# Patient Record
Sex: MALE | Race: OTHER | HISPANIC OR LATINO | Employment: FULL TIME | URBAN - METROPOLITAN AREA
[De-identification: names, ages, dates, MRNs, and addresses within clinical notes are randomized per-mention and may not be internally consistent; named-entity substitution may affect disease eponyms.]

---

## 2017-01-20 ENCOUNTER — TRANSCRIBE ORDERS (OUTPATIENT)
Dept: LAB | Facility: HOSPITAL | Age: 42
End: 2017-01-20

## 2017-01-20 ENCOUNTER — APPOINTMENT (OUTPATIENT)
Dept: LAB | Facility: HOSPITAL | Age: 42
End: 2017-01-20
Attending: INTERNAL MEDICINE
Payer: COMMERCIAL

## 2017-01-20 DIAGNOSIS — N18.30 CHRONIC KIDNEY DISEASE, STAGE III (MODERATE) (HCC): ICD-10-CM

## 2017-01-20 LAB
ANION GAP SERPL CALCULATED.3IONS-SCNC: 7 MMOL/L (ref 4–13)
BUN SERPL-MCNC: 26 MG/DL (ref 5–25)
CALCIUM SERPL-MCNC: 9 MG/DL (ref 8.3–10.1)
CHLORIDE SERPL-SCNC: 105 MMOL/L (ref 100–108)
CO2 SERPL-SCNC: 28 MMOL/L (ref 21–32)
CREAT SERPL-MCNC: 1.18 MG/DL (ref 0.6–1.3)
GFR SERPL CREATININE-BSD FRML MDRD: >60 ML/MIN/1.73SQ M
GLUCOSE SERPL-MCNC: 130 MG/DL (ref 65–140)
POTASSIUM SERPL-SCNC: 3.6 MMOL/L (ref 3.5–5.3)
SODIUM SERPL-SCNC: 140 MMOL/L (ref 136–145)

## 2017-01-20 PROCEDURE — 36415 COLL VENOUS BLD VENIPUNCTURE: CPT

## 2017-01-20 PROCEDURE — 80048 BASIC METABOLIC PNL TOTAL CA: CPT

## 2017-01-25 ENCOUNTER — ALLSCRIPTS OFFICE VISIT (OUTPATIENT)
Dept: OTHER | Facility: OTHER | Age: 42
End: 2017-01-25

## 2017-02-08 ENCOUNTER — ALLSCRIPTS OFFICE VISIT (OUTPATIENT)
Dept: OTHER | Facility: OTHER | Age: 42
End: 2017-02-08

## 2017-02-13 ENCOUNTER — GENERIC CONVERSION - ENCOUNTER (OUTPATIENT)
Dept: OTHER | Facility: OTHER | Age: 42
End: 2017-02-13

## 2017-02-22 ENCOUNTER — ALLSCRIPTS OFFICE VISIT (OUTPATIENT)
Dept: OTHER | Facility: OTHER | Age: 42
End: 2017-02-22

## 2017-03-07 ENCOUNTER — TRANSCRIBE ORDERS (OUTPATIENT)
Dept: LAB | Facility: HOSPITAL | Age: 42
End: 2017-03-07

## 2017-03-07 ENCOUNTER — APPOINTMENT (OUTPATIENT)
Dept: LAB | Facility: HOSPITAL | Age: 42
End: 2017-03-07
Attending: INTERNAL MEDICINE
Payer: COMMERCIAL

## 2017-03-07 DIAGNOSIS — R73.01 IMPAIRED FASTING GLUCOSE: ICD-10-CM

## 2017-03-07 DIAGNOSIS — E78.5 HYPERLIPIDEMIA, UNSPECIFIED HYPERLIPIDEMIA TYPE: ICD-10-CM

## 2017-03-07 DIAGNOSIS — E66.9 OBESITY, UNSPECIFIED: ICD-10-CM

## 2017-03-07 DIAGNOSIS — E78.5 HYPERLIPIDEMIA, UNSPECIFIED HYPERLIPIDEMIA TYPE: Primary | ICD-10-CM

## 2017-03-07 LAB
ANION GAP SERPL CALCULATED.3IONS-SCNC: 8 MMOL/L (ref 4–13)
BUN SERPL-MCNC: 18 MG/DL (ref 5–25)
CALCIUM SERPL-MCNC: 9.2 MG/DL (ref 8.3–10.1)
CHLORIDE SERPL-SCNC: 106 MMOL/L (ref 100–108)
CO2 SERPL-SCNC: 29 MMOL/L (ref 21–32)
CREAT SERPL-MCNC: 1.06 MG/DL (ref 0.6–1.3)
GFR SERPL CREATININE-BSD FRML MDRD: >60 ML/MIN/1.73SQ M
GLUCOSE SERPL-MCNC: 93 MG/DL (ref 65–140)
MAGNESIUM SERPL-MCNC: 2.3 MG/DL (ref 1.6–2.6)
POTASSIUM SERPL-SCNC: 3.6 MMOL/L (ref 3.5–5.3)
SODIUM SERPL-SCNC: 143 MMOL/L (ref 136–145)

## 2017-03-07 PROCEDURE — 83735 ASSAY OF MAGNESIUM: CPT

## 2017-03-07 PROCEDURE — 36415 COLL VENOUS BLD VENIPUNCTURE: CPT

## 2017-03-07 PROCEDURE — 80048 BASIC METABOLIC PNL TOTAL CA: CPT

## 2017-03-08 ENCOUNTER — GENERIC CONVERSION - ENCOUNTER (OUTPATIENT)
Dept: OTHER | Facility: OTHER | Age: 42
End: 2017-03-08

## 2017-03-08 ENCOUNTER — ALLSCRIPTS OFFICE VISIT (OUTPATIENT)
Dept: OTHER | Facility: OTHER | Age: 42
End: 2017-03-08

## 2017-03-22 ENCOUNTER — ALLSCRIPTS OFFICE VISIT (OUTPATIENT)
Dept: OTHER | Facility: OTHER | Age: 42
End: 2017-03-22

## 2017-03-26 ENCOUNTER — APPOINTMENT (OUTPATIENT)
Dept: LAB | Age: 42
End: 2017-03-26
Payer: COMMERCIAL

## 2017-03-26 ENCOUNTER — TRANSCRIBE ORDERS (OUTPATIENT)
Dept: ADMINISTRATIVE | Age: 42
End: 2017-03-26

## 2017-03-26 DIAGNOSIS — Z00.00 ROUTINE ADULT HEALTH MAINTENANCE: ICD-10-CM

## 2017-03-26 DIAGNOSIS — Z00.00 ROUTINE ADULT HEALTH MAINTENANCE: Primary | ICD-10-CM

## 2017-03-26 LAB
ALBUMIN SERPL BCP-MCNC: 4.1 G/DL (ref 3.5–5)
ALP SERPL-CCNC: 82 U/L (ref 46–116)
ALT SERPL W P-5'-P-CCNC: 69 U/L (ref 12–78)
ANION GAP SERPL CALCULATED.3IONS-SCNC: 8 MMOL/L (ref 4–13)
AST SERPL W P-5'-P-CCNC: 22 U/L (ref 5–45)
BASOPHILS # BLD AUTO: 0.13 THOUSANDS/ΜL (ref 0–0.1)
BASOPHILS NFR BLD AUTO: 2 % (ref 0–1)
BILIRUB SERPL-MCNC: 0.36 MG/DL (ref 0.2–1)
BUN SERPL-MCNC: 15 MG/DL (ref 5–25)
CALCIUM SERPL-MCNC: 9.1 MG/DL (ref 8.3–10.1)
CHLORIDE SERPL-SCNC: 106 MMOL/L (ref 100–108)
CO2 SERPL-SCNC: 30 MMOL/L (ref 21–32)
CORTIS AM PEAK SERPL-MCNC: 18 UG/ML (ref 4.2–22.4)
CREAT SERPL-MCNC: 0.91 MG/DL (ref 0.6–1.3)
CREAT UR-MCNC: 71.7 MG/DL
EOSINOPHIL # BLD AUTO: 0.51 THOUSAND/ΜL (ref 0–0.61)
EOSINOPHIL NFR BLD AUTO: 6 % (ref 0–6)
ERYTHROCYTE [DISTWIDTH] IN BLOOD BY AUTOMATED COUNT: 14 % (ref 11.6–15.1)
EST. AVERAGE GLUCOSE BLD GHB EST-MCNC: 120 MG/DL
GFR SERPL CREATININE-BSD FRML MDRD: >60 ML/MIN/1.73SQ M
GLUCOSE P FAST SERPL-MCNC: 81 MG/DL (ref 65–99)
HBA1C MFR BLD: 5.8 % (ref 4.2–6.3)
HCT VFR BLD AUTO: 46.9 % (ref 36.5–49.3)
HGB BLD-MCNC: 15.4 G/DL (ref 12–17)
LYMPHOCYTES # BLD AUTO: 1.55 THOUSANDS/ΜL (ref 0.6–4.47)
LYMPHOCYTES NFR BLD AUTO: 19 % (ref 14–44)
MAGNESIUM SERPL-MCNC: 2.4 MG/DL (ref 1.6–2.6)
MCH RBC QN AUTO: 28.3 PG (ref 26.8–34.3)
MCHC RBC AUTO-ENTMCNC: 32.8 G/DL (ref 31.4–37.4)
MCV RBC AUTO: 86 FL (ref 82–98)
MICROALBUMIN UR-MCNC: 9.2 MG/L (ref 0–20)
MICROALBUMIN/CREAT 24H UR: 13 MG/G CREATININE (ref 0–30)
MONOCYTES # BLD AUTO: 0.94 THOUSAND/ΜL (ref 0.17–1.22)
MONOCYTES NFR BLD AUTO: 12 % (ref 4–12)
NEUTROPHILS # BLD AUTO: 4.88 THOUSANDS/ΜL (ref 1.85–7.62)
NEUTS SEG NFR BLD AUTO: 61 % (ref 43–75)
NRBC BLD AUTO-RTO: 0 /100 WBCS
PHOSPHATE SERPL-MCNC: 3.4 MG/DL (ref 2.7–4.5)
PLATELET # BLD AUTO: 272 THOUSANDS/UL (ref 149–390)
PMV BLD AUTO: 9.9 FL (ref 8.9–12.7)
POTASSIUM SERPL-SCNC: 3.5 MMOL/L (ref 3.5–5.3)
PROLACTIN SERPL-MCNC: 36.7 NG/ML (ref 2.5–17.4)
PROT SERPL-MCNC: 7.5 G/DL (ref 6.4–8.2)
RBC # BLD AUTO: 5.45 MILLION/UL (ref 3.88–5.62)
SODIUM SERPL-SCNC: 144 MMOL/L (ref 136–145)
T3FREE SERPL-MCNC: 2.18 PG/ML (ref 2.3–4.2)
T4 FREE SERPL-MCNC: 0.82 NG/DL (ref 0.76–1.46)
TSH SERPL DL<=0.05 MIU/L-ACNC: 0.83 UIU/ML (ref 0.36–3.74)
WBC # BLD AUTO: 8.03 THOUSAND/UL (ref 4.31–10.16)

## 2017-03-26 PROCEDURE — 84100 ASSAY OF PHOSPHORUS: CPT

## 2017-03-26 PROCEDURE — 83036 HEMOGLOBIN GLYCOSYLATED A1C: CPT

## 2017-03-26 PROCEDURE — 82024 ASSAY OF ACTH: CPT

## 2017-03-26 PROCEDURE — 84439 ASSAY OF FREE THYROXINE: CPT

## 2017-03-26 PROCEDURE — 82043 UR ALBUMIN QUANTITATIVE: CPT | Performed by: SPECIALIST

## 2017-03-26 PROCEDURE — 84146 ASSAY OF PROLACTIN: CPT

## 2017-03-26 PROCEDURE — 84481 FREE ASSAY (FT-3): CPT

## 2017-03-26 PROCEDURE — 82533 TOTAL CORTISOL: CPT

## 2017-03-26 PROCEDURE — 36415 COLL VENOUS BLD VENIPUNCTURE: CPT

## 2017-03-26 PROCEDURE — 84443 ASSAY THYROID STIM HORMONE: CPT

## 2017-03-26 PROCEDURE — 83735 ASSAY OF MAGNESIUM: CPT

## 2017-03-26 PROCEDURE — 85025 COMPLETE CBC W/AUTO DIFF WBC: CPT

## 2017-03-26 PROCEDURE — 82570 ASSAY OF URINE CREATININE: CPT | Performed by: SPECIALIST

## 2017-03-26 PROCEDURE — 80053 COMPREHEN METABOLIC PANEL: CPT

## 2017-03-28 LAB — ACTH PLAS-MCNC: 2 PG/ML (ref 7.2–63.3)

## 2017-04-05 ENCOUNTER — GENERIC CONVERSION - ENCOUNTER (OUTPATIENT)
Dept: OTHER | Facility: OTHER | Age: 42
End: 2017-04-05

## 2017-05-04 ENCOUNTER — ALLSCRIPTS OFFICE VISIT (OUTPATIENT)
Dept: OTHER | Facility: OTHER | Age: 42
End: 2017-05-04

## 2017-05-17 ENCOUNTER — TRANSCRIBE ORDERS (OUTPATIENT)
Dept: ADMINISTRATIVE | Age: 42
End: 2017-05-17

## 2017-05-17 ENCOUNTER — APPOINTMENT (OUTPATIENT)
Dept: LAB | Age: 42
End: 2017-05-17
Payer: COMMERCIAL

## 2017-05-17 DIAGNOSIS — E27.8 ABNORMALITY OF CORTISOL-BINDING GLOBULIN (HCC): ICD-10-CM

## 2017-05-17 DIAGNOSIS — E27.8 ABNORMALITY OF CORTISOL-BINDING GLOBULIN (HCC): Primary | ICD-10-CM

## 2017-05-17 DIAGNOSIS — N18.30 CHRONIC KIDNEY DISEASE, STAGE III (MODERATE) (HCC): ICD-10-CM

## 2017-05-17 LAB
ANION GAP SERPL CALCULATED.3IONS-SCNC: 6 MMOL/L (ref 4–13)
BUN SERPL-MCNC: 17 MG/DL (ref 5–25)
CALCIUM SERPL-MCNC: 9 MG/DL (ref 8.3–10.1)
CHLORIDE SERPL-SCNC: 105 MMOL/L (ref 100–108)
CO2 SERPL-SCNC: 31 MMOL/L (ref 21–32)
CREAT SERPL-MCNC: 0.89 MG/DL (ref 0.6–1.3)
GFR SERPL CREATININE-BSD FRML MDRD: >60 ML/MIN/1.73SQ M
GLUCOSE P FAST SERPL-MCNC: 93 MG/DL (ref 65–99)
POTASSIUM SERPL-SCNC: 3.6 MMOL/L (ref 3.5–5.3)
SODIUM SERPL-SCNC: 142 MMOL/L (ref 136–145)
TESTOST SERPL-MCNC: 478.2 NG/DL (ref 241–827)

## 2017-05-17 PROCEDURE — 84133 ASSAY OF URINE POTASSIUM: CPT

## 2017-05-17 PROCEDURE — 82507 ASSAY OF CITRATE: CPT

## 2017-05-17 PROCEDURE — 84403 ASSAY OF TOTAL TESTOSTERONE: CPT

## 2017-05-17 PROCEDURE — 80048 BASIC METABOLIC PNL TOTAL CA: CPT

## 2017-05-17 PROCEDURE — 81003 URINALYSIS AUTO W/O SCOPE: CPT

## 2017-05-17 PROCEDURE — 82436 ASSAY OF URINE CHLORIDE: CPT

## 2017-05-17 PROCEDURE — 82340 ASSAY OF CALCIUM IN URINE: CPT

## 2017-05-17 PROCEDURE — 83935 ASSAY OF URINE OSMOLALITY: CPT

## 2017-05-17 PROCEDURE — 82140 ASSAY OF AMMONIA: CPT

## 2017-05-17 PROCEDURE — 82530 CORTISOL FREE: CPT

## 2017-05-17 PROCEDURE — 84300 ASSAY OF URINE SODIUM: CPT

## 2017-05-17 PROCEDURE — 84392 ASSAY OF URINE SULFATE: CPT

## 2017-05-17 PROCEDURE — 84560 ASSAY OF URINE/URIC ACID: CPT

## 2017-05-17 PROCEDURE — 84105 ASSAY OF URINE PHOSPHORUS: CPT

## 2017-05-17 PROCEDURE — 83735 ASSAY OF MAGNESIUM: CPT

## 2017-05-17 PROCEDURE — 83945 ASSAY OF OXALATE: CPT

## 2017-05-17 PROCEDURE — 36415 COLL VENOUS BLD VENIPUNCTURE: CPT

## 2017-05-17 PROCEDURE — 82570 ASSAY OF URINE CREATININE: CPT

## 2017-05-17 PROCEDURE — 82131 AMINO ACIDS SINGLE QUANT: CPT

## 2017-05-19 LAB
CORTIS F 24H UR-MRATE: 121 UG/24 HR (ref 0–50)
CORTIS F UR-MCNC: 26 UG/L

## 2017-05-25 LAB
AMMONIA 24H UR-MRATE: 54 MEQ/24 HR
AMMONIA UR-SCNC: ABNORMAL UG/DL
CA H2 PHOS DIHYD CRY URNS QL MICRO: 2.65 RATIO (ref 0–3)
CALCIUM 24H UR-MCNC: 10.4 MG/DL
CALCIUM 24H UR-MRATE: 483.6 MG/24 HR (ref 100–300)
CHLORIDE 24H UR-SCNC: <20 MMOL/L
CHLORIDE 24H UR-SRATE: <93 MMOL/24 HR (ref 110–250)
CITRATE 24H UR-MCNC: 39 MG/L
CITRATE 24H UR-MRATE: 181 MG/24 HR (ref 320–1240)
COM CRY STONE QL IR: 2.24 RATIO (ref 0–6)
CREAT 24H UR-MCNC: 38 MG/DL
CREAT 24H UR-MRATE: 1767 MG/24 HR (ref 1000–2000)
CYSTINE 24H UR-MCNC: 0.36 MG/L
CYSTINE 24H UR-MRATE: 1.67 MG/24 HR (ref 10–100)
MAGNESIUM 24H UR-MRATE: 107 MG/24 HR (ref 12–293)
MAGNESIUM UR-MCNC: 2.3 MG/DL
NA URATE CRY STONE QL IR: 0.43 RATIO (ref 0–4)
OSMOLALITY UR: 248 MOSMOL/KG (ref 300–900)
OXALATE 24H UR-MRATE: 23 MG/24 HR (ref 7–44)
OXALATE UR-MCNC: 5 MG/L
PH 24H UR: 6.7 [PH]
PHOSPHATE 24H UR-MCNC: 29.5 MG/DL
PHOSPHATE 24H UR-MRATE: 1371.8 MG/24 HR (ref 400–1300)
PLEASE NOTE (STONE RISK): ABNORMAL
POTASSIUM 24H UR-SCNC: 97.6 MMOL/24 HR (ref 25–125)
POTASSIUM UR-SCNC: 21 MMOL/L
PRESERVED URINE: 4650 ML/24 HR (ref 800–1800)
SODIUM 24H UR-SCNC: <20 MMOL/L
SODIUM 24H UR-SRATE: <93 MMOL/24 HR (ref 58–337)
SPECIMEN VOL 24H UR: 4650 ML/24 HR (ref 800–1800)
SULFATE 24H UR-MCNC: 65 MEQ/24 HR (ref 0–30)
SULFATE UR-MCNC: 14 MEQ/L
TRI-PHOS CRY STONE MICRO: 0.05 RATIO (ref 0–1)
URATE 24H UR-MCNC: 17.9 MG/DL
URATE 24H UR-MRATE: 832 MG/24 HR (ref 250–750)
URATE DIHYD CRY STONE QL IR: 0.17 RATIO (ref 0–1.2)

## 2017-06-02 ENCOUNTER — TRANSCRIBE ORDERS (OUTPATIENT)
Dept: ADMINISTRATIVE | Facility: HOSPITAL | Age: 42
End: 2017-06-02

## 2017-06-02 DIAGNOSIS — E27.8 ABNORMALITY OF CORTISOL-BINDING GLOBULIN (HCC): Primary | ICD-10-CM

## 2017-06-02 DIAGNOSIS — I15.2 ADRENAL HYPERTENSION (HCC): Primary | ICD-10-CM

## 2017-06-02 DIAGNOSIS — E27.9 ADRENAL HYPERTENSION (HCC): Primary | ICD-10-CM

## 2017-06-08 ENCOUNTER — GENERIC CONVERSION - ENCOUNTER (OUTPATIENT)
Dept: OTHER | Facility: OTHER | Age: 42
End: 2017-06-08

## 2017-06-19 ENCOUNTER — HOSPITAL ENCOUNTER (OUTPATIENT)
Dept: RADIOLOGY | Age: 42
Discharge: HOME | End: 2017-06-19
Payer: COMMERCIAL

## 2017-06-19 DIAGNOSIS — I10 ESSENTIAL (PRIMARY) HYPERTENSION: ICD-10-CM

## 2017-06-19 DIAGNOSIS — E04.1 THYROID NODULE: ICD-10-CM

## 2017-06-19 DIAGNOSIS — E27.8 ABNORMALITY OF CORTISOL-BINDING GLOBULIN (HCC): ICD-10-CM

## 2017-06-19 DIAGNOSIS — N20.0 CALCULUS OF KIDNEY: ICD-10-CM

## 2017-06-19 DIAGNOSIS — N18.30 CHRONIC KIDNEY DISEASE, STAGE III (MODERATE) (HCC): ICD-10-CM

## 2017-06-19 DIAGNOSIS — E27.9 ADRENAL HYPERTENSION (HCC): ICD-10-CM

## 2017-06-19 DIAGNOSIS — I15.2 ADRENAL HYPERTENSION (HCC): ICD-10-CM

## 2017-06-19 PROCEDURE — 77080 DXA BONE DENSITY AXIAL: CPT

## 2017-06-19 PROCEDURE — 76536 US EXAM OF HEAD AND NECK: CPT

## 2017-06-29 ENCOUNTER — TRANSCRIBE ORDERS (OUTPATIENT)
Dept: LAB | Facility: HOSPITAL | Age: 42
End: 2017-06-29

## 2017-06-29 ENCOUNTER — APPOINTMENT (OUTPATIENT)
Dept: LAB | Facility: HOSPITAL | Age: 42
End: 2017-06-29
Payer: COMMERCIAL

## 2017-06-29 DIAGNOSIS — Z00.8 HEALTH EXAMINATION IN POPULATION SURVEY: ICD-10-CM

## 2017-06-29 DIAGNOSIS — Z00.8 HEALTH EXAMINATION IN POPULATION SURVEY: Primary | ICD-10-CM

## 2017-06-29 LAB
CHOLEST SERPL-MCNC: 184 MG/DL (ref 50–200)
EST. AVERAGE GLUCOSE BLD GHB EST-MCNC: 140 MG/DL
HBA1C MFR BLD: 6.5 % (ref 4.2–6.3)
HDLC SERPL-MCNC: 63 MG/DL (ref 40–60)
LDLC SERPL CALC-MCNC: 95 MG/DL (ref 0–100)
TRIGL SERPL-MCNC: 128 MG/DL

## 2017-06-29 PROCEDURE — 80061 LIPID PANEL: CPT

## 2017-06-29 PROCEDURE — 36415 COLL VENOUS BLD VENIPUNCTURE: CPT

## 2017-06-29 PROCEDURE — 83036 HEMOGLOBIN GLYCOSYLATED A1C: CPT

## 2017-08-02 ENCOUNTER — GENERIC CONVERSION - ENCOUNTER (OUTPATIENT)
Dept: OTHER | Facility: OTHER | Age: 42
End: 2017-08-02

## 2017-09-11 ENCOUNTER — ALLSCRIPTS OFFICE VISIT (OUTPATIENT)
Dept: OTHER | Facility: OTHER | Age: 42
End: 2017-09-11

## 2017-11-28 ENCOUNTER — TRANSCRIBE ORDERS (OUTPATIENT)
Dept: LAB | Facility: HOSPITAL | Age: 42
End: 2017-11-28

## 2017-11-28 ENCOUNTER — APPOINTMENT (OUTPATIENT)
Dept: LAB | Facility: HOSPITAL | Age: 42
End: 2017-11-28
Payer: COMMERCIAL

## 2017-11-28 DIAGNOSIS — E27.8 ABNORMALITY OF CORTISOL-BINDING GLOBULIN (HCC): ICD-10-CM

## 2017-11-28 DIAGNOSIS — D35.00: ICD-10-CM

## 2017-11-28 DIAGNOSIS — E27.8 ABNORMALITY OF CORTISOL-BINDING GLOBULIN (HCC): Primary | ICD-10-CM

## 2017-11-28 LAB
ANION GAP SERPL CALCULATED.3IONS-SCNC: 5 MMOL/L (ref 4–13)
BUN SERPL-MCNC: 22 MG/DL (ref 5–25)
CALCIUM SERPL-MCNC: 9.2 MG/DL (ref 8.3–10.1)
CHLORIDE SERPL-SCNC: 106 MMOL/L (ref 100–108)
CO2 SERPL-SCNC: 30 MMOL/L (ref 21–32)
CREAT SERPL-MCNC: 1.11 MG/DL (ref 0.6–1.3)
GFR SERPL CREATININE-BSD FRML MDRD: 81 ML/MIN/1.73SQ M
GLUCOSE P FAST SERPL-MCNC: 129 MG/DL (ref 65–99)
POTASSIUM SERPL-SCNC: 3.3 MMOL/L (ref 3.5–5.3)
SODIUM SERPL-SCNC: 141 MMOL/L (ref 136–145)

## 2017-11-28 PROCEDURE — 36415 COLL VENOUS BLD VENIPUNCTURE: CPT

## 2017-11-28 PROCEDURE — 82530 CORTISOL FREE: CPT

## 2017-11-28 PROCEDURE — 80048 BASIC METABOLIC PNL TOTAL CA: CPT

## 2017-11-30 LAB
CORTIS F 24H UR-MRATE: 68 UG/24 HR (ref 0–50)
CORTIS F UR-MCNC: 25 UG/L

## 2017-12-14 ENCOUNTER — APPOINTMENT (OUTPATIENT)
Dept: LAB | Facility: HOSPITAL | Age: 42
End: 2017-12-14
Payer: COMMERCIAL

## 2017-12-14 DIAGNOSIS — D35.00: ICD-10-CM

## 2017-12-14 LAB
BASOPHILS # BLD AUTO: 0.05 THOUSANDS/ΜL (ref 0–0.1)
BASOPHILS NFR BLD AUTO: 1 % (ref 0–1)
EOSINOPHIL # BLD AUTO: 0.13 THOUSAND/ΜL (ref 0–0.61)
EOSINOPHIL NFR BLD AUTO: 1 % (ref 0–6)
ERYTHROCYTE [DISTWIDTH] IN BLOOD BY AUTOMATED COUNT: 13.1 % (ref 11.6–15.1)
HCT VFR BLD AUTO: 45.5 % (ref 36.5–49.3)
HGB BLD-MCNC: 15.3 G/DL (ref 12–17)
LYMPHOCYTES # BLD AUTO: 1.82 THOUSANDS/ΜL (ref 0.6–4.47)
LYMPHOCYTES NFR BLD AUTO: 17 % (ref 14–44)
MCH RBC QN AUTO: 29.4 PG (ref 26.8–34.3)
MCHC RBC AUTO-ENTMCNC: 33.6 G/DL (ref 31.4–37.4)
MCV RBC AUTO: 87 FL (ref 82–98)
MONOCYTES # BLD AUTO: 1.33 THOUSAND/ΜL (ref 0.17–1.22)
MONOCYTES NFR BLD AUTO: 13 % (ref 4–12)
NEUTROPHILS # BLD AUTO: 7.05 THOUSANDS/ΜL (ref 1.85–7.62)
NEUTS SEG NFR BLD AUTO: 68 % (ref 43–75)
NRBC BLD AUTO-RTO: 0 /100 WBCS
PLATELET # BLD AUTO: 311 THOUSANDS/UL (ref 149–390)
PMV BLD AUTO: 8.9 FL (ref 8.9–12.7)
RBC # BLD AUTO: 5.21 MILLION/UL (ref 3.88–5.62)
WBC # BLD AUTO: 10.54 THOUSAND/UL (ref 4.31–10.16)

## 2017-12-14 PROCEDURE — 84402 ASSAY OF FREE TESTOSTERONE: CPT

## 2017-12-14 PROCEDURE — 85025 COMPLETE CBC W/AUTO DIFF WBC: CPT

## 2017-12-14 PROCEDURE — 84403 ASSAY OF TOTAL TESTOSTERONE: CPT

## 2017-12-14 PROCEDURE — 36415 COLL VENOUS BLD VENIPUNCTURE: CPT

## 2017-12-16 LAB
TESTOST FREE SERPL-MCNC: 14 PG/ML (ref 6.8–21.5)
TESTOST SERPL-MCNC: 227 NG/DL (ref 264–916)

## 2018-01-11 NOTE — PROGRESS NOTES
Chief Complaint  Chief Complaint Free Text Note Form: Patient here for Health system follow up; BP slightly elevated, states he just took his BP meds  History of Present Illness  HPI: Transition from very low calorie diet to low calorie diet  Utilizing 2 mm replacement and one protein bar and a normal dinner  Has gained about 1/2 pounds since his last visit  He to reach this due to over indulging over the holidays and increased snacking not necessarily due to hunger but because of severe  He has not been able to consistently incorporate exercise  He remains off of diuretics, his BP was mildly elevated but just took his medications prior to      Past Medical History    1  History of Obesity (BMI 30 0-34 9) (278 00) (E66 9)    Assessment    1  Weight gain (783 1) (R63 5)   2  Benign essential hypertension (401 1) (I10)   3  GERD (gastroesophageal reflux disease) (530 81) (K21 9)   4  Impaired fasting glucose (790 21) (R73 01)    Discussion/Summary  Discussion Summary:   35 yo male w/ hypertension, CKD II/IIIa, hyperlipidemia, GERD, MED, impaired fasting glucose, nephrolithiasis, low T bilateral adrenal nodules and obesity here to pursue medical weight management to improve weight and health      Initial: 217 7  Current: 193 8  Change: -23 8 lbs (-11%)    Obesity class 1-->overweight:  -completed VLCD-->LCD  -initial focus of 5-10% weight loss over 3-6 mos for improved health(met)  -screening labs-BMP completed 1/2017 and within acceptable limits, repeat labs 3/2017 by PCP and within acceptable limits    IFG: improving  -a1c 6 3-->5 8%  -defer metformin for now    b/l adrenal nodules/abnormal ft4:  -follows w/ endocrinology    HTN:  -on coreg, and felodipine  -torsemide held, pt stopped eplerenone and BP stable at home in the 120s /80  -mildly elevated today, but pt just took BP meds    GERD: improved  -decreased PPI to once daily, now decrease to every other day and stop after 2 weeks,  if symptoms return go back to once daily    HPL:  -on statin    low T:  -on androgel    History of kidney stones/CKD II/IIIa:  -encourage hydration, no protein restriction per guidelines    MED:  -pt does not use CPAP  -Advised on risks of untreated sleep apnea and how it may relate to difficulty with losing weight  -Since patient will start on aggressive weight loss regimen will defer sleep medicine evaluation for now    RTC in 3 months w/ me  Goals and Barriers: The patient has the current Goals:   Goal:  80oz Of water  Continue to monitor BP  Add exercise to regimen, combine protein w/ carb for snack  Decrease PPI to every other day for 2 weeks then stop  Patient's Capacity to Self-Care: Patient is able to Self-Care  Understands and agrees with treatment plan: The treatment plan was reviewed with the patient/guardian  The patient/guardian understands and agrees with the treatment plan      Active Problems    1  Abnormal EKG (794 31) (R94 31)   2  Adrenal mass (255 9) (E27 9)   3  Atypical chest pain (786 59) (R07 89)   4  Benign essential hypertension (401 1) (I10)   5  CKD (chronic kidney disease), stage III (585 3) (N18 3)   6  GERD (gastroesophageal reflux disease) (530 81) (K21 9)   7  Heart burn (787 1) (R12)   8  Hyperlipidemia (272 4) (E78 5)   9  Impaired fasting glucose (790 21) (R73 01)   10  Kidney stones (592 0) (N20 0)   11  Low testosterone (257 2) (E29 1)   12  Nephrolithiasis (592 0) (N20 0)   13  Obstructive sleep apnea (327 23) (G47 33)   14  Thyroid disease (246 9) (E07 9)   15  Weight gain (783 1) (R63 5)    Family History  Mother    1  Family history of asthma (V17 5) (Z82 5)   2  Family history of diabetes mellitus (V18 0) (Z83 3)  Father    3  Family history of endocarditis (V17 49) (Z82 49)   4  Family history of essential hypertension (V17 49) (Z82 49)   5   Family history of gout (V18 19) (Z82 69)    Social History    · Being A Social Drinker   · Daily caffeine consumption, 4-5 servings a day   · Former smoker (V15 82) (W49 087)   · Denied: History of Never Used Drugs    Current Meds   1  AndroGel Pump 20 25 MG/ACT (1 62%) Transdermal Gel; APPLY ONE PUMP PRESS   ONE TIME DAILY AS DIRECTED; Therapy: (Recorded:10Kjp6593) to Recorded   2  Atorvastatin Calcium 20 MG Oral Tablet; Take 1 tablet daily; Therapy: 61LJL0086 to Recorded   3  Carvedilol 12 5 MG Oral Tablet; TAKE ONE TABLET BY MOUTH EVERY DAY IN THE   MORNING AND TAKE 2 TABLETS IN THE EVENING; Therapy: 10CVE4861 to  Requested for: 25QJK1326 Recorded   4  Felodipine ER 10 MG Oral Tablet Extended Release 24 Hour; TAKE 1 TABLET ONCE   DAILY; Therapy: 87LUJ8851 to (Evaluate:27Sue2636)  Requested for: 53HGH2449; Last   Rx:06Bsx4703 Ordered   5  Omeprazole 20 MG Oral Capsule Delayed Release; TAKE 1 CAPSULE TWICE DAILY; Therapy: (459 9517) to Recorded   6  Vitamin D 2000 UNIT Oral Tablet; Take 1 tablet daily Recorded    Allergies    1  No Known Drug Allergies    Vitals  Vital Signs    Recorded: 98WAR2123 09:01AM   Temperature 96 8 F   Heart Rate 64   Respiration 16   Systolic 187   Diastolic 86   Height 5 ft 8 5 in   Weight 193 lb 12 8 oz   BMI Calculated 29 04   BSA Calculated 2 02     Physical Exam    Constitutional   General appearance: Abnormal   well developed and obese  Eyes No conjunctival pallor  Ears, Nose, Mouth, and Throat Moist oral mucosa  Pulmonary   Respiratory effort: No increased work of breathing or signs of respiratory distress  Auscultation of lungs: Clear to auscultation, equal breath sounds bilaterally, no wheezes, no rales, no rhonci  Cardiovascular   Auscultation of heart: Normal rate and rhythm, normal S1 and S2, without murmurs  Abdomen   Abdomen: Non-tender, no masses      Musculoskeletal   Gait and station: Normal     Psychiatric   Orientation to person, place and time: Normal     Mood and affect: Normal          Signatures   Electronically signed by : JONNA Lorenzo ; May  4 2017 10:07AM EST (Author)

## 2018-01-12 VITALS
HEIGHT: 69 IN | DIASTOLIC BLOOD PRESSURE: 80 MMHG | TEMPERATURE: 97.6 F | WEIGHT: 217.7 LBS | HEART RATE: 72 BPM | SYSTOLIC BLOOD PRESSURE: 140 MMHG | BODY MASS INDEX: 32.24 KG/M2

## 2018-01-12 NOTE — PROGRESS NOTES
History of Present Illness  HPI: Ann Kapoor presented for his 4 week follow-up of the VLCD diet  He lost 7 9# the past 2 weeks and has had an overall 15 9# loss (7 3% TBW) in the past 4 weeks  His BP was 132/70 today and he states he has had similar daily readings at home  He stated that he discontinued Eplerone  Relayed this to Dr Yumiko Mckinney and patient instrcuted to notify his nephrologist of this change  He is taking Carvedilol and Felodipine  Patient states they are doing well  Tolerating meal replacements without difficulty  Consuming 60 oz of water - stressed he should be consuming 80oz -100 oz daily of water in addition to water used to mix replacements  Patient not experiencing constipation  He stated his energy level is good and has no break through hunger  Patient wishes to continue another 2 weeks on the VLCD diet  Bariatric [facility]:   Weight Assessment: IBW: 157#, Goal Weight: 185# ( 30#)  Excess calories come from in between meal snacking, large portions at mealtimes and high calorie high fat food choices  Dietary Recall:   Breakfast: Shake  Lunch: Shake  Snack: bar  Dinner: Soup  Beverages: water  Estimated fluid intake: 60-80oz daily   He dines out seldom  Exercise Frequency:  He does not exercise  His obesity/being overweight is related to excessive energy intake and as evidenced by BMI: 30 2  Nutrition Prescription: Estimated calories for weight loss: Ecal BMR: 3806 weight loss 1465 calories ( 2# per week with light activity with job), Estimated daily protein needs: 86-107gm ( 1 2-1 5gm/kg IBW) and Estimated daily fluid needs: 83oz ( 35ml/kg IBW)  Nutrition Intervention: Counseling provided with emphasis on meal planning, hydration, fiber intake and food journaling  Comprehension: good  Expected Compliance: good     Goals: follow meal plan prescribed, do not skip meals or snacks and VLCD Diet - 800 caloires/ 50 grams of protein using 3 meal replacements and 1 bar    Monitor/Evaluation: weekly weight, food journal, fluid intake and exercise level  Active Problems    1  Abnormal EKG (794 31) (R94 31)   2  Adrenal mass (255 9) (E27 9)   3  Atypical chest pain (786 59) (R07 89)   4  Benign essential hypertension (401 1) (I10)   5  CKD (chronic kidney disease), stage III (585 3) (N18 3)   6  GERD (gastroesophageal reflux disease) (530 81) (K21 9)   7  Heart burn (787 1) (R12)   8  Hyperlipidemia (272 4) (E78 5)   9  Impaired fasting glucose (790 21) (R73 01)   10  Kidney stones (592 0) (N20 0)   11  Low testosterone (257 2) (E29 1)   12  Nephrolithiasis (592 0) (N20 0)   13  Obesity (BMI 30 0-34 9) (278 00) (E66 9)   14  Obstructive sleep apnea (327 23) (G47 33)   15  Thyroid disease (246 9) (E07 9)   16  Weight gain (783 1) (R63 5)    Family History  Mother    1  Family history of asthma (V17 5) (Z82 5)   2  Family history of diabetes mellitus (V18 0) (Z83 3)  Father    3  Family history of endocarditis (V17 49) (Z82 49)   4  Family history of essential hypertension (V17 49) (Z82 49)   5  Family history of gout (V18 19) (Z82 69)    Social History    · Being A Social Drinker   · Daily caffeine consumption, 4-5 servings a day   · Former smoker (V15 82) (H12 092)   · Denied: History of Never Used Drugs    Current Meds   1  AndroGel Pump 20 25 MG/ACT (1 62%) Transdermal Gel; APPLY ONE PUMP PRESS   ONE TIME DAILY AS DIRECTED; Therapy: (Recorded:78Cme1490) to Recorded   2  Atorvastatin Calcium 20 MG Oral Tablet; Take 1 tablet daily; Therapy: 59ONQ3484 to Recorded   3  Carvedilol 12 5 MG Oral Tablet; TAKE ONE TABLET BY MOUTH EVERY DAY IN THE   MORNING AND TAKE 2 TABLETS IN THE EVENING; Therapy: 28CCN0349 to  Requested for: 95GBO1251 Recorded   4  Eplerenone 50 MG Oral Tablet; TAKE 1 TABLET DAILY; Therapy: 26TEJ2846 to (Evaluate:42Cjm3947)  Requested for: 72HWN8825; Last   Rx:90Vbd1290 Ordered   5   Felodipine ER 10 MG Oral Tablet Extended Release 24 Hour; TAKE 1 TABLET ONCE   DAILY; Therapy: 54VGV2603 to (Evaluate:21Laa3914)  Requested for: 92BJK3227; Last   Rx:28Hoc3891 Ordered   6  Omeprazole 20 MG Oral Capsule Delayed Release; TAKE 1 CAPSULE TWICE DAILY; Therapy: (458 1615) to Recorded   7  Torsemide 10 MG Oral Tablet; take 1 tablet by mouth one time daily; Therapy: 64ISW1863 to (Evaluate:29Gqp0839)  Requested for: 19PCH0188; Last   Rx:91Bpj1279 Ordered   8  Vitamin D 2000 UNIT Oral Tablet; Take 1 tablet daily Recorded    Allergies    1  No Known Drug Allergies    Vitals  Signs   Recorded: 07STG9464 10:62RR   Systolic: 701  Diastolic: 70  Height: 5 ft 8 5 in  Weight: 201 lb 12 8 oz  BMI Calculated: 30 24  BSA Calculated: 2 06    Future Appointments    Date/Time Provider Specialty Site   04/05/2017 08:00 AM Jasson Gardner [facility]   03/22/2017 09:15 AM Mortimer Keeling, M D   Bariatric Medicine [facility]     Signatures   Electronically signed by : Joy Azevedo, ; Mar  9 2017  9:54AM EST                       (Author)    Electronically signed by : JONNA Soto ; Mar  9 2017 12:18PM EST                       (Co-author)

## 2018-01-12 NOTE — PROGRESS NOTES
Chief Complaint  Chief Complaint Free Text Note Form: Patient here for MW follow up  History of Present Illness  HPI: The patient presents for MW follow up  He has had a 5 lb weight gain since his last visit  He has continued to do two shakes per day, with 2 hard boiled eggs for am snack  He usually does not have any snacks before dinner, but has the most difficulty in the evenings  He reports his wife makes a lot of different foods for dinner  He also has trouble with evening snacking, particularly cheetos  Initially was measuring out portions, but has not been doing so lately  Not exercising  No longer food logging, but has been trying to be consistent with his food choices in the evenings  Past Medical History    1  History of Obesity (BMI 30 0-34 9) (278 00) (E66 9)  Active Problems And Past Medical History Reviewed: The active problems and past medical history were reviewed and updated today  Assessment    1  Weight gain (783 1) (R63 5)   2  Benign essential hypertension (401 1) (I10)   3  CKD (chronic kidney disease), stage III (585 3) (N18 3)   4  DMII (diabetes mellitus, type 2) (250 00) (E11 9)   5  GERD (gastroesophageal reflux disease) (530 81) (K21 9)   6  Hyperlipidemia (272 4) (E78 5)    Discussion/Summary  Discussion Summary:   37 yo male w/ hypertension, CKD II/IIIa, hyperlipidemia, GERD, MED, impaired fasting glucose, nephrolithiasis, low T bilateral adrenal nodules and obesity here to pursue medical weight management to improve weight and health  Initial: 217 7  Current: 198 8 lbs  Change: -18 9 lbs (-8 5 %)    Obesity class 1-->overweight:  -completed VLCD-->LCD  -initial focus of 5-10% weight loss over 3-6 mos for improved health(met)  -screening labs-BMP completed 1/2017 and within acceptable limits, repeat labs 3/2017 by PCP and within acceptable limits  -Discussed the role of weight loss medications   Patient is currently not interested, as he feels he is aware of the areas he needs to improve  He finds food logging tedious  He does not have much variation in breakfast, am snack, and lunch, therefore recommended he focus on food logging his evening meals, snacks, and beverages, for now  He should try not exceeding 850 calories to continue with 1821-3457/day calorie diet  However, did discuss the importance of food logging consistently throughout the day  Also recommended he add mid afternoon snack to help prevent overeating at dinner, such as string cheese with apple or greek yogurt  IFG --> DM 2  -a1c 6 3--> 5 8% --> 6 5 %  -Reviewed A1c results from 6/29/17 with the patient  -He has an appointment coming up with endocrinology and plans to further discuss at that time   -Discussed potential treatments, including metformin/Saxenda, depending on his kidney function    -He will focus on lifestyle changes first    -Recommended he have notes/labs from endocrinologist sent to our office  b/l adrenal nodules/abnormal ft4:  -follows w/ endocrinology    HTN: stable  -on coreg, and felodipine      GERD: improved  -decreased PPI to once daily  -drinks 4-8 cups coffee/day  Strongly encouraged he reduce coffee intake to help improve sx  HPL:  -on statin  -within acceptable limits from 6/29/17    low T:  -on androgel    History of kidney stones/CKD II/IIIa:  -encourage hydration, no protein restriction per guidelines    MED:  -pt does not use CPAP  -will further discuss at next appointment  RTC in 3-4 months  Goals and Barriers: The patient has the current Goals:   Goal:  80oz Of water  Add exercise to regimen  No sugary beverages  Patient's Capacity to Self-Care: Patient is able to Self-Care  Understands and agrees with treatment plan: The treatment plan was reviewed with the patient/guardian  The patient/guardian understands and agrees with the treatment plan      Active Problems    1  Abnormal EKG (794 31) (R94 31)   2  Adrenal mass (255 9) (E27 9)   3   Atypical chest pain (786 59) (R07 89)   4  Benign essential hypertension (401 1) (I10)   5  CKD (chronic kidney disease), stage III (585 3) (N18 3)   6  GERD (gastroesophageal reflux disease) (530 81) (K21 9)   7  Heart burn (787 1) (R12)   8  Hyperlipidemia (272 4) (E78 5)   9  Impaired fasting glucose (790 21) (R73 01)   10  Kidney stones (592 0) (N20 0)   11  Low testosterone (257 2) (E29 1)   12  Nephrolithiasis (592 0) (N20 0)   13  Obstructive sleep apnea (327 23) (G47 33)   14  Thyroid disease (246 9) (E07 9)   15  Weight gain (783 1) (R63 5)    Surgical History  Surgical History Reviewed: The surgical history was reviewed and updated today  Family History  Mother    1  Family history of asthma (V17 5) (Z82 5)   2  Family history of diabetes mellitus (V18 0) (Z83 3)  Father    3  Family history of endocarditis (V17 49) (Z82 49)   4  Family history of essential hypertension (V17 49) (Z82 49)   5  Family history of gout (V18 19) (Z82 69)  Family History Reviewed: The family history was reviewed and updated today  Social History    · Being A Social Drinker   · Daily caffeine consumption, 4-5 servings a day   · Former smoker (V15 82) (G14 742)   · Denied: History of Never Used Drugs  Social History Reviewed: The social history was reviewed and updated today  Current Meds   1  AndroGel Pump 20 25 MG/ACT (1 62%) Transdermal Gel; APPLY ONE PUMP PRESS   ONE TIME DAILY AS DIRECTED; Therapy: (Recorded:95Wnr3431) to Recorded   2  Atorvastatin Calcium 20 MG Oral Tablet; Take 1 tablet daily; Therapy: 63AZQ9300 to Recorded   3  Carvedilol 12 5 MG Oral Tablet; TAKE ONE TABLET BY MOUTH EVERY DAY IN THE   MORNING AND TAKE 2 TABLETS IN THE EVENING; Therapy: 98DDK5794 to  Requested for: 91KRW2566 Recorded   4  Felodipine ER 10 MG Oral Tablet Extended Release 24 Hour; TAKE 1 TABLET ONCE   DAILY; Therapy: 89INT7389 to (Evaluate:86Qyq0722)  Requested for: 28TXB5599; Last   Rx:63Xok1623 Ordered   5   Omeprazole 20 MG Oral Capsule Delayed Release; TAKE 1 CAPSULE TWICE DAILY; Therapy: (497 7898) to Recorded   6  Vitamin D 2000 UNIT Oral Tablet; Take 1 tablet daily Recorded  Medication List Reviewed: The medication list was reviewed and updated today  Allergies    1  No Known Drug Allergies    Vitals  Vital Signs    Recorded: 11Sep2017 12:55PM   Temperature 97 9 F   Heart Rate 68   Respiration 16   Systolic 392   Diastolic 80   Height 5 ft 8 5 in   Weight 198 lb 12 8 oz   BMI Calculated 29 79   BSA Calculated 2 05     Physical Exam    Constitutional   General appearance: Abnormal   well developed and overweight  Eyes no conjunctival pallor  Ears, Nose, Mouth, and Throat oral mucosa moist    Pulmonary   Respiratory effort: No increased work of breathing or signs of respiratory distress  Auscultation of lungs: Clear to auscultation, equal breath sounds bilaterally, no wheezes, no rales, no rhonci  Cardiovascular   Auscultation of heart: Normal rate and rhythm, normal S1 and S2, without murmurs  Examination of extremities for edema and/or varicosities: Normal   no edema  Abdomen   Abdomen: Non-tender, no masses  Bowel sounds were normal  The abdomen was soft and nontender     Musculoskeletal   Gait and station: Normal     Psychiatric   Orientation to person, place and time: Normal          Future Appointments    Date/Time Provider Specialty Site   01/09/2018 10:00 AM Viktoria Hill [facility] Bariatric Medicine [facility]     Signatures   Electronically signed by : Juanjose Boyd [facility]; Sep 11 2017  2:20PM EST                       (Author)    Electronically signed by : JONNA Chase ; Sep 11 2017  2:24PM EST                       (Co-author)

## 2018-01-13 VITALS
TEMPERATURE: 96.8 F | BODY MASS INDEX: 28.71 KG/M2 | DIASTOLIC BLOOD PRESSURE: 86 MMHG | SYSTOLIC BLOOD PRESSURE: 140 MMHG | WEIGHT: 193.8 LBS | HEIGHT: 69 IN | RESPIRATION RATE: 16 BRPM | HEART RATE: 64 BPM

## 2018-01-13 VITALS
SYSTOLIC BLOOD PRESSURE: 132 MMHG | DIASTOLIC BLOOD PRESSURE: 70 MMHG | WEIGHT: 201.8 LBS | BODY MASS INDEX: 29.89 KG/M2 | HEIGHT: 69 IN

## 2018-01-13 VITALS
WEIGHT: 219.25 LBS | DIASTOLIC BLOOD PRESSURE: 76 MMHG | HEIGHT: 69 IN | SYSTOLIC BLOOD PRESSURE: 134 MMHG | BODY MASS INDEX: 32.47 KG/M2

## 2018-01-13 NOTE — PROGRESS NOTES
Message  Reviewed VLCD diet principles  Developed meal plan and reviewed product use  Ordered 2 week supply  Gave patient written VLCD packet and left patient contact number  Will call patent in 2-3 days to see how tolerating the VLCD diet  Active Problems    1  Abnormal EKG (794 31) (R94 31)   2  Adrenal mass (255 9) (E27 9)   3  Atypical chest pain (786 59) (R07 89)   4  Benign essential hypertension (401 1) (I10)   5  CKD (chronic kidney disease), stage III (585 3) (N18 3)   6  GERD (gastroesophageal reflux disease) (530 81) (K21 9)   7  Heart burn (787 1) (R12)   8  Hyperlipidemia (272 4) (E78 5)   9  Kidney stones (592 0) (N20 0)   10  Low testosterone (257 2) (E29 1)   11  Nephrolithiasis (592 0) (N20 0)   12  Obstructive sleep apnea (327 23) (G47 33)   13  Thyroid disease (246 9) (E07 9)    Current Meds   1  AndroGel Pump 20 25 MG/ACT (1 62%) Transdermal Gel; APPLY ONE PUMP PRESS   ONE TIME DAILY AS DIRECTED; Therapy: (Recorded:81Gxe3132) to Recorded   2  Atorvastatin Calcium 20 MG Oral Tablet; Take 1 tablet daily; Therapy: 41LXK8042 to Recorded   3  Carvedilol 12 5 MG Oral Tablet; TAKE ONE TABLET BY MOUTH EVERY DAY IN THE   MORNING AND TAKE 2 TABLETS IN THE EVENING; Therapy: 72TZQ7316 to  Requested for: 04FKC6313 Recorded   4  Eplerenone 50 MG Oral Tablet; TAKE ONE TABLET BY MOUTH ONCE DAILY; Therapy: 79NSF6808 to (Evaluate:92Gay6994)  Requested for: 49Igs7545; Last   Rx:34Ofe7601 Ordered   5  Felodipine ER 10 MG Oral Tablet Extended Release 24 Hour; take 1 tablet by mouth one   time daily; Therapy: 65BOM3391 to (Evaluate:55Ctq8748)  Requested for: 53Fuy7224; Last   Rx:82Vlf4143 Ordered   6  Omeprazole 20 MG Oral Capsule Delayed Release; TAKE 1 CAPSULE TWICE DAILY; Therapy: (459 9584) to Recorded   7  Torsemide 10 MG Oral Tablet (Demadex); take 1 tablet by mouth one time daily; Therapy: 10OJT9646 to (Cyndi Janett)  Requested for: 07VEZ1472;  Last   Rx:30Nov2015 Ordered 8  Vitamin D 2000 UNIT Oral Tablet; Take 1 tablet daily Recorded    Allergies    1   No Known Drug Allergies    Signatures   Electronically signed by : Delbert Corcoran, ; Feb 8 2017  2:09PM EST                       (Author)

## 2018-01-13 NOTE — MISCELLANEOUS
Provider Comments  Provider Comments:   Dear Candie Castorena,    We called you about your scheduled appointment for today but were unable to reach you  It is very important that you follow up with us so that we can assess your physical and nutritional safety  Please call our office at [phone] to reschedule your appointment       Sincerely,     [facility]          Signatures   Electronically signed by : Effie Colemna, ; Aug  2 2017  8:35AM EST                       (Author)

## 2018-01-13 NOTE — RESULT NOTES
Verified Results  (1) MAGNESIUM 34GUR0028 06:53AM Eveline Trevino     Test Name Result Flag Reference   MAGNESIUM 2 3 mg/dL  1 6-2 6     (1) BASIC METABOLIC PROFILE 04AFH0911 06:53AM Eveline Trevino     Test Name Result Flag Reference   GLUCOSE,RANDM 93 mg/dL     If the patient is fasting, the ADA then defines impaired fasting glucose as > 100 mg/dL and diabetes as > or equal to 123 mg/dL  SODIUM 143 mmol/L  136-145   POTASSIUM 3 6 mmol/L  3 5-5 3   CHLORIDE 106 mmol/L  100-108   CARBON DIOXIDE 29 mmol/L  21-32   ANION GAP (CALC) 8 mmol/L  4-13   BLOOD UREA NITROGEN 18 mg/dL  5-25   CREATININE 1 06 mg/dL  0 60-1 30   Standardized to IDMS reference method   CALCIUM 9 2 mg/dL  8 3-10 1   eGFR Non-African American      >60 0 ml/min/1 73sq m   [facility] [employer] Disease Education Program recommendations are as follows:  GFR calculation is accurate only with a steady state creatinine  Chronic Kidney disease less than 60 ml/min/1 73 sq  meters  Kidney failure less than 15 ml/min/1 73 sq  meters

## 2018-01-14 VITALS
TEMPERATURE: 97.9 F | SYSTOLIC BLOOD PRESSURE: 134 MMHG | HEIGHT: 69 IN | DIASTOLIC BLOOD PRESSURE: 80 MMHG | WEIGHT: 198.8 LBS | BODY MASS INDEX: 29.44 KG/M2 | HEART RATE: 68 BPM | RESPIRATION RATE: 16 BRPM

## 2018-01-16 NOTE — MISCELLANEOUS
Message  Spoke to patient regarding his last 24-hour urine stone risk profile results  His urine volume, urine sodium levels are acceptable  Has significantly higher urine calcium level and low urine citrate level  At this time we'll start patient on HCTZ 25 mg by mouth daily and potassium citrate 1 tablet by mouth daily  Patient is agreeable  He also is being followed by endocrinology in  Regarding his adrenal gland  He was recently told to have an adrenal hyperplasia with elevated cortisol level  He is scheduled for bone scan and depending on the results of bone scan, he might need an adrenal gland surgery  Will need repeat 24-hour urine stone risk profile in 2-3 months        Signatures   Electronically signed by : JONNA Stevens ; Jun 8 2017  3:30PM EST                       (Author)

## 2018-01-17 NOTE — MISCELLANEOUS
Message   Recorded as Task   Date: 06/08/2017 12:34 PM, Created By: Chris Vega   Task Name: Call Back   Assigned To: Yoly Crook   Regarding Patient: Jojo Brush, Status: In Progress   GiovanniEmma Salazar - 08 Jun 2017 12:34 PM     TASK CREATED  I reviewed his 24 hour urine stone profile  He has high urine calcium  Can you let him know to start HCTZ and potassium citrate? I already prescribed to pharmacy  Also he needs repeat BMP in 2-3 weeks  Also protein restriction (meat particularly)  Thanks  Yoly Crook - 08 Jun 2017 1:33 PM     TASK IN PROGRESS   Per Dr Kevin Schreiber I reviewed his 24 hour urine stone profile  He has high urine calcium  Can you let him know to start HCTZ and potassium citrate? I already prescribed to pharmacy  Also he needs to repeat a BMP 2-3 week and also a protein restriction (meat particularly)  I spoke with the patient's wife and she is aware of the above instructions and the lab slip has been mailed out for the patient  Gopi Henriquez      Active Problems    1  Abnormal EKG (794 31) (R94 31)   2  Adrenal mass (255 9) (E27 9)   3  Atypical chest pain (786 59) (R07 89)   4  Benign essential hypertension (401 1) (I10)   5  CKD (chronic kidney disease), stage III (585 3) (N18 3)   6  GERD (gastroesophageal reflux disease) (530 81) (K21 9)   7  Heart burn (787 1) (R12)   8  Hyperlipidemia (272 4) (E78 5)   9  Impaired fasting glucose (790 21) (R73 01)   10  Kidney stones (592 0) (N20 0)   11  Low testosterone (257 2) (E29 1)   12  Nephrolithiasis (592 0) (N20 0)   13  Obstructive sleep apnea (327 23) (G47 33)   14  Thyroid disease (246 9) (E07 9)   15  Weight gain (783 1) (R63 5)    Current Meds   1  AndroGel Pump 20 25 MG/ACT (1 62%) Transdermal Gel; APPLY ONE PUMP PRESS   ONE TIME DAILY AS DIRECTED; Therapy: (Recorded:31Wkv1372) to Recorded   2  Atorvastatin Calcium 20 MG Oral Tablet; Take 1 tablet daily; Therapy: 24BAU3513 to Recorded   3   Carvedilol 12 5 MG Oral Tablet; TAKE ONE TABLET BY MOUTH EVERY DAY IN THE   MORNING AND TAKE 2 TABLETS IN THE EVENING; Therapy: 95CPY1449 to  Requested for: 29IXT0127 Recorded   4  Felodipine ER 10 MG Oral Tablet Extended Release 24 Hour; TAKE 1 TABLET ONCE   DAILY; Therapy: 53QCT6639 to (Evaluate:97Xks5479)  Requested for: 06BWX3381; Last   Rx:58Bnn4093 Ordered   5  HydroCHLOROthiazide 25 MG Oral Tablet; take 1 tablet by mouth every day; Therapy: 36UCM7746 to (77 873 135)  Requested for: 31YUS0271; Last   Rx:2017 Ordered   6  Omeprazole 20 MG Oral Capsule Delayed Release; TAKE 1 CAPSULE TWICE DAILY; Therapy: (459 9550) to Recorded   7  Potassium Citrate ER 15 MEQ (1620 MG) Oral Tablet Extended Release; Take one tablet   po daily; Therapy: 72DLI8888 to (Juan Diego Newman)  Requested for: 96VJN7187; Last   Rx:2017 Ordered   8  Vitamin D 2000 UNIT Oral Tablet; Take 1 tablet daily Recorded    Allergies    1  No Known Drug Allergies    Plan  Benign essential hypertension, CKD (chronic kidney disease), stage III, Kidney stones,  Nephrolithiasis    · (1) BASIC METABOLIC PROFILE; Status:Active - Retrospective By Protocol  Authorization;  Requested [id number];     Signatures   Electronically signed by : Lorrin Hodgkins, M D ; 2017  2:59PM EST                       (Author)

## 2018-01-17 NOTE — PROGRESS NOTES
History of Present Illness  HPI: Flora Ortega presented for his 2 week follow-up on initiation of the VLCD diet  He has lost 8# in the past 2 weeks  He states he is doing well  Tolerating meal replacements without difficulty  Consuming at least 80oz of water in addition to water used to mix replacements  Patient not experiencing constipation  Energy level good  No hungry  Bariatric MNT [facility]:   Weight Assessment: IBW: 157#, Goal Weight: to lose 30#  Excess calories come from in between meal snacking, large portions at mealtimes and high calorie high fat food choices  Dietary Recall:   Breakfast: Shake  Lunch: Shake  Snack: bar  Dinner: Soup  Snack: fiber ice tea   Beverages: water  Estimated fluid intake: >80oz  He dines out seldom  Exercise Frequency:  He does not exercise  His obesity/being overweight is related to excessive energy intake and as evidenced by BMI: 31 4  Nutrition Prescription: Estimated calories for weight loss: Ecal BMR: 1829 Weight loss: 4763-0776 calories ( 2# per week - sedentary ot lightly active), Estimated daily protein needs: 86-107gm ( 1 2-1 5 gm/kg IBW) and Estimated daily fluid needs: 83oz ( 35ml/kg IBW)  Nutrition Intervention: Counseling provided with emphasis on meal planning, hydration, fiber intake, protein intake, exercise and food journaling  Education materials provided: [place] manual    Comprehension: good  Expected Compliance: good  Goals: follow meal plan prescribed, reduce portion sizes at mealtimes, Limit carbohydrate intake less than 50 grams and 800 calories with less than 50 grams  Monitor/Evaluation: weekly weight, food journal, fluid intake and exercise level  Active Problems    1  Abnormal EKG (794 31) (R94 31)   2  Adrenal mass (255 9) (E27 9)   3  Atypical chest pain (786 59) (R07 89)   4  Benign essential hypertension (401 1) (I10)   5  CKD (chronic kidney disease), stage III (585 3) (N18 3)   6   GERD (gastroesophageal reflux disease) (530 81) (K21 9)   7  Heart burn (787 1) (R12)   8  Hyperlipidemia (272 4) (E78 5)   9  Impaired fasting glucose (790 21) (R73 01)   10  Kidney stones (592 0) (N20 0)   11  Low testosterone (257 2) (E29 1)   12  Nephrolithiasis (592 0) (N20 0)   13  Obesity (BMI 30 0-34 9) (278 00) (E66 9)   14  Obstructive sleep apnea (327 23) (G47 33)   15  Thyroid disease (246 9) (E07 9)   16  Weight gain (783 1) (R63 5)    Family History  Mother    1  Family history of asthma (V17 5) (Z82 5)   2  Family history of diabetes mellitus (V18 0) (Z83 3)  Father    3  Family history of endocarditis (V17 49) (Z82 49)   4  Family history of essential hypertension (V17 49) (Z82 49)   5  Family history of gout (V18 19) (Z82 69)    Social History    · Being A Social Drinker   · Daily caffeine consumption, 4-5 servings a day   · Former smoker (V15 82) (G04 455)   · Denied: History of Never Used Drugs    Current Meds   1  AndroGel Pump 20 25 MG/ACT (1 62%) Transdermal Gel; APPLY ONE PUMP PRESS   ONE TIME DAILY AS DIRECTED; Therapy: (Recorded:47Fef9556) to Recorded   2  Atorvastatin Calcium 20 MG Oral Tablet; Take 1 tablet daily; Therapy: 62AUC2411 to Recorded   3  Carvedilol 12 5 MG Oral Tablet; TAKE ONE TABLET BY MOUTH EVERY DAY IN THE   MORNING AND TAKE 2 TABLETS IN THE EVENING; Therapy: 07JBM4441 to  Requested for: 77SLK2414 Recorded   4  Eplerenone 50 MG Oral Tablet; TAKE ONE TABLET BY MOUTH ONCE DAILY; Therapy: 88VAQ1505 to (Evaluate:01Feb2017)  Requested for: 81Vei1156; Last   Rx:68Zov7553 Ordered   5  Felodipine ER 10 MG Oral Tablet Extended Release 24 Hour; take 1 tablet by mouth one   time daily; Therapy: 08KGS2763 to (Evaluate:01Feb2017)  Requested for: 69Wqn2864; Last   Rx:00Mtm4595 Ordered   6  Omeprazole 20 MG Oral Capsule Delayed Release; TAKE 1 CAPSULE TWICE DAILY; Therapy: (459 9584) to Recorded   7   Torsemide 10 MG Oral Tablet; take 1 tablet by mouth one time daily; Therapy: 13WAS6178 to (Delpha Rom)  Requested for: 63UMH7634; Last   Rx:30Nov2015 Ordered   8  Vitamin D 2000 UNIT Oral Tablet; Take 1 tablet daily Recorded    Allergies    1  No Known Drug Allergies    Vitals  Signs   Recorded: 92XNK7857 75:36GU   Systolic: 763  Diastolic: 78  Height: 5 ft 8 5 in  Weight: 209 lb 11 2 oz  BMI Calculated: 31 42  BSA Calculated: 2 1    Future Appointments    Date/Time Provider Specialty Site   03/08/2017 08:00 AM Yevgeniy [facility]   03/22/2017 09:15 AM JONNA Garcia   Bariatric Medicine [facility]     Signatures   Electronically signed by : Fay Romero, ; Feb 22 2017  2:36PM EST                       (Author)    Electronically signed by : JONNA Schultz ; Feb 23 2017  9:23AM EST                       (Co-author)

## 2018-01-18 NOTE — PROGRESS NOTES
Message  Called patient to follow-up on initiation of the VLCD diet  Patient states they are doing well  Tolerating meal replacements without difficulty  Consuming at least 80oz of water in addition to water used to mix replacements  Patient not experiencing constipation  Active Problems    1  Abnormal EKG (794 31) (R94 31)   2  Adrenal mass (255 9) (E27 9)   3  Atypical chest pain (786 59) (R07 89)   4  Benign essential hypertension (401 1) (I10)   5  CKD (chronic kidney disease), stage III (585 3) (N18 3)   6  GERD (gastroesophageal reflux disease) (530 81) (K21 9)   7  Heart burn (787 1) (R12)   8  Hyperlipidemia (272 4) (E78 5)   9  Impaired fasting glucose (790 21) (R73 01)   10  Kidney stones (592 0) (N20 0)   11  Low testosterone (257 2) (E29 1)   12  Nephrolithiasis (592 0) (N20 0)   13  Obesity (BMI 30 0-34 9) (278 00) (E66 9)   14  Obstructive sleep apnea (327 23) (G47 33)   15  Thyroid disease (246 9) (E07 9)   16  Weight gain (783 1) (R63 5)    Current Meds   1  AndroGel Pump 20 25 MG/ACT (1 62%) Transdermal Gel; APPLY ONE PUMP PRESS   ONE TIME DAILY AS DIRECTED; Therapy: (Recorded:99Quz9025) to Recorded   2  Atorvastatin Calcium 20 MG Oral Tablet; Take 1 tablet daily; Therapy: 33JHF2750 to Recorded   3  Carvedilol 12 5 MG Oral Tablet; TAKE ONE TABLET BY MOUTH EVERY DAY IN THE   MORNING AND TAKE 2 TABLETS IN THE EVENING; Therapy: 19NAC6219 to  Requested for: 70ZAT7885 Recorded   4  Eplerenone 50 MG Oral Tablet; TAKE ONE TABLET BY MOUTH ONCE DAILY; Therapy: 74DHB8971 to (Evaluate:63Bsz3209)  Requested for: 67Edi6508; Last   Rx:94Ipp5981 Ordered   5  Felodipine ER 10 MG Oral Tablet Extended Release 24 Hour; take 1 tablet by mouth one   time daily; Therapy: 23CYH9825 to (Evaluate:96Ohe2247)  Requested for: 43Mri2548; Last   Rx:26Hti4601 Ordered   6  Omeprazole 20 MG Oral Capsule Delayed Release; TAKE 1 CAPSULE TWICE DAILY; Therapy: (Flo Ladd) to Recorded   7   Torsemide 10 MG Oral Tablet (Demadex); take 1 tablet by mouth one time daily; Therapy: 15QDF0643 to (Rhys Sanders)  Requested for: 38HIP5814; Last   Rx:17Uzk4833 Ordered   8  Vitamin D 2000 UNIT Oral Tablet; Take 1 tablet daily Recorded    Allergies    1   No Known Drug Allergies    Signatures   Electronically signed by : Justin Umaña, ; Feb 13 2017  2:29PM EST                       (Author)

## 2018-01-18 NOTE — PROGRESS NOTES
History of Present Illness  HPI: Flora Ortega presented for meal planning session He has had a 2 7# gain in the past month and has had an overall weight loss of 23 8# (11% TBW) in the past 3 months  Food journaling 7999-0939 calories Had a 5 day of higher calorie range and did not track everything  Home one day a week - snack more  We discussed moving a shake to the trouble area of snacking  Patient would like to keep using 2 meal replacements for now  Bariatric MNT [facility]:   Weight Assessment: IBW: 157#, Goal Weight: 180#  Excess calories come from in between meal snacking, large portions at mealtimes and high calorie high fat food choices  Dietary Recall:   Breakfast: Shake  Snack: Protien Bar  2 fruit  Lunch: Shake  Snack: 2 eggs    Dinner: 600-800 calories  larger portions - or second  Snack: skip   Beverages: water/ coffee  Estimated fluid intake: 80oz  Cooking: wife  Shopping: wife  Exercise Frequency:  He does not exercise  Nutrition Prescription: Estimated calories for weight loss: Ecal BMR: 1756 Weight loss 2# with light activity, Estimated daily protein needs: 86-107gm ( 1 2-1 5 gm/kg IBW) and Estimated daily fluid needs: 83oz ( 35ml/kg IBW)  Nutrition Intervention: Counseling provided with emphasis on meal planning, portion sizes, healthy snack choices, hydration, fiber intake, protein intake, exercise and food journaling  Education materials provided: calorie controlled menus and low carbohydrate meal planning  Comprehension: good  Expected Compliance: good  Goals: follow meal plan prescribed, reduce portion sizes at mealtimes, plan meals and snacks daily, Choose lower-calorie, lower-fat meal options at home and when dining out, food journal, do not skip meals or snacks and 2839-7630 calories on non exercise days and 0375-3587 calories   Monitor/Evaluation: weekly weight, food journal, fluid intake and exercise level  Active Problems    1   Abnormal EKG (794 31) (R94 31)   2  Adrenal mass (255 9) (E27 9)   3  Atypical chest pain (786 59) (R07 89)   4  Benign essential hypertension (401 1) (I10)   5  CKD (chronic kidney disease), stage III (585 3) (N18 3)   6  GERD (gastroesophageal reflux disease) (530 81) (K21 9)   7  Heart burn (787 1) (R12)   8  Hyperlipidemia (272 4) (E78 5)   9  Impaired fasting glucose (790 21) (R73 01)   10  Kidney stones (592 0) (N20 0)   11  Low testosterone (257 2) (E29 1)   12  Nephrolithiasis (592 0) (N20 0)   13  Obstructive sleep apnea (327 23) (G47 33)   14  Thyroid disease (246 9) (E07 9)   15  Weight gain (783 1) (R63 5)    Past Medical History    1  History of Obesity (BMI 30 0-34 9) (278 00) (E66 9)    Family History  Mother    1  Family history of asthma (V17 5) (Z82 5)   2  Family history of diabetes mellitus (V18 0) (Z83 3)  Father    3  Family history of endocarditis (V17 49) (Z82 49)   4  Family history of essential hypertension (V17 49) (Z82 49)   5  Family history of gout (V18 19) (Z82 69)    Social History    · Being A Social Drinker   · Daily caffeine consumption, 4-5 servings a day   · Former smoker (V15 82) (Q29 674)   · Denied: History of Never Used Drugs    Current Meds   1  AndroGel Pump 20 25 MG/ACT (1 62%) Transdermal Gel; APPLY ONE PUMP PRESS   ONE TIME DAILY AS DIRECTED; Therapy: (Recorded:10Zbh7708) to Recorded   2  Atorvastatin Calcium 20 MG Oral Tablet; Take 1 tablet daily; Therapy: 63VAW5792 to Recorded   3  Carvedilol 12 5 MG Oral Tablet; TAKE ONE TABLET BY MOUTH EVERY DAY IN THE   MORNING AND TAKE 2 TABLETS IN THE EVENING; Therapy: 33XFH6577 to  Requested for: 85DCU8042 Recorded   4  Felodipine ER 10 MG Oral Tablet Extended Release 24 Hour; TAKE 1 TABLET ONCE   DAILY; Therapy: 57GQQ3072 to (Evaluate:49Tlo0272)  Requested for: 27YZL2071; Last   Rx:12Sqt9687 Ordered   5  Omeprazole 20 MG Oral Capsule Delayed Release; TAKE 1 CAPSULE TWICE DAILY; Therapy: (Christi Tobias) to Recorded   6   Vitamin D 2000 UNIT Oral Tablet; Take 1 tablet daily Recorded    Allergies    1  No Known Drug Allergies    Future Appointments    Date/Time Provider Specialty Site   08/02/2017 08:15 AM Amy Mcclain  [facility]   08/02/2017 08:00 AM JONNA Woody   Bariatric Medicine [facility]     Signatures   Electronically signed by : Geneva Scott, ; May  4 2017 11:00AM EST                       (Author)    Electronically signed by : JONNA Benavides ; May  4 2017 12:59PM EST                       (Co-author)

## 2018-01-18 NOTE — PROGRESS NOTES
Chief Complaint  Chief Complaint Free Text Note Form: Patient is here today for 6 week VLCD/ MWM follow-up  Past Medical History    1  History of Obesity (BMI 30 0-34 9) (278 00) (E66 9)    Assessment    1  Weight gain (783 1) (R63 5)   2  Benign essential hypertension (401 1) (I10)   3  GERD (gastroesophageal reflux disease) (530 81) (K21 9)   4  Impaired fasting glucose (790 21) (R73 01)   5  Adrenal mass (255 9) (E27 9)   6  Thyroid disease (246 9) (E07 9)   7  History of Obesity (BMI 30 0-34 9) (278 00) (E66 9)    Discussion/Summary  Discussion Summary:   37 yo male w/ hypertension, CKD II/IIIa, hyperlipidemia, GERD, MED, impaired fasting glucose, nephrolithiasis, low T bilateral adrenal nodules and obesity here to pursue medical weight management to improve weight and health  Initial: 217 7  Current: 194 4  Change: -23 4 lbs (-11%)    Obesity class 1-->overweight:  -enrolled in VLCD  -initial focus of 5-10% weight loss over 3-6 mos for improved health(met)  -screening labs-BMP completed 1/2017 and within acceptable limits    IFG:  -a1c 6 3%  -may consider metformin  -will repeat at next visit    b/l adrenal nodules/abnormal ft4:  -follows w/ endocrinology    HTN:  -on coreg, and felodipine  -torsemide held, pt stopped eplerenone and BP stable at home in the 120s /80    GERD:  -on PPI twice daily, decrease to once daily    HPL:  -on statin    low T:  -on androgel    History of kidney stones/CKD II/IIIa:  -encourage hydration, no protein restriction per guidelines    MED:  -pt does not use CPAP  -Advised on risks of untreated sleep apnea and how it may relate to difficulty with losing weight  -Since patient will start on aggressive weight loss regimen will defer sleep medicine evaluation for now    RTC in 6 weeks  Pt wishes to continue VLCD for 2 more weeks then work on a transition plan    Goal:  80oz of water  Continue to monitor BP  Decrease PPI to once daily     Patient's Capacity to Self-Care: Patient is able to Self-Care  Understands and agrees with treatment plan: The treatment plan was reviewed with the patient/guardian  The patient/guardian understands and agrees with the treatment plan      Active Problems    1  Abnormal EKG (794 31) (R94 31)   2  Adrenal mass (255 9) (E27 9)   3  Atypical chest pain (786 59) (R07 89)   4  Benign essential hypertension (401 1) (I10)   5  CKD (chronic kidney disease), stage III (585 3) (N18 3)   6  GERD (gastroesophageal reflux disease) (530 81) (K21 9)   7  Heart burn (787 1) (R12)   8  Hyperlipidemia (272 4) (E78 5)   9  Impaired fasting glucose (790 21) (R73 01)   10  Kidney stones (592 0) (N20 0)   11  Low testosterone (257 2) (E29 1)   12  Nephrolithiasis (592 0) (N20 0)   13  Obstructive sleep apnea (327 23) (G47 33)   14  Thyroid disease (246 9) (E07 9)   15  Weight gain (783 1) (R63 5)    Family History  Mother    1  Family history of asthma (V17 5) (Z82 5)   2  Family history of diabetes mellitus (V18 0) (Z83 3)  Father    3  Family history of endocarditis (V17 49) (Z82 49)   4  Family history of essential hypertension (V17 49) (Z82 49)   5  Family history of gout (V18 19) (Z82 69)    Social History    · Being A Social Drinker   · Daily caffeine consumption, 4-5 servings a day   · Former smoker (V15 82) (B69 474)   · Denied: History of Never Used Drugs    Current Meds   1  AndroGel Pump 20 25 MG/ACT (1 62%) Transdermal Gel; APPLY ONE PUMP PRESS   ONE TIME DAILY AS DIRECTED; Therapy: (Recorded:34Wal3753) to Recorded   2  Atorvastatin Calcium 20 MG Oral Tablet; Take 1 tablet daily; Therapy: 90IGA8961 to Recorded   3  Carvedilol 12 5 MG Oral Tablet; TAKE ONE TABLET BY MOUTH EVERY DAY IN THE   MORNING AND TAKE 2 TABLETS IN THE EVENING; Therapy: 63TDF9965 to  Requested for: 91GUL2550 Recorded   4  Felodipine ER 10 MG Oral Tablet Extended Release 24 Hour; TAKE 1 TABLET ONCE   DAILY;    Therapy: 98LYK0276 to (Evaluate:03Zfi8437)  Requested for: 40QNI5717; Last   Rx:00Qqp2837 Ordered   5  Omeprazole 20 MG Oral Capsule Delayed Release; TAKE 1 CAPSULE TWICE DAILY; Therapy: (520 8073) to Recorded   6  Vitamin D 2000 UNIT Oral Tablet; Take 1 tablet daily Recorded    Allergies    1  No Known Drug Allergies    Vitals  Vital Signs    Recorded: 52QSJ0680 08:57AM   Temperature 97 1 F, Tympanic   Heart Rate 68, L Radial   Pulse Quality Norm   Systolic 688, LUE, Sitting   Diastolic 84, LUE, Sitting   Height 5 ft 8 5 in   Weight 194 lb 6 4 oz   BMI Calculated 29 13   BSA Calculated 2 02     Physical Exam    Constitutional   General appearance: Abnormal   well developed and obese  Eyes No conjunctival pallor  Ears, Nose, Mouth, and Throat Moist oral mucosa  Pulmonary   Respiratory effort: No increased work of breathing or signs of respiratory distress  Auscultation of lungs: Clear to auscultation, equal breath sounds bilaterally, no wheezes, no rales, no rhonci  Cardiovascular   Auscultation of heart: Normal rate and rhythm, normal S1 and S2, without murmurs  Abdomen   Abdomen: Non-tender, no masses  Musculoskeletal   Gait and station: Normal     Psychiatric   Orientation to person, place and time: Normal     Mood and affect: Normal          Future Appointments    Date/Time Provider Specialty Site   04/05/2017 08:00 AM Amy Mcclain  [facility]   05/04/2017 09:00 AM JONNA Rodgers   Bariatric Medicine [facility]     Signatures   Electronically signed by : JONNA Nieto ; Mar 22 2017  9:42AM EST                       (Author)

## 2018-01-22 VITALS
SYSTOLIC BLOOD PRESSURE: 140 MMHG | DIASTOLIC BLOOD PRESSURE: 84 MMHG | WEIGHT: 194.4 LBS | BODY MASS INDEX: 28.79 KG/M2 | HEIGHT: 69 IN | HEART RATE: 68 BPM | TEMPERATURE: 97.1 F

## 2018-01-22 VITALS
WEIGHT: 191.1 LBS | HEIGHT: 69 IN | BODY MASS INDEX: 28.3 KG/M2 | SYSTOLIC BLOOD PRESSURE: 130 MMHG | DIASTOLIC BLOOD PRESSURE: 80 MMHG

## 2018-01-22 VITALS
DIASTOLIC BLOOD PRESSURE: 78 MMHG | BODY MASS INDEX: 31.06 KG/M2 | HEIGHT: 69 IN | WEIGHT: 209.7 LBS | SYSTOLIC BLOOD PRESSURE: 132 MMHG

## 2018-03-13 ENCOUNTER — TELEPHONE (OUTPATIENT)
Dept: NEPHROLOGY | Facility: CLINIC | Age: 43
End: 2018-03-13

## 2018-03-13 DIAGNOSIS — E78.5 HYPERLIPIDEMIA, UNSPECIFIED HYPERLIPIDEMIA TYPE: Primary | ICD-10-CM

## 2018-03-13 DIAGNOSIS — I10 ESSENTIAL HYPERTENSION: ICD-10-CM

## 2018-03-13 RX ORDER — ATORVASTATIN CALCIUM 20 MG/1
1 TABLET, FILM COATED ORAL DAILY
COMMUNITY
Start: 2015-03-11 | End: 2018-03-13 | Stop reason: SDUPTHER

## 2018-03-13 RX ORDER — ATORVASTATIN CALCIUM 20 MG/1
20 TABLET, FILM COATED ORAL DAILY
Qty: 90 TABLET | Refills: 3 | Status: SHIPPED | OUTPATIENT
Start: 2018-03-13 | End: 2018-06-29 | Stop reason: SDUPTHER

## 2018-03-13 RX ORDER — EPLERENONE 50 MG/1
50 TABLET, FILM COATED ORAL DAILY
Qty: 90 TABLET | Refills: 3 | Status: SHIPPED | OUTPATIENT
Start: 2018-03-13 | End: 2018-06-29 | Stop reason: SDUPTHER

## 2018-03-13 RX ORDER — CARVEDILOL 12.5 MG/1
1 TABLET ORAL 3 TIMES DAILY
COMMUNITY
Start: 2013-07-09 | End: 2018-03-13 | Stop reason: SDUPTHER

## 2018-03-13 RX ORDER — TORSEMIDE 10 MG/1
1 TABLET ORAL DAILY
COMMUNITY
End: 2018-03-13 | Stop reason: SDUPTHER

## 2018-03-13 RX ORDER — FELODIPINE 10 MG/1
10 TABLET, EXTENDED RELEASE ORAL DAILY
Qty: 90 TABLET | Refills: 3 | Status: SHIPPED | OUTPATIENT
Start: 2018-03-13 | End: 2018-06-29 | Stop reason: SDUPTHER

## 2018-03-13 RX ORDER — EPLERENONE 50 MG/1
1 TABLET, FILM COATED ORAL DAILY
COMMUNITY
End: 2018-03-13 | Stop reason: SDUPTHER

## 2018-03-13 RX ORDER — CARVEDILOL 12.5 MG/1
12.5 TABLET ORAL 2 TIMES DAILY WITH MEALS
Qty: 270 TABLET | Refills: 3 | Status: SHIPPED | OUTPATIENT
Start: 2018-03-13 | End: 2018-06-29 | Stop reason: SDUPTHER

## 2018-03-13 RX ORDER — TORSEMIDE 10 MG/1
10 TABLET ORAL DAILY
Qty: 90 TABLET | Refills: 3 | Status: SHIPPED | OUTPATIENT
Start: 2018-03-13 | End: 2018-06-29 | Stop reason: SDUPTHER

## 2018-03-13 RX ORDER — FELODIPINE 10 MG/1
1 TABLET, EXTENDED RELEASE ORAL DAILY
COMMUNITY
Start: 2015-02-11 | End: 2018-03-13 | Stop reason: SDUPTHER

## 2018-03-13 NOTE — TELEPHONE ENCOUNTER
The wife called and stated patient needs refill on meds sent to [facility] in [place]  Epleronone  Torsemide  Felodipine  Carvedilol  Atorvastatin  Please contact pt when meds are sent to the pharmacy   They will like to pick them up today

## 2018-03-15 ENCOUNTER — APPOINTMENT (OUTPATIENT)
Dept: LAB | Facility: HOSPITAL | Age: 43
End: 2018-03-15
Payer: COMMERCIAL

## 2018-03-15 ENCOUNTER — TRANSCRIBE ORDERS (OUTPATIENT)
Dept: LAB | Facility: HOSPITAL | Age: 43
End: 2018-03-15

## 2018-03-15 DIAGNOSIS — R68.82 DECREASED LIBIDO: ICD-10-CM

## 2018-03-15 DIAGNOSIS — R68.82 DECREASED LIBIDO: Primary | ICD-10-CM

## 2018-03-15 PROCEDURE — 36415 COLL VENOUS BLD VENIPUNCTURE: CPT

## 2018-03-15 PROCEDURE — 84402 ASSAY OF FREE TESTOSTERONE: CPT

## 2018-03-15 PROCEDURE — 84403 ASSAY OF TOTAL TESTOSTERONE: CPT

## 2018-03-16 LAB
TESTOST FREE SERPL-MCNC: 29.7 PG/ML (ref 6.8–21.5)
TESTOST SERPL-MCNC: 888 NG/DL (ref 264–916)

## 2018-03-26 PROBLEM — R79.89 LOW TESTOSTERONE: Status: ACTIVE | Noted: 2017-02-08

## 2018-04-30 NOTE — PATIENT INSTRUCTIONS
What is low testosterone (male hypogonadism)? Low testosterone (male hypogonadism) is a condition in which the testes (testicles, the male reproductive glands) do not produce enough testosterone (a male sex hormone)  In men, testosterone helps maintain and develop:    Sexual features  Muscle mass  Adequate levels of red blood cells  Bone density  Sense of well-being, and  Sexual and reproductive function  How common is low testosterone? Low testosterone affects almost 40% of men aged 39 and older  It is difficult to define normal testosterone levels, because levels vary throughout the day and are affected by body mass index (BMI), nutrition, alcohol consumption, certain medications, age, and illness  What causes low testosterone? As a man ages, the amount of testosterone in his body gradually drops  This natural decline starts after age 27 and continues (about 1% per year) throughout his life      There are many other potential causes of low testosterone, including the following:    Injury (trauma, interrupted blood supply to the testes) or infection of the testes (orchitis)  Chemotherapy for cancer  Metabolic disorders such as hemochromatosis (too much iron in the body)  Dysfunction or tumors of the pituitary gland  Medications, including opioids, hormones used to treat prostate cancer, and steroids (i e , prednisone)  Acute (short-term) or chronic (long-term) illness  Alcohol abuse  Cirrhosis of the liver  Chronic renal (kidney) failure  HIV/AIDS  Inflammatory conditions such as sarcoidosis (a condition that causes inflammation of the lungs and other organs)  Kallman syndrome (abnormal development of the hypothalamus, a gland in the brain that controls many hormones)  High levels of the milk-producing hormone prolactin  Obesity or extreme weight loss  Uncontrolled type 2 diabetes mellitus  Congenital defect (present at birth)  Obstructive sleep apnea  Aging  Estrogen excess (usually from an external or environmental source)  Anabolic steroid abuse  Severe primary hypothyroidism  Pubertal delay  Trauma (head injury)  Radiation exposure or prior surgery of the brain  What are the symptoms of low testosterone? Symptoms of low testosterone depend on the age of person, and include the following:    Low sex drive  Erectile dysfunction  Decreased sense of well-being  Depressed mood  Difficulties with concentration and memory  Fatigue  Moodiness and irritability  Loss of muscular strength  Other changes that occur with low testosterone include:    A decrease in hemoglobin and mild anemia  A decrease in body hair  Thinning of the bones (osteoporosis)  Increased body fat  Breast development (gynecomastia)    How is low testosterone diagnosed? Low testosterone is diagnosed by measuring the amount of testosterone in the blood with a blood test  It may take several measurements to determine if a patient has low testosterone, since levels tend to change throughout the day  The highest levels of testosterone are generally in the morning, near 8:00 a m  This is why doctors prefer to measure testosterone levels in the early morning  How is low testosterone treated? Low testosterone is treated with testosterone replacement therapy, which can be given in several different ways:    Intramuscular injections (into a muscle), usually every 10 to 14 days; Testosterone patches  These are used every day and are applied to different parts of the body, including the buttocks, arms, back, and abdomen  Testosterone gels that are applied every day to the clean dry skin of the upper back and arms  (The gels require care in making sure that the hormone is not accidentally transferred to another person or partner )  Pellets that are implanted under the skin every two months  (Oral testosterone is not approved for use in the [place] )    What are the benefits of testosterone replacement therapy?     Potential benefits of testosterone replacement therapy may include: In boys, avoiding problems related to delayed puberty  Loss of fat  Increased bone density and protection against osteoporosis  Improved mood and sense of well-being  Improved sexual function  Improved mental sharpness  Greater muscle strength and physical performance  What are the side effects of testosterone replacement therapy? The side effects of testosterone replacement therapy include:    Acne or oily skin  Swelling in the ankles caused by mild fluid retention  Stimulation of the prostate, which can cause urination symptoms such as difficulty urinating  Breast enlargement or tenderness  Worsening of sleep apnea (a sleep disorder that results in frequent nighttime awakenings and daytime sleepiness)  Smaller testicles  Skin irritation  Laboratory abnormalities that can occur with testosterone replacement include: Increase in prostate-specific antigen (PSA)  Increase in red blood cell count  Decrease in sperm count, producing infertility (inability to have children), which is especially important in younger men who desire fertility  If you are taking hormone replacement therapy, regular follow-up appointments with your physician are important:    PSA levels should be checked at 3, 6, and 12 months within the first year, and then every year after that  A digital rectal examination of the prostate should be done at 3-6 months and 1 year after beginning therapy, and then every year after that  This is recommended even for men who are not on testosterone replacement therapy, as an age-related prostate cancer screening  This typically begins at age [de-identified]  Hematocrit levels will be checked before testosterone therapy starts, and then on a regular basis to make sure red blood cell levels remain normal     Who shouldn't take testosterone replacement therapy? Testosterone replacement therapy may cause the prostate to grow   If a man has early prostate cancer, there is concern that testosterone may stimulate the cancer's growth  Therefore, men who have prostate cancer should not take testosterone replacement therapy  It is important for all men considering testosterone replacement therapy to undergo prostate screening before starting this therapy  Other men who should not take testosterone replacement therapy include those who have: An enlarged prostate resulting in urinary symptoms (difficulty starting a urinary stream); A lump on their prostate that has not been evaluated; A PSA measurement above 4;  Breast cancer; An elevated hematocrit level (higher-than-normal number of red blood cells); Severe congestive heart failure; Obstructive sleep apnea that has not been treated  Kidney Stones   WHAT YOU NEED TO KNOW:   What is a kidney stone? Kidney stones form in the urinary system when the water and waste in your urine are out of balance  When this happens, certain types of waste crystals separate from the urine  The crystals build up and form kidney stones  Kidney stones can be made of uric acid, calcium, phosphate, or oxalate crystals  You may have 1 or more kidney stones  What increases my risk for kidney stones? · You do not drink enough liquids (especially water) each day  · You have urinary tract infections often  · You follow a certain type of diet  For example, people who eat a diet high in meat or salt may be at higher risk for kidney stones  People who eat foods high in oxalate may also be at higher risk  Foods that are high in oxalate include nuts, chocolate, coffee, and green leafy vegetables  · You take certain medicines such as diuretics, steroids, and antacids  · A family member has had kidney stones  · You were born with a kidney or bowel disorder, or you have other medical problems such as gout  What are the signs and symptoms of kidney stones?    · Pain in the middle of your back that moves across to your side or that may spread to your groin    · Nausea and vomiting    · Urge to urinate often, burning feeling when you urinate, or pink or red urine    · Tenderness in your lower back, side, or stomach  How are kidney stones diagnosed? Your healthcare provider will ask about your health, diet, and lifestyle  He may refer you to a urologist  [place] may need tests to find out what type of kidney stones you have  Tests can show the size of your kidney stones and where they are in your urinary system  You may have one or more of the following:  · Urine tests  may show if you have blood in your urine  They may also show high amounts of the substances that form kidney stones, such as uric acid  · Blood tests  show how well your kidneys are working  They may also be used to check the levels of calcium or uric acid in your blood  · A noncontrast helical CT scan  is a type of x-ray that uses a computer to take pictures of your kidneys  Healthcare providers use the pictures to check for kidney stones or other problems  · X-rays  of your kidneys, bladder, and ureters may be done  You may be given a dye before the pictures are taken to help healthcare providers see the pictures better  You may need to have more than one x-ray  Tell the healthcare provider if you have ever had an allergic reaction to contrast dye  · An abdominal ultrasound  uses sound waves to show pictures of your abdomen on a monitor  How are kidney stones treated? · NSAIDs , such as ibuprofen, help decrease swelling, pain, and fever  This medicine is available with or without a doctor's order  NSAIDs can cause stomach bleeding or kidney problems in certain people  If you take blood thinner medicine, always ask your healthcare provider if NSAIDs are safe for you  Always read the medicine label and follow directions  · Prescription medicine  may be given  Ask how to take this medicine safely  · Medicines  to balance your electrolytes may be needed       · A procedure or surgery  to remove the kidney stones may be needed if they do not pass on their own  Your treatment will depend on the size and location of your kidney stones  How can I manage my symptoms? · Drink plenty of liquids  Your healthcare provider may tell you to drink at least 8 to 12 (eight-ounce) cups of liquids each day  This helps flush out the kidney stones when you urinate  Water is the best liquid to drink  · Strain your urine every time you go to the bathroom  Urinate through a strainer or a piece of thin cloth to catch the stones  Take the stones to your healthcare provider so they can be sent to the lab for tests  This will help your healthcare providers plan the best treatment for you  · Eat a variety of healthy foods  Healthy foods include fruits, vegetables, whole-grain breads, low-fat dairy products, beans, and fish  You may need to limit how much sodium (salt) or protein you eat  Ask for information about the best foods for you  · Exercise regularly  Your stones may pass more easily if you stay active  Ask about the best activities for you  When should I seek immediate care? · You have vomiting that is not relieved by medicine  When should I contact my healthcare provider? · You have a fever  · You have trouble passing urine  · You see blood in your urine  · You have severe pain  · You have any questions or concerns about your condition or care  CARE AGREEMENT:   You have the right to help plan your care  Learn about your health condition and how it may be treated  Discuss treatment options with your caregivers to decide what care you want to receive  You always have the right to refuse treatment  The above information is an  only  It is not intended as medical advice for individual conditions or treatments  Talk to your doctor, nurse or pharmacist before following any medical regimen to see if it is safe and effective for you    © 2017 [facility] [facility] Information is for End User's use only and may not be sold, redistributed or otherwise used for commercial purposes  All illustrations and images included in CareNotes® are the copyrighted property of A D A M , Inc  or Fabiano Gupta

## 2018-04-30 NOTE — PROGRESS NOTES
Problem List Items Addressed This Visit     Nephrolithiasis     Patient with a history of quite a few stone episodes in the past also with history of extracorporeal shockwave lithotripsy  He last saw a urologist 1/2 years ago wishes to reestablish with urological care  His last ultrasound from approximately 1 5 years ago shows a 1 centimeter nonobstructing left renal stone put he does not have a recent CT scan     Plan:  I discussed with the patient the need for follow-up imaging to assess his current stone burden  We will proceed to CT scan of the abdomen pelvis without contrast for this purpose         Relevant Orders    CT abdomen pelvis wo contrast    Low testosterone - Primary     Patient has a reported history of low testosterone, is receiving biweekly testosterone injections by an endocrinologist     Previously tried AndroGel which did not achieve therapeutic levels  His testosterone is most recently 5 which is well within the normal range and in fact that the upper limit of normal     He identifies no untoward effects from testosterone replacement therapy and will continue to receive this from his endocrinologist                Discussion:  After his CT scan is performed we will review this for his current stone burden  If he has nonobstructing stones we will offer him ureteroscopy with laser lithotripsy if desired by him  Otherwise, we will continue to recommend continued fluid intake, and consider treating him with hydrochlorothiazide to decrease his urinary calcium and potassium citrate to increase his urinary citrate      Assessment and plan:     Please see problem oriented charting for the assessment plan of today's urological complaints    Mary Jo Martin MD      Chief Complaint     Nephrolithiasis  Hypogonadism      History of Present Illness     Francheska Grissom is a 39 y o  gentleman with a past medical history as listed below who has a urological history of hypogonadism as well as nephrolithiasis  With regard to nephrolithiasis it is localized to both the right and left kidney  He is not currently having any flank discomfort or flank pain  These symptoms  These symptoms have been present for quite some time and the timing is chronic  Previous treatments include extracorporeal shockwave lithotripsy and previous work-up includes renal bladder ultrasound  The patient mentions nothing as aggravating and alleviating factors, respectively  The following associated signs and symptoms are mentioned:   Occasional flan pain in the past with passage of stones kand symptoms are mentioned: Aric Bearden His most recent free and total testosterone from March 15, 2018 are in the corrected range of 888 nanograms/deciliter of total testosterone, and 29 7 picograms per milliliter of free testosterone  His last 24 hour urine study from May 2017 showed an appropriate urine volume of well over 2 liters (over 4 liters were collected), hyperuricosuria was seen, hypercalciuria and hypocitraturia were also noted)  Review of systems otherwise negative    He works as a paramedic with the [employer]      Detailed Urologic History     - please refer to HPI    Review of Systems     Review of Systems   Constitutional: Negative  HENT: Negative  Eyes: Negative  Respiratory: Negative  Cardiovascular: Negative  Gastrointestinal: Negative  Endocrine: Negative  Genitourinary: Negative  Musculoskeletal: Negative  Skin: Negative  Allergic/Immunologic: Negative  Neurological: Negative  Hematological: Negative  Psychiatric/Behavioral: Negative  Allergies     No Known Allergies    Physical Exam     Physical Exam   Constitutional: He is oriented to person, place, and time  He appears well-developed and well-nourished  No distress  HENT:   Head: Normocephalic and atraumatic     Right Ear: External ear normal    Left Ear: External ear normal    Nose: Nose normal    Mouth/Throat: Oropharynx is clear and moist  No oropharyngeal exudate  Eyes: Conjunctivae and EOM are normal  Pupils are equal, round, and reactive to light  Right eye exhibits no discharge  Left eye exhibits no discharge  No scleral icterus  Neck: Normal range of motion  Neck supple  No tracheal deviation present  No thyromegaly present  Cardiovascular: Normal rate, regular rhythm and intact distal pulses  Pulmonary/Chest: Effort normal  No stridor  No respiratory distress  He has no wheezes  He exhibits no tenderness  Abdominal: Soft  He exhibits no distension and no mass  There is no tenderness  There is no rebound and no guarding  No hernia  There is no CVA tenderness   Musculoskeletal: Normal range of motion  He exhibits no edema, tenderness or deformity  Lymphadenopathy:     He has no cervical adenopathy  Neurological: He is alert and oriented to person, place, and time  No cranial nerve deficit or sensory deficit  He exhibits normal muscle tone  Coordination normal    Skin: Skin is warm and dry  No rash noted  He is not diaphoretic  No erythema  No pallor  Psychiatric: He has a normal mood and affect  His behavior is normal  Judgment and thought content normal    Nursing note and vitals reviewed            Vital Signs  Vitals:    05/01/18 1336   BP: 140/82   BP Location: Left arm   Patient Position: Sitting   Cuff Size: Adult   Pulse: 84   Weight: 96 2 kg (212 lb 1 3 oz)   Height: 5' 9" (1 753 m)         Current Medications       Current Outpatient Prescriptions:     aspirin 81 MG tablet, Take by mouth, Disp: , Rfl:     atorvastatin (LIPITOR) 20 mg tablet, Take 1 tablet (20 mg total) by mouth daily, Disp: 90 tablet, Rfl: 3    carvedilol (COREG) 12 5 mg tablet, Take 1 tablet (12 5 mg total) by mouth 2 (two) times a day with meals, Disp: 270 tablet, Rfl: 3    Cholecalciferol (VITAMIN D) 2000 units CAPS, Take 1 tablet by mouth daily, Disp: , Rfl:     eplerenone (INSPRA) 50 MG tablet, Take 1 tablet (50 mg total) by mouth daily, Disp: 90 tablet, Rfl: 3    felodipine (PLENDIL) 10 MG 24 hr tablet, Take 1 tablet (10 mg total) by mouth daily, Disp: 90 tablet, Rfl: 3    omeprazole (PriLOSEC) 20 mg delayed release capsule, Take 1 capsule (20 mg total) by mouth 2 (two) times a day, Disp: 180 capsule, Rfl: 3    SYRINGE-NEEDLE, DISP, 3 ML 21G X 1-1/2" 3 ML MISC, For testerone injection, Disp: , Rfl:     testosterone (ANDROGEL PUMP) 1 62 % TD gel pump, Place on the skin Daily, Disp: , Rfl:     testosterone cypionate (DEPO-TESTOSTERONE) 200 mg/mL SOLN, Inject 0 8 mL deep IM once every two weeks  , Disp: , Rfl:     torsemide (DEMADEX) 10 mg tablet, Take 1 tablet (10 mg total) by mouth daily, Disp: 90 tablet, Rfl: 3      Active Problems     Patient Active Problem List   Diagnosis    Nephrolithiasis    Low testosterone    Gastroesophageal reflux disease without esophagitis         Past Medical History     Past Medical History:   Diagnosis Date    [facility] [address] [facility]     Chronic kidney disease     Diabetes [facility] [address] [facility]     Disease of thyroid gland     GERD (gastroesophageal reflux disease)     Hypertension     Kidney stones     Low testosterone     Thyroid disease          Surgical History     History reviewed  No pertinent surgical history  Family History     Family History   Problem Relation Age of Onset    Asthma Mother     Diabetes Mother     Other Father      Endocarditis    Hypertension Father     Gout Father          Social History     Social History     History   Smoking Status    Never Smoker   Smokeless Tobacco    Never Used         Pertinent Lab Values     Lab Results   Component Value Date    CREATININE 1 11 11/28/2017       No results found for: PSA          Pertinent Imaging       The patient's images were reviewed by me personally and also in real time with them in the examination room using our PACS imaging system    The imaging findings are significant for a renal bladder ultrasound from December 2016 which showed a 10 millimeter echogenic focus in the mid pole of left kidney which could possibly represent a stone  Fede Henao

## 2018-05-01 ENCOUNTER — OFFICE VISIT (OUTPATIENT)
Dept: GASTROENTEROLOGY | Facility: AMBULARY SURGERY CENTER | Age: 43
End: 2018-05-01
Payer: COMMERCIAL

## 2018-05-01 ENCOUNTER — OFFICE VISIT (OUTPATIENT)
Dept: UROLOGY | Facility: CLINIC | Age: 43
End: 2018-05-01
Payer: COMMERCIAL

## 2018-05-01 VITALS
WEIGHT: 212 LBS | BODY MASS INDEX: 31.4 KG/M2 | DIASTOLIC BLOOD PRESSURE: 80 MMHG | SYSTOLIC BLOOD PRESSURE: 142 MMHG | HEART RATE: 85 BPM | TEMPERATURE: 98.4 F | HEIGHT: 69 IN

## 2018-05-01 VITALS
SYSTOLIC BLOOD PRESSURE: 140 MMHG | HEART RATE: 84 BPM | BODY MASS INDEX: 31.41 KG/M2 | HEIGHT: 69 IN | DIASTOLIC BLOOD PRESSURE: 82 MMHG | WEIGHT: 212.08 LBS

## 2018-05-01 DIAGNOSIS — K21.9 GASTROESOPHAGEAL REFLUX DISEASE WITHOUT ESOPHAGITIS: Primary | ICD-10-CM

## 2018-05-01 DIAGNOSIS — N20.0 NEPHROLITHIASIS: ICD-10-CM

## 2018-05-01 DIAGNOSIS — R79.89 LOW TESTOSTERONE: Primary | ICD-10-CM

## 2018-05-01 PROCEDURE — 99244 OFF/OP CNSLTJ NEW/EST MOD 40: CPT | Performed by: INTERNAL MEDICINE

## 2018-05-01 PROCEDURE — 99204 OFFICE O/P NEW MOD 45 MIN: CPT | Performed by: UROLOGY

## 2018-05-01 RX ORDER — OMEPRAZOLE 20 MG/1
20 CAPSULE, DELAYED RELEASE ORAL 2 TIMES DAILY
Qty: 180 CAPSULE | Refills: 3 | Status: SHIPPED | OUTPATIENT
Start: 2018-05-01 | End: 2019-08-30 | Stop reason: SDUPTHER

## 2018-05-01 NOTE — ASSESSMENT & PLAN NOTE
Patient has a reported history of low testosterone, is receiving biweekly testosterone injections by an endocrinologist     Previously tried AndroGel which did not achieve therapeutic levels    His testosterone is most recently 5 which is well within the normal range and in fact that the upper limit of normal     He identifies no untoward effects from testosterone replacement therapy and will continue to receive this from his endocrinologist

## 2018-05-01 NOTE — ASSESSMENT & PLAN NOTE
Patient with a history of quite a few stone episodes in the past also with history of extracorporeal shockwave lithotripsy  He last saw a urologist 1/2 years ago wishes to reestablish with urological care  His last ultrasound from approximately 1 5 years ago shows a 1 centimeter nonobstructing left renal stone put he does not have a recent CT scan     Plan:  I discussed with the patient the need for follow-up imaging to assess his current stone burden    We will proceed to CT scan of the abdomen pelvis without contrast for this purpose

## 2018-05-01 NOTE — LETTER
May 1, 2018     MD Sherry Siu [id number] [facility]    Patient: Zo Yates   YOB: 1975   Date of Visit: 5/1/2018       Dear Dr Nuno Prado: Thank you for referring Zo Yates to me for evaluation  Below are my notes for this consultation  If you have questions, please do not hesitate to call me  I look forward to following your patient along with you  Sincerely,        Charla Bedolla MD        CC: No Recipients  Charla Bedolla MD  5/1/2018  4:28 PM  Sign at close encounter       Problem List Items Addressed This Visit     Nephrolithiasis     Patient with a history of quite a few stone episodes in the past also with history of extracorporeal shockwave lithotripsy  He last saw a urologist 1/2 years ago wishes to reestablish with urological care  His last ultrasound from approximately 1 5 years ago shows a 1 centimeter nonobstructing left renal stone put he does not have a recent CT scan     Plan:  I discussed with the patient the need for follow-up imaging to assess his current stone burden  We will proceed to CT scan of the abdomen pelvis without contrast for this purpose         Relevant Orders    CT abdomen pelvis wo contrast    Low testosterone - Primary     Patient has a reported history of low testosterone, is receiving biweekly testosterone injections by an endocrinologist     Previously tried AndroGel which did not achieve therapeutic levels  His testosterone is most recently 5 which is well within the normal range and in fact that the upper limit of normal     He identifies no untoward effects from testosterone replacement therapy and will continue to receive this from his endocrinologist                Discussion:  After his CT scan is performed we will review this for his current stone burden  If he has nonobstructing stones we will offer him ureteroscopy with laser lithotripsy if desired by him    Otherwise, we will continue to recommend continued fluid intake, and consider treating him with hydrochlorothiazide to decrease his urinary calcium and potassium citrate to increase his urinary citrate  Assessment and plan:     Please see problem oriented charting for the assessment plan of today's urological complaints    Jeannie Soto MD      Chief Complaint     Nephrolithiasis  Hypogonadism      History of Present Illness     Bruce Magaña is a 39 y o  gentleman with a past medical history as listed below who has a urological history of hypogonadism as well as nephrolithiasis  With regard to nephrolithiasis it is localized to both the right and left kidney  He is not currently having any flank discomfort or flank pain  These symptoms  These symptoms have been present for quite some time and the timing is chronic  Previous treatments include extracorporeal shockwave lithotripsy and previous work-up includes renal bladder ultrasound  The patient mentions nothing as aggravating and alleviating factors, respectively  The following associated signs and symptoms are mentioned:   Occasional flan pain in the past with passage of stones kand symptoms are mentioned: Ligia Crowe His most recent free and total testosterone from March 15, 2018 are in the corrected range of 888 nanograms/deciliter of total testosterone, and 29 7 picograms per milliliter of free testosterone  His last 24 hour urine study from May 2017 showed an appropriate urine volume of well over 2 liters (over 4 liters were collected), hyperuricosuria was seen, hypercalciuria and hypocitraturia were also noted)  Review of systems otherwise negative    He works as a paramedic with the [employer]      Detailed Urologic History     - please refer to HPI    Review of Systems     Review of Systems   Constitutional: Negative  HENT: Negative  Eyes: Negative  Respiratory: Negative  Cardiovascular: Negative  Gastrointestinal: Negative  Endocrine: Negative  Genitourinary: Negative  Musculoskeletal: Negative  Skin: Negative  Allergic/Immunologic: Negative  Neurological: Negative  Hematological: Negative  Psychiatric/Behavioral: Negative  Allergies     No Known Allergies    Physical Exam     Physical Exam   Constitutional: He is oriented to person, place, and time  He appears well-developed and well-nourished  No distress  HENT:   Head: Normocephalic and atraumatic  Right Ear: External ear normal    Left Ear: External ear normal    Nose: Nose normal    Mouth/Throat: Oropharynx is clear and moist  No oropharyngeal exudate  Eyes: Conjunctivae and EOM are normal  Pupils are equal, round, and reactive to light  Right eye exhibits no discharge  Left eye exhibits no discharge  No scleral icterus  Neck: Normal range of motion  Neck supple  No tracheal deviation present  No thyromegaly present  Cardiovascular: Normal rate, regular rhythm and intact distal pulses  Pulmonary/Chest: Effort normal  No stridor  No respiratory distress  He has no wheezes  He exhibits no tenderness  Abdominal: Soft  He exhibits no distension and no mass  There is no tenderness  There is no rebound and no guarding  No hernia  There is no CVA tenderness   Musculoskeletal: Normal range of motion  He exhibits no edema, tenderness or deformity  Lymphadenopathy:     He has no cervical adenopathy  Neurological: He is alert and oriented to person, place, and time  No cranial nerve deficit or sensory deficit  He exhibits normal muscle tone  Coordination normal    Skin: Skin is warm and dry  No rash noted  He is not diaphoretic  No erythema  No pallor  Psychiatric: He has a normal mood and affect  His behavior is normal  Judgment and thought content normal    Nursing note and vitals reviewed            Vital Signs  Vitals:    05/01/18 1336   BP: 140/82   BP Location: Left arm   Patient Position: Sitting   Cuff Size: Adult   Pulse: 84   Weight: 96 2 kg (212 lb 1 3 oz)   Height: 5' 9" (1 753 m)         Current Medications       Current Outpatient Prescriptions:     aspirin 81 MG tablet, Take by mouth, Disp: , Rfl:     atorvastatin (LIPITOR) 20 mg tablet, Take 1 tablet (20 mg total) by mouth daily, Disp: 90 tablet, Rfl: 3    carvedilol (COREG) 12 5 mg tablet, Take 1 tablet (12 5 mg total) by mouth 2 (two) times a day with meals, Disp: 270 tablet, Rfl: 3    Cholecalciferol (VITAMIN D) 2000 units CAPS, Take 1 tablet by mouth daily, Disp: , Rfl:     eplerenone (INSPRA) 50 MG tablet, Take 1 tablet (50 mg total) by mouth daily, Disp: 90 tablet, Rfl: 3    felodipine (PLENDIL) 10 MG 24 hr tablet, Take 1 tablet (10 mg total) by mouth daily, Disp: 90 tablet, Rfl: 3    omeprazole (PriLOSEC) 20 mg delayed release capsule, Take 1 capsule (20 mg total) by mouth 2 (two) times a day, Disp: 180 capsule, Rfl: 3    SYRINGE-NEEDLE, DISP, 3 ML 21G X 1-1/2" 3 ML MISC, For testerone injection, Disp: , Rfl:     testosterone (ANDROGEL PUMP) 1 62 % TD gel pump, Place on the skin Daily, Disp: , Rfl:     testosterone cypionate (DEPO-TESTOSTERONE) 200 mg/mL SOLN, Inject 0 8 mL deep IM once every two weeks  , Disp: , Rfl:     torsemide (DEMADEX) 10 mg tablet, Take 1 tablet (10 mg total) by mouth daily, Disp: 90 tablet, Rfl: 3      Active Problems     Patient Active Problem List   Diagnosis    Nephrolithiasis    Low testosterone    Gastroesophageal reflux disease without esophagitis         Past Medical History     Past Medical History:   Diagnosis Date    [facility]     Chronic kidney disease     Diabetes [facility]     Disease of thyroid gland     GERD (gastroesophageal reflux disease)     Hypertension     Kidney stones     Low testosterone     Thyroid disease          Surgical History     History reviewed  No pertinent surgical history        Family History     Family History   Problem Relation Age of Onset    Asthma Mother     Diabetes Mother    Lynette Nolasco Other Father Endocarditis    Hypertension Father     Gout Father          Social History     Social History     History   Smoking Status    Never Smoker   Smokeless Tobacco    Never Used         Pertinent Lab Values     Lab Results   Component Value Date    CREATININE 1 11 11/28/2017       No results found for: PSA          Pertinent Imaging       The patient's images were reviewed by me personally and also in real time with them in the examination room using our PACS imaging system  The imaging findings are significant for a renal bladder ultrasound from December 2016 which showed a 10 millimeter echogenic focus in the mid pole of left kidney which could possibly represent a stone  Georgette Ormond

## 2018-05-01 NOTE — PROGRESS NOTES
Consultation - [phone]  Mabeline Body 1975 male         Chief Complaint:  GERD    HPI:  50-year-old male with history of hypertension, hyperlipidemia has problems with chronic acid reflux  He had upper endoscopy few years ago and takes omeprazole regularly  He reports having gastric polyps removed  Patient has regular bowel movements and denies any blood or mucus in the stool  Appetite is good and denies any recent weight loss  Denies any abdominal pain, nausea, or vomiting  Has no heartburn or acid reflux  Denies any difficulty swallowing  REVIEW OF SYSTEMS: Review of Systems   Constitutional: Negative for activity change, appetite change, chills, diaphoresis, fatigue, fever and unexpected weight change  HENT: Negative for ear discharge, ear pain, facial swelling, hearing loss, nosebleeds, sore throat, tinnitus and voice change  Eyes: Negative for pain, discharge, redness, itching and visual disturbance  Respiratory: Negative for apnea, cough, chest tightness, shortness of breath and wheezing  Cardiovascular: Negative for chest pain and palpitations  Gastrointestinal:        As noted in HPI   Endocrine: Negative for cold intolerance, heat intolerance and polyuria  Genitourinary: Negative for difficulty urinating, dysuria, flank pain, hematuria and urgency  Musculoskeletal: Negative for arthralgias, back pain, gait problem, joint swelling and myalgias  Skin: Negative for rash and wound  Neurological: Negative for dizziness, tremors, seizures, speech difficulty, light-headedness, numbness and headaches  Hematological: Negative for adenopathy  Does not bruise/bleed easily  Psychiatric/Behavioral: Negative for agitation, behavioral problems and confusion  The patient is not nervous/anxious           Past Medical History:   Diagnosis Date    [facility] [address] [facility]     Chronic kidney disease     [facility]     Disease of thyroid gland     GERD (gastroesophageal reflux disease)     Hypertension     Kidney stones     Low testosterone     Thyroid disease       History reviewed  No pertinent surgical history  Social History     Social History    Marital status: /Civil Union     Spouse name: N/A    Number of children: N/A    Years of education: N/A     Occupational History    Not on file  Social History Main Topics    Smoking status: Never Smoker    Smokeless tobacco: Never Used    Alcohol use No    Drug use: No    Sexual activity: Not on file     Other Topics Concern    Not on file     Social History Narrative    No narrative on file     Family History   Problem Relation Age of Onset    Asthma Mother     Diabetes Mother     Other Father      Endocarditis    Hypertension Father     Gout Father      Patient has no known allergies  Current Outpatient Prescriptions   Medication Sig Dispense Refill    aspirin 81 MG tablet Take by mouth      atorvastatin (LIPITOR) 20 mg tablet Take 1 tablet (20 mg total) by mouth daily 90 tablet 3    carvedilol (COREG) 12 5 mg tablet Take 1 tablet (12 5 mg total) by mouth 2 (two) times a day with meals 270 tablet 3    Cholecalciferol (VITAMIN D) 2000 units CAPS Take 1 tablet by mouth daily      eplerenone (INSPRA) 50 MG tablet Take 1 tablet (50 mg total) by mouth daily 90 tablet 3    felodipine (PLENDIL) 10 MG 24 hr tablet Take 1 tablet (10 mg total) by mouth daily 90 tablet 3    SYRINGE-NEEDLE, DISP, 3 ML 21G X 1-1/2" 3 ML MISC For testerone injection      testosterone (ANDROGEL PUMP) 1 62 % TD gel pump Place on the skin Daily      testosterone cypionate (DEPO-TESTOSTERONE) 200 mg/mL SOLN Inject 0 8 mL deep IM once every two weeks        torsemide (DEMADEX) 10 mg tablet Take 1 tablet (10 mg total) by mouth daily 90 tablet 3    omeprazole (PriLOSEC) 20 mg delayed release capsule Take 1 capsule (20 mg total) by mouth 2 (two) times a day 180 capsule 3     No current facility-administered medications for this visit  Blood pressure 142/80, pulse 85, temperature 98 4 °F (36 9 °C), temperature source Tympanic, height 5' 9" (1 753 m), weight 96 2 kg (212 lb)  PHYSICAL EXAM: Physical Exam   Constitutional: He is oriented to person, place, and time  He appears well-developed  HENT:   Head: Normocephalic and atraumatic  Mouth/Throat: Oropharynx is clear and moist    Eyes: Conjunctivae are normal  Pupils are equal, round, and reactive to light  Right eye exhibits no discharge  Left eye exhibits no discharge  No scleral icterus  Neck: Neck supple  No JVD present  No tracheal deviation present  No thyromegaly present  Cardiovascular: Normal rate, regular rhythm, normal heart sounds and intact distal pulses  Exam reveals no gallop and no friction rub  No murmur heard  Pulmonary/Chest: Effort normal and breath sounds normal  No respiratory distress  He has no wheezes  He has no rales  He exhibits no tenderness  Abdominal: Soft  Bowel sounds are normal  He exhibits no distension and no mass  There is no tenderness  There is no rebound and no guarding  No hernia  Musculoskeletal: He exhibits no edema  Lymphadenopathy:     He has no cervical adenopathy  Neurological: He is alert and oriented to person, place, and time  Skin: Skin is warm and dry  No rash noted  No erythema  Psychiatric: He has a normal mood and affect   His behavior is normal  Thought content normal         Lab Results   Component Value Date    WBC 10 54 (H) 12/14/2017    HGB 15 3 12/14/2017    HCT 45 5 12/14/2017    MCV 87 12/14/2017     12/14/2017     Lab Results   Component Value Date    GLUCOSE 93 03/07/2017    CALCIUM 9 2 11/28/2017     11/28/2017    K 3 3 (L) 11/28/2017    CO2 30 11/28/2017     11/28/2017    BUN 22 11/28/2017    CREATININE 1 11 11/28/2017     Lab Results   Component Value Date    ALT 69 03/26/2017    AST 22 03/26/2017    ALKPHOS 82 03/26/2017    BILITOT 0 36 03/26/2017 No results found for: INR, PROTIME    Us Thyroid    Result Date: 6/21/2017  Impression:  Stable left-sided thyroid nodules  Surveillance ultrasound in one year's time suggested  ##[id number]    Workstation performed: [id number]     Dxa Bone Density Spine Hip And Pelvis    Result Date: 6/19/2017  Impression: 1  Based on the [facility] classification, the T-score of -1 2 is consistent with low bone mineral density  There has been 3 5% interval decrease in the BMD of the hip, and 3 6% increase in the spine  2   The 10 year risk of hip fracture is 0 1 %, with the 10 year risk of major osteoporotic fracture being 1 2 %, as calculated by the WHO fracture risk assessment tool (FRAX)  The current NOF guidelines recommend treating patients with FRAX 10 year risk  score of >3% for hip fracture and >20% for major osteoporotic fracture  3   Any secondary causes of low bone mineral density should be excluded prior to treatment, if clinically indicated  4   A daily intake of at least 1200 mg calcium and 800 - 1000 IU of Vitamin D, as well as weight bearing and muscle strengthening exercise, fall prevention and avoidance of tobacco and excessive alcohol intake as basic preventive measures are suggested  5   Repeat DXA scan in 18-24 months as clinically indicated  WHO CLASSIFICATION: Normal (a T-score of -1 0 or higher) Low bone mineral density (a T-score of less than -1 0 but higher than -2 5) Osteoporosis (a T-score of -2 5 or less) Severe osteoporosis (a T-score of -2 5 or less with a fragility fracture)   Workstation performed: [id number]       ASSESSMENT & PLAN:    Gastroesophageal reflux disease without esophagitis  Gastroesophageal reflux disease - Patient has the symptoms of chronic acid reflux for a long time  Possible hiatal hernia or LES weakness    Should rule out Florian's esophagus because of chronic symptoms         -continue omeprazole    -schedule for upper endoscopy    -Patient was explained about the lifestyle and dietary modifications  Advised to avoid fatty foods, chocolates, caffeine, alcohol and any other triggering foods  Avoid eating for at least 3 hours before going to bed         -Patient was explained about  the risks and benefits of the procedure  Risks including but not limited to bleeding, infection, perforation were explained in detail  Also explained about less than 100% sensitivity with the exam and other alternatives

## 2018-05-01 NOTE — LETTER
May 2, 2018     Shruti Andrade MD  [place] [id number] [facility]    Patient: Kelton Hankins   YOB: 1975   Date of Visit: 5/1/2018       Dear Dr Maria Alejandra Lindsey: Thank you for referring Kelton Hankins to me for evaluation  Below are my notes for this consultation  If you have questions, please do not hesitate to call me  I look forward to following your patient along with you           Sincerely,        Elisa Florez MD        CC: No Recipients

## 2018-05-02 NOTE — ASSESSMENT & PLAN NOTE
Gastroesophageal reflux disease - Patient has the symptoms of chronic acid reflux for a long time  Possible hiatal hernia or LES weakness  Should rule out Florian's esophagus because of chronic symptoms         -continue omeprazole    -schedule for upper endoscopy    -Patient was explained about the lifestyle and dietary modifications  Advised to avoid fatty foods, chocolates, caffeine, alcohol and any other triggering foods  Avoid eating for at least 3 hours before going to bed         -Patient was explained about  the risks and benefits of the procedure  Risks including but not limited to bleeding, infection, perforation were explained in detail  Also explained about less than 100% sensitivity with the exam and other alternatives

## 2018-05-30 ENCOUNTER — TRANSCRIBE ORDERS (OUTPATIENT)
Dept: ADMINISTRATIVE | Age: 43
End: 2018-05-30

## 2018-05-30 ENCOUNTER — APPOINTMENT (OUTPATIENT)
Dept: LAB | Age: 43
End: 2018-05-30
Payer: COMMERCIAL

## 2018-05-30 DIAGNOSIS — D35.00 BENIGN NEOPLASM OF ADRENAL GLAND, UNSPECIFIED LATERALITY: ICD-10-CM

## 2018-05-30 DIAGNOSIS — D35.00 BENIGN NEOPLASM OF ADRENAL GLAND, UNSPECIFIED LATERALITY: Primary | ICD-10-CM

## 2018-05-30 PROCEDURE — 82530 CORTISOL FREE: CPT

## 2018-05-31 ENCOUNTER — APPOINTMENT (OUTPATIENT)
Dept: LAB | Facility: HOSPITAL | Age: 43
End: 2018-05-31
Payer: COMMERCIAL

## 2018-05-31 ENCOUNTER — TRANSCRIBE ORDERS (OUTPATIENT)
Dept: LAB | Facility: HOSPITAL | Age: 43
End: 2018-05-31

## 2018-05-31 DIAGNOSIS — D35.00 BENIGN NEOPLASM OF ADRENAL GLAND, UNSPECIFIED LATERALITY: ICD-10-CM

## 2018-05-31 DIAGNOSIS — Z00.8 HEALTH EXAMINATION IN POPULATION SURVEY: ICD-10-CM

## 2018-05-31 DIAGNOSIS — D35.00 BENIGN NEOPLASM OF ADRENAL GLAND, UNSPECIFIED LATERALITY: Primary | ICD-10-CM

## 2018-05-31 LAB
ANION GAP SERPL CALCULATED.3IONS-SCNC: 9 MMOL/L (ref 4–13)
BASOPHILS # BLD AUTO: 0.09 THOUSANDS/ΜL (ref 0–0.1)
BASOPHILS NFR BLD AUTO: 1 % (ref 0–1)
BUN SERPL-MCNC: 21 MG/DL (ref 5–25)
CALCIUM SERPL-MCNC: 9.1 MG/DL (ref 8.3–10.1)
CHLORIDE SERPL-SCNC: 102 MMOL/L (ref 100–108)
CO2 SERPL-SCNC: 27 MMOL/L (ref 21–32)
CREAT SERPL-MCNC: 1.21 MG/DL (ref 0.6–1.3)
EOSINOPHIL # BLD AUTO: 0.22 THOUSAND/ΜL (ref 0–0.61)
EOSINOPHIL NFR BLD AUTO: 2 % (ref 0–6)
ERYTHROCYTE [DISTWIDTH] IN BLOOD BY AUTOMATED COUNT: 13.4 % (ref 11.6–15.1)
GFR SERPL CREATININE-BSD FRML MDRD: 72 ML/MIN/1.73SQ M
GLUCOSE P FAST SERPL-MCNC: 102 MG/DL (ref 65–99)
HCT VFR BLD AUTO: 49.3 % (ref 36.5–49.3)
HGB BLD-MCNC: 16 G/DL (ref 12–17)
IMM GRANULOCYTES # BLD AUTO: 0.08 THOUSAND/UL (ref 0–0.2)
IMM GRANULOCYTES NFR BLD AUTO: 1 % (ref 0–2)
LYMPHOCYTES # BLD AUTO: 1.85 THOUSANDS/ΜL (ref 0.6–4.47)
LYMPHOCYTES NFR BLD AUTO: 18 % (ref 14–44)
MCH RBC QN AUTO: 27.7 PG (ref 26.8–34.3)
MCHC RBC AUTO-ENTMCNC: 32.5 G/DL (ref 31.4–37.4)
MCV RBC AUTO: 85 FL (ref 82–98)
MONOCYTES # BLD AUTO: 1.26 THOUSAND/ΜL (ref 0.17–1.22)
MONOCYTES NFR BLD AUTO: 12 % (ref 4–12)
NEUTROPHILS # BLD AUTO: 7.07 THOUSANDS/ΜL (ref 1.85–7.62)
NEUTS SEG NFR BLD AUTO: 66 % (ref 43–75)
NRBC BLD AUTO-RTO: 0 /100 WBCS
PLATELET # BLD AUTO: 340 THOUSANDS/UL (ref 149–390)
PMV BLD AUTO: 9.4 FL (ref 8.9–12.7)
POTASSIUM SERPL-SCNC: 3.1 MMOL/L (ref 3.5–5.3)
RBC # BLD AUTO: 5.77 MILLION/UL (ref 3.88–5.62)
SODIUM SERPL-SCNC: 138 MMOL/L (ref 136–145)
TESTOST SERPL-MCNC: 275 NG/DL (ref 113–1065)
WBC # BLD AUTO: 10.57 THOUSAND/UL (ref 4.31–10.16)

## 2018-05-31 PROCEDURE — 84403 ASSAY OF TOTAL TESTOSTERONE: CPT

## 2018-05-31 PROCEDURE — 85025 COMPLETE CBC W/AUTO DIFF WBC: CPT

## 2018-05-31 PROCEDURE — 36415 COLL VENOUS BLD VENIPUNCTURE: CPT

## 2018-05-31 PROCEDURE — 80048 BASIC METABOLIC PNL TOTAL CA: CPT

## 2018-06-02 LAB
CORTIS F 24H UR-MRATE: 29 UG/24 HR (ref 0–50)
CORTIS F UR-MCNC: 15 UG/L

## 2018-06-07 ENCOUNTER — HOSPITAL ENCOUNTER (OUTPATIENT)
Dept: RADIOLOGY | Age: 43
Discharge: HOME | End: 2018-06-07
Payer: COMMERCIAL

## 2018-06-07 DIAGNOSIS — N20.0 NEPHROLITHIASIS: ICD-10-CM

## 2018-06-07 PROCEDURE — 74176 CT ABD & PELVIS W/O CONTRAST: CPT

## 2018-06-11 ENCOUNTER — ANESTHESIA EVENT (OUTPATIENT)
Dept: GASTROENTEROLOGY | Facility: HOSPITAL | Age: 43
End: 2018-06-11
Payer: COMMERCIAL

## 2018-06-14 ENCOUNTER — HOSPITAL ENCOUNTER (OUTPATIENT)
Dept: RADIOLOGY | Age: 43
Discharge: HOME | End: 2018-06-14
Payer: COMMERCIAL

## 2018-06-14 ENCOUNTER — OFFICE VISIT (OUTPATIENT)
Dept: UROLOGY | Facility: CLINIC | Age: 43
End: 2018-06-14
Payer: COMMERCIAL

## 2018-06-14 VITALS
HEART RATE: 74 BPM | DIASTOLIC BLOOD PRESSURE: 78 MMHG | WEIGHT: 207.6 LBS | BODY MASS INDEX: 30.75 KG/M2 | SYSTOLIC BLOOD PRESSURE: 138 MMHG | HEIGHT: 69 IN

## 2018-06-14 DIAGNOSIS — D35.00 BENIGN NEOPLASM OF ADRENAL GLAND, UNSPECIFIED LATERALITY: ICD-10-CM

## 2018-06-14 DIAGNOSIS — N20.0 NEPHROLITHIASIS: Primary | ICD-10-CM

## 2018-06-14 DIAGNOSIS — R79.89 LOW TESTOSTERONE: ICD-10-CM

## 2018-06-14 PROCEDURE — 74170 CT ABD WO CNTRST FLWD CNTRST: CPT

## 2018-06-14 PROCEDURE — 99214 OFFICE O/P EST MOD 30 MIN: CPT | Performed by: UROLOGY

## 2018-06-14 RX ADMIN — IOHEXOL 100 ML: 350 INJECTION, SOLUTION INTRAVENOUS at 13:07

## 2018-06-14 NOTE — PROGRESS NOTES
Problem List Items Addressed This Visit     Nephrolithiasis - Primary     I last saw the patient a number of weeks ago and send him for CT scan to evaluate his stone burden  He has no particular stone burden on the right side, however he does have a nonobstructing stone in the lower pole left kidney as well as a sizable obstructing stone in the distal ureter  He has previously preferred extracorporeal shockwave lithotripsy, but I told him that this is not the ideal treatment modality for distal ureteral stones  We did discuss ureteroscopy and cystoscopy with laser lithotripsy and retrograde pyelography as well as the benefit of stone removal, and the risk of ureteral stricture, ureteral damage, need for additional procedures, flank pain, infection, bleeding, and damage to surrounding structures  He verbalized consent to this procedure and also signed informed consent form and we will proceed to the operating room at the ambulatory surgery center in the coming weeks to treat his stone burden  Relevant Orders    Case request operating room: CYSTOSCOPY URETEROSCOPY WITH LITHOTRIPSY HOLMIUM LASER, RETROGRADE PYELOGRAM AND INSERTION STENT URETERAL (Completed)    Low testosterone     The patient's low testosterone is currently managed by Endocrinology, his measure testosterone levels have been between the low 200s to 800s depending on when his testosterone is checked  He feels well subjectively  Plan:  Continue testosterone replacement therapy as per Endocrinology                   Assessment and plan:       Please see problem oriented charting for the assessment plan of today's urological complaints  Renetta Ruiz MD      Chief Complaint     Chief Complaint   Patient presents with    low testosterone    Nephrolithiasis         History of Present Illness     Ramiro Floyd is a 39 y o  gentleman with a history of recurrent nephrolithiasis as well as low testosterone    He is doing well from a hypogonadism perspective and receiving his testosterone replacement shots from the endocrinology service and there appropriately managing him for this complaint  On review of systems today he denies dysuria, incontinence, hesitancy of urination, gross hematuria, urgency, nocturia times once, he feels empty after voiding and stream quality is good  He denies flank pain, malaise, fevers, and chills  His AUA symptom score today is 2 and quality of Life score is 1  He did go for his CT scan as directed and I thanked him for doing this  This CT scan shows a large, obstructing distal left ureteral stone and we had a long discussion today regarding the need for treatment given that he has silent obstruction with no pain and given his young age and the risk for complicated urinary tract infection going forward  He is amenable to treatment of his kidney stone      Detailed Urologic History     - please refer to HPI    Review of Systems     Review of Systems   Constitutional: Negative  HENT: Negative  Eyes: Negative  Respiratory: Negative  Cardiovascular: Negative  Gastrointestinal: Negative  Endocrine: Negative  Genitourinary:        As per HPI   Musculoskeletal: Negative  Skin: Negative  Allergic/Immunologic: Negative  Neurological: Negative  Hematological: Negative  Psychiatric/Behavioral: Negative  AUA SYMPTOM SCORE      Most Recent Value   AUA SYMPTOM SCORE   How often have you had a sensation of not emptying your bladder completely after you finished urinating? 0   How often have you had to urinate again less than two hours after you finished urinating? 1   How often have you found you stopped and started again several times when you urinate?  0   How often have you found it difficult to postpone urination? 0   How often have you had a weak urinary stream?  0   How often have you had to push or strain to begin urination?   0   How many times did you most typically get up to urinate from the time you went to bed at night until the time you got up in the morning? 1   Quality of Life: If you were to spend the rest of your life with your urinary condition just the way it is now, how would you feel about that?  1   AUA SYMPTOM SCORE  2            Allergies     No Known Allergies    Physical Exam     Physical Exam   Constitutional: He is oriented to person, place, and time  He appears well-developed and well-nourished  No distress  HENT:   Head: Normocephalic and atraumatic  Eyes: EOM are normal  Pupils are equal, round, and reactive to light  Right eye exhibits no discharge  Left eye exhibits no discharge  Neck: Normal range of motion  Neck supple  Cardiovascular: Regular rhythm and intact distal pulses  Pulmonary/Chest: Effort normal and breath sounds normal  No stridor  No respiratory distress  He has no wheezes  He exhibits no tenderness  Abdominal: Soft  Bowel sounds are normal  He exhibits no distension and no mass  There is no tenderness  There is no rebound and no guarding  No hernia  Musculoskeletal: Normal range of motion  Neurological: He is alert and oriented to person, place, and time  No cranial nerve deficit  Coordination normal    Skin: Skin is warm and dry  No rash noted  He is not diaphoretic  No erythema  No pallor  Psychiatric: He has a normal mood and affect  His behavior is normal  Judgment and thought content normal    Nursing note and vitals reviewed            Vital Signs  Vitals:    06/14/18 1126   BP: 138/78   BP Location: Left arm   Patient Position: Sitting   Cuff Size: Standard   Pulse: 74   Weight: 94 2 kg (207 lb 9 6 oz)   Height: 5' 9" (1 753 m)         Current Medications       Current Outpatient Prescriptions:     atorvastatin (LIPITOR) 20 mg tablet, Take 1 tablet (20 mg total) by mouth daily, Disp: 90 tablet, Rfl: 3    carvedilol (COREG) 12 5 mg tablet, Take 1 tablet (12 5 mg total) by mouth 2 (two) times a day with meals, Disp: 270 tablet, Rfl: 3    Cholecalciferol (VITAMIN D) 2000 units CAPS, Take 1 tablet by mouth daily, Disp: , Rfl:     eplerenone (INSPRA) 50 MG tablet, Take 1 tablet (50 mg total) by mouth daily, Disp: 90 tablet, Rfl: 3    felodipine (PLENDIL) 10 MG 24 hr tablet, Take 1 tablet (10 mg total) by mouth daily, Disp: 90 tablet, Rfl: 3    omeprazole (PriLOSEC) 20 mg delayed release capsule, Take 1 capsule (20 mg total) by mouth 2 (two) times a day, Disp: 180 capsule, Rfl: 3    SYRINGE-NEEDLE, DISP, 3 ML 21G X 1-1/2" 3 ML MISC, For testerone injection, Disp: , Rfl:     testosterone cypionate (DEPO-TESTOSTERONE) 200 mg/mL SOLN, Inject 0 8 mL deep IM once every two weeks  , Disp: , Rfl:     torsemide (DEMADEX) 10 mg tablet, Take 1 tablet (10 mg total) by mouth daily, Disp: 90 tablet, Rfl: 3    aspirin 81 MG tablet, Take by mouth, Disp: , Rfl:     testosterone (ANDROGEL PUMP) 1 62 % TD gel pump, Place on the skin Daily, Disp: , Rfl:       Active Problems     Patient Active Problem List   Diagnosis    Nephrolithiasis    Low testosterone    Gastroesophageal reflux disease without esophagitis         Past Medical History     Past Medical History:   Diagnosis Date    [facility] [address] [facility]     Chronic kidney disease     Diabetes [facility] [address] [facility]     Disease of thyroid gland     GERD (gastroesophageal reflux disease)     Hypertension     Kidney stones     Low testosterone     Thyroid disease          Surgical History     History reviewed  No pertinent surgical history        Family History     Family History   Problem Relation Age of Onset    Asthma Mother     Diabetes Mother     Other Father         Endocarditis    Hypertension Father     Gout Father          Social History     Social History     History   Smoking Status    Never Smoker   Smokeless Tobacco    Never Used         Pertinent Lab Values     Lab Results   Component Value Date    CREATININE 1 21 05/31/2018               Pertinent Imaging      The patient's images were reviewed by me personally and also in real time with them in the examination room using our PACS imaging system  The imaging findings are significant for distal left ureteral stone burden, a small, nonobstructing lower pole left renal stone

## 2018-06-14 NOTE — LETTER
June 14, 2018     Oni Interiano MD  [facility] [id number] [facility]    Patient: Saritha Garcia   YOB: 1975   Date of Visit: 6/14/2018       Dear Dr Sahil Euceda: Thank you for referring Saritha Garcia to me for evaluation  Below are my notes for this consultation  If you have questions, please do not hesitate to call me  I look forward to following your patient along with you  Sincerely,        Sapna Plasencia MD        CC: No Recipients  Sapna Plasencia MD  6/14/2018  1:07 PM  Sign at close encounter       Problem List Items Addressed This Visit     Nephrolithiasis - Primary     I last saw the patient a number of weeks ago and send him for CT scan to evaluate his stone burden  He has no particular stone burden on the right side, however he does have a nonobstructing stone in the lower pole left kidney as well as a sizable obstructing stone in the distal ureter  He has previously preferred extracorporeal shockwave lithotripsy, but I told him that this is not the ideal treatment modality for distal ureteral stones  We did discuss ureteroscopy and cystoscopy with laser lithotripsy and retrograde pyelography as well as the benefit of stone removal, and the risk of ureteral stricture, ureteral damage, need for additional procedures, flank pain, infection, bleeding, and damage to surrounding structures  He verbalized consent to this procedure and also signed informed consent form and we will proceed to the operating room at the ambulatory surgery center in the coming weeks to treat his stone burden  Relevant Orders    Case request operating room: CYSTOSCOPY URETEROSCOPY WITH LITHOTRIPSY HOLMIUM LASER, RETROGRADE PYELOGRAM AND INSERTION STENT URETERAL (Completed)    Low testosterone     The patient's low testosterone is currently managed by Endocrinology, his measure testosterone levels have been between the low 200s to 800s depending on when his testosterone is checked      He feels well subjectively  Plan:  Continue testosterone replacement therapy as per Endocrinology                   Assessment and plan:       Please see problem oriented charting for the assessment plan of today's urological complaints  Magdy Tineo MD      Chief Complaint     Chief Complaint   Patient presents with    low testosterone    Nephrolithiasis         History of Present Illness     Lian Henderson is a 39 y o  gentleman with a history of recurrent nephrolithiasis as well as low testosterone  He is doing well from a hypogonadism perspective and receiving his testosterone replacement shots from the endocrinology service and there appropriately managing him for this complaint  On review of systems today he denies dysuria, incontinence, hesitancy of urination, gross hematuria, urgency, nocturia times once, he feels empty after voiding and stream quality is good  He denies flank pain, malaise, fevers, and chills  His AUA symptom score today is 2 and quality of Life score is 1  He did go for his CT scan as directed and I thanked him for doing this  This CT scan shows a large, obstructing distal left ureteral stone and we had a long discussion today regarding the need for treatment given that he has silent obstruction with no pain and given his young age and the risk for complicated urinary tract infection going forward  He is amenable to treatment of his kidney stone      Detailed Urologic History     - please refer to HPI    Review of Systems     Review of Systems   Constitutional: Negative  HENT: Negative  Eyes: Negative  Respiratory: Negative  Cardiovascular: Negative  Gastrointestinal: Negative  Endocrine: Negative  Genitourinary:        As per HPI   Musculoskeletal: Negative  Skin: Negative  Allergic/Immunologic: Negative  Neurological: Negative  Hematological: Negative  Psychiatric/Behavioral: Negative          AUA SYMPTOM SCORE      Most Recent Value AUA SYMPTOM SCORE   How often have you had a sensation of not emptying your bladder completely after you finished urinating? 0   How often have you had to urinate again less than two hours after you finished urinating? 1   How often have you found you stopped and started again several times when you urinate?  0   How often have you found it difficult to postpone urination? 0   How often have you had a weak urinary stream?  0   How often have you had to push or strain to begin urination? 0   How many times did you most typically get up to urinate from the time you went to bed at night until the time you got up in the morning? 1   Quality of Life: If you were to spend the rest of your life with your urinary condition just the way it is now, how would you feel about that?  1   AUA SYMPTOM SCORE  2            Allergies     No Known Allergies    Physical Exam     Physical Exam   Constitutional: He is oriented to person, place, and time  He appears well-developed and well-nourished  No distress  HENT:   Head: Normocephalic and atraumatic  Eyes: EOM are normal  Pupils are equal, round, and reactive to light  Right eye exhibits no discharge  Left eye exhibits no discharge  Neck: Normal range of motion  Neck supple  Cardiovascular: Regular rhythm and intact distal pulses  Pulmonary/Chest: Effort normal and breath sounds normal  No stridor  No respiratory distress  He has no wheezes  He exhibits no tenderness  Abdominal: Soft  Bowel sounds are normal  He exhibits no distension and no mass  There is no tenderness  There is no rebound and no guarding  No hernia  Musculoskeletal: Normal range of motion  Neurological: He is alert and oriented to person, place, and time  No cranial nerve deficit  Coordination normal    Skin: Skin is warm and dry  No rash noted  He is not diaphoretic  No erythema  No pallor  Psychiatric: He has a normal mood and affect   His behavior is normal  Judgment and thought content normal    Nursing note and vitals reviewed  Vital Signs  Vitals:    06/14/18 1126   BP: 138/78   BP Location: Left arm   Patient Position: Sitting   Cuff Size: Standard   Pulse: 74   Weight: 94 2 kg (207 lb 9 6 oz)   Height: 5' 9" (1 753 m)         Current Medications       Current Outpatient Prescriptions:     atorvastatin (LIPITOR) 20 mg tablet, Take 1 tablet (20 mg total) by mouth daily, Disp: 90 tablet, Rfl: 3    carvedilol (COREG) 12 5 mg tablet, Take 1 tablet (12 5 mg total) by mouth 2 (two) times a day with meals, Disp: 270 tablet, Rfl: 3    Cholecalciferol (VITAMIN D) 2000 units CAPS, Take 1 tablet by mouth daily, Disp: , Rfl:     eplerenone (INSPRA) 50 MG tablet, Take 1 tablet (50 mg total) by mouth daily, Disp: 90 tablet, Rfl: 3    felodipine (PLENDIL) 10 MG 24 hr tablet, Take 1 tablet (10 mg total) by mouth daily, Disp: 90 tablet, Rfl: 3    omeprazole (PriLOSEC) 20 mg delayed release capsule, Take 1 capsule (20 mg total) by mouth 2 (two) times a day, Disp: 180 capsule, Rfl: 3    SYRINGE-NEEDLE, DISP, 3 ML 21G X 1-1/2" 3 ML MISC, For testerone injection, Disp: , Rfl:     testosterone cypionate (DEPO-TESTOSTERONE) 200 mg/mL SOLN, Inject 0 8 mL deep IM once every two weeks  , Disp: , Rfl:     torsemide (DEMADEX) 10 mg tablet, Take 1 tablet (10 mg total) by mouth daily, Disp: 90 tablet, Rfl: 3    aspirin 81 MG tablet, Take by mouth, Disp: , Rfl:     testosterone (ANDROGEL PUMP) 1 62 % TD gel pump, Place on the skin Daily, Disp: , Rfl:       Active Problems     Patient Active Problem List   Diagnosis    Nephrolithiasis    Low testosterone    Gastroesophageal reflux disease without esophagitis         Past Medical History     Past Medical History:   Diagnosis Date    [facility] [address] [facility]     Chronic kidney disease     Diabetes ([facility] [address] [facility]     Disease of thyroid gland     GERD (gastroesophageal reflux disease)     Hypertension     Kidney stones     Low testosterone     Thyroid disease Surgical History     History reviewed  No pertinent surgical history  Family History     Family History   Problem Relation Age of Onset    Asthma Mother     Diabetes Mother     Other Father         Endocarditis    Hypertension Father     Gout Father          Social History     Social History     History   Smoking Status    Never Smoker   Smokeless Tobacco    Never Used         Pertinent Lab Values     Lab Results   Component Value Date    CREATININE 1 21 05/31/2018               Pertinent Imaging      The patient's images were reviewed by me personally and also in real time with them in the examination room using our PACS imaging system  The imaging findings are significant for distal left ureteral stone burden, a small, nonobstructing lower pole left renal stone

## 2018-06-14 NOTE — ASSESSMENT & PLAN NOTE
The patient's low testosterone is currently managed by Endocrinology, his measure testosterone levels have been between the low 200s to 800s depending on when his testosterone is checked  He feels well subjectively      Plan:  Continue testosterone replacement therapy as per Endocrinology

## 2018-06-14 NOTE — ASSESSMENT & PLAN NOTE
I last saw the patient a number of weeks ago and send him for CT scan to evaluate his stone burden  He has no particular stone burden on the right side, however he does have a nonobstructing stone in the lower pole left kidney as well as a sizable obstructing stone in the distal ureter  He has previously preferred extracorporeal shockwave lithotripsy, but I told him that this is not the ideal treatment modality for distal ureteral stones  We did discuss ureteroscopy and cystoscopy with laser lithotripsy and retrograde pyelography as well as the benefit of stone removal, and the risk of ureteral stricture, ureteral damage, need for additional procedures, flank pain, infection, bleeding, and damage to surrounding structures  He verbalized consent to this procedure and also signed informed consent form and we will proceed to the operating room at the ambulatory surgery center in the coming weeks to treat his stone burden

## 2018-06-14 NOTE — PATIENT INSTRUCTIONS
Ureteroscopy   WHAT YOU NEED TO KNOW:   A ureteroscopy is a procedure to examine in the inside of your urinary tract, which includes your urethra, bladder, ureters, and kidneys  A ureteroscope is a small, thin tube with a light and camera on the end  Ureteroscopy can help your healthcare provider diagnose and treat problems in your urinary tract, such as kidney stones  HOW TO PREPARE:   The week before your procedure:   · Write down the correct date, time, and location of your procedure  · Ask your caregiver if you need to stop using aspirin or any other prescribed or over-the-counter medicine before your procedure or surgery  · Bring your medicine bottles or a list of your medicines when you see your caregiver  Tell your caregiver if you are allergic to any medicine  Tell your caregiver if you use any herbs, food supplements, or over-the-counter medicine  · Arrange a ride home  Ask a family member or friend to drive you home after your surgery or procedure  Do not drive yourself home  · You may need blood or urine tests before your procedure  You may also need an EKG  Ask your healthcare provider for more information about these and other tests that you may need  Write down the date, time, and location of each test   The night before your procedure:  Ask caregivers about directions for eating and drinking  The day of your procedure:   · Ask your caregiver before taking any medicine on the day of your procedure  These medicines include insulin, diabetic pills, high blood pressure pills, or heart pills  Bring a list of all the medicines you take, or your pill bottles, with you to the hospital     · You or a close family member will be asked to sign a legal document called a consent form  It gives caregivers permission to do the procedure or surgery  It also explains the problems that may happen, and your choices  Make sure all your questions are answered before you sign this form      · An anesthesiologist will talk to you before your surgery  You may need medicine to keep you asleep or numb an area of your body during surgery  Tell caregivers if you or anyone in your family has had a problem with anesthesia in the past     · Caregivers may insert an intravenous tube (IV) into your vein  A vein in the arm is usually chosen  Through the IV tube, you may be given liquids and medicine  WHAT WILL HAPPEN:   What will happen: Your healthcare provider will place the ureteroscope into your urethra  He will pass it through your bladder and into your ureters and kidneys  Your healthcare provider may place tools through the scope that will help him remove tissue or stones  The tools may also help him place stents or sheaths to help keep your ureters open  After your procedure: You will be taken to a room to rest until you are fully awake  Healthcare providers will monitor you closely for any problems  Do not get out of bed until your healthcare provider says it is okay  When your healthcare provider sees that you are okay, you will be able to go home or be taken to your hospital room  CONTACT YOUR HEALTHCARE PROVIDER IF:   · You cannot make it to your procedure  · You have a fever  · You get a cold or the flu  · You have questions or concerns about your procedure  SEEK CARE IMMEDIATELY IF:   · Your symptoms get worse  RISKS:   You may bleed more than expected or get an infection  One of your ureters may be injured  You may have a blockage in one of your ureters  You may need another procedure or surgery  CARE AGREEMENT:   You have the right to help plan your care  Learn about your health condition and how it may be treated  Discuss treatment options with your caregivers to decide what care you want to receive  You always have the right to refuse treatment     © 2017 [address] [employer] Information is for End User's use only and may not be sold, redistributed or otherwise used for commercial purposes  All illustrations and images included in CareNotes® are the copyrighted property of [employer]  or [employer]  The above information is an  only  It is not intended as medical advice for individual conditions or treatments  Talk to your doctor, nurse or pharmacist before following any medical regimen to see if it is safe and effective for you

## 2018-06-15 ENCOUNTER — TELEPHONE (OUTPATIENT)
Dept: GASTROENTEROLOGY | Facility: AMBULARY SURGERY CENTER | Age: 43
End: 2018-06-15

## 2018-06-20 NOTE — ANESTHESIA PREPROCEDURE EVALUATION
Review of Systems/Medical History  Patient summary reviewed  Chart reviewed  No history of anesthetic complications     Cardiovascular  Hypertension ,    Pulmonary  Negative pulmonary ROS        GI/Hepatic    GERD ,        Kidney stones, Chronic kidney disease ,        Endo/Other  History of thyroid disease ,   Comment: Adrenal Mass    GYN  Negative gynecology ROS          Hematology   Musculoskeletal       Neurology   Psychology                Anesthesia Plan  ASA Score- 3     Anesthesia Type- IV sedation with anesthesia with ASA Monitors  Additional Monitors:   Airway Plan:         Plan Factors-    Induction- intravenous  Postoperative Plan-     Informed Consent- Anesthetic plan and risks discussed with patient

## 2018-06-21 ENCOUNTER — ANESTHESIA (OUTPATIENT)
Dept: GASTROENTEROLOGY | Facility: HOSPITAL | Age: 43
End: 2018-06-21
Payer: COMMERCIAL

## 2018-06-21 ENCOUNTER — HOSPITAL ENCOUNTER (OUTPATIENT)
Facility: HOSPITAL | Age: 43
Setting detail: OUTPATIENT SURGERY
Discharge: HOME | End: 2018-06-21
Attending: INTERNAL MEDICINE | Admitting: INTERNAL MEDICINE
Payer: COMMERCIAL

## 2018-06-21 VITALS
DIASTOLIC BLOOD PRESSURE: 87 MMHG | HEART RATE: 63 BPM | SYSTOLIC BLOOD PRESSURE: 138 MMHG | RESPIRATION RATE: 16 BRPM | HEIGHT: 69 IN | BODY MASS INDEX: 31.1 KG/M2 | WEIGHT: 210 LBS | TEMPERATURE: 97.9 F | OXYGEN SATURATION: 98 %

## 2018-06-21 DIAGNOSIS — K21.9 GASTROESOPHAGEAL REFLUX DISEASE WITHOUT ESOPHAGITIS: ICD-10-CM

## 2018-06-21 PROCEDURE — 88305 TISSUE EXAM BY PATHOLOGIST: CPT | Performed by: PATHOLOGY

## 2018-06-21 PROCEDURE — 43239 EGD BIOPSY SINGLE/MULTIPLE: CPT | Performed by: INTERNAL MEDICINE

## 2018-06-21 PROCEDURE — 88342 IMHCHEM/IMCYTCHM 1ST ANTB: CPT | Performed by: PATHOLOGY

## 2018-06-21 RX ORDER — PROPOFOL 10 MG/ML
INJECTION, EMULSION INTRAVENOUS AS NEEDED
Status: DISCONTINUED | OUTPATIENT
Start: 2018-06-21 | End: 2018-06-21 | Stop reason: SURG

## 2018-06-21 RX ORDER — SODIUM CHLORIDE 9 MG/ML
INJECTION, SOLUTION INTRAVENOUS CONTINUOUS PRN
Status: DISCONTINUED | OUTPATIENT
Start: 2018-06-21 | End: 2018-06-21 | Stop reason: SURG

## 2018-06-21 RX ORDER — SODIUM CHLORIDE 9 MG/ML
125 INJECTION, SOLUTION INTRAVENOUS CONTINUOUS
Status: CANCELLED | OUTPATIENT
Start: 2018-06-21

## 2018-06-21 RX ORDER — LIDOCAINE HYDROCHLORIDE 10 MG/ML
INJECTION, SOLUTION INFILTRATION; PERINEURAL AS NEEDED
Status: DISCONTINUED | OUTPATIENT
Start: 2018-06-21 | End: 2018-06-21 | Stop reason: SURG

## 2018-06-21 RX ADMIN — PROPOFOL 100 MG: 10 INJECTION, EMULSION INTRAVENOUS at 11:08

## 2018-06-21 RX ADMIN — PROPOFOL 100 MG: 10 INJECTION, EMULSION INTRAVENOUS at 11:06

## 2018-06-21 RX ADMIN — PROPOFOL 50 MG: 10 INJECTION, EMULSION INTRAVENOUS at 11:14

## 2018-06-21 RX ADMIN — PROPOFOL 50 MG: 10 INJECTION, EMULSION INTRAVENOUS at 11:11

## 2018-06-21 RX ADMIN — LIDOCAINE HYDROCHLORIDE 100 MG: 10 INJECTION, SOLUTION INFILTRATION; PERINEURAL at 11:06

## 2018-06-21 RX ADMIN — SODIUM CHLORIDE: 0.9 INJECTION, SOLUTION INTRAVENOUS at 10:53

## 2018-06-21 NOTE — OP NOTE
**** GI/ENDOSCOPY REPORT ****     PATIENT NAME: JEFFREY KERNS - VISIT ID:  Patient ID: [id number] YOB: 1975     INTRODUCTION: Esophagogastroduodenoscopy - A 39 male patient presents for   an outpatient Esophagogastroduodenoscopy at [place] [address]  INDICATIONS: GERD  CONSENT: The benefits, risks, and alternatives to the procedure were   discussed and informed consent was obtained from the patient  PREPARATION:  EKG, pulse, pulse oximetry and blood pressure were monitored   throughout the procedure  ASA Classification: Class 2 - Patient has mild   to moderate systemic disturbance that may or may not be related to the   disorder requiring surgery  MEDICATIONS: Anesthesia-check records Anesthesia administered by   anesthesiologist      PROCEDURE:  The endoscope was passed without difficulty through the mouth   under direct visualization and advanced to the 2nd portion of the   duodenum  The scope was withdrawn and the mucosa was carefully examined  FINDINGS:   Esophagus: 2 tongues of columnar type mucosa noted starting   from 38-39 5- concerning for SSBE, biopsies obtained  Stomach: Erosive   gastritis was found in the antrum and pylorus  The mucosa appeared   erosive  A biopsy was taken  Multiple semi-pedunculated polyps (measuring   between 5 and 10 mm in size) was found in the body of the stomach and   fundus  Two forceps biopsies was taken  On retroflexed view, the stomach   appeared to be normal   Duodenum: The duodenal bulb, 1st portion of the   duodenum, and 2nd portion of the duodenum appeared to be normal  A biopsy   was taken  COMPLICATIONS: There were no complications  IMPRESSIONS: 2 tongues of columnar type mucosa noted starting from   38-39 5- concerning for SSBE, biopsies obtained  Erosive gastritis found   in the antrum and pylorus  Biopsy taken  Polyps found in the body of the   stomach and fundus  Two biopsies taken  Normal stomach   Normal duodenal   bulb, 1st portion of the duodenum, and 2nd portion of the duodenum  Biopsy   taken  RECOMMENDATIONS: Discharge home when standard parameters are met  Avoid   all non-steroidal anti-inflammatory drugs (NSAID's) including but not   limited to Ibuprofen, Advil, Motrin, and Nuprin  Anti-reflux measures:   Raise the head of the bed 4 to 6 inches  Avoid smoking  Avoid excess   coffee, tea or other caffeinated beverages  Avoid garments that fit   tightly through the abdomen  Avoid eating before bed  Start ulcer free   diet  Follow-up on the results of the biopsy specimens in 2 weeks  Continue PPI for now  ESTIMATED BLOOD LOSS: Insignificant  PATHOLOGY SPECIMENS: Biopsy taken  Associated finding: Gastritis  Two   forceps biopsies taken  Associated finding: Polyp  Random biopsy taken  Yes     PROCEDURE CODES: 87820 - EGD flexible; with biopsy     ICD-9 Codes: 530 81 Esophageal reflux 535 40 Other specified gastritides,   without mention of hemorrhage 211 1 Benign neoplasm of stomach     ICD-10 Codes: K21 Gastro-esophageal reflux disease K29 Gastritis and   duodenitis K17 4 Neoplasm of uncertain behavior of stomach     PERFORMED BY: JONNA Noonan  on 06/21/2018  Version 1, electronically signed by JONNA Victor  on 06/21/2018   at 11:21

## 2018-06-21 NOTE — H&P
History and Physical - [facility]  Kemi Bull 39 y o  male MRN: [id number]    HPI: Kemi Bull is a 39y o  year old male who presents for evaluation of longstanding symptoms of GERD, EGD to evaluate for Florian's  Review of Systems    Historical Information   Past Medical History:   Diagnosis Date    Adrenal mass ([address] )     Chronic kidney disease     Disease of thyroid gland     GERD (gastroesophageal reflux disease)     Hypertension     Kidney stones     Low testosterone     Thyroid disease      History reviewed  No pertinent surgical history  Social History   History   Alcohol Use No     History   Drug Use No     History   Smoking Status    Never Smoker   Smokeless Tobacco    Never Used     Family History   Problem Relation Age of Onset    Asthma Mother     Diabetes Mother     Other Father         Endocarditis    Hypertension Father     Gout Father        Meds/Allergies     Prescriptions Prior to Admission   Medication    atorvastatin (LIPITOR) 20 mg tablet    carvedilol (COREG) 12 5 mg tablet    Cholecalciferol (VITAMIN D) 2000 units CAPS    eplerenone (INSPRA) 50 MG tablet    felodipine (PLENDIL) 10 MG 24 hr tablet    omeprazole (PriLOSEC) 20 mg delayed release capsule    torsemide (DEMADEX) 10 mg tablet    SYRINGE-NEEDLE, DISP, 3 ML 21G X 1-1/2" 3 ML MISC    testosterone (ANDROGEL PUMP) 1 62 % TD gel pump    testosterone cypionate (DEPO-TESTOSTERONE) 200 mg/mL SOLN       No Known Allergies    Objective     Blood pressure 131/85, pulse 60, temperature (!) 97 2 °F (36 2 °C), temperature source Temporal, resp  rate 18, height 5' 9" (1 753 m), weight 95 3 kg (210 lb), SpO2 98 %  PHYSICAL EXAM    Gen: NAD  CV: RRR  CHEST: Clear  ABD: soft, NT/ND  EXT: no edema  Neuro: AAO      ASSESSMENT/PLAN:  This is a 39y o  year old male here for evaluation of GERD symptoms  PLAN:   Procedure:  EGD to assess for Florian's

## 2018-06-21 NOTE — ANESTHESIA POSTPROCEDURE EVALUATION
Post-Op Assessment Note      CV Status:  Stable    Mental Status:  Alert and awake    Hydration Status:  Euvolemic    PONV Controlled:  Controlled    Airway Patency:  Patent    Post Op Vitals Reviewed: Yes          Staff: CRNA       Comments: vss sv nonobstructed uneventful          BP   146/90   Temp     Pulse 77   Resp 24   SpO2 99

## 2018-06-28 ENCOUNTER — APPOINTMENT (OUTPATIENT)
Dept: LAB | Facility: HOSPITAL | Age: 43
End: 2018-06-28

## 2018-06-28 DIAGNOSIS — Z00.8 HEALTH EXAMINATION IN POPULATION SURVEY: ICD-10-CM

## 2018-06-28 DIAGNOSIS — D35.00 BENIGN NEOPLASM OF ADRENAL GLAND, UNSPECIFIED LATERALITY: ICD-10-CM

## 2018-06-28 LAB
CHOLEST SERPL-MCNC: 167 MG/DL (ref 50–200)
EST. AVERAGE GLUCOSE BLD GHB EST-MCNC: 131 MG/DL
HBA1C MFR BLD: 6.2 % (ref 4.2–6.3)
HDLC SERPL-MCNC: 54 MG/DL (ref 40–60)
LDLC SERPL CALC-MCNC: 81 MG/DL (ref 0–100)
NONHDLC SERPL-MCNC: 113 MG/DL
TRIGL SERPL-MCNC: 158 MG/DL

## 2018-06-28 PROCEDURE — 36415 COLL VENOUS BLD VENIPUNCTURE: CPT

## 2018-06-28 PROCEDURE — 80061 LIPID PANEL: CPT

## 2018-06-28 PROCEDURE — 83036 HEMOGLOBIN GLYCOSYLATED A1C: CPT

## 2018-06-29 DIAGNOSIS — I10 ESSENTIAL HYPERTENSION: ICD-10-CM

## 2018-06-29 DIAGNOSIS — E78.5 HYPERLIPIDEMIA, UNSPECIFIED HYPERLIPIDEMIA TYPE: ICD-10-CM

## 2018-06-29 NOTE — TELEPHONE ENCOUNTER
From: Kelton Hankins  Sent: 6/29/2018 9:59 AM EDT  Subject: Medication Renewal Request    Kelton Hankins would like a refill of the following medications:     atorvastatin (LIPITOR) 20 mg tablet Ward Jiménez MD]     carvedilol (COREG) 12 5 mg tablet Ward Jiménez MD]     eplerenone (INSPRA) 50 MG tablet Ward Jiménez MD]     felodipine (PLENDIL) 10 MG 24 hr tablet Ward Jiménez MD]     torsemide (DEMADEX) 10 mg tablet Ward Jiménez MD]    [id number]

## 2018-07-02 RX ORDER — TORSEMIDE 10 MG/1
10 TABLET ORAL DAILY
Qty: 90 TABLET | Refills: 3 | Status: SHIPPED | OUTPATIENT
Start: 2018-07-02 | End: 2018-11-16

## 2018-07-02 RX ORDER — CARVEDILOL 12.5 MG/1
12.5 TABLET ORAL 2 TIMES DAILY WITH MEALS
Qty: 270 TABLET | Refills: 1 | Status: SHIPPED | OUTPATIENT
Start: 2018-07-02 | End: 2018-11-16 | Stop reason: SDUPTHER

## 2018-07-02 RX ORDER — FELODIPINE 10 MG/1
10 TABLET, EXTENDED RELEASE ORAL DAILY
Qty: 90 TABLET | Refills: 3 | Status: SHIPPED | OUTPATIENT
Start: 2018-07-02 | End: 2020-01-28 | Stop reason: SDUPTHER

## 2018-07-02 RX ORDER — ATORVASTATIN CALCIUM 20 MG/1
20 TABLET, FILM COATED ORAL DAILY
Qty: 90 TABLET | Refills: 3 | Status: SHIPPED | OUTPATIENT
Start: 2018-07-02 | End: 2019-12-30 | Stop reason: ALTCHOICE

## 2018-07-02 RX ORDER — EPLERENONE 50 MG/1
50 TABLET, FILM COATED ORAL DAILY
Qty: 90 TABLET | Refills: 3 | Status: SHIPPED | OUTPATIENT
Start: 2018-07-02 | End: 2018-11-16 | Stop reason: SDUPTHER

## 2018-07-13 ENCOUNTER — TELEPHONE (OUTPATIENT)
Dept: GASTROENTEROLOGY | Facility: AMBULARY SURGERY CENTER | Age: 43
End: 2018-07-13

## 2018-07-13 NOTE — TELEPHONE ENCOUNTER
----- Message from Rafy Fitzgerald MD sent at 7/13/2018  2:19 AM EDT -----  bx were negative for celiac disease, H pylori, polyps were benign, but due to endoscopic evidence suggestive of allison's, will repeat EGD in 2-3 years  Continue PPI for now

## 2018-07-31 NOTE — PRE-PROCEDURE INSTRUCTIONS
Pre-Surgery Instructions:   Medication Instructions    atorvastatin (LIPITOR) 20 mg tablet Instructed patient per Anesthesia Guidelines   carvedilol (COREG) 12 5 mg tablet Instructed patient per Anesthesia Guidelines   Cholecalciferol (VITAMIN D) 2000 units CAPS Instructed patient per Anesthesia Guidelines   eplerenone (INSPRA) 50 MG tablet Instructed patient per Anesthesia Guidelines   felodipine (PLENDIL) 10 MG 24 hr tablet Instructed patient per Anesthesia Guidelines   omeprazole (PriLOSEC) 20 mg delayed release capsule Instructed patient per Anesthesia Guidelines   testosterone cypionate (DEPO-TESTOSTERONE) 200 mg/mL SOLN Instructed patient per Anesthesia Guidelines   torsemide (DEMADEX) 10 mg tablet Instructed patient per Anesthesia Guidelines  Pre-op and bathing instructions given

## 2018-08-06 NOTE — PROGRESS NOTES
8/7/2018  [facility]  1975  [id number]      Assessment  -Nephrolithiasis  -Hypogonadism    Discussion  Elo Howell is a 39 y o  male being managed by Dr Jassi Wright  Patient scheduled for cystoscopy, ureteroscopy with lithotripsy holmium laser, and insertion of ureteral stent on 08/21/2018  We reviewed the risks and benefits of this procedure including but not limited to cardiopulmonary complications, bleeding, infection, damage to nearby structures such as bladder/ureter/kidney, need for nephrostomy tube, need for additional surgeries  Patient verbalized understanding  Consent will be obtained on the day of procedure by performing surgeon  All questions were answered  History of Present Illness  39 y o  male presents today to discuss his upcoming surgery  Patient was noted to have obstructing 4 5 x 6 x 8 mm calculus in the distal left ureter at the UVJ, as well as multiple nonobstructing bilateral calculi on recent CT scan from 6/7/18  It was decided to proceed with elective ureteroscopy with Dr Jassi Wright  Patient reports having 2 episodes of left-sided flank discomfort but denies passing stone  He reports no episodes of gross hematuria or dysuria  Patient has additional history of ESWL in the past   Hypogonadism being managed by endocrinology, currently using testosterone injections, recent testosterone level from 5/31/18 was 275  Review of Systems  Review of Systems   Constitutional: Negative  HENT: Negative  Respiratory: Negative  Cardiovascular: Negative  Gastrointestinal: Negative  Genitourinary: Negative for decreased urine volume, difficulty urinating, dysuria, flank pain, frequency, hematuria and urgency  Musculoskeletal: Negative  Skin: Negative  Neurological: Negative  Psychiatric/Behavioral: Negative            Past Medical History  Past Medical History:   Diagnosis Date    Adrenal mass ([address] )     Chronic kidney disease     Disease of thyroid gland     GERD (gastroesophageal reflux disease)     Hypertension     Kidney stones     Low testosterone     Sleep apnea     not currently using his CPAP    Thyroid disease        Surgical History  Past Surgical History:   Procedure Laterality Date    KIDNEY STONE SURGERY      AL ESOPHAGOGASTRODUODENOSCOPY TRANSORAL DIAGNOSTIC N/A 6/21/2018    Procedure: ESOPHAGOGASTRODUODENOSCOPY (EGD); Surgeon: Karime Mercado MD;  Location: [facility]; Service: Gastroenterology       Family History  Family History   Problem Relation Age of Onset    Asthma Mother     Diabetes Mother     Other Father         Endocarditis    Hypertension Father     Gout Father        Social History  Social History     Social History    Marital status: /Civil Union     Spouse name: N/A    Number of children: N/A    Years of education: N/A     Occupational History    Not on file       Social History Main Topics    Smoking status: Former Smoker     Packs/day: 1 00     Years: 17 00     Quit date: 2005    Smokeless tobacco: Never Used    Alcohol use Yes      Comment: 1 drink every two weeks or so    Drug use: No    Sexual activity: Yes     Other Topics Concern    Not on file     Social History Narrative    No narrative on file       Current Medications  Current Outpatient Prescriptions   Medication Sig Dispense Refill    atorvastatin (LIPITOR) 20 mg tablet Take 1 tablet (20 mg total) by mouth daily 90 tablet 3    carvedilol (COREG) 12 5 mg tablet Take 1 tablet (12 5 mg total) by mouth 2 (two) times a day with meals (Patient taking differently: Take 12 5 mg by mouth 3 (three) times a day  ) 270 tablet 1    Cholecalciferol (VITAMIN D) 2000 units CAPS Take 1 tablet by mouth daily      eplerenone (INSPRA) 50 MG tablet Take 1 tablet (50 mg total) by mouth daily 90 tablet 3    felodipine (PLENDIL) 10 MG 24 hr tablet Take 1 tablet (10 mg total) by mouth daily 90 tablet 3    omeprazole (PriLOSEC) 20 mg delayed release capsule Take 1 capsule (20 mg total) by mouth 2 (two) times a day 180 capsule 3    SYRINGE-NEEDLE, DISP, 3 ML 21G X 1-1/2" 3 ML MISC For testerone injection      testosterone cypionate (DEPO-TESTOSTERONE) 200 mg/mL SOLN Inject 0 8 mL deep IM once every two weeks   torsemide (DEMADEX) 10 mg tablet Take 1 tablet (10 mg total) by mouth daily 90 tablet 3     No current facility-administered medications for this visit  Allergies  No Known Allergies    Vitals  There were no vitals filed for this visit  Physical Exam  Physical Exam   Constitutional: He is oriented to person, place, and time  He appears well-developed and well-nourished  HENT:   Head: Normocephalic  Eyes: Pupils are equal, round, and reactive to light  Neck: Normal range of motion  Cardiovascular: Normal rate, regular rhythm, normal heart sounds and intact distal pulses  Pulmonary/Chest: Effort normal and breath sounds normal    Abdominal: Soft  Normal appearance  There is no CVA tenderness  Musculoskeletal: Normal range of motion  Neurological: He is alert and oriented to person, place, and time  He has normal reflexes  Skin: Skin is warm and dry  Psychiatric: He has a normal mood and affect   His behavior is normal  Judgment and thought content normal

## 2018-08-07 ENCOUNTER — OFFICE VISIT (OUTPATIENT)
Dept: UROLOGY | Facility: AMBULATORY SURGERY CENTER | Age: 43
End: 2018-08-07
Payer: COMMERCIAL

## 2018-08-07 ENCOUNTER — APPOINTMENT (OUTPATIENT)
Dept: LAB | Age: 43
End: 2018-08-07
Payer: COMMERCIAL

## 2018-08-07 ENCOUNTER — TRANSCRIBE ORDERS (OUTPATIENT)
Dept: ADMINISTRATIVE | Age: 43
End: 2018-08-07

## 2018-08-07 VITALS
BODY MASS INDEX: 29.09 KG/M2 | WEIGHT: 196.4 LBS | DIASTOLIC BLOOD PRESSURE: 70 MMHG | SYSTOLIC BLOOD PRESSURE: 152 MMHG | HEART RATE: 77 BPM | HEIGHT: 69 IN

## 2018-08-07 DIAGNOSIS — E29.1 HYPOGONADISM IN MALE: ICD-10-CM

## 2018-08-07 DIAGNOSIS — N20.0 NEPHROLITHIASIS: ICD-10-CM

## 2018-08-07 DIAGNOSIS — N20.0 NEPHROLITHIASIS: Primary | ICD-10-CM

## 2018-08-07 PROCEDURE — 87086 URINE CULTURE/COLONY COUNT: CPT

## 2018-08-07 PROCEDURE — 88305 TISSUE EXAM BY PATHOLOGIST: CPT | Performed by: PATHOLOGY

## 2018-08-07 PROCEDURE — 99213 OFFICE O/P EST LOW 20 MIN: CPT | Performed by: NURSE PRACTITIONER

## 2018-08-07 NOTE — TELEPHONE ENCOUNTER
Pt showed up at the office in error, thought he had an appointment with us but it was with another doctor  Pt  was advised of his results while here   Recall set

## 2018-08-07 NOTE — PATIENT INSTRUCTIONS
Ureteroscopy   WHAT YOU NEED TO KNOW:   A ureteroscopy is a procedure to examine in the inside of your urinary tract, which includes your urethra, bladder, ureters, and kidneys  A ureteroscope is a small, thin tube with a light and camera on the end  Ureteroscopy can help your healthcare provider diagnose and treat problems in your urinary tract, such as kidney stones  DISCHARGE INSTRUCTIONS:   Medicine:   · Antibiotics  may be given to treat or prevent an infection  · Take your medicine as directed  Contact your healthcare provider if you think your medicine is not helping or if you have side effects  Tell him or her if you are allergic to any medicine  Keep a list of the medicines, vitamins, and herbs you take  Include the amounts, and when and why you take them  Bring the list or the pill bottles to follow-up visits  Carry your medicine list with you in case of an emergency  Follow up with your healthcare provider as directed:  Write down your questions so you remember to ask them during your visits  Drink liquids as directed  Liquids can help prevent kidney stones and urinary tract infections  Drink water and limit the amount of caffeine you drink  Caffeine may be found in coffee, tea, soda, sports drinks, and foods  Ask your healthcare provider how much liquid to drink each day  Contact your healthcare provider if:   · You have a fever  · You cannot urinate  · You have blood in your urine  · You are vomiting  · You have pain in your abdomen or side  · You have questions or concerns about your condition or care  © 2017 [facility] Information is for End User's use only and may not be sold, redistributed or otherwise used for commercial purposes  All illustrations and images included in CareNotes® are the copyrighted property of A D A M [employer]  or [employer]iano Gupta  The above information is an  only   It is not intended as medical advice for individual conditions or treatments  Talk to your doctor, nurse or pharmacist before following any medical regimen to see if it is safe and effective for you

## 2018-08-08 ENCOUNTER — LAB REQUISITION (OUTPATIENT)
Dept: LAB | Facility: HOSPITAL | Age: 43
End: 2018-08-08
Payer: COMMERCIAL

## 2018-08-08 DIAGNOSIS — L82.0 INFLAMED SEBORRHEIC KERATOSIS: ICD-10-CM

## 2018-08-08 DIAGNOSIS — D04.39 CARCINOMA IN SITU OF SKIN OF OTHER PARTS OF FACE: ICD-10-CM

## 2018-08-08 LAB — BACTERIA UR CULT: NORMAL

## 2018-08-20 ENCOUNTER — ANESTHESIA EVENT (OUTPATIENT)
Dept: PERIOP | Facility: HOSPITAL | Age: 43
End: 2018-08-20
Payer: COMMERCIAL

## 2018-08-21 ENCOUNTER — HOSPITAL ENCOUNTER (OUTPATIENT)
Facility: HOSPITAL | Age: 43
Setting detail: OUTPATIENT SURGERY
Discharge: HOME | End: 2018-08-21
Attending: UROLOGY | Admitting: UROLOGY
Payer: COMMERCIAL

## 2018-08-21 ENCOUNTER — TELEPHONE (OUTPATIENT)
Dept: UROLOGY | Facility: CLINIC | Age: 43
End: 2018-08-21

## 2018-08-21 ENCOUNTER — APPOINTMENT (OUTPATIENT)
Dept: RADIOLOGY | Facility: HOSPITAL | Age: 43
End: 2018-08-21
Payer: COMMERCIAL

## 2018-08-21 ENCOUNTER — ANESTHESIA (OUTPATIENT)
Dept: PERIOP | Facility: HOSPITAL | Age: 43
End: 2018-08-21
Payer: COMMERCIAL

## 2018-08-21 VITALS
OXYGEN SATURATION: 99 % | SYSTOLIC BLOOD PRESSURE: 170 MMHG | DIASTOLIC BLOOD PRESSURE: 87 MMHG | HEART RATE: 74 BPM | RESPIRATION RATE: 16 BRPM | TEMPERATURE: 98 F | WEIGHT: 193 LBS | HEIGHT: 69 IN | BODY MASS INDEX: 28.58 KG/M2

## 2018-08-21 DIAGNOSIS — N20.0 NEPHROLITHIASIS: Primary | ICD-10-CM

## 2018-08-21 PROCEDURE — 74420 UROGRAPHY RTRGR +-KUB: CPT

## 2018-08-21 PROCEDURE — 52332 CYSTOSCOPY AND TREATMENT: CPT | Performed by: UROLOGY

## 2018-08-21 PROCEDURE — 52351 CYSTOURETERO & OR PYELOSCOPE: CPT | Performed by: UROLOGY

## 2018-08-21 PROCEDURE — C1769 GUIDE WIRE: HCPCS | Performed by: UROLOGY

## 2018-08-21 PROCEDURE — C2617 STENT, NON-COR, TEM W/O DEL: HCPCS | Performed by: UROLOGY

## 2018-08-21 DEVICE — STENT URETERAL 6 FR 26CM INLAY OPTIMA: Type: IMPLANTABLE DEVICE | Site: URETER | Status: FUNCTIONAL

## 2018-08-21 RX ORDER — GLYCOPYRROLATE 0.2 MG/ML
INJECTION INTRAMUSCULAR; INTRAVENOUS AS NEEDED
Status: DISCONTINUED | OUTPATIENT
Start: 2018-08-21 | End: 2018-08-21 | Stop reason: SURG

## 2018-08-21 RX ORDER — DOCUSATE SODIUM 100 MG/1
100 CAPSULE, LIQUID FILLED ORAL 3 TIMES DAILY
Qty: 90 CAPSULE | Refills: 0 | Status: SHIPPED | OUTPATIENT
Start: 2018-08-21 | End: 2018-11-06 | Stop reason: ALTCHOICE

## 2018-08-21 RX ORDER — ONDANSETRON 2 MG/ML
INJECTION INTRAMUSCULAR; INTRAVENOUS AS NEEDED
Status: DISCONTINUED | OUTPATIENT
Start: 2018-08-21 | End: 2018-08-21 | Stop reason: SURG

## 2018-08-21 RX ORDER — MIDAZOLAM HYDROCHLORIDE 1 MG/ML
INJECTION INTRAMUSCULAR; INTRAVENOUS AS NEEDED
Status: DISCONTINUED | OUTPATIENT
Start: 2018-08-21 | End: 2018-08-21 | Stop reason: SURG

## 2018-08-21 RX ORDER — FENTANYL CITRATE/PF 50 MCG/ML
25 SYRINGE (ML) INJECTION
Status: DISCONTINUED | OUTPATIENT
Start: 2018-08-21 | End: 2018-08-21 | Stop reason: HOSPADM

## 2018-08-21 RX ORDER — ONDANSETRON 4 MG/1
4 TABLET, ORALLY DISINTEGRATING ORAL EVERY 6 HOURS PRN
Qty: 20 TABLET | Refills: 0 | Status: SHIPPED | OUTPATIENT
Start: 2018-08-21 | End: 2018-11-06 | Stop reason: ALTCHOICE

## 2018-08-21 RX ORDER — MAGNESIUM HYDROXIDE 1200 MG/15ML
LIQUID ORAL AS NEEDED
Status: DISCONTINUED | OUTPATIENT
Start: 2018-08-21 | End: 2018-08-21 | Stop reason: HOSPADM

## 2018-08-21 RX ORDER — SODIUM CHLORIDE, SODIUM LACTATE, POTASSIUM CHLORIDE, CALCIUM CHLORIDE 600; 310; 30; 20 MG/100ML; MG/100ML; MG/100ML; MG/100ML
125 INJECTION, SOLUTION INTRAVENOUS CONTINUOUS
Status: DISCONTINUED | OUTPATIENT
Start: 2018-08-21 | End: 2018-08-21 | Stop reason: HOSPADM

## 2018-08-21 RX ORDER — SODIUM CHLORIDE 9 MG/ML
INJECTION, SOLUTION INTRAVENOUS AS NEEDED
Status: DISCONTINUED | OUTPATIENT
Start: 2018-08-21 | End: 2018-08-21 | Stop reason: HOSPADM

## 2018-08-21 RX ORDER — FENTANYL CITRATE 50 UG/ML
INJECTION, SOLUTION INTRAMUSCULAR; INTRAVENOUS AS NEEDED
Status: DISCONTINUED | OUTPATIENT
Start: 2018-08-21 | End: 2018-08-21 | Stop reason: SURG

## 2018-08-21 RX ORDER — SULFAMETHOXAZOLE AND TRIMETHOPRIM 800; 160 MG/1; MG/1
1 TABLET ORAL EVERY 12 HOURS SCHEDULED
Qty: 6 TABLET | Refills: 0 | Status: SHIPPED | OUTPATIENT
Start: 2018-08-21 | End: 2018-08-24

## 2018-08-21 RX ORDER — PROPOFOL 10 MG/ML
INJECTION, EMULSION INTRAVENOUS AS NEEDED
Status: DISCONTINUED | OUTPATIENT
Start: 2018-08-21 | End: 2018-08-21 | Stop reason: SURG

## 2018-08-21 RX ORDER — SODIUM CHLORIDE 9 MG/ML
100 INJECTION, SOLUTION INTRAVENOUS CONTINUOUS
Status: DISCONTINUED | OUTPATIENT
Start: 2018-08-21 | End: 2018-08-21 | Stop reason: HOSPADM

## 2018-08-21 RX ORDER — SODIUM CHLORIDE 9 MG/ML
INJECTION, SOLUTION INTRAVENOUS CONTINUOUS PRN
Status: DISCONTINUED | OUTPATIENT
Start: 2018-08-21 | End: 2018-08-21 | Stop reason: SURG

## 2018-08-21 RX ORDER — OXYCODONE HYDROCHLORIDE AND ACETAMINOPHEN 5; 325 MG/1; MG/1
1 TABLET ORAL ONCE AS NEEDED
Status: COMPLETED | OUTPATIENT
Start: 2018-08-21 | End: 2018-08-21

## 2018-08-21 RX ORDER — ONDANSETRON 2 MG/ML
4 INJECTION INTRAMUSCULAR; INTRAVENOUS ONCE AS NEEDED
Status: DISCONTINUED | OUTPATIENT
Start: 2018-08-21 | End: 2018-08-21 | Stop reason: HOSPADM

## 2018-08-21 RX ORDER — LIDOCAINE HYDROCHLORIDE 10 MG/ML
INJECTION, SOLUTION INFILTRATION; PERINEURAL AS NEEDED
Status: DISCONTINUED | OUTPATIENT
Start: 2018-08-21 | End: 2018-08-21 | Stop reason: SURG

## 2018-08-21 RX ORDER — OXYCODONE HYDROCHLORIDE AND ACETAMINOPHEN 5; 325 MG/1; MG/1
1 TABLET ORAL EVERY 6 HOURS PRN
Qty: 12 TABLET | Refills: 0 | Status: SHIPPED | OUTPATIENT
Start: 2018-08-21 | End: 2018-08-31

## 2018-08-21 RX ADMIN — GLYCOPYRROLATE 0.2 MG: 0.2 INJECTION, SOLUTION INTRAMUSCULAR; INTRAVENOUS at 12:43

## 2018-08-21 RX ADMIN — LIDOCAINE HYDROCHLORIDE 50 MG: 10 INJECTION, SOLUTION INFILTRATION; PERINEURAL at 12:43

## 2018-08-21 RX ADMIN — SODIUM CHLORIDE: 0.9 INJECTION, SOLUTION INTRAVENOUS at 12:29

## 2018-08-21 RX ADMIN — FENTANYL CITRATE 50 MCG: 50 INJECTION INTRAMUSCULAR; INTRAVENOUS at 12:50

## 2018-08-21 RX ADMIN — DEXAMETHASONE SODIUM PHOSPHATE 10 MG: 10 INJECTION INTRAMUSCULAR; INTRAVENOUS at 12:50

## 2018-08-21 RX ADMIN — PROPOFOL 200 MG: 10 INJECTION, EMULSION INTRAVENOUS at 12:43

## 2018-08-21 RX ADMIN — OXYCODONE HYDROCHLORIDE AND ACETAMINOPHEN 1 TABLET: 5; 325 TABLET ORAL at 14:27

## 2018-08-21 RX ADMIN — MIDAZOLAM HYDROCHLORIDE 2 MG: 1 INJECTION, SOLUTION INTRAMUSCULAR; INTRAVENOUS at 12:40

## 2018-08-21 RX ADMIN — CEFAZOLIN SODIUM 2000 MG: 2 SOLUTION INTRAVENOUS at 12:40

## 2018-08-21 RX ADMIN — ONDANSETRON 4 MG: 2 INJECTION INTRAMUSCULAR; INTRAVENOUS at 13:07

## 2018-08-21 NOTE — TELEPHONE ENCOUNTER
Diana Velasquez is status post left ureteroscopy, retrograde pyelography, cystoscopy, and left ureteral stent placement  This was negative for stone disease and it looks like he passed his distal, 1 centimeter ureteral stone  He has a stent on a string which will be removed later this week      The plan following that will be for a 6 weeks KUB and renal ultrasound and a follow-up visit with an advanced practitioner or with me and then following with us on a yearly basis

## 2018-08-21 NOTE — TELEPHONE ENCOUNTER
Spoke to pt's wife to schedule  Appointment was scheduled on Thursday for nurse visit stent with string removal  Wife is unsure if she can make this visit with patient and may call back to move to Friday if necessary

## 2018-08-21 NOTE — DISCHARGE INSTRUCTIONS
Ureteroscopy   WHAT YOU NEED TO KNOW:     Juliana Gorman,    Today you had a ureteroscopy and retrograde pyelography with a stent placement  I was amazed to see that you had passed your distal ureteral stones  Your ureter did look dilated indicating that stones were previously in this location  I looked up your kidney is well and there were some small calcified areas of the lining of the kidney indicative of forming stones, however there were no stones within the collecting system  This is good news  You also had no scar tissue within the ureter  It will be normal for you to have frequency of urination, pain we urinate, and discomfort in your left hand side  We will remove your stent at the end of the week we will get an x-ray and ultrasound at approximately 6 weeks to see how you are doing  If you have questions or concerns in the postoperative frame of time, please do not hesitate to contact my office  Take care and be well,  Dr Randi Keenan      A ureteroscopy is a procedure to examine in the inside of your urinary tract, which includes your urethra, bladder, ureters, and kidneys  A ureteroscope is a small, thin tube with a light and camera on the end  Ureteroscopy can help your healthcare provider diagnose and treat problems in your urinary tract, such as kidney stones  DISCHARGE INSTRUCTIONS:   Medicine:   · Antibiotics  may be given to treat or prevent an infection  · Take your medicine as directed  Contact your healthcare provider if you think your medicine is not helping or if you have side effects  Tell him or her if you are allergic to any medicine  Keep a list of the medicines, vitamins, and herbs you take  Include the amounts, and when and why you take them  Bring the list or the pill bottles to follow-up visits  Carry your medicine list with you in case of an emergency    Follow up with your healthcare provider as directed:  Write down your questions so you remember to ask them during your visits  Drink liquids as directed  Liquids can help prevent kidney stones and urinary tract infections  Drink water and limit the amount of caffeine you drink  Caffeine may be found in coffee, tea, soda, sports drinks, and foods  Ask your healthcare provider how much liquid to drink each day  Contact your healthcare provider if:   · You have a fever  · You cannot urinate  · You have blood in your urine  · You are vomiting  · You have pain in your abdomen or side  · You have questions or concerns about your condition or care  © 2017 [facility] Information is for End User's use only and may not be sold, redistributed or otherwise used for commercial purposes  All illustrations and images included in CareNotes® are the copyrighted property of A D A M [employer] Inc  or [employer]iano Gupta  The above information is an  only  It is not intended as medical advice for individual conditions or treatments  Talk to your doctor, nurse or pharmacist before following any medical regimen to see if it is safe and effective for you

## 2018-08-21 NOTE — H&P
The patient was seen and examined  There are no changes from the prior outpatient history and physical examination  The patient is appropriately prepared for surgery today as planned

## 2018-08-21 NOTE — ANESTHESIA PREPROCEDURE EVALUATION
Review of Systems/Medical History  Patient summary reviewed  Chart reviewed  No history of anesthetic complications     Cardiovascular  Hypertension ,    Pulmonary  Sleep apnea ,        GI/Hepatic    GERD ,        Kidney stones,        Endo/Other    Comment: Adrenal Mass    GYN       Hematology   Musculoskeletal       Neurology   Psychology              Lab Results   Component Value Date    WBC 10 57 (H) 05/31/2018    HGB 16 0 05/31/2018     05/31/2018     Lab Results   Component Value Date     05/31/2018    K 3 1 (L) 05/31/2018    BUN 21 05/31/2018    CREATININE 1 21 05/31/2018    GLUCOSE 93 03/07/2017     Lab Results   Component Value Date    HGBA1C 6 2 06/28/2018         Anesthesia Plan  ASA Score- 2     Anesthesia Type- general with ASA Monitors  Additional Monitors:   Airway Plan: LMA  Plan Factors-    Induction- intravenous  Postoperative Plan-     Informed Consent- Anesthetic plan and risks discussed with patient  I personally reviewed this patient with the CRNA  Discussed and agreed on the Anesthesia Plan with the CRNA  Terrance Rivera

## 2018-08-21 NOTE — ANESTHESIA POSTPROCEDURE EVALUATION
Post-Op Assessment Note      CV Status:  Stable    Hydration Status:  Euvolemic    PONV Controlled:  Controlled    Airway Patency:  Patent    Post Op Vitals Reviewed: Yes          Staff: CRNA       Comments: vss report rn          BP     Temp     Pulse     Resp      SpO2

## 2018-08-23 ENCOUNTER — CLINICAL SUPPORT (OUTPATIENT)
Dept: UROLOGY | Facility: CLINIC | Age: 43
End: 2018-08-23

## 2018-08-23 VITALS — HEIGHT: 69 IN | WEIGHT: 195 LBS | BODY MASS INDEX: 28.88 KG/M2

## 2018-08-23 DIAGNOSIS — N20.0 NEPHROLITHIASIS: Primary | ICD-10-CM

## 2018-08-23 NOTE — PROGRESS NOTES
Diana Velasquez presented for appointment today and had his stent with him  He stated that he accidentally pulled it out this morning in the shower  Patient was instructed on pain management after stent removal and hydration  Orders were placed for KUB and renal ultrasound for follow up in 6 weeks with AP

## 2018-08-27 ENCOUNTER — HOSPITAL ENCOUNTER (OUTPATIENT)
Dept: RADIOLOGY | Facility: HOSPITAL | Age: 43
Discharge: HOME | End: 2018-08-27
Attending: UROLOGY
Payer: COMMERCIAL

## 2018-08-27 DIAGNOSIS — N20.0 NEPHROLITHIASIS: ICD-10-CM

## 2018-08-27 PROCEDURE — 74018 RADEX ABDOMEN 1 VIEW: CPT

## 2018-08-27 PROCEDURE — 76770 US EXAM ABDO BACK WALL COMP: CPT

## 2018-10-15 ENCOUNTER — TELEPHONE (OUTPATIENT)
Dept: UROLOGY | Facility: CLINIC | Age: 43
End: 2018-10-15

## 2018-10-15 DIAGNOSIS — N20.0 NEPHROLITHIASIS: Primary | ICD-10-CM

## 2018-10-15 NOTE — TELEPHONE ENCOUNTER
Ct scan schedule 10/23/18 at [facility] and follow up scheduled 11/6/18  Patient is aware of appts

## 2018-10-15 NOTE — TELEPHONE ENCOUNTER
Spoke with the patient's wife, concern for recurrent stone disease, I have ordered a CT stone protocol, patient will see me in 2 weeks

## 2018-10-23 ENCOUNTER — HOSPITAL ENCOUNTER (OUTPATIENT)
Dept: RADIOLOGY | Facility: HOSPITAL | Age: 43
Discharge: HOME | End: 2018-10-23
Attending: UROLOGY
Payer: COMMERCIAL

## 2018-10-23 DIAGNOSIS — N20.0 NEPHROLITHIASIS: ICD-10-CM

## 2018-10-23 PROCEDURE — 74176 CT ABD & PELVIS W/O CONTRAST: CPT

## 2018-11-02 NOTE — PROGRESS NOTES
Problem List Items Addressed This Visit     Nephrolithiasis - Primary     At the patient's last operative visit, for a 1 centimeter, apparently impacted distal left ureteral stone no stone was seen  Follow-up imaging confirms that no stone is present at this location  I reviewed these films with the patient and with his wife in detail the examination room today  We reviewed, as well, his last 24 urine study which showed hypercalciuria as well as hypocitraturia  I offered him starting hydrochlorothiazide as well as potassium citrate but he wishes to be on less medications at this time  Plan:  I reviewed with him the intake of citrate containing beverages, and other behavioral and fluid changes to decrease future stone risk  He will see us in 6 months with a KUB and renal ultrasound  Relevant Orders    US retroperitoneal complete    XR abdomen 1 view kub    Prostate cancer screening encounter, options and risks discussed     I discussed with the patient the AUA guidelines recommendations for prostate cancer screening in men age 53-78  We also discussed the competing risks of age, medical comorbidities, and the risks of infection, bleeding, pain, damage to surrounding structures, risk of diagnosis prostate cancer, and risks of incontinence and erectile dysfunction upon diagnosis and treatment of prostate cancer      Plan:  The patient will start routine prostate cancer screening at age 53, no particular risk factors at this time                       Assessment and plan:       Please see problem oriented charting for the assessment plan of today's urological complaints    Steven Franklin MD      Chief Complaint     Chief Complaint   Patient presents with    Nephrolithiasis    Prostate cancer screening discussion      History of Present Illness     Pawan Runrachel is a 39 y o  gentleman that I have seen previously for nephrolithiasis, his repeat CT scan shows that there is no distal left ureteral stone that he passed this 1 centimeter stone on his own  He has no flank pain today and no new complaints  AUA symptom score is 6 with a bother score of 1  He does have some forming stones within the kidneys, on the right and left, we reviewed these imaging findings  I did offer him medical therapy with hydrochlorothiazide as well as potassium citrate, he wishes to not start these at this time given his taking multiple medications at this time  The following portions of the patient's history were reviewed and updated as appropriate: allergies, current medications, past family history, past medical history, past social history, past surgical history and problem list     Detailed Urologic History     - please refer to HPI    Review of Systems     Review of Systems   Constitutional: Negative  HENT: Negative  Eyes: Negative  Respiratory: Negative  Cardiovascular: Negative  Gastrointestinal: Negative  Endocrine: Negative  Genitourinary: Negative  Musculoskeletal: Negative  Skin: Negative  Allergic/Immunologic: Negative  Neurological: Negative  Hematological: Negative  Psychiatric/Behavioral: Negative  Allergies     No Known Allergies    Physical Exam     Physical Exam   Constitutional: He is oriented to person, place, and time  He appears well-developed and well-nourished  No distress  HENT:   Head: Normocephalic and atraumatic  Right Ear: External ear normal    Left Ear: External ear normal    Nose: Nose normal    Eyes: Pupils are equal, round, and reactive to light  Conjunctivae and EOM are normal  Right eye exhibits no discharge  Left eye exhibits no discharge  No scleral icterus  Neck: Normal range of motion  Neck supple  Cardiovascular: Regular rhythm and intact distal pulses  Pulmonary/Chest: Effort normal  No stridor  No respiratory distress  He has no wheezes  Abdominal: Soft  Musculoskeletal: He exhibits no deformity     Neurological: He is alert and oriented to person, place, and time  No cranial nerve deficit  Coordination normal    Skin: Skin is warm and dry  No rash noted  He is not diaphoretic  No erythema  No pallor  Psychiatric: He has a normal mood and affect  His behavior is normal  Judgment and thought content normal    Nursing note and vitals reviewed  Vital Signs  Vitals:    11/06/18 1130   BP: 140/90   BP Location: Left arm   Patient Position: Sitting   Cuff Size: Adult   Pulse: 72   Weight: 91 4 kg (201 lb 6 4 oz)   Height: 5' 9" (1 753 m)         Current Medications       Current Outpatient Prescriptions:     atorvastatin (LIPITOR) 20 mg tablet, Take 1 tablet (20 mg total) by mouth daily, Disp: 90 tablet, Rfl: 3    carvedilol (COREG) 12 5 mg tablet, Take 1 tablet (12 5 mg total) by mouth 2 (two) times a day with meals (Patient taking differently: Take 12 5 mg by mouth 3 (three) times a day  ), Disp: 270 tablet, Rfl: 1    Cholecalciferol (VITAMIN D) 2000 units CAPS, Take 1 tablet by mouth daily, Disp: , Rfl:     eplerenone (INSPRA) 50 MG tablet, Take 1 tablet (50 mg total) by mouth daily, Disp: 90 tablet, Rfl: 3    felodipine (PLENDIL) 10 MG 24 hr tablet, Take 1 tablet (10 mg total) by mouth daily, Disp: 90 tablet, Rfl: 3    omeprazole (PriLOSEC) 20 mg delayed release capsule, Take 1 capsule (20 mg total) by mouth 2 (two) times a day, Disp: 180 capsule, Rfl: 3    SYRINGE-NEEDLE, DISP, 3 ML 21G X 1-1/2" 3 ML MISC, For testerone injection, Disp: , Rfl:     testosterone cypionate (DEPO-TESTOSTERONE) 200 mg/mL SOLN, Inject 0 8 mL deep IM once every two weeks  , Disp: , Rfl:     torsemide (DEMADEX) 10 mg tablet, Take 1 tablet (10 mg total) by mouth daily, Disp: 90 tablet, Rfl: 3      Active Problems     Patient Active Problem List   Diagnosis    Nephrolithiasis    Low testosterone    Gastroesophageal reflux disease without esophagitis    Prostate cancer screening encounter, options and risks discussed         Past Medical History     Past Medical History:   Diagnosis Date    Adrenal mass ([address] )     Chronic kidney disease     Disease of thyroid gland     GERD (gastroesophageal reflux disease)     Hypertension     Kidney stones     Low testosterone     Sleep apnea     not currently using his CPAP    Thyroid disease          Surgical History     Past Surgical History:   Procedure Laterality Date    FL RETROGRADE PYELOGRAM  8/21/2018    KIDNEY STONE SURGERY      [place] CYSTO/URETERO W/LITHOTRIPSY &INDWELL STENT INSRT Left 8/21/2018    Procedure: CYSTOSCOPY URETEROSCOPY, RETROGRADE PYELOGRAM AND INSERTION STENT URETERAL;  Surgeon: Cristóbal Patel MD;  [facility];  Service: Urology    [place] ESOPHAGOGASTRODUODENOSCOPY TRANSORAL DIAGNOSTIC N/A 6/21/2018    Procedure: ESOPHAGOGASTRODUODENOSCOPY (EGD); Surgeon: Aixa Hernandez MD;  [facility]; Service: Gastroenterology         Family History     Family History   Problem Relation Age of Onset    Asthma Mother     Diabetes Mother     Other Father         Endocarditis    Hypertension Father     Gout Father          Social History     Social History     History   Smoking Status    Former Smoker    Packs/day: 1 00    Years: 17 00    Quit date: 2005   Smokeless Tobacco    Never Used         Pertinent Lab Values     Lab Results   Component Value Date    CREATININE 1 21 05/31/2018       No results found for: PSA          Pertinent Imaging       The patient's images were reviewed by me personally and also in real time with them in the examination room using our PACS imaging system  The imaging findings are significant for nonobstructing stones bilaterally, small, no hydronephrosis, the previously noted distal ureteral stone on the left is no longer present

## 2018-11-02 NOTE — PATIENT INSTRUCTIONS
Kidney Stones   WHAT YOU NEED TO KNOW:   Kidney stones form in the urinary system when the water and waste in your urine are out of balance  When this happens, certain types of waste crystals separate from the urine  The crystals build up and form kidney stones  You may have 1 or more kidney stones  DISCHARGE INSTRUCTIONS:   Seek care immediately:   · You have vomiting that is not relieved by medicine  Contact your healthcare provider if:   · You have a fever  · You have trouble passing urine  · You see blood in your urine  · You have severe pain  · You have any questions or concerns about your condition or care  Medicines:   · NSAIDs , such as ibuprofen, help decrease swelling, pain, and fever  This medicine is available with or without a doctor's order  NSAIDs can cause stomach bleeding or kidney problems in certain people  If you take blood thinner medicine, always ask your healthcare provider if NSAIDs are safe for you  Always read the medicine label and follow directions  · Prescription medicine  may be given  Ask how to take this medicine safely  · Medicines  to balance your electrolytes may be needed  · Take your medicine as directed  Contact your healthcare provider if you think your medicine is not helping or if you have side effects  Tell him or her if you are allergic to any medicine  Keep a list of the medicines, vitamins, and herbs you take  Include the amounts, and when and why you take them  Bring the list or the pill bottles to follow-up visits  Carry your medicine list with you in case of an emergency  Follow up with your healthcare provider as directed: You may need to return for more tests  Write down your questions so you remember to ask them during your visits  Self-care:   · Drink plenty of liquids  Your healthcare provider may tell you to drink at least 8 to 12 (eight-ounce) cups of liquids each day  This helps flush out the kidney stones when you urinate  Water is the best liquid to drink  · Strain your urine every time you go to the bathroom  Urinate through a strainer or a piece of thin cloth to catch the stones  Take the stones to your healthcare provider so they can be sent to the lab for tests  This will help your healthcare providers plan the best treatment for you  · Eat a variety of healthy foods  Healthy foods include fruits, vegetables, whole-grain breads, low-fat dairy products, beans, and fish  You may need to limit how much sodium (salt) or protein you eat  Ask for information about the best foods for you  · Stay active  Your stones may pass more easily by if you stay active  Ask about the best activities for you  After you pass your kidney stones:  Once you have passed your kidney stones, your healthcare provider may  order a 24-hour urine test  Results from a 24-hour urine test will help your healthcare provider plan ways to prevent more stones from forming  If you are told to do a 24-hour test, your healthcare provider will give you more instructions  © 2017 [address] [facility] Information is for End User's use only and may not be sold, redistributed or otherwise used for commercial purposes  All illustrations and images included in CareNotes® are the copyrighted property of A D A M [employer]  or Fabiano Gupta  The above information is an  only  It is not intended as medical advice for individual conditions or treatments  Talk to your doctor, nurse or pharmacist before following any medical regimen to see if it is safe and effective for you

## 2018-11-06 ENCOUNTER — OFFICE VISIT (OUTPATIENT)
Dept: UROLOGY | Facility: CLINIC | Age: 43
End: 2018-11-06
Payer: COMMERCIAL

## 2018-11-06 VITALS
BODY MASS INDEX: 29.83 KG/M2 | HEIGHT: 69 IN | DIASTOLIC BLOOD PRESSURE: 90 MMHG | WEIGHT: 201.4 LBS | HEART RATE: 72 BPM | SYSTOLIC BLOOD PRESSURE: 140 MMHG

## 2018-11-06 DIAGNOSIS — N20.0 NEPHROLITHIASIS: Primary | ICD-10-CM

## 2018-11-06 DIAGNOSIS — Z12.5 PROSTATE CANCER SCREENING ENCOUNTER, OPTIONS AND RISKS DISCUSSED: ICD-10-CM

## 2018-11-06 PROCEDURE — 99214 OFFICE O/P EST MOD 30 MIN: CPT | Performed by: UROLOGY

## 2018-11-06 NOTE — ASSESSMENT & PLAN NOTE
I discussed with the patient the AUA guidelines recommendations for prostate cancer screening in men age 53-78  We also discussed the competing risks of age, medical comorbidities, and the risks of infection, bleeding, pain, damage to surrounding structures, risk of diagnosis prostate cancer, and risks of incontinence and erectile dysfunction upon diagnosis and treatment of prostate cancer      Plan:  The patient will start routine prostate cancer screening at age 53, no particular risk factors at this time

## 2018-11-06 NOTE — ASSESSMENT & PLAN NOTE
At the patient's last operative visit, for a 1 centimeter, apparently impacted distal left ureteral stone no stone was seen  Follow-up imaging confirms that no stone is present at this location  I reviewed these films with the patient and with his wife in detail the examination room today  We reviewed, as well, his last 24 urine study which showed hypercalciuria as well as hypocitraturia  I offered him starting hydrochlorothiazide as well as potassium citrate but he wishes to be on less medications at this time  Plan:  I reviewed with him the intake of citrate containing beverages, and other behavioral and fluid changes to decrease future stone risk  He will see us in 6 months with a KUB and renal ultrasound

## 2018-11-06 NOTE — LETTER
November 6, 2018     Fabian Brown MD  [place] [id number] [facility]    Patient: Maryln Runner   YOB: 1975   Date of Visit: 11/6/2018       Dear Dr Birgit Painter: Thank you for referring Maryln Runner to me for evaluation  Below are my notes for this consultation  If you have questions, please do not hesitate to call me  I look forward to following your patient along with you  Sincerely,        Steven Franklin MD        CC: No Recipients  Steven Franklin MD  11/6/2018 12:11 PM  Sign at close encounter       Problem List Items Addressed This Visit     Nephrolithiasis - Primary     At the patient's last operative visit, for a 1 centimeter, apparently impacted distal left ureteral stone no stone was seen  Follow-up imaging confirms that no stone is present at this location  I reviewed these films with the patient and with his wife in detail the examination room today  We reviewed, as well, his last 24 urine study which showed hypercalciuria as well as hypocitraturia  I offered him starting hydrochlorothiazide as well as potassium citrate but he wishes to be on less medications at this time  Plan:  I reviewed with him the intake of citrate containing beverages, and other behavioral and fluid changes to decrease future stone risk  He will see us in 6 months with a KUB and renal ultrasound  Relevant Orders    US retroperitoneal complete    XR abdomen 1 view kub    Prostate cancer screening encounter, options and risks discussed     I discussed with the patient the AUA guidelines recommendations for prostate cancer screening in men age 53-78  We also discussed the competing risks of age, medical comorbidities, and the risks of infection, bleeding, pain, damage to surrounding structures, risk of diagnosis prostate cancer, and risks of incontinence and erectile dysfunction upon diagnosis and treatment of prostate cancer      Plan:  The patient will start routine prostate cancer screening at age 53, no particular risk factors at this time                       Assessment and plan:       Please see problem oriented charting for the assessment plan of today's urological complaints    Lona Maldonado MD      Chief Complaint     Chief Complaint   Patient presents with    Nephrolithiasis    Prostate cancer screening discussion      History of Present Illness     Magui Brar is a 39 y o  gentleman that I have seen previously for nephrolithiasis, his repeat CT scan shows that there is no distal left ureteral stone that he passed this 1 centimeter stone on his own  He has no flank pain today and no new complaints  AUA symptom score is 6 with a bother score of 1  He does have some forming stones within the kidneys, on the right and left, we reviewed these imaging findings  I did offer him medical therapy with hydrochlorothiazide as well as potassium citrate, he wishes to not start these at this time given his taking multiple medications at this time  The following portions of the patient's history were reviewed and updated as appropriate: allergies, current medications, past family history, past medical history, past social history, past surgical history and problem list     Detailed Urologic History     - please refer to HPI    Review of Systems     Review of Systems   Constitutional: Negative  HENT: Negative  Eyes: Negative  Respiratory: Negative  Cardiovascular: Negative  Gastrointestinal: Negative  Endocrine: Negative  Genitourinary: Negative  Musculoskeletal: Negative  Skin: Negative  Allergic/Immunologic: Negative  Neurological: Negative  Hematological: Negative  Psychiatric/Behavioral: Negative  Allergies     No Known Allergies    Physical Exam     Physical Exam   Constitutional: He is oriented to person, place, and time  He appears well-developed and well-nourished  No distress  HENT:   Head: Normocephalic and atraumatic  Right Ear: External ear normal    Left Ear: External ear normal    Nose: Nose normal    Eyes: Pupils are equal, round, and reactive to light  Conjunctivae and EOM are normal  Right eye exhibits no discharge  Left eye exhibits no discharge  No scleral icterus  Neck: Normal range of motion  Neck supple  Cardiovascular: Regular rhythm and intact distal pulses  Pulmonary/Chest: Effort normal  No stridor  No respiratory distress  He has no wheezes  Abdominal: Soft  Musculoskeletal: He exhibits no deformity  Neurological: He is alert and oriented to person, place, and time  No cranial nerve deficit  Coordination normal    Skin: Skin is warm and dry  No rash noted  He is not diaphoretic  No erythema  No pallor  Psychiatric: He has a normal mood and affect  His behavior is normal  Judgment and thought content normal    Nursing note and vitals reviewed            Vital Signs  Vitals:    11/06/18 1130   BP: 140/90   BP Location: Left arm   Patient Position: Sitting   Cuff Size: Adult   Pulse: 72   Weight: 91 4 kg (201 lb 6 4 oz)   Height: 5' 9" (1 753 m)         Current Medications       Current Outpatient Prescriptions:     atorvastatin (LIPITOR) 20 mg tablet, Take 1 tablet (20 mg total) by mouth daily, Disp: 90 tablet, Rfl: 3    carvedilol (COREG) 12 5 mg tablet, Take 1 tablet (12 5 mg total) by mouth 2 (two) times a day with meals (Patient taking differently: Take 12 5 mg by mouth 3 (three) times a day  ), Disp: 270 tablet, Rfl: 1    Cholecalciferol (VITAMIN D) 2000 units CAPS, Take 1 tablet by mouth daily, Disp: , Rfl:     eplerenone (INSPRA) 50 MG tablet, Take 1 tablet (50 mg total) by mouth daily, Disp: 90 tablet, Rfl: 3    felodipine (PLENDIL) 10 MG 24 hr tablet, Take 1 tablet (10 mg total) by mouth daily, Disp: 90 tablet, Rfl: 3    omeprazole (PriLOSEC) 20 mg delayed release capsule, Take 1 capsule (20 mg total) by mouth 2 (two) times a day, Disp: 180 capsule, Rfl: 3    SYRINGE-NEEDLE, DISP, 3 ML 21G X 1-1/2" 3 ML MISC, For testerone injection, Disp: , Rfl:     testosterone cypionate (DEPO-TESTOSTERONE) 200 mg/mL SOLN, Inject 0 8 mL deep IM once every two weeks  , Disp: , Rfl:     torsemide (DEMADEX) 10 mg tablet, Take 1 tablet (10 mg total) by mouth daily, Disp: 90 tablet, Rfl: 3      Active Problems     Patient Active Problem List   Diagnosis    Nephrolithiasis    Low testosterone    Gastroesophageal reflux disease without esophagitis    Prostate cancer screening encounter, options and risks discussed         Past Medical History     Past Medical History:   Diagnosis Date    Adrenal mass ([address] )     Chronic kidney disease     Disease of thyroid gland     GERD (gastroesophageal reflux disease)     Hypertension     Kidney stones     Low testosterone     Sleep apnea     not currently using his CPAP    Thyroid disease          Surgical History     Past Surgical History:   Procedure Laterality Date    [place] RETROGRADE PYELOGRAM  8/21/2018    KIDNEY STONE SURGERY      [place] CYSTO/URETERO W/LITHOTRIPSY &INDWELL STENT INSRT Left 8/21/2018    Procedure: CYSTOSCOPY URETEROSCOPY, RETROGRADE PYELOGRAM AND INSERTION STENT URETERAL;  Surgeon: Ara Albrecht MD;  [facility];  Service: Urology    [place] ESOPHAGOGASTRODUODENOSCOPY TRANSORAL DIAGNOSTIC N/A 6/21/2018    Procedure: ESOPHAGOGASTRODUODENOSCOPY (EGD); Surgeon: Shasha Quiroga MD;  [facility];   Service: Gastroenterology         Family History     Family History   Problem Relation Age of Onset    Asthma Mother     Diabetes Mother     Other Father         Endocarditis    Hypertension Father     Gout Father          Social History     Social History     History   Smoking Status    Former Smoker    Packs/day: 1 00    Years: 17 00    Quit date: 2005   Smokeless Tobacco    Never Used         Pertinent Lab Values     Lab Results   Component Value Date    CREATININE 1 21 05/31/2018       No results found for: PSA          Pertinent Imaging       The patient's images were reviewed by me personally and also in real time with them in the examination room using our PACS imaging system  The imaging findings are significant for nonobstructing stones bilaterally, small, no hydronephrosis, the previously noted distal ureteral stone on the left is no longer present

## 2018-11-16 ENCOUNTER — OFFICE VISIT (OUTPATIENT)
Dept: NEPHROLOGY | Facility: CLINIC | Age: 43
End: 2018-11-16
Payer: COMMERCIAL

## 2018-11-16 VITALS
RESPIRATION RATE: 16 BRPM | HEIGHT: 69 IN | WEIGHT: 200.38 LBS | BODY MASS INDEX: 29.68 KG/M2 | HEART RATE: 78 BPM | DIASTOLIC BLOOD PRESSURE: 118 MMHG | SYSTOLIC BLOOD PRESSURE: 156 MMHG

## 2018-11-16 DIAGNOSIS — R82.991 HYPOCITRATURIA: ICD-10-CM

## 2018-11-16 DIAGNOSIS — I10 ESSENTIAL HYPERTENSION: ICD-10-CM

## 2018-11-16 DIAGNOSIS — N18.2 CKD (CHRONIC KIDNEY DISEASE), STAGE II: Primary | ICD-10-CM

## 2018-11-16 DIAGNOSIS — E87.6 HYPOKALEMIA: ICD-10-CM

## 2018-11-16 DIAGNOSIS — N20.0 NEPHROLITHIASIS: ICD-10-CM

## 2018-11-16 PROCEDURE — 99214 OFFICE O/P EST MOD 30 MIN: CPT | Performed by: INTERNAL MEDICINE

## 2018-11-16 RX ORDER — CARVEDILOL 25 MG/1
25 TABLET ORAL 2 TIMES DAILY WITH MEALS
Qty: 60 TABLET | Refills: 5 | Status: SHIPPED | OUTPATIENT
Start: 2018-11-16 | End: 2019-04-24 | Stop reason: SDUPTHER

## 2018-11-16 RX ORDER — POTASSIUM CITRATE 15 MEQ/1
1 TABLET, EXTENDED RELEASE ORAL DAILY
Qty: 30 TABLET | Refills: 5 | Status: SHIPPED | OUTPATIENT
Start: 2018-11-16 | End: 2019-01-11 | Stop reason: SDUPTHER

## 2018-11-16 RX ORDER — EPLERENONE 50 MG/1
50 TABLET, FILM COATED ORAL 2 TIMES DAILY
Qty: 60 TABLET | Refills: 5 | Status: SHIPPED | OUTPATIENT
Start: 2018-11-16 | End: 2019-10-04 | Stop reason: SDUPTHER

## 2018-11-16 RX ORDER — CHLORTHALIDONE 25 MG/1
12.5 TABLET ORAL DAILY
Qty: 15 TABLET | Refills: 5 | Status: SHIPPED | OUTPATIENT
Start: 2018-11-16 | End: 2018-11-20 | Stop reason: SDUPTHER

## 2018-11-16 NOTE — LETTER
November 16, 2018     Oxana Schmidt MD  [facility] [address] [facility]    Patient: Krysta Herman   YOB: 1975   Date of Visit: 11/16/2018       Dear Dr Ligia Richards: Thank you for referring Krysta Herman to me for evaluation  Below are my notes for this consultation  If you have questions, please do not hesitate to call me  I look forward to following your patient along with you  Sincerely,        Ivan Sidhu MD        CC: MD Ivan Plascencia MD  11/16/2018  4:50 PM  Sign at close encounter  [address] 39 y o  male MRN: [id number]  DATE: 11/16/2018  Reason for visit:   Chief Complaint   Patient presents with    Follow-up    Chronic Kidney Disease    Nephrolithiasis    Hypertension     ASSESSMENT and PLAN:  Hypertension  -Likely thought to be essentially in origin although cannot rule out whether he has secondary hypertension due to primary hyperaldosteronism given his bilateral adrenal nodules  -Currently patient is on Coreg, eplerenone, felodipine and torsemide   -his blood pressure remains uncontrolled and above goal in the office today  His home blood pressure has been elevated in 160/110s  -will increase eplerenone from 50 mg p  O  Daily to b i d  Dosing  Increase carvedilol from 12 5 mg/25 mg to 25 mg p o  B i d  -advised to bring blood pressure machine during next visit  Continue to monitor blood pressure at home and call back if blood pressure remains greater than 140/90   -will check plasma catecholamine/metanephrine   -he is being closely followed by endocrine regarding adrenal nodule in [place]   -Patient was found to have high aldosterone level along with high aldosterone/renin ratio although patient was also taking eplerenone at that time and makes the test results unreliable  -Will discuss with endocrine at [facility] to further evaluate if BP continue to remain higher   Salt restricted diet  -Looking back in medical records, cannot rule out any component of primary hyperaldosteronism  If blood pressure remains uncontrolled, may need to consider re-evaluation with aldosterone/renin ratio in which case we will need to hold eplerenone      Recurrent nephrolithiasis  -patient recently had an episode of nephrolithiasis requiring ureteral stent placement with eventual removal   Patient says his stone composition was calcium stone in the past although I do not have documentation   -advised to drink plenty of free water with goal urine output greater than 2 L per day   -last 24 hour urine stone risk profile from 2017 were reviewed with the patient which shows adequate urine output, urine sodium at goal   Urine citrate level remains lower with significantly elevated urine calcium level    -will repeat 24 hour urine stone risk profile again next month   -continue to follow low salt diet  -will start patient on chlorthalidone 12 5 mg p  O  Daily  Discontinue torsemide   -also start potassium citrate 15 mEq p o  Daily  -will make further recommendations based on 24 hour urine stone risk profile results  -Renal ultrasound in December 2016 shows normal-sized kidneys, 10 mm nonobstructing stone in the left kidney  No hydronephrosis  -will also check serum calcium, phosphorus, PTH   -most recent CT scan in October 2018 shows stable 2 5 mm calculus in right kidney, 1 mm calculus in left kidney  No hydronephrosis  CKD stage II- III a  -Patient's baseline serum creatinine 1 1 to 1 2  Last serum creatinine 1 2 in May 2018  Check BMP next month     -He could have a component of CK D given his long-term hypertension history  Previous urinalysis did not show any hematuria or proteinuria  Last urine microalbumin/creatinine ratio 13 mg in 2017      Leg edema  -clinically does not seem to have leg edema at the time of my encounter although patient says that he has ongoing leg edema issues at home   -will start chlorthalidone as above   Discontinue standing dose of torsemide  I have advised him to take p r n  Torsemide as needed for worsening leg edema  Hypokalemia  -increasing eplerenone as above will likely help  [place] potassium diet     -checking BMP early next month and if continued to have persistent hypo kalemia, will need potassium supplement  Diagnoses and all orders for this visit:    Hypocitraturia  -     Potassium Citrate ER 15 MEQ (1620 MG) TBCR; Take 1 tablet by mouth daily    Essential hypertension  -     carvedilol (COREG) 25 mg tablet; Take 1 tablet (25 mg total) by mouth 2 (two) times a day with meals  -     eplerenone (INSPRA) 50 MG tablet; Take 1 tablet (50 mg total) by mouth 2 (two) times a day  -     chlorthalidone 25 mg tablet; Take 0 5 tablets (12 5 mg total) by mouth daily  -     Basic metabolic panel; Future  -     Urinalysis with reflex to microscopic; Future  -     Protein / creatinine ratio, urine; Future  -     Metanephrine, Fractionated Plasma Free; Future  -     Catecholamines, fractionated, plasma; Future  -     Potassium Citrate ER 15 MEQ (1620 MG) TBCR; Take 1 tablet by mouth daily  -     Uric acid; Future    Hypokalemia  -     Magnesium; Future    Nephrolithiasis  -     Stone risk profile; Future  -     PTH, intact; Future  -     Phosphorus; Future  -     Uric acid; Future          SUBJECTIVE / HPI:  Patient is 27-year-old male with significant medical issues of hypertension diagnosed early in the age, bilateral adrenal nodule, history of nephrolithiasis with last episode recently requiring ureteral stent placement with eventual removal, history of lithotripsy, sleep apnea, comes for regular follow-up of hypertension and nephrolithiasis  Baseline serum creatinine 1 1-1 2  Last serum creatinine 1 2 overall stable  Patient has lost follow-up for almost two years and was seen in the office last time in early 2017    Since last visit, patient was also seen by Dr Mick Lopez in [place] regarding his bilateral adrenal nodules and being closely followed  Lately he has been having significantly elevated blood pressure at home  His home blood pressure readings are generally 160/1 10s  Patient has not brought blood pressure machine to the office visit today  He claims to be compliant with low-salt diet  His 24 hour urine stone risk profile from 2017 was reviewed with him  He denies any hematuria, no dysuria  No flank or abdominal pain  No fever or chills  Patient recently had an episode of kidney stone requiring cystoscopy, retrograde pyelography, stent placement which was eventually removed  Being closely followed by Urology  continued to take same antihypertensive regimen including Coreg, eplerenone, felodipine and torsemide  No recent NSAID exposure  He tries to be compliant with salt restricted diet and tries to drink more water    REVIEW OF SYSTEMS:  More than 10 point review of systems were obtained and discussed in length with the patient  Complete review of systems were negative / unremarkable except mentioned above  PHYSICAL EXAM:  Vitals:    11/16/18 1440   BP: (!) 156/118   BP Location: Left arm   Patient Position: Sitting   Cuff Size: Large   Pulse: 78   Resp: 16   Weight: 90 9 kg (200 lb 6 oz)   Height: 5' 9" (1 753 m)     Body mass index is 29 59 kg/m²  Physical Exam   Constitutional: He is oriented to person, place, and time  He appears well-developed and well-nourished  HENT:   Head: Normocephalic and atraumatic  Right Ear: External ear normal    Left Ear: External ear normal    Nose: Nose normal    Eyes: Pupils are equal, round, and reactive to light  Conjunctivae and EOM are normal  No scleral icterus  Neck: Neck supple  No JVD present  Cardiovascular: Normal rate and normal heart sounds  Pulmonary/Chest: Effort normal and breath sounds normal  No respiratory distress  He has no wheezes  He has no rales  Abdominal: Soft   Bowel sounds are normal  He exhibits no distension  There is no tenderness  Musculoskeletal: He exhibits no edema or tenderness  Neurological: He is alert and oriented to person, place, and time  Skin: Skin is warm and dry  Psychiatric: He has a normal mood and affect  His behavior is normal    Vitals reviewed  PAST MEDICAL HISTORY:  Past Medical History:   Diagnosis Date    Adrenal mass ([address] )     Chronic kidney disease     Disease of thyroid gland     GERD (gastroesophageal reflux disease)     Hypertension     Kidney stones     Low testosterone     Sleep apnea     not currently using his CPAP    Thyroid disease        PAST SURGICAL HISTORY:  Past Surgical History:   Procedure Laterality Date    FL RETROGRADE PYELOGRAM  8/21/2018    KIDNEY STONE SURGERY      HI CYSTO/URETERO W/LITHOTRIPSY &INDWELL STENT INSRT Left 8/21/2018    Procedure: CYSTOSCOPY URETEROSCOPY, RETROGRADE PYELOGRAM AND INSERTION STENT URETERAL;  Surgeon: Cristóbal Patel MD;  [facility];  Service: Urology    HI ESOPHAGOGASTRODUODENOSCOPY TRANSORAL DIAGNOSTIC N/A 6/21/2018    Procedure: ESOPHAGOGASTRODUODENOSCOPY (EGD); Surgeon: Aixa Hernandez MD;  [facility];   Service: Gastroenterology       SOCIAL HISTORY:  History   Alcohol Use    Yes     Comment: 1 drink every two weeks or so     History   Drug Use No     History   Smoking Status    Former Smoker    Packs/day: 1 00    Years: 17 00    Quit date: 2005   Smokeless Tobacco    Never Used       FAMILY HISTORY:  Family History   Problem Relation Age of Onset    Asthma Mother     Diabetes Mother     Other Father         Endocarditis    Hypertension Father     Gout Father        MEDICATIONS:    Current Outpatient Prescriptions:     atorvastatin (LIPITOR) 20 mg tablet, Take 1 tablet (20 mg total) by mouth daily, Disp: 90 tablet, Rfl: 3    carvedilol (COREG) 25 mg tablet, Take 1 tablet (25 mg total) by mouth 2 (two) times a day with meals, Disp: 60 tablet, Rfl: 5    Cholecalciferol (VITAMIN D) 2000 units CAPS, Take 1 tablet by mouth daily, Disp: , Rfl:     eplerenone (INSPRA) 50 MG tablet, Take 1 tablet (50 mg total) by mouth 2 (two) times a day, Disp: 60 tablet, Rfl: 5    felodipine (PLENDIL) 10 MG 24 hr tablet, Take 1 tablet (10 mg total) by mouth daily, Disp: 90 tablet, Rfl: 3    omeprazole (PriLOSEC) 20 mg delayed release capsule, Take 1 capsule (20 mg total) by mouth 2 (two) times a day (Patient taking differently: Take 20 mg by mouth daily  ), Disp: 180 capsule, Rfl: 3    SYRINGE-NEEDLE, DISP, 3 ML 21G X 1-1/2" 3 ML MISC, For testerone injection, Disp: , Rfl:     testosterone cypionate (DEPO-TESTOSTERONE) 200 mg/mL SOLN, Inject 0 8 mL deep IM once every two weeks  , Disp: , Rfl:     chlorthalidone 25 mg tablet, Take 0 5 tablets (12 5 mg total) by mouth daily, Disp: 15 tablet, Rfl: 5    Potassium Citrate ER 15 MEQ (1620 MG) TBCR, Take 1 tablet by mouth daily, Disp: 30 tablet, Rfl: 5    Lab Results: Last creatinine 1 2

## 2018-11-16 NOTE — PROGRESS NOTES
NEPHROLOGY OUTPATIENT PROGRESS NOTE   Jessica Valerio 39 y o  male MRN: [id number]  DATE: 11/16/2018  Reason for visit:   Chief Complaint   Patient presents with    Follow-up    Chronic Kidney Disease    Nephrolithiasis    Hypertension     ASSESSMENT and PLAN:  Hypertension  -Likely thought to be essentially in origin although cannot rule out whether he has secondary hypertension due to primary hyperaldosteronism given his bilateral adrenal nodules  -Currently patient is on Coreg, eplerenone, felodipine and torsemide   -his blood pressure remains uncontrolled and above goal in the office today  His home blood pressure has been elevated in 160/110s  -will increase eplerenone from 50 mg p  O  Daily to b i d  Dosing  Increase carvedilol from 12 5 mg/25 mg to 25 mg p o  B i d  -advised to bring blood pressure machine during next visit  Continue to monitor blood pressure at home and call back if blood pressure remains greater than 140/90   -will check plasma catecholamine/metanephrine   -he is being closely followed by endocrine regarding adrenal nodule in [place]   -Patient was found to have high aldosterone level along with high aldosterone/renin ratio although patient was also taking eplerenone at that time and makes the test results unreliable  -Will discuss with endocrine at [facility] to further evaluate if BP continue to remain higher  Salt restricted diet  -Looking back in medical records, cannot rule out any component of primary hyperaldosteronism    If blood pressure remains uncontrolled, may need to consider re-evaluation with aldosterone/renin ratio in which case we will need to hold eplerenone      Recurrent nephrolithiasis  -patient recently had an episode of nephrolithiasis requiring ureteral stent placement with eventual removal   Patient says his stone composition was calcium stone in the past although I do not have documentation   -advised to drink plenty of free water with goal urine output greater than 2 L per day   -last 24 hour urine stone risk profile from 2017 were reviewed with the patient which shows adequate urine output, urine sodium at goal   Urine citrate level remains lower with significantly elevated urine calcium level    -will repeat 24 hour urine stone risk profile again next month   -continue to follow low salt diet  -will start patient on chlorthalidone 12 5 mg p  O  Daily  Discontinue torsemide   -also start potassium citrate 15 mEq p o  Daily  -will make further recommendations based on 24 hour urine stone risk profile results  -Renal ultrasound in December 2016 shows normal-sized kidneys, 10 mm nonobstructing stone in the left kidney  No hydronephrosis  -will also check serum calcium, phosphorus, PTH   -most recent CT scan in October 2018 shows stable 2 5 mm calculus in right kidney, 1 mm calculus in left kidney  No hydronephrosis  CKD stage II- III a  -Patient's baseline serum creatinine 1 1 to 1 2  Last serum creatinine 1 2 in May 2018  Check BMP next month     -He could have a component of CK D given his long-term hypertension history  Previous urinalysis did not show any hematuria or proteinuria  Last urine microalbumin/creatinine ratio 13 mg in 2017  Leg edema  -clinically does not seem to have leg edema at the time of my encounter although patient says that he has ongoing leg edema issues at home   -will start chlorthalidone as above  Discontinue standing dose of torsemide  I have advised him to take p r n  Torsemide as needed for worsening leg edema  Hypokalemia  -increasing eplerenone as above will likely help  [place] potassium diet     -checking BMP early next month and if continued to have persistent hypo kalemia, will need potassium supplement  Diagnoses and all orders for this visit:    Hypocitraturia  -     Potassium Citrate ER 15 MEQ (1620 MG) TBCR;  Take 1 tablet by mouth daily    Essential hypertension  -     carvedilol (COREG) 25 mg tablet; Take 1 tablet (25 mg total) by mouth 2 (two) times a day with meals  -     eplerenone (INSPRA) 50 MG tablet; Take 1 tablet (50 mg total) by mouth 2 (two) times a day  -     chlorthalidone 25 mg tablet; Take 0 5 tablets (12 5 mg total) by mouth daily  -     Basic metabolic panel; Future  -     Urinalysis with reflex to microscopic; Future  -     Protein / creatinine ratio, urine; Future  -     Metanephrine, Fractionated Plasma Free; Future  -     Catecholamines, fractionated, plasma; Future  -     Potassium Citrate ER 15 MEQ (1620 MG) TBCR; Take 1 tablet by mouth daily  -     Uric acid; Future    Hypokalemia  -     Magnesium; Future    Nephrolithiasis  -     Stone risk profile; Future  -     PTH, intact; Future  -     Phosphorus; Future  -     Uric acid; Future          SUBJECTIVE / HPI:  Patient is 58-year-old male with significant medical issues of hypertension diagnosed early in the age, bilateral adrenal nodule, history of nephrolithiasis with last episode recently requiring ureteral stent placement with eventual removal, history of lithotripsy, sleep apnea, comes for regular follow-up of hypertension and nephrolithiasis  Baseline serum creatinine 1 1-1 2  Last serum creatinine 1 2 overall stable  Patient has lost follow-up for almost two years and was seen in the office last time in early 2017  Since last visit, patient was also seen by Dr Jennifer Mitchell in [place] regarding his bilateral adrenal nodules and being closely followed  Lately he has been having significantly elevated blood pressure at home  His home blood pressure readings are generally 160/1 10s  Patient has not brought blood pressure machine to the office visit today  He claims to be compliant with low-salt diet  His 24 hour urine stone risk profile from 2017 was reviewed with him  He denies any hematuria, no dysuria  No flank or abdominal pain  No fever or chills      Patient recently had an episode of kidney stone requiring cystoscopy, retrograde pyelography, stent placement which was eventually removed  Being closely followed by Urology  continued to take same antihypertensive regimen including Coreg, eplerenone, felodipine and torsemide  No recent NSAID exposure  He tries to be compliant with salt restricted diet and tries to drink more water    REVIEW OF SYSTEMS:  More than 10 point review of systems were obtained and discussed in length with the patient  Complete review of systems were negative / unremarkable except mentioned above  PHYSICAL EXAM:  Vitals:    11/16/18 1440   BP: (!) 156/118   BP Location: Left arm   Patient Position: Sitting   Cuff Size: Large   Pulse: 78   Resp: 16   Weight: 90 9 kg (200 lb 6 oz)   Height: 5' 9" (1 753 m)     Body mass index is 29 59 kg/m²  Physical Exam   Constitutional: He is oriented to person, place, and time  He appears well-developed and well-nourished  HENT:   Head: Normocephalic and atraumatic  Right Ear: External ear normal    Left Ear: External ear normal    Nose: Nose normal    Eyes: Pupils are equal, round, and reactive to light  Conjunctivae and EOM are normal  No scleral icterus  Neck: Neck supple  No JVD present  Cardiovascular: Normal rate and normal heart sounds  Pulmonary/Chest: Effort normal and breath sounds normal  No respiratory distress  He has no wheezes  He has no rales  Abdominal: Soft  Bowel sounds are normal  He exhibits no distension  There is no tenderness  Musculoskeletal: He exhibits no edema or tenderness  Neurological: He is alert and oriented to person, place, and time  Skin: Skin is warm and dry  Psychiatric: He has a normal mood and affect  His behavior is normal    Vitals reviewed        PAST MEDICAL HISTORY:  Past Medical History:   Diagnosis Date    Adrenal mass ([address] )     Chronic kidney disease     Disease of thyroid gland     GERD (gastroesophageal reflux disease)     Hypertension     Kidney stones     Low testosterone     Sleep apnea     not currently using his CPAP    Thyroid disease        PAST SURGICAL HISTORY:  Past Surgical History:   Procedure Laterality Date    FL RETROGRADE PYELOGRAM  8/21/2018    KIDNEY STONE SURGERY      KS CYSTO/URETERO W/LITHOTRIPSY &INDWELL STENT INSRT Left 8/21/2018    Procedure: CYSTOSCOPY URETEROSCOPY, RETROGRADE PYELOGRAM AND INSERTION STENT URETERAL;  Surgeon: Jae Rivas MD;  [facility];  Service: Urology    KS ESOPHAGOGASTRODUODENOSCOPY TRANSORAL DIAGNOSTIC N/A 6/21/2018    Procedure: ESOPHAGOGASTRODUODENOSCOPY (EGD); Surgeon: Montse Evans MD;  [facility];   Service: Gastroenterology       SOCIAL HISTORY:  History   Alcohol Use    Yes     Comment: 1 drink every two weeks or so     History   Drug Use No     History   Smoking Status    Former Smoker    Packs/day: 1 00    Years: 17 00    Quit date: 2005   Smokeless Tobacco    Never Used       FAMILY HISTORY:  Family History   Problem Relation Age of Onset    Asthma Mother     Diabetes Mother     Other Father         Endocarditis    Hypertension Father     Gout Father        MEDICATIONS:    Current Outpatient Prescriptions:     atorvastatin (LIPITOR) 20 mg tablet, Take 1 tablet (20 mg total) by mouth daily, Disp: 90 tablet, Rfl: 3    carvedilol (COREG) 25 mg tablet, Take 1 tablet (25 mg total) by mouth 2 (two) times a day with meals, Disp: 60 tablet, Rfl: 5    Cholecalciferol (VITAMIN D) 2000 units CAPS, Take 1 tablet by mouth daily, Disp: , Rfl:     eplerenone (INSPRA) 50 MG tablet, Take 1 tablet (50 mg total) by mouth 2 (two) times a day, Disp: 60 tablet, Rfl: 5    felodipine (PLENDIL) 10 MG 24 hr tablet, Take 1 tablet (10 mg total) by mouth daily, Disp: 90 tablet, Rfl: 3    omeprazole (PriLOSEC) 20 mg delayed release capsule, Take 1 capsule (20 mg total) by mouth 2 (two) times a day (Patient taking differently: Take 20 mg by mouth daily  ), Disp: 180 capsule, Rfl: 3    SYRINGE-NEEDLE, DISP, 3 ML 21G X 1-1/2" 3 ML MISC, For testerone injection, Disp: , Rfl:     testosterone cypionate (DEPO-TESTOSTERONE) 200 mg/mL SOLN, Inject 0 8 mL deep IM once every two weeks  , Disp: , Rfl:     chlorthalidone 25 mg tablet, Take 0 5 tablets (12 5 mg total) by mouth daily, Disp: 15 tablet, Rfl: 5    Potassium Citrate ER 15 MEQ (1620 MG) TBCR, Take 1 tablet by mouth daily, Disp: 30 tablet, Rfl: 5    Lab Results: Last creatinine 1 2

## 2018-11-16 NOTE — PATIENT INSTRUCTIONS
-start chlorthalidone 12 5 mg daily  -do not take torsemide on a daily basis although you can take it as needed for worsening leg swelling  -continue salt restricted diet  -drink plenty of water with goal urine output greater than 2 L per day  -continue to check blood pressure daily at home and call me if the blood pressures greater than 140/90  -please bring blood pressure machine during next visit  -increase carvedilol to 25 mg twice daily  -increase eplerenone to 50 mg twice daily  -start potassium citrate 15 mEq one tablet daily  -check 24 hr urine collection in December and along with blood test

## 2018-11-19 ENCOUNTER — TELEPHONE (OUTPATIENT)
Dept: NEPHROLOGY | Facility: CLINIC | Age: 43
End: 2018-11-19

## 2018-11-19 NOTE — TELEPHONE ENCOUNTER
----- Message from Verner Hollering, MD sent at 11/16/2018  4:42 PM EST -----  Can you please mail him script which I have placed on your desk? I already talked to him

## 2018-11-20 DIAGNOSIS — I10 ESSENTIAL HYPERTENSION: ICD-10-CM

## 2018-11-20 RX ORDER — CHLORTHALIDONE 25 MG/1
25 TABLET ORAL DAILY
Qty: 30 TABLET | Refills: 5 | Status: SHIPPED | OUTPATIENT
Start: 2018-11-20 | End: 2019-03-05 | Stop reason: SDUPTHER

## 2018-11-20 NOTE — TELEPHONE ENCOUNTER
Spoke with the patient's wife and she is aware that the patient can take Chlorthalidone 25 mg daily

## 2018-11-21 ENCOUNTER — DOCUMENTATION (OUTPATIENT)
Dept: OTHER | Facility: HOSPITAL | Age: 43
End: 2018-11-21

## 2018-11-21 NOTE — PROGRESS NOTES
Discussed with Dr Yuli Carroll, endocrinologist from [facility] regarding patient's bilateral adrenal nodules  His 24 hour urine cortisol level has been intermittently higher although is not consistent to explain is hypertension  Patient remains on eplerenone and unable to confirm whether patient was evaluated for primary hyperaldosteronism before he was started on eplerenone  At this time we will continue eplerenone as dose was recently increased to see the effect with his blood pressure  If blood pressure remains continued to be uncontrolled, may need to consider holding up very non and evaluation of primary hyperaldosteronism

## 2018-12-06 ENCOUNTER — APPOINTMENT (OUTPATIENT)
Dept: LAB | Facility: HOSPITAL | Age: 43
End: 2018-12-06
Attending: INTERNAL MEDICINE
Payer: COMMERCIAL

## 2018-12-06 ENCOUNTER — TRANSCRIBE ORDERS (OUTPATIENT)
Dept: LAB | Facility: HOSPITAL | Age: 43
End: 2018-12-06

## 2018-12-06 DIAGNOSIS — H16.221 KERATOCONJUNCTIVITIS SICCA OF RIGHT EYE NOT DUE TO SJOGREN'S SYNDROME: ICD-10-CM

## 2018-12-06 DIAGNOSIS — R68.82 DECREASED LIBIDO: ICD-10-CM

## 2018-12-06 DIAGNOSIS — D35.00 BENIGN NEOPLASM OF ADRENAL GLAND, UNSPECIFIED LATERALITY: Primary | ICD-10-CM

## 2018-12-06 DIAGNOSIS — D35.00 BENIGN NEOPLASM OF ADRENAL GLAND, UNSPECIFIED LATERALITY: ICD-10-CM

## 2018-12-06 DIAGNOSIS — I10 ESSENTIAL HYPERTENSION: ICD-10-CM

## 2018-12-06 DIAGNOSIS — N20.0 NEPHROLITHIASIS: ICD-10-CM

## 2018-12-06 DIAGNOSIS — E87.6 HYPOKALEMIA: ICD-10-CM

## 2018-12-06 LAB
ANION GAP SERPL CALCULATED.3IONS-SCNC: 6 MMOL/L (ref 4–13)
BASOPHILS # BLD AUTO: 0.09 THOUSANDS/ΜL (ref 0–0.1)
BASOPHILS NFR BLD AUTO: 1 % (ref 0–1)
BILIRUB UR QL STRIP: NEGATIVE
BUN SERPL-MCNC: 24 MG/DL (ref 5–25)
CALCIUM SERPL-MCNC: 10 MG/DL (ref 8.3–10.1)
CHLORIDE SERPL-SCNC: 103 MMOL/L (ref 100–108)
CLARITY UR: CLEAR
CO2 SERPL-SCNC: 27 MMOL/L (ref 21–32)
COLOR UR: NORMAL
CREAT 24H UR-MRATE: 2.3 G/24HR (ref 0.8–1.8)
CREAT SERPL-MCNC: 1.48 MG/DL (ref 0.6–1.3)
CREAT UR-MCNC: 164 MG/DL
EOSINOPHIL # BLD AUTO: 0.2 THOUSAND/ΜL (ref 0–0.61)
EOSINOPHIL NFR BLD AUTO: 2 % (ref 0–6)
ERYTHROCYTE [DISTWIDTH] IN BLOOD BY AUTOMATED COUNT: 12.7 % (ref 11.6–15.1)
GFR SERPL CREATININE-BSD FRML MDRD: 57 ML/MIN/1.73SQ M
GLUCOSE P FAST SERPL-MCNC: 101 MG/DL (ref 65–99)
GLUCOSE UR STRIP-MCNC: NEGATIVE MG/DL
HCT VFR BLD AUTO: 47.3 % (ref 36.5–49.3)
HGB BLD-MCNC: 15.3 G/DL (ref 12–17)
HGB UR QL STRIP.AUTO: NEGATIVE
IMM GRANULOCYTES # BLD AUTO: 0.16 THOUSAND/UL (ref 0–0.2)
IMM GRANULOCYTES NFR BLD AUTO: 2 % (ref 0–2)
KETONES UR STRIP-MCNC: NEGATIVE MG/DL
LEUKOCYTE ESTERASE UR QL STRIP: NEGATIVE
LYMPHOCYTES # BLD AUTO: 1.91 THOUSANDS/ΜL (ref 0.6–4.47)
LYMPHOCYTES NFR BLD AUTO: 18 % (ref 14–44)
MAGNESIUM SERPL-MCNC: 2.1 MG/DL (ref 1.6–2.6)
MCH RBC QN AUTO: 28.7 PG (ref 26.8–34.3)
MCHC RBC AUTO-ENTMCNC: 32.3 G/DL (ref 31.4–37.4)
MCV RBC AUTO: 89 FL (ref 82–98)
MONOCYTES # BLD AUTO: 1.09 THOUSAND/ΜL (ref 0.17–1.22)
MONOCYTES NFR BLD AUTO: 10 % (ref 4–12)
NEUTROPHILS # BLD AUTO: 7.36 THOUSANDS/ΜL (ref 1.85–7.62)
NEUTS SEG NFR BLD AUTO: 67 % (ref 43–75)
NITRITE UR QL STRIP: NEGATIVE
NRBC BLD AUTO-RTO: 0 /100 WBCS
PH UR STRIP.AUTO: 6 [PH] (ref 4.5–8)
PHOSPHATE SERPL-MCNC: 3.7 MG/DL (ref 2.7–4.5)
PLATELET # BLD AUTO: 355 THOUSANDS/UL (ref 149–390)
PMV BLD AUTO: 8.6 FL (ref 8.9–12.7)
POTASSIUM SERPL-SCNC: 4 MMOL/L (ref 3.5–5.3)
PROT UR STRIP-MCNC: NEGATIVE MG/DL
PROT UR-MCNC: 21 MG/DL
PROT/CREAT UR: 0.13 MG/G{CREAT} (ref 0–0.1)
PTH-INTACT SERPL-MCNC: 43 PG/ML (ref 18.4–80.1)
RBC # BLD AUTO: 5.33 MILLION/UL (ref 3.88–5.62)
SODIUM SERPL-SCNC: 136 MMOL/L (ref 136–145)
SP GR UR STRIP.AUTO: 1.02 (ref 1–1.03)
SPECIMEN VOL UR: 2350 ML
URATE SERPL-MCNC: 6.7 MG/DL (ref 4.2–8)
UROBILINOGEN UR QL STRIP.AUTO: 0.2 E.U./DL
WBC # BLD AUTO: 10.81 THOUSAND/UL (ref 4.31–10.16)

## 2018-12-06 PROCEDURE — 84105 ASSAY OF URINE PHOSPHORUS: CPT

## 2018-12-06 PROCEDURE — 83735 ASSAY OF MAGNESIUM: CPT

## 2018-12-06 PROCEDURE — 84560 ASSAY OF URINE/URIC ACID: CPT

## 2018-12-06 PROCEDURE — 82507 ASSAY OF CITRATE: CPT

## 2018-12-06 PROCEDURE — 84392 ASSAY OF URINE SULFATE: CPT

## 2018-12-06 PROCEDURE — 84300 ASSAY OF URINE SODIUM: CPT

## 2018-12-06 PROCEDURE — 83970 ASSAY OF PARATHORMONE: CPT

## 2018-12-06 PROCEDURE — 36415 COLL VENOUS BLD VENIPUNCTURE: CPT

## 2018-12-06 PROCEDURE — 84100 ASSAY OF PHOSPHORUS: CPT

## 2018-12-06 PROCEDURE — 82340 ASSAY OF CALCIUM IN URINE: CPT

## 2018-12-06 PROCEDURE — 82570 ASSAY OF URINE CREATININE: CPT

## 2018-12-06 PROCEDURE — 84402 ASSAY OF FREE TESTOSTERONE: CPT

## 2018-12-06 PROCEDURE — 85025 COMPLETE CBC W/AUTO DIFF WBC: CPT

## 2018-12-06 PROCEDURE — 82140 ASSAY OF AMMONIA: CPT

## 2018-12-06 PROCEDURE — 82131 AMINO ACIDS SINGLE QUANT: CPT

## 2018-12-06 PROCEDURE — 81003 URINALYSIS AUTO W/O SCOPE: CPT

## 2018-12-06 PROCEDURE — 82384 ASSAY THREE CATECHOLAMINES: CPT

## 2018-12-06 PROCEDURE — 80048 BASIC METABOLIC PNL TOTAL CA: CPT

## 2018-12-06 PROCEDURE — 83935 ASSAY OF URINE OSMOLALITY: CPT

## 2018-12-06 PROCEDURE — 84550 ASSAY OF BLOOD/URIC ACID: CPT

## 2018-12-06 PROCEDURE — 82530 CORTISOL FREE: CPT

## 2018-12-06 PROCEDURE — 84403 ASSAY OF TOTAL TESTOSTERONE: CPT

## 2018-12-06 PROCEDURE — 83835 ASSAY OF METANEPHRINES: CPT

## 2018-12-06 PROCEDURE — 83945 ASSAY OF OXALATE: CPT

## 2018-12-06 PROCEDURE — 84133 ASSAY OF URINE POTASSIUM: CPT

## 2018-12-06 PROCEDURE — 82436 ASSAY OF URINE CHLORIDE: CPT

## 2018-12-06 PROCEDURE — 84156 ASSAY OF PROTEIN URINE: CPT

## 2018-12-08 LAB
TESTOST FREE SERPL-MCNC: 4 PG/ML (ref 6.8–21.5)
TESTOST SERPL-MCNC: 61 NG/DL (ref 264–916)

## 2018-12-09 LAB
CORTIS F 24H UR-MRATE: 112 UG/24 HR (ref 0–50)
CORTIS F UR-MCNC: 48 UG/L

## 2018-12-11 LAB
METANEPH FREE SERPL-MCNC: 31 PG/ML (ref 0–62)
NORMETANEPHRINE SERPL-MCNC: 76 PG/ML (ref 0–145)

## 2018-12-12 ENCOUNTER — TELEPHONE (OUTPATIENT)
Dept: OTHER | Facility: HOSPITAL | Age: 43
End: 2018-12-12

## 2018-12-12 LAB
DOPAMINE 24H UR-MRATE: <30 PG/ML (ref 0–48)
EPINEPH PLAS-MCNC: <15 PG/ML (ref 0–62)
NOREPINEPH PLAS-MCNC: 751 PG/ML (ref 0–874)

## 2018-12-12 NOTE — TELEPHONE ENCOUNTER
Can you let him know that his serum creatinine has slightly increased to 1 4 from his usual baseline  This sometimes could be secondary to increasing eplerenone dose which we did during last office visit  Also still pending 24 hour urine stone risk profile results and will let him know once we get the results back  Meanwhile I would have him repeat BMP in couple weeks        He should also let us know with all his blood pressure readings since increasing eplerenone to see if blood pressure improved

## 2018-12-14 LAB
AMMONIA 24H UR-MRATE: 33 MEQ/24 HR
AMMONIA UR-SCNC: ABNORMAL UG/DL
CA H2 PHOS DIHYD CRY URNS QL MICRO: 1.55 RATIO (ref 0–3)
CALCIUM 24H UR-MCNC: 7.7 MG/DL
CALCIUM 24H UR-MRATE: 179 MG/24 HR (ref 100–300)
CHLORIDE 24H UR-SCNC: 95 MMOL/L
CHLORIDE 24H UR-SRATE: 221 MMOL/24 HR (ref 110–250)
CITRATE 24H UR-MCNC: 56 MG/L
CITRATE 24H UR-MRATE: 130 MG/24 HR (ref 320–1240)
COM CRY STONE QL IR: 3.77 RATIO (ref 0–6)
CREAT 24H UR-MCNC: 87.6 MG/DL
CREAT 24H UR-MRATE: 2036.7 MG/24 HR (ref 1000–2000)
CYSTINE 24H UR-MCNC: 0.84 MG/L
CYSTINE 24H UR-MRATE: 1.95 MG/24 HR (ref 10–100)
MAGNESIUM 24H UR-MRATE: 88 MG/24 HR (ref 12–293)
MAGNESIUM UR-MCNC: 3.8 MG/DL
NA URATE CRY STONE QL IR: 3.8 RATIO (ref 0–4)
OSMOLALITY UR: 568 MOSMOL/KG (ref 300–900)
OXALATE 24H UR-MRATE: 33 MG/24 HR (ref 7–44)
OXALATE UR-MCNC: 14 MG/L
PH 24H UR: 6.3 [PH]
PHOSPHATE 24H UR-MCNC: 45.2 MG/DL
PHOSPHATE 24H UR-MRATE: 1050.9 MG/24 HR (ref 400–1300)
PLEASE NOTE (STONE RISK): ABNORMAL
POTASSIUM 24H UR-SCNC: 108.6 MMOL/24 HR (ref 25–125)
POTASSIUM UR-SCNC: 46.7 MMOL/L
PRESERVED URINE: 2325 ML/24 HR (ref 800–1800)
SODIUM 24H UR-SCNC: 102 MMOL/L
SODIUM 24H UR-SRATE: 237 MMOL/24 HR (ref 58–337)
SPECIMEN VOL 24H UR: 2325 ML/24 HR (ref 800–1800)
SULFATE 24H UR-MCNC: 37 MEQ/24 HR (ref 0–30)
SULFATE UR-MCNC: 16 MEQ/L
TRI-PHOS CRY STONE MICRO: 0.03 RATIO (ref 0–1)
URATE 24H UR-MCNC: 33.2 MG/DL
URATE 24H UR-MRATE: 772 MG/24 HR (ref 250–750)
URATE DIHYD CRY STONE QL IR: 0.74 RATIO (ref 0–1.2)

## 2018-12-18 ENCOUNTER — TELEPHONE (OUTPATIENT)
Dept: NEPHROLOGY | Facility: CLINIC | Age: 43
End: 2018-12-18

## 2018-12-18 NOTE — TELEPHONE ENCOUNTER
Spoke with the patient's wife about the patient test results and she would like to speak with Dr Maggie Angela regarding lab test results and medications

## 2018-12-18 NOTE — TELEPHONE ENCOUNTER
Patient's wife called earlier to discuss lab results  I have called back at 1:35 p m  On 12/18/18 and had left voicemail explaining that patient is catecholamine/metanephrines are nonsignificant  Also 24 hr urine stone risk profile shows adequate urine volume and continue to drink plenty of water, urine sodium seems to be slightly elevated and he needs to follow low salt diet also urine citrate level remains lower and I have advised him to increase potassium citrate from one tablet daily to twice daily  Recommended him to call back if has any questions regarding the results  Also he needs to call back with all his blood pressure readings at home which I have recommended in voicemail

## 2018-12-27 ENCOUNTER — TELEPHONE (OUTPATIENT)
Dept: NEPHROLOGY | Facility: CLINIC | Age: 43
End: 2018-12-27

## 2018-12-27 NOTE — TELEPHONE ENCOUNTER
Spoke to patient's wife as she was concern regarding starting medication which was discussed during last endocrine visit earlier this month in December 2018  She does not know the name of the medication although just wanted to update  I have answered all her questions  Advised her to call back if has any questions or issues  As per wife, patient's blood pressure at home has been much improved since increasing eplerenone  Blood pressure has been running in the range of 120s to 130s/80s to 90s

## 2019-01-07 ENCOUNTER — TRANSCRIBE ORDERS (OUTPATIENT)
Dept: ADMINISTRATIVE | Facility: HOSPITAL | Age: 44
End: 2019-01-07

## 2019-01-07 DIAGNOSIS — E04.1 NONTOXIC UNINODULAR GOITER: Primary | ICD-10-CM

## 2019-01-07 DIAGNOSIS — E27.9 ADRENAL HYPERTENSION (HCC): ICD-10-CM

## 2019-01-07 DIAGNOSIS — E66.8 OBESITY OF ENDOCRINE ORIGIN: ICD-10-CM

## 2019-01-07 DIAGNOSIS — I15.2 ADRENAL HYPERTENSION (HCC): ICD-10-CM

## 2019-01-11 ENCOUNTER — OFFICE VISIT (OUTPATIENT)
Dept: NEPHROLOGY | Facility: CLINIC | Age: 44
End: 2019-01-11
Payer: COMMERCIAL

## 2019-01-11 ENCOUNTER — HOSPITAL ENCOUNTER (OUTPATIENT)
Dept: RADIOLOGY | Facility: HOSPITAL | Age: 44
Discharge: HOME | End: 2019-01-11
Attending: SPECIALIST
Payer: COMMERCIAL

## 2019-01-11 VITALS
WEIGHT: 197 LBS | SYSTOLIC BLOOD PRESSURE: 134 MMHG | BODY MASS INDEX: 29.18 KG/M2 | HEIGHT: 69 IN | HEART RATE: 70 BPM | DIASTOLIC BLOOD PRESSURE: 86 MMHG

## 2019-01-11 DIAGNOSIS — E27.9 ADRENAL HYPERTENSION (HCC): ICD-10-CM

## 2019-01-11 DIAGNOSIS — N18.2 CKD (CHRONIC KIDNEY DISEASE), STAGE II: Primary | ICD-10-CM

## 2019-01-11 DIAGNOSIS — E66.8 OBESITY OF ENDOCRINE ORIGIN: ICD-10-CM

## 2019-01-11 DIAGNOSIS — I15.2 ADRENAL HYPERTENSION (HCC): ICD-10-CM

## 2019-01-11 DIAGNOSIS — I10 BENIGN ESSENTIAL HYPERTENSION: ICD-10-CM

## 2019-01-11 DIAGNOSIS — E04.1 NONTOXIC UNINODULAR GOITER: ICD-10-CM

## 2019-01-11 DIAGNOSIS — N20.0 NEPHROLITHIASIS: ICD-10-CM

## 2019-01-11 DIAGNOSIS — E27.8 ADRENAL NODULE (HCC): ICD-10-CM

## 2019-01-11 DIAGNOSIS — R82.991 HYPOCITRATURIA: ICD-10-CM

## 2019-01-11 DIAGNOSIS — I10 ESSENTIAL HYPERTENSION: ICD-10-CM

## 2019-01-11 PROCEDURE — 76536 US EXAM OF HEAD AND NECK: CPT

## 2019-01-11 PROCEDURE — 99214 OFFICE O/P EST MOD 30 MIN: CPT | Performed by: INTERNAL MEDICINE

## 2019-01-11 PROCEDURE — 3066F NEPHROPATHY DOC TX: CPT | Performed by: INTERNAL MEDICINE

## 2019-01-11 RX ORDER — POTASSIUM CITRATE 15 MEQ/1
1 TABLET, EXTENDED RELEASE ORAL 2 TIMES DAILY
Qty: 180 TABLET | Refills: 2 | Status: SHIPPED | OUTPATIENT
Start: 2019-01-11 | End: 2019-12-30 | Stop reason: ALTCHOICE

## 2019-01-11 NOTE — LETTER
January 11, 2019     Erika Parr MD  [facility]    Patient: Vani Butler   YOB: 1975   Date of Visit: 1/11/2019       Dear Dr Milvia Rock: Thank you for referring Vani Butler to me for evaluation  Below are my notes for this consultation  If you have questions, please do not hesitate to call me  I look forward to following your patient along with you  Sincerely,        Boni Gamble MD        CC: No Recipients  Boni Gamble MD  1/11/2019 10:44 AM  Sign at close encounter  [address] 38 y o  male MRN: [id number]  DATE: 1/11/2019  Reason for visit:   Chief Complaint   Patient presents with    Follow-up    Hypertension    Nephrolithiasis     ASSESSMENT and PLAN:  Hypertension  -Likely thought to be essential in origin although another differential remains secondary hypertension due to primary hyperaldosteronism given his bilateral adrenal nodules  -Currently patient is on Coreg, eplerenone, felodipine and chlorthalidone   -his blood pressure has significantly improved since increasing eplerenone during last office visit  Home blood pressure readings 120s to 130s/70s to 80s  Today's blood pressure is acceptable in the office today   -his home wrist BP cuff was compared with our office readings which are exactly identical   -plasma metanephrine and catecholamines are nonsignificant in December 2018  Unable to check serum aldosterone/PRA while being on eplerenone     -he is being closely followed by endocrine regarding adrenal nodule in [place]   -Patient was found to have high aldosterone level along with high aldosterone/renin ratio although patient was also taking eplerenone at that time and makes the test results unreliable    - Salt restricted diet     Recurrent nephrolithiasis  -patient hadd an episode of nephrolithiasis requiring ureteral stent placement with eventual removal in recent past   Patient says his stone composition was calcium stone in the past although I do not have documentation   -advised to drink plenty of free water with goal urine output greater than 2 L per day   -last 24 hour urine stone risk profile from December 2018 were reviewed with the patient which shows adequate urine volume, urine sodium 237, urine calcium 179, urine citrate 130, urine pH 6 3    -will repeat 24 hour urine stone risk profile again before next visit   -advised to follow low salt diet   -continue chlorthalidone 12 5 mg p  O  Daily     -increase potassium citrate from 15 mEq daily to b i d  Dosing  He was recommended on telephone note to increase dosing although he has not done this yet as he does not like to take more medications  He is agreeable today  -Renal ultrasound in December 2016 shows normal-sized kidneys, 10 mm nonobstructing stone in the left kidney  No hydronephrosis  -further workup in December 2018 showed serum phosphorus 3 7, PTH 43, uric acid 6 7, bicarb 27  -most recent CT scan in October 2018 shows stable 2 5 mm calculus in right kidney, 1 mm calculus in left kidney  No hydronephrosis  Hypocitraturia  -management as above     CKD stage II- III a  -Patient's baseline serum creatinine 1 1 to 1 2  Last serum creatinine increased at 1 4 in December 2018 after increasing eplerenone dose  Would continue all antihypertensive medications for now  He will be getting repeat CMP next month      -He could have a component of CK D given his long-term hypertension history  Previous urinalysis did not show any hematuria or proteinuria  Last UPC ratio 130 mg overall nonsignificant in December 2018   -I have recommended him to change omeprazole to Pepcid/Zantac if possible      Leg edema  -seems to be much improved and stable  Currently remains on chlorthalidone      Hypokalemia  -improved within normal range      Diagnoses and all orders for this visit:    CKD (chronic kidney disease), stage II  -     Basic metabolic panel; Future  -     CBC; Future  -     Magnesium; Future  -     Phosphorus; Future  -     PTH, intact; Future  -     Protein / creatinine ratio, urine; Future  -     Urinalysis with reflex to microscopic; Future  -     Vitamin D 25 hydroxy; Future  -     Uric acid; Future    Benign essential hypertension    Nephrolithiasis  -     Phosphorus; Future  -     PTH, intact; Future  -     Urinalysis with reflex to microscopic; Future  -     Vitamin D 25 hydroxy; Future  -     Uric acid; Future  -     Stone risk profile; Future    Adrenal nodule (HCC)    Gastroesophageal reflux disease without esophagitis    Hypocitraturia  -     Potassium Citrate ER 15 MEQ (1620 MG) TBCR; Take 1 tablet by mouth 2 (two) times a day  -     Stone risk profile; Future    Essential hypertension  -     Potassium Citrate ER 15 MEQ (1620 MG) TBCR; Take 1 tablet by mouth 2 (two) times a day    Other orders  -     metyrapone (METOPIRONE) 250 MG capsule; Take 750 mg by mouth 4 (four) times a day        SUBJECTIVE / HPI:  Patient is 50-year-old male with significant medical issues of hypertension diagnosed early in the age, bilateral adrenal nodule, history of nephrolithiasis with last episode recently requiring ureteral stent placement with eventual removal, history of lithotripsy, sleep apnea, comes for regular follow-up of hypertension and nephrolithiasis  Baseline serum creatinine 1 1-1 2  During last office visit he was started on chlorthalidone and eplerenone was increased to 50 mg p o  B i d   Since then his blood pressure has overall much improved  His last serum creatinine 1 4 after increasing above medications  Home blood pressure readings are generally 120s to 130s/70s to 80s  His blood pressure is acceptable in the office today as well  He denies any hematuria, no dysuria  No flank or abdominal pain  No fever or chills  Most recent 24 hour urine stone risk profile was reviewed with him    He was advised to increase potassium citrate one tablet b i d  Although he continues to take only one as he does not like to take more pills  He was recently started on Metopyrone by endocrine for [place]       Patient recently had an episode of kidney stone requiring cystoscopy, retrograde pyelography, stent placement which was eventually removed  Being closely followed by Urology  No recent NSAID exposure  He tries to be compliant with salt restricted diet and tries to drink more water    REVIEW OF SYSTEMS:  More than 10 point review of systems were obtained and discussed in length with the patient  Complete review of systems were negative / unremarkable except mentioned above  PHYSICAL EXAM:  Vitals:    01/11/19 0940 01/11/19 1011   BP: 112/62 134/86   BP Location: Right arm    Patient Position: Sitting    Cuff Size: Adult    Pulse: 70    Weight: 89 4 kg (197 lb)    Height: 5' 9" (1 753 m)      Body mass index is 29 09 kg/m²  Physical Exam   Constitutional: He is oriented to person, place, and time  He appears well-developed and well-nourished  HENT:   Head: Normocephalic and atraumatic  Right Ear: External ear normal    Left Ear: External ear normal    Nose: Nose normal    Eyes: Pupils are equal, round, and reactive to light  Conjunctivae and EOM are normal  No scleral icterus  Neck: Neck supple  No JVD present  Cardiovascular: Normal rate and normal heart sounds  Pulmonary/Chest: Effort normal and breath sounds normal  No respiratory distress  He has no wheezes  He has no rales  Abdominal: Soft  Bowel sounds are normal  He exhibits no distension  There is no tenderness  Musculoskeletal: He exhibits no edema or tenderness  Neurological: He is alert and oriented to person, place, and time  Skin: Skin is warm and dry  Psychiatric: He has a normal mood and affect  His behavior is normal    Vitals reviewed        PAST MEDICAL HISTORY:  Past Medical History:   Diagnosis Date    Adrenal mass ([address] )     Chronic kidney disease     Disease of thyroid gland     GERD (gastroesophageal reflux disease)     Hypertension     Kidney stones     Low testosterone     Sleep apnea     not currently using his CPAP    Thyroid disease        PAST SURGICAL HISTORY:  Past Surgical History:   Procedure Laterality Date    FL RETROGRADE PYELOGRAM  8/21/2018    KIDNEY STONE SURGERY      VT CYSTO/URETERO W/LITHOTRIPSY &INDWELL STENT INSRT Left 8/21/2018    Procedure: CYSTOSCOPY URETEROSCOPY, RETROGRADE PYELOGRAM AND INSERTION STENT URETERAL;  Surgeon: David Camejo MD;  [facility];  Service: Urology    VT ESOPHAGOGASTRODUODENOSCOPY TRANSORAL DIAGNOSTIC N/A 6/21/2018    Procedure: ESOPHAGOGASTRODUODENOSCOPY (EGD); Surgeon: Jeovanny Buchanan MD;  [facility];   Service: Gastroenterology       SOCIAL HISTORY:  History   Alcohol Use    Yes     Comment: 1 drink every two weeks or so     History   Drug Use No     History   Smoking Status    Former Smoker    Packs/day: 1 00    Years: 17 00    Quit date: 2005   Smokeless Tobacco    Never Used       FAMILY HISTORY:  Family History   Problem Relation Age of Onset    Asthma Mother     Diabetes Mother     Other Father         Endocarditis    Hypertension Father     Gout Father        MEDICATIONS:    Current Outpatient Prescriptions:     atorvastatin (LIPITOR) 20 mg tablet, Take 1 tablet (20 mg total) by mouth daily, Disp: 90 tablet, Rfl: 3    carvedilol (COREG) 25 mg tablet, Take 1 tablet (25 mg total) by mouth 2 (two) times a day with meals, Disp: 60 tablet, Rfl: 5    chlorthalidone 25 mg tablet, Take 1 tablet (25 mg total) by mouth daily, Disp: 30 tablet, Rfl: 5    Cholecalciferol (VITAMIN D) 2000 units CAPS, Take 1 tablet by mouth daily, Disp: , Rfl:     eplerenone (INSPRA) 50 MG tablet, Take 1 tablet (50 mg total) by mouth 2 (two) times a day, Disp: 60 tablet, Rfl: 5    felodipine (PLENDIL) 10 MG 24 hr tablet, Take 1 tablet (10 mg total) by mouth daily, Disp: 90 tablet, Rfl: 3    metyrapone (METOPIRONE) 250 MG capsule, Take 750 mg by mouth 4 (four) times a day, Disp: , Rfl:     omeprazole (PriLOSEC) 20 mg delayed release capsule, Take 1 capsule (20 mg total) by mouth 2 (two) times a day (Patient taking differently: Take 20 mg by mouth daily  ), Disp: 180 capsule, Rfl: 3    Potassium Citrate ER 15 MEQ (1620 MG) TBCR, Take 1 tablet by mouth 2 (two) times a day, Disp: 180 tablet, Rfl: 2    SYRINGE-NEEDLE, DISP, 3 ML 21G X 1-1/2" 3 ML MISC, For testerone injection, Disp: , Rfl:     testosterone cypionate (DEPO-TESTOSTERONE) 200 mg/mL SOLN, Inject 0 8 mL deep IM once every two weeks  , Disp: , Rfl:     Lab Results:   Cr 1 4 in 12/2018

## 2019-01-11 NOTE — PATIENT INSTRUCTIONS
Low-Sodium Diet   WHAT YOU NEED TO KNOW:   A low-sodium diet limits foods that are high in sodium (salt)  You will need to follow a low-sodium diet if you have high blood pressure, kidney disease, or heart failure  You may also need to follow this diet if you have a condition that is causing your body to retain (hold) extra fluid  You may need to limit the amount of sodium you eat to 1,500 mg  Ask your healthcare provider how much sodium you can have each day  DISCHARGE INSTRUCTIONS:   How to use food labels to choose foods that are low in sodium:  Read food labels to find the amount of sodium they contain  The amount of sodium is listed in milligrams (mg)  The % Daily Value (DV) column tells you how much of your daily needs are met by 1 serving of the food for each nutrient listed  Choose foods that have less than 5% of the DV of sodium  These foods are considered low in sodium  Foods that have 20% or more of the DV of sodium are considered high in sodium  Some food labels may also list any of the following terms that tell you about the sodium content in the food:  · Sodium-free:  Less than 5 mg in each serving    · Very low sodium:  35 mg of sodium or less in each serving    · Low sodium:  140 mg of sodium or less in each serving    · Reduced sodium: At least 25% less sodium in each serving than the regular type    · Light in sodium:  50% less sodium in each serving    · Unsalted or no added salt:  No extra salt is added during processing (the food may still contain sodium)  Foods to avoid:  Salty foods are high in sodium   You should avoid the following:  · Processed foods:      ¨ Mixes for cornbread, biscuits, cake, and pudding     ¨ Instant foods, such as potatoes, cereals, noodles, and rice     ¨ Packaged foods, such as bread stuffing, rice and pasta mixes, snack dip mixes, and macaroni and cheese     ¨ Canned foods, such as canned vegetables, soups, broths, sauces, and vegetable or tomato juice    ¨ Snack foods, such as salted chips, popcorn, pretzels, pork rinds, salted crackers, and salted nuts    ¨ Frozen foods, such as dinners, entrees, vegetables with sauces, and breaded meats    ¨ Sauerkraut, pickled vegetables, and other foods prepared in brine    · Meats and cheeses:      ¨ Smoked or cured meat, such as corned beef, [place], ham, hot dogs, and sausage    ¨ Canned meats or spreads, such as potted meats, sardines, anchovies, and imitation seafood    ¨ Deli or lunch meats, such as bologna, ham, turkey, and roast beef    ¨ Processed cheese, such as American cheese and cheese spreads    · Condiments, sauces, and seasonings:      ¨ Salt (¼ teaspoon of salt contains 575 mg of sodium)    ¨ Seasonings made with salt, such as garlic salt, celery salt, onion salt, and seasoned salt    ¨ Regular soy sauce, barbecue sauce, teriyaki sauce, steak sauce, Worcestershire sauce, and most flavored vinegars    ¨ Canned gravy and mixes     ¨ Regular condiments, such as mustard, ketchup, and salad dressings    ¨ Pickles and olives    ¨ Meat tenderizers and monosodium glutamate (MSG)  Foods to include:  Read the food label to find the amount of sodium in each serving  · Bread and cereal:  Try to choose breads with less than 80 mg of sodium per serving  ¨ Bread, roll, alexander, tortilla, or unsalted crackers  ¨ Ready-to-eat cereals with less than 5% DV of sodium (examples include shredded wheat and puffed rice)    ¨ Pasta    · Vegetables and fruits:      ¨ Unsalted fresh, frozen, or canned vegetables    ¨ Fresh, frozen, or canned fruits    ¨ Fruit juice    · Dairy:  One serving has about 150 mg of sodium  ¨ Milk, all types    ¨ Yogurt    ¨ Hard cheese, such as cheddar, Swiss, [employer], or mozzarella    · Meat and other protein foods:  Some raw meats may have added sodium       ¨ Plain meats, fish, and poultry     ¨ Egg    · Other foods:      ¨ Homemade pudding    ¨ Unsalted nuts, popcorn, or pretzels    ¨ Unsalted butter or margarine  Ways to decrease sodium:   · Add spices and herbs to foods instead of salt during cooking  Use salt-free seasonings to add flavor to foods  Examples include onion powder, garlic powder, basil, villar powder, paprika, and parsley  Try lemon or lime juice or vinegar to give foods a tart flavor  Use hot peppers, pepper, or cayenne pepper to add a spicy flavor to foods  · Do not keep a salt shaker at your kitchen table  This may help keep you from adding salt to food at the table  It may take time to get used to enjoying the natural flavor of food instead of adding salt  Talk to your healthcare provider before you use salt substitutes  Some salt substitutes have a high amount of potassium and need to be avoided if you have kidney disease  · Choose low-sodium foods at restaurants  Meals from restaurants are often high in sodium  Some restaurants have nutrition information on the menu that tells you the amount of sodium in their foods  If possible, ask for your food to be prepared with less, or no salt  · Shop for unsalted or low-sodium foods and snacks at the grocery store  Examples include unsalted or low-sodium broths, soups, and canned vegetables  Choose fresh or frozen vegetables instead  Choose unsalted nuts or seeds or fresh fruits or vegetables as snacks  Read food labels and choose salt-free, very low-sodium, or low-sodium foods  © 2017 [address] [employer] Information is for End User's use only and may not be sold, redistributed or otherwise used for commercial purposes  All illustrations and images included in CareNotes® are the copyrighted property of [employer]  or Fabiano Gupta  The above information is an  only  It is not intended as medical advice for individual conditions or treatments  Talk to your doctor, nurse or pharmacist before following any medical regimen to see if it is safe and effective for you

## 2019-01-11 NOTE — PROGRESS NOTES
NEPHROLOGY OUTPATIENT PROGRESS NOTE   Neeraj Haley 38 y o  male MRN: [id number]  DATE: 1/11/2019  Reason for visit:   Chief Complaint   Patient presents with    Follow-up    Hypertension    Nephrolithiasis     ASSESSMENT and PLAN:  Hypertension  -Likely thought to be essential in origin although another differential remains secondary hypertension due to primary hyperaldosteronism given his bilateral adrenal nodules  -Currently patient is on Coreg, eplerenone, felodipine and chlorthalidone   -his blood pressure has significantly improved since increasing eplerenone during last office visit  Home blood pressure readings 120s to 130s/70s to 80s  Today's blood pressure is acceptable in the office today   -his home wrist BP cuff was compared with our office readings which are exactly identical   -plasma metanephrine and catecholamines are nonsignificant in December 2018  Unable to check serum aldosterone/PRA while being on eplerenone     -he is being closely followed by endocrine regarding adrenal nodule in [place]   -Patient was found to have high aldosterone level along with high aldosterone/renin ratio although patient was also taking eplerenone at that time and makes the test results unreliable  - Salt restricted diet     Recurrent nephrolithiasis  -patient hadd an episode of nephrolithiasis requiring ureteral stent placement with eventual removal in recent past   Patient says his stone composition was calcium stone in the past although I do not have documentation   -advised to drink plenty of free water with goal urine output greater than 2 L per day   -last 24 hour urine stone risk profile from December 2018 were reviewed with the patient which shows adequate urine volume, urine sodium 237, urine calcium 179, urine citrate 130, urine pH 6 3    -will repeat 24 hour urine stone risk profile again before next visit   -advised to follow low salt diet   -continue chlorthalidone 12 5 mg p  O  Daily      -increase potassium citrate from 15 mEq daily to b i d  Dosing  He was recommended on telephone note to increase dosing although he has not done this yet as he does not like to take more medications  He is agreeable today  -Renal ultrasound in December 2016 shows normal-sized kidneys, 10 mm nonobstructing stone in the left kidney  No hydronephrosis  -further workup in December 2018 showed serum phosphorus 3 7, PTH 43, uric acid 6 7, bicarb 27  -most recent CT scan in October 2018 shows stable 2 5 mm calculus in right kidney, 1 mm calculus in left kidney  No hydronephrosis  Hypocitraturia  -management as above     CKD stage II- III a  -Patient's baseline serum creatinine 1 1 to 1 2  Last serum creatinine increased at 1 4 in December 2018 after increasing eplerenone dose  Would continue all antihypertensive medications for now  He will be getting repeat CMP next month      -He could have a component of CK D given his long-term hypertension history  Previous urinalysis did not show any hematuria or proteinuria  Last UPC ratio 130 mg overall nonsignificant in December 2018   -I have recommended him to change omeprazole to Pepcid/Zantac if possible      Leg edema  -seems to be much improved and stable  Currently remains on chlorthalidone      Hypokalemia  -improved within normal range  Diagnoses and all orders for this visit:    CKD (chronic kidney disease), stage II  -     Basic metabolic panel; Future  -     CBC; Future  -     Magnesium; Future  -     Phosphorus; Future  -     PTH, intact; Future  -     Protein / creatinine ratio, urine; Future  -     Urinalysis with reflex to microscopic; Future  -     Vitamin D 25 hydroxy; Future  -     Uric acid; Future    Benign essential hypertension    Nephrolithiasis  -     Phosphorus; Future  -     PTH, intact; Future  -     Urinalysis with reflex to microscopic; Future  -     Vitamin D 25 hydroxy; Future  -     Uric acid;  Future  -     Stone risk profile; Future    Adrenal nodule (HCC)    Gastroesophageal reflux disease without esophagitis    Hypocitraturia  -     Potassium Citrate ER 15 MEQ (1620 MG) TBCR; Take 1 tablet by mouth 2 (two) times a day  -     Stone risk profile; Future    Essential hypertension  -     Potassium Citrate ER 15 MEQ (1620 MG) TBCR; Take 1 tablet by mouth 2 (two) times a day    Other orders  -     metyrapone (METOPIRONE) 250 MG capsule; Take 750 mg by mouth 4 (four) times a day        SUBJECTIVE / HPI:  Patient is 39-year-old male with significant medical issues of hypertension diagnosed early in the age, bilateral adrenal nodule, history of nephrolithiasis with last episode recently requiring ureteral stent placement with eventual removal, history of lithotripsy, sleep apnea, comes for regular follow-up of hypertension and nephrolithiasis  Baseline serum creatinine 1 1-1 2  During last office visit he was started on chlorthalidone and eplerenone was increased to 50 mg p o  B i d   Since then his blood pressure has overall much improved  His last serum creatinine 1 4 after increasing above medications  Home blood pressure readings are generally 120s to 130s/70s to 80s  His blood pressure is acceptable in the office today as well  He denies any hematuria, no dysuria  No flank or abdominal pain  No fever or chills  Most recent 24 hour urine stone risk profile was reviewed with him  He was advised to increase potassium citrate one tablet b i d  Although he continues to take only one as he does not like to take more pills  He was recently started on Metopyrone by endocrine for [place]       Patient recently had an episode of kidney stone requiring cystoscopy, retrograde pyelography, stent placement which was eventually removed  Being closely followed by Urology  No recent NSAID exposure   He tries to be compliant with salt restricted diet and tries to drink more water    REVIEW OF SYSTEMS:  More than 10 point review of systems were obtained and discussed in length with the patient  Complete review of systems were negative / unremarkable except mentioned above  PHYSICAL EXAM:  Vitals:    01/11/19 0940 01/11/19 1011   BP: 112/62 134/86   BP Location: Right arm    Patient Position: Sitting    Cuff Size: Adult    Pulse: 70    Weight: 89 4 kg (197 lb)    Height: 5' 9" (1 753 m)      Body mass index is 29 09 kg/m²  Physical Exam   Constitutional: He is oriented to person, place, and time  He appears well-developed and well-nourished  HENT:   Head: Normocephalic and atraumatic  Right Ear: External ear normal    Left Ear: External ear normal    Nose: Nose normal    Eyes: Pupils are equal, round, and reactive to light  Conjunctivae and EOM are normal  No scleral icterus  Neck: Neck supple  No JVD present  Cardiovascular: Normal rate and normal heart sounds  Pulmonary/Chest: Effort normal and breath sounds normal  No respiratory distress  He has no wheezes  He has no rales  Abdominal: Soft  Bowel sounds are normal  He exhibits no distension  There is no tenderness  Musculoskeletal: He exhibits no edema or tenderness  Neurological: He is alert and oriented to person, place, and time  Skin: Skin is warm and dry  Psychiatric: He has a normal mood and affect  His behavior is normal    Vitals reviewed        PAST MEDICAL HISTORY:  Past Medical History:   Diagnosis Date    Adrenal mass ([address] )     Chronic kidney disease     Disease of thyroid gland     GERD (gastroesophageal reflux disease)     Hypertension     Kidney stones     Low testosterone     Sleep apnea     not currently using his CPAP    Thyroid disease        PAST SURGICAL HISTORY:  Past Surgical History:   Procedure Laterality Date    FL RETROGRADE PYELOGRAM  8/21/2018    KIDNEY STONE SURGERY      [place] CYSTO/URETERO W/LITHOTRIPSY &INDWELL STENT INSRT Left 8/21/2018    Procedure: CYSTOSCOPY URETEROSCOPY, RETROGRADE PYELOGRAM AND INSERTION STENT URETERAL;  Surgeon: Tabitha Frias MD;  [facility];  Service: Urology    LA ESOPHAGOGASTRODUODENOSCOPY TRANSORAL DIAGNOSTIC N/A 6/21/2018    Procedure: ESOPHAGOGASTRODUODENOSCOPY (EGD); Surgeon: Artem Loving MD;  Location: [facility];   Service: Gastroenterology       SOCIAL HISTORY:  History   Alcohol Use    Yes     Comment: 1 drink every two weeks or so     History   Drug Use No     History   Smoking Status    Former Smoker    Packs/day: 1 00    Years: 17 00    Quit date: 2005   Smokeless Tobacco    Never Used       FAMILY HISTORY:  Family History   Problem Relation Age of Onset    Asthma Mother     Diabetes Mother     Other Father         Endocarditis    Hypertension Father     Gout Father        MEDICATIONS:    Current Outpatient Prescriptions:     atorvastatin (LIPITOR) 20 mg tablet, Take 1 tablet (20 mg total) by mouth daily, Disp: 90 tablet, Rfl: 3    carvedilol (COREG) 25 mg tablet, Take 1 tablet (25 mg total) by mouth 2 (two) times a day with meals, Disp: 60 tablet, Rfl: 5    chlorthalidone 25 mg tablet, Take 1 tablet (25 mg total) by mouth daily, Disp: 30 tablet, Rfl: 5    Cholecalciferol (VITAMIN D) 2000 units CAPS, Take 1 tablet by mouth daily, Disp: , Rfl:     eplerenone (INSPRA) 50 MG tablet, Take 1 tablet (50 mg total) by mouth 2 (two) times a day, Disp: 60 tablet, Rfl: 5    felodipine (PLENDIL) 10 MG 24 hr tablet, Take 1 tablet (10 mg total) by mouth daily, Disp: 90 tablet, Rfl: 3    metyrapone (METOPIRONE) 250 MG capsule, Take 750 mg by mouth 4 (four) times a day, Disp: , Rfl:     omeprazole (PriLOSEC) 20 mg delayed release capsule, Take 1 capsule (20 mg total) by mouth 2 (two) times a day (Patient taking differently: Take 20 mg by mouth daily  ), Disp: 180 capsule, Rfl: 3    Potassium Citrate ER 15 MEQ (1620 MG) TBCR, Take 1 tablet by mouth 2 (two) times a day, Disp: 180 tablet, Rfl: 2    SYRINGE-NEEDLE, DISP, 3 ML 21G X 1-1/2" 3 ML MISC, For testerone injection, Disp: , Rfl:    testosterone cypionate (DEPO-TESTOSTERONE) 200 mg/mL SOLN, Inject 0 8 mL deep IM once every two weeks  , Disp: , Rfl:     Lab Results:   Cr 1 4 in 12/2018

## 2019-02-08 ENCOUNTER — TRANSCRIBE ORDERS (OUTPATIENT)
Dept: LAB | Facility: HOSPITAL | Age: 44
End: 2019-02-08

## 2019-02-08 ENCOUNTER — APPOINTMENT (OUTPATIENT)
Dept: LAB | Facility: HOSPITAL | Age: 44
End: 2019-02-08
Payer: COMMERCIAL

## 2019-02-08 DIAGNOSIS — E24.9 CUSHING'S SYNDROME (HCC): Primary | ICD-10-CM

## 2019-02-08 DIAGNOSIS — E24.9 CUSHING'S SYNDROME (HCC): ICD-10-CM

## 2019-02-08 LAB
ALBUMIN SERPL BCP-MCNC: 3.8 G/DL (ref 3.5–5)
ALP SERPL-CCNC: 71 U/L (ref 46–116)
ALT SERPL W P-5'-P-CCNC: 33 U/L (ref 12–78)
ANION GAP SERPL CALCULATED.3IONS-SCNC: 6 MMOL/L (ref 4–13)
AST SERPL W P-5'-P-CCNC: 19 U/L (ref 5–45)
BILIRUB SERPL-MCNC: 0.35 MG/DL (ref 0.2–1)
BUN SERPL-MCNC: 24 MG/DL (ref 5–25)
CALCIUM SERPL-MCNC: 9.7 MG/DL (ref 8.3–10.1)
CHLORIDE SERPL-SCNC: 101 MMOL/L (ref 100–108)
CO2 SERPL-SCNC: 29 MMOL/L (ref 21–32)
CORTIS SERPL-MCNC: 9.7 UG/DL
CREAT 24H UR-MRATE: 2.2 G/24HR (ref 0.8–1.8)
CREAT SERPL-MCNC: 1.31 MG/DL (ref 0.6–1.3)
GFR SERPL CREATININE-BSD FRML MDRD: 65 ML/MIN/1.73SQ M
GLUCOSE P FAST SERPL-MCNC: 89 MG/DL (ref 65–99)
POTASSIUM SERPL-SCNC: 3.7 MMOL/L (ref 3.5–5.3)
PROT SERPL-MCNC: 7.8 G/DL (ref 6.4–8.2)
SODIUM SERPL-SCNC: 136 MMOL/L (ref 136–145)
SPECIMEN VOL UR: 2200 ML

## 2019-02-08 PROCEDURE — 82533 TOTAL CORTISOL: CPT

## 2019-02-08 PROCEDURE — 80053 COMPREHEN METABOLIC PANEL: CPT

## 2019-02-08 PROCEDURE — 82530 CORTISOL FREE: CPT

## 2019-02-08 PROCEDURE — 36415 COLL VENOUS BLD VENIPUNCTURE: CPT

## 2019-02-08 PROCEDURE — 82570 ASSAY OF URINE CREATININE: CPT

## 2019-02-12 LAB
CORTIS F 24H UR-MRATE: 13 UG/24 HR (ref 0–50)
CORTIS F UR-MCNC: 6 UG/L

## 2019-02-22 ENCOUNTER — TELEPHONE (OUTPATIENT)
Dept: NEPHROLOGY | Facility: CLINIC | Age: 44
End: 2019-02-22

## 2019-02-22 NOTE — TELEPHONE ENCOUNTER
Can you let him know that his creatinine slightly improved at 1 3  Continue to monitor  He should have repeat BMP in 4 to 6 weeks

## 2019-02-25 DIAGNOSIS — N18.2 CKD (CHRONIC KIDNEY DISEASE) STAGE 2, GFR 60-89 ML/MIN: Primary | ICD-10-CM

## 2019-02-25 NOTE — TELEPHONE ENCOUNTER
Spoke with the patient's wife and she is aware of his lab test results and that there are no changes at this time and to repeat a BMP in 4-6 weeks and the lab slip has been mailed out

## 2019-03-04 ENCOUNTER — TELEPHONE (OUTPATIENT)
Dept: NEPHROLOGY | Facility: CLINIC | Age: 44
End: 2019-03-04

## 2019-03-04 DIAGNOSIS — I10 ESSENTIAL HYPERTENSION: ICD-10-CM

## 2019-03-04 NOTE — TELEPHONE ENCOUNTER
Yung Pop, patient's wife called in regards to her husbands medication for the chlorthalidone  The pharmacy has the old prescription and needs the new one  Also, they are asking if they would be able to get a prescription for the med for a 90 day supply   Theyre asking the prescription be sent to the [address] [place] [address] [facility] on [facility]  Best call back number is [phone]

## 2019-03-05 RX ORDER — CHLORTHALIDONE 25 MG/1
25 TABLET ORAL DAILY
Qty: 90 TABLET | Refills: 3 | Status: SHIPPED | OUTPATIENT
Start: 2019-03-05 | End: 2020-08-17 | Stop reason: SDUPTHER

## 2019-03-05 NOTE — TELEPHONE ENCOUNTER
No changes  patient to continue chlorthalidone 25 mg one tablet daily  I have prescribed to [address] on [address] with 90 day supply with three refills

## 2019-03-08 NOTE — TELEPHONE ENCOUNTER
Left message for the patient to call back  This is my fourth attempt at contacting the patient  I left a message for the patient stating that if he has any questions regarding his prescription to contact our office

## 2019-03-12 NOTE — PROGRESS NOTES
Assessment/Plan:    No problem-specific Assessment & Plan notes found for this encounter  Diagnoses and all orders for this visit:    Obesity, Class I, BMI 30-34 9  -     Basic metabolic panel; Future  -     Magnesium; Future    Weight gain  -     Basic metabolic panel; Future  -     Magnesium; Future    Gastroesophageal reflux disease without esophagitis  -     Basic metabolic panel; Future  -     Magnesium; Future    Benign essential hypertension  -     Basic metabolic panel; Future  -     Magnesium; Future    CKD (chronic kidney disease), stage II  -     Basic metabolic panel; Future  -     Magnesium; Future    Impaired fasting glucose  -     Basic metabolic panel; Future  -     Magnesium; Future    Thyroid disease  -     Basic metabolic panel; Future  -     Magnesium; Future    Obstructive sleep apnea  -     Basic metabolic panel; Future  -     Magnesium; Future    Nephrolithiasis  -     Basic metabolic panel; Future  -     Magnesium; Future      -Discussed options of [facility]-Intensive Lifestyle Intervention Program, Very Low Calorie Diet-VLCD and Conservative Program and the role of weight loss medications   -Initial weight loss goal of 5-10% weight loss for improved health  -Screening labs- reviewed recent labs  -Patient is interested in pursuing Very Low Calorie Diet-VLCD   -encouraged compliance with CPAP  Labs ordered: yes, Pt needs to get labs to monitor kidney function and electrolytes every 2-3 weeks while on VLCD  He plans to do it for 6 weeks  Recent labs on 2/2019 stable kidney function (has CKD) and normal electrolytes  HTN meds addressed: yes, recommended to cut in half chlorthalidone when he starts VLCD, monitor closely BP while on VLCD, report if any Bps <110/70 or any symptoms like dizziness  He can continue with potassium supplement     DM2 meds addressed: no  VLCD time restriction based on BMI: no  Discussed and reviewed with patient the importance of good hydration while on VLCD especially for prevention of cramps, dehydration or kidney stones recurrence  After VLCD for 6 weeks he would like to transition into partial replacement  Goals:  Food log (ie ) www myfitnesspal com,sparkpeople  com,loseit com,calorieking  com,etc  baritastic  No sugary beverages  At least 80oz of water daily  45 minute visit, >50% face-to-face time spent counseling patient on surgical and nonsurgical interventions for the treatment of excess weight  Discussed the advantages and long-term outcomes with regards to bariatric surgery  Discussed in detail nonsurgical options including intensive lifestyle intervention program, very low-calorie diet program and conservative program   Discussed the role of weight loss medications  Counseled patient on diet behavior and exercise modification for weight loss  Follow up in approximately 2 weeks with Non-Surgical Dietician  Subjective:   Chief Complaint   Patient presents with    Consult     MWM consult, patient in 2017       Patient ID: Krysta Herman  is a 38 y o  male with excess weight/obesity here to pursue weight management  Past Medical History:   Diagnosis Date    Adrenal mass ([address] )     Chronic kidney disease     Disease of thyroid gland     GERD (gastroesophageal reflux disease)     Hypertension     Kidney stones     Low testosterone     Sleep apnea     not currently using his CPAP    Thyroid disease        HPI:    Pt Returning again after being in program in 2017  Weight regain of 15lbs  Was stable with his weight until this past November and gained 15lbs since then with the holidays  He is interested in doing VLCD again  He was doing partial PS but was unable to keep getting them due to time constrains       Obesity   Severity: class 1    Goals: 190lbs  STOP bang: + sleep apnea, not using CPAP- not compliant    The following portions of the patient's history were reviewed and updated as appropriate: allergies, current medications, past family history, past medical history, past social history, past surgical history and problem list     Review of Systems   Constitutional: Negative for activity change and appetite change  Respiratory: Negative  Cardiovascular: Negative  Gastrointestinal: Negative  Genitourinary: Negative  Hx of kidney stones   Musculoskeletal: Negative for arthralgias  Skin: Negative for rash  Psychiatric/Behavioral: Negative  Objective:    /70 (BP Location: Left arm, Patient Position: Sitting, Cuff Size: Adult)   Pulse 79   Temp 98 2 °F (36 8 °C) (Tympanic)   Ht 5' 9" (1 753 m)   Wt 96 kg (211 lb 11 2 oz)   BMI 31 26 kg/m²     Physical Exam   Constitutional   General appearance: Abnormal   well developed and obese  Eyes No conjunctival pallor  Ears, Nose, Mouth, and Throat Oral mucosa moist    Pulmonary   Respiratory effort: No increased work of breathing or signs of respiratory distress  Auscultation of lungs: Clear to auscultation, equal breath sounds bilaterally, no wheezes, no rales, no rhonci  Cardiovascular   Auscultation of heart: Normal rate and rhythm, normal S1 and S2, without murmurs  Examination of extremities for edema and/or varicosities: Normal   no edema  Abdomen   Abdomen: Abnormal   The abdomen was obese  Bowel sounds were normal  The abdomen was soft and nontender     Musculoskeletal   Gait and station: Normal     Psychiatric   Orientation to person, place and time: Normal     Affect: appropriate

## 2019-03-13 ENCOUNTER — OFFICE VISIT (OUTPATIENT)
Dept: BARIATRICS | Facility: CLINIC | Age: 44
End: 2019-03-13
Payer: COMMERCIAL

## 2019-03-13 VITALS
BODY MASS INDEX: 31.36 KG/M2 | DIASTOLIC BLOOD PRESSURE: 70 MMHG | WEIGHT: 211.7 LBS | TEMPERATURE: 98.2 F | HEART RATE: 79 BPM | HEIGHT: 69 IN | SYSTOLIC BLOOD PRESSURE: 124 MMHG

## 2019-03-13 DIAGNOSIS — K21.9 GASTROESOPHAGEAL REFLUX DISEASE WITHOUT ESOPHAGITIS: ICD-10-CM

## 2019-03-13 DIAGNOSIS — R73.01 IMPAIRED FASTING GLUCOSE: ICD-10-CM

## 2019-03-13 DIAGNOSIS — N18.2 CKD (CHRONIC KIDNEY DISEASE), STAGE II: ICD-10-CM

## 2019-03-13 DIAGNOSIS — E66.9 OBESITY, CLASS I, BMI 30-34.9: Primary | ICD-10-CM

## 2019-03-13 DIAGNOSIS — I10 BENIGN ESSENTIAL HYPERTENSION: ICD-10-CM

## 2019-03-13 DIAGNOSIS — R63.5 WEIGHT GAIN: ICD-10-CM

## 2019-03-13 DIAGNOSIS — N20.0 NEPHROLITHIASIS: ICD-10-CM

## 2019-03-13 DIAGNOSIS — E07.9 THYROID DISEASE: ICD-10-CM

## 2019-03-13 DIAGNOSIS — G47.33 OBSTRUCTIVE SLEEP APNEA: ICD-10-CM

## 2019-03-13 PROBLEM — E78.5 HYPERLIPIDEMIA: Status: ACTIVE | Noted: 2017-02-08

## 2019-03-13 PROBLEM — E11.9 DMII (DIABETES MELLITUS, TYPE 2) (HCC): Status: ACTIVE | Noted: 2017-09-11

## 2019-03-13 PROBLEM — R12 HEART BURN: Status: ACTIVE | Noted: 2017-02-08

## 2019-03-13 PROBLEM — D35.02 ADRENAL ADENOMA, LEFT: Status: ACTIVE | Noted: 2019-03-13

## 2019-03-13 PROBLEM — R12 HEART BURN: Status: RESOLVED | Noted: 2017-02-08 | Resolved: 2019-03-13

## 2019-03-13 PROCEDURE — 99214 OFFICE O/P EST MOD 30 MIN: CPT | Performed by: FAMILY MEDICINE

## 2019-03-15 ENCOUNTER — APPOINTMENT (OUTPATIENT)
Dept: LAB | Facility: HOSPITAL | Age: 44
End: 2019-03-15
Attending: INTERNAL MEDICINE
Payer: COMMERCIAL

## 2019-03-15 ENCOUNTER — TRANSCRIBE ORDERS (OUTPATIENT)
Dept: LAB | Facility: HOSPITAL | Age: 44
End: 2019-03-15

## 2019-03-15 DIAGNOSIS — E27.9 ADRENAL HYPERTENSION (HCC): ICD-10-CM

## 2019-03-15 DIAGNOSIS — E66.8 OBESITY OF ENDOCRINE ORIGIN: ICD-10-CM

## 2019-03-15 DIAGNOSIS — N18.2 CKD (CHRONIC KIDNEY DISEASE) STAGE 2, GFR 60-89 ML/MIN: ICD-10-CM

## 2019-03-15 DIAGNOSIS — E04.1 NONTOXIC UNINODULAR GOITER: ICD-10-CM

## 2019-03-15 DIAGNOSIS — I15.2 ADRENAL HYPERTENSION (HCC): ICD-10-CM

## 2019-03-15 DIAGNOSIS — I15.2 ADRENAL HYPERTENSION (HCC): Primary | ICD-10-CM

## 2019-03-15 DIAGNOSIS — E27.9 ADRENAL HYPERTENSION (HCC): Primary | ICD-10-CM

## 2019-03-15 LAB
ALBUMIN SERPL BCP-MCNC: 4 G/DL (ref 3.5–5)
ALP SERPL-CCNC: 63 U/L (ref 46–116)
ALT SERPL W P-5'-P-CCNC: 63 U/L (ref 12–78)
ANION GAP SERPL CALCULATED.3IONS-SCNC: 3 MMOL/L (ref 4–13)
AST SERPL W P-5'-P-CCNC: 26 U/L (ref 5–45)
BASOPHILS # BLD AUTO: 0.12 THOUSANDS/ΜL (ref 0–0.1)
BASOPHILS NFR BLD AUTO: 1 % (ref 0–1)
BILIRUB SERPL-MCNC: 0.69 MG/DL (ref 0.2–1)
BUN SERPL-MCNC: 28 MG/DL (ref 5–25)
CALCIUM SERPL-MCNC: 9.5 MG/DL (ref 8.3–10.1)
CHLORIDE SERPL-SCNC: 102 MMOL/L (ref 100–108)
CO2 SERPL-SCNC: 29 MMOL/L (ref 21–32)
CORTIS AM PEAK SERPL-MCNC: 16.2 UG/DL (ref 4.2–22.4)
CREAT SERPL-MCNC: 1.39 MG/DL (ref 0.6–1.3)
CREAT UR-MCNC: 281 MG/DL
EOSINOPHIL # BLD AUTO: 0.55 THOUSAND/ΜL (ref 0–0.61)
EOSINOPHIL NFR BLD AUTO: 7 % (ref 0–6)
ERYTHROCYTE [DISTWIDTH] IN BLOOD BY AUTOMATED COUNT: 14.1 % (ref 11.6–15.1)
EST. AVERAGE GLUCOSE BLD GHB EST-MCNC: 126 MG/DL
GFR SERPL CREATININE-BSD FRML MDRD: 61 ML/MIN/1.73SQ M
GLUCOSE P FAST SERPL-MCNC: 91 MG/DL (ref 65–99)
HBA1C MFR BLD: 6 % (ref 4.2–6.3)
HCT VFR BLD AUTO: 46.3 % (ref 36.5–49.3)
HGB BLD-MCNC: 14.9 G/DL (ref 12–17)
IMM GRANULOCYTES # BLD AUTO: 0.06 THOUSAND/UL (ref 0–0.2)
IMM GRANULOCYTES NFR BLD AUTO: 1 % (ref 0–2)
LYMPHOCYTES # BLD AUTO: 1.66 THOUSANDS/ΜL (ref 0.6–4.47)
LYMPHOCYTES NFR BLD AUTO: 20 % (ref 14–44)
MAGNESIUM SERPL-MCNC: 2.1 MG/DL (ref 1.6–2.6)
MCH RBC QN AUTO: 28.5 PG (ref 26.8–34.3)
MCHC RBC AUTO-ENTMCNC: 32.2 G/DL (ref 31.4–37.4)
MCV RBC AUTO: 89 FL (ref 82–98)
MICROALBUMIN UR-MCNC: 24.7 MG/L (ref 0–20)
MICROALBUMIN/CREAT 24H UR: 9 MG/G CREATININE (ref 0–30)
MONOCYTES # BLD AUTO: 0.86 THOUSAND/ΜL (ref 0.17–1.22)
MONOCYTES NFR BLD AUTO: 10 % (ref 4–12)
NEUTROPHILS # BLD AUTO: 5.18 THOUSANDS/ΜL (ref 1.85–7.62)
NEUTS SEG NFR BLD AUTO: 61 % (ref 43–75)
NRBC BLD AUTO-RTO: 0 /100 WBCS
PHOSPHATE SERPL-MCNC: 3.5 MG/DL (ref 2.7–4.5)
PLATELET # BLD AUTO: 268 THOUSANDS/UL (ref 149–390)
PMV BLD AUTO: 8.4 FL (ref 8.9–12.7)
POTASSIUM SERPL-SCNC: 4.2 MMOL/L (ref 3.5–5.3)
PROLACTIN SERPL-MCNC: 68.7 NG/ML (ref 2.5–17.4)
PROT SERPL-MCNC: 7.7 G/DL (ref 6.4–8.2)
RBC # BLD AUTO: 5.22 MILLION/UL (ref 3.88–5.62)
SODIUM SERPL-SCNC: 134 MMOL/L (ref 136–145)
T4 FREE SERPL-MCNC: 0.87 NG/DL (ref 0.76–1.46)
TSH SERPL DL<=0.05 MIU/L-ACNC: 1.65 UIU/ML (ref 0.36–3.74)
WBC # BLD AUTO: 8.43 THOUSAND/UL (ref 4.31–10.16)

## 2019-03-15 PROCEDURE — 80053 COMPREHEN METABOLIC PANEL: CPT

## 2019-03-15 PROCEDURE — 84439 ASSAY OF FREE THYROXINE: CPT

## 2019-03-15 PROCEDURE — 82024 ASSAY OF ACTH: CPT

## 2019-03-15 PROCEDURE — 83735 ASSAY OF MAGNESIUM: CPT

## 2019-03-15 PROCEDURE — 82533 TOTAL CORTISOL: CPT

## 2019-03-15 PROCEDURE — 82308 ASSAY OF CALCITONIN: CPT

## 2019-03-15 PROCEDURE — 84443 ASSAY THYROID STIM HORMONE: CPT

## 2019-03-15 PROCEDURE — 84146 ASSAY OF PROLACTIN: CPT

## 2019-03-15 PROCEDURE — 85025 COMPLETE CBC W/AUTO DIFF WBC: CPT

## 2019-03-15 PROCEDURE — 83835 ASSAY OF METANEPHRINES: CPT

## 2019-03-15 PROCEDURE — 84100 ASSAY OF PHOSPHORUS: CPT

## 2019-03-15 PROCEDURE — 82043 UR ALBUMIN QUANTITATIVE: CPT

## 2019-03-15 PROCEDURE — 83036 HEMOGLOBIN GLYCOSYLATED A1C: CPT

## 2019-03-15 PROCEDURE — 82570 ASSAY OF URINE CREATININE: CPT

## 2019-03-15 PROCEDURE — 36415 COLL VENOUS BLD VENIPUNCTURE: CPT

## 2019-03-16 LAB — ACTH PLAS-MCNC: 2.8 PG/ML (ref 7.2–63.3)

## 2019-03-18 LAB — CALCIT SERPL-MCNC: <2 PG/ML (ref 0–8.4)

## 2019-03-20 LAB
METANEPH FREE SERPL-MCNC: 25 PG/ML (ref 0–62)
NORMETANEPHRINE SERPL-MCNC: 97 PG/ML (ref 0–145)

## 2019-03-27 ENCOUNTER — OFFICE VISIT (OUTPATIENT)
Dept: BARIATRICS | Facility: CLINIC | Age: 44
End: 2019-03-27

## 2019-03-27 VITALS
SYSTOLIC BLOOD PRESSURE: 120 MMHG | HEIGHT: 69 IN | WEIGHT: 203.2 LBS | DIASTOLIC BLOOD PRESSURE: 80 MMHG | BODY MASS INDEX: 30.1 KG/M2

## 2019-03-27 DIAGNOSIS — R63.5 ABNORMAL WEIGHT GAIN: ICD-10-CM

## 2019-03-27 PROCEDURE — RECHECK: Performed by: DIETITIAN, REGISTERED

## 2019-03-27 PROCEDURE — VLCD: Performed by: DIETITIAN, REGISTERED

## 2019-04-08 ENCOUNTER — OFFICE VISIT (OUTPATIENT)
Dept: BARIATRICS | Facility: CLINIC | Age: 44
End: 2019-04-08

## 2019-04-08 VITALS
DIASTOLIC BLOOD PRESSURE: 90 MMHG | WEIGHT: 204.3 LBS | BODY MASS INDEX: 30.26 KG/M2 | SYSTOLIC BLOOD PRESSURE: 132 MMHG | HEIGHT: 69 IN

## 2019-04-08 DIAGNOSIS — R63.5 ABNORMAL WEIGHT GAIN: ICD-10-CM

## 2019-04-08 PROCEDURE — RECHECK: Performed by: DIETITIAN, REGISTERED

## 2019-04-08 PROCEDURE — VLCD: Performed by: DIETITIAN, REGISTERED

## 2019-04-24 DIAGNOSIS — I10 ESSENTIAL HYPERTENSION: ICD-10-CM

## 2019-04-24 RX ORDER — CARVEDILOL 25 MG/1
25 TABLET ORAL 2 TIMES DAILY WITH MEALS
Qty: 180 TABLET | Refills: 3 | Status: SHIPPED | OUTPATIENT
Start: 2019-04-24 | End: 2020-08-17 | Stop reason: SDUPTHER

## 2019-05-01 ENCOUNTER — OFFICE VISIT (OUTPATIENT)
Dept: BARIATRICS | Facility: CLINIC | Age: 44
End: 2019-05-01

## 2019-05-01 VITALS — HEIGHT: 69 IN | BODY MASS INDEX: 30.11 KG/M2 | WEIGHT: 203.3 LBS

## 2019-05-01 DIAGNOSIS — R63.5 ABNORMAL WEIGHT GAIN: ICD-10-CM

## 2019-05-01 PROCEDURE — RECHECK: Performed by: DIETITIAN, REGISTERED

## 2019-05-01 PROCEDURE — 88305 TISSUE EXAM BY PATHOLOGIST: CPT | Performed by: PATHOLOGY

## 2019-05-01 PROCEDURE — VLCD: Performed by: DIETITIAN, REGISTERED

## 2019-05-04 ENCOUNTER — HOSPITAL ENCOUNTER (OUTPATIENT)
Dept: RADIOLOGY | Facility: HOSPITAL | Age: 44
Discharge: HOME | End: 2019-05-04
Attending: UROLOGY
Payer: COMMERCIAL

## 2019-05-04 ENCOUNTER — TRANSCRIBE ORDERS (OUTPATIENT)
Dept: RADIOLOGY | Facility: HOSPITAL | Age: 44
End: 2019-05-04

## 2019-05-04 DIAGNOSIS — N20.0 NEPHROLITHIASIS: ICD-10-CM

## 2019-05-04 PROCEDURE — 74018 RADEX ABDOMEN 1 VIEW: CPT

## 2019-05-04 PROCEDURE — 76770 US EXAM ABDO BACK WALL COMP: CPT

## 2019-05-06 ENCOUNTER — LAB REQUISITION (OUTPATIENT)
Dept: LAB | Facility: HOSPITAL | Age: 44
End: 2019-05-06
Payer: COMMERCIAL

## 2019-05-06 DIAGNOSIS — D48.5 NEOPLASM OF UNCERTAIN BEHAVIOR OF SKIN: ICD-10-CM

## 2019-05-24 ENCOUNTER — TELEPHONE (OUTPATIENT)
Dept: NEPHROLOGY | Facility: CLINIC | Age: 44
End: 2019-05-24

## 2019-06-26 PROCEDURE — 88305 TISSUE EXAM BY PATHOLOGIST: CPT | Performed by: PATHOLOGY

## 2019-06-27 ENCOUNTER — LAB REQUISITION (OUTPATIENT)
Dept: LAB | Facility: HOSPITAL | Age: 44
End: 2019-06-27
Payer: COMMERCIAL

## 2019-06-27 DIAGNOSIS — L98.0 PYOGENIC GRANULOMA: ICD-10-CM

## 2019-08-17 ENCOUNTER — APPOINTMENT (OUTPATIENT)
Dept: LAB | Facility: HOSPITAL | Age: 44
End: 2019-08-17
Payer: COMMERCIAL

## 2019-08-17 ENCOUNTER — TRANSCRIBE ORDERS (OUTPATIENT)
Dept: LAB | Facility: HOSPITAL | Age: 44
End: 2019-08-17

## 2019-08-17 DIAGNOSIS — R35.0 URINARY FREQUENCY: ICD-10-CM

## 2019-08-17 DIAGNOSIS — N18.9 CHRONIC RENAL FAILURE, UNSPECIFIED CKD STAGE: ICD-10-CM

## 2019-08-17 DIAGNOSIS — R53.83 FATIGUE, UNSPECIFIED TYPE: ICD-10-CM

## 2019-08-17 DIAGNOSIS — I10 ESSENTIAL HYPERTENSION, MALIGNANT: Primary | ICD-10-CM

## 2019-08-17 DIAGNOSIS — E34.9 TESTOSTERONE DEFICIENCY: ICD-10-CM

## 2019-08-17 DIAGNOSIS — I10 ESSENTIAL HYPERTENSION, MALIGNANT: ICD-10-CM

## 2019-08-17 LAB
25(OH)D3 SERPL-MCNC: 22 NG/ML (ref 30–100)
ALBUMIN SERPL BCP-MCNC: 3.7 G/DL (ref 3.5–5)
ALP SERPL-CCNC: 85 U/L (ref 46–116)
ALT SERPL W P-5'-P-CCNC: 36 U/L (ref 12–78)
ANION GAP SERPL CALCULATED.3IONS-SCNC: 4 MMOL/L (ref 4–13)
AST SERPL W P-5'-P-CCNC: 14 U/L (ref 5–45)
BASOPHILS # BLD AUTO: 0.08 THOUSANDS/ΜL (ref 0–0.1)
BASOPHILS NFR BLD AUTO: 1 % (ref 0–1)
BILIRUB SERPL-MCNC: 0.28 MG/DL (ref 0.2–1)
BUN SERPL-MCNC: 15 MG/DL (ref 5–25)
CALCIUM SERPL-MCNC: 8.7 MG/DL (ref 8.3–10.1)
CHLORIDE SERPL-SCNC: 104 MMOL/L (ref 100–108)
CHOLEST SERPL-MCNC: 228 MG/DL (ref 50–200)
CO2 SERPL-SCNC: 32 MMOL/L (ref 21–32)
CORTIS AM PEAK SERPL-MCNC: 16.2 UG/DL (ref 4.2–22.4)
CREAT SERPL-MCNC: 1.07 MG/DL (ref 0.6–1.3)
EOSINOPHIL # BLD AUTO: 0.16 THOUSAND/ΜL (ref 0–0.61)
EOSINOPHIL NFR BLD AUTO: 2 % (ref 0–6)
ERYTHROCYTE [DISTWIDTH] IN BLOOD BY AUTOMATED COUNT: 13.7 % (ref 11.6–15.1)
GFR SERPL CREATININE-BSD FRML MDRD: 83 ML/MIN/1.73SQ M
GLUCOSE P FAST SERPL-MCNC: 97 MG/DL (ref 65–99)
HCT VFR BLD AUTO: 45.2 % (ref 36.5–49.3)
HDLC SERPL-MCNC: 58 MG/DL (ref 40–60)
HGB BLD-MCNC: 14.6 G/DL (ref 12–17)
IMM GRANULOCYTES # BLD AUTO: 0.08 THOUSAND/UL (ref 0–0.2)
IMM GRANULOCYTES NFR BLD AUTO: 1 % (ref 0–2)
LDLC SERPL CALC-MCNC: 139 MG/DL (ref 0–100)
LYMPHOCYTES # BLD AUTO: 1.74 THOUSANDS/ΜL (ref 0.6–4.47)
LYMPHOCYTES NFR BLD AUTO: 20 % (ref 14–44)
MCH RBC QN AUTO: 27.9 PG (ref 26.8–34.3)
MCHC RBC AUTO-ENTMCNC: 32.3 G/DL (ref 31.4–37.4)
MCV RBC AUTO: 86 FL (ref 82–98)
MONOCYTES # BLD AUTO: 0.85 THOUSAND/ΜL (ref 0.17–1.22)
MONOCYTES NFR BLD AUTO: 10 % (ref 4–12)
NEUTROPHILS # BLD AUTO: 6.02 THOUSANDS/ΜL (ref 1.85–7.62)
NEUTS SEG NFR BLD AUTO: 66 % (ref 43–75)
NONHDLC SERPL-MCNC: 170 MG/DL
NRBC BLD AUTO-RTO: 0 /100 WBCS
PLATELET # BLD AUTO: 352 THOUSANDS/UL (ref 149–390)
PMV BLD AUTO: 9.3 FL (ref 8.9–12.7)
POTASSIUM SERPL-SCNC: 3.6 MMOL/L (ref 3.5–5.3)
PROT SERPL-MCNC: 7.3 G/DL (ref 6.4–8.2)
PSA SERPL-MCNC: 1.4 NG/ML (ref 0–4)
RBC # BLD AUTO: 5.23 MILLION/UL (ref 3.88–5.62)
SODIUM SERPL-SCNC: 140 MMOL/L (ref 136–145)
T3 SERPL-MCNC: 1 NG/ML (ref 0.6–1.8)
T4 FREE SERPL-MCNC: 0.66 NG/DL (ref 0.76–1.46)
TRIGL SERPL-MCNC: 153 MG/DL
TSH SERPL DL<=0.05 MIU/L-ACNC: 0.41 UIU/ML (ref 0.36–3.74)
WBC # BLD AUTO: 8.93 THOUSAND/UL (ref 4.31–10.16)

## 2019-08-17 PROCEDURE — 84480 ASSAY TRIIODOTHYRONINE (T3): CPT

## 2019-08-17 PROCEDURE — 84402 ASSAY OF FREE TESTOSTERONE: CPT

## 2019-08-17 PROCEDURE — 84443 ASSAY THYROID STIM HORMONE: CPT

## 2019-08-17 PROCEDURE — 82306 VITAMIN D 25 HYDROXY: CPT

## 2019-08-17 PROCEDURE — 84403 ASSAY OF TOTAL TESTOSTERONE: CPT

## 2019-08-17 PROCEDURE — 82088 ASSAY OF ALDOSTERONE: CPT

## 2019-08-17 PROCEDURE — 84439 ASSAY OF FREE THYROXINE: CPT

## 2019-08-17 PROCEDURE — 80061 LIPID PANEL: CPT

## 2019-08-17 PROCEDURE — 82530 CORTISOL FREE: CPT

## 2019-08-17 PROCEDURE — 80053 COMPREHEN METABOLIC PANEL: CPT

## 2019-08-17 PROCEDURE — 36415 COLL VENOUS BLD VENIPUNCTURE: CPT

## 2019-08-17 PROCEDURE — 82533 TOTAL CORTISOL: CPT

## 2019-08-17 PROCEDURE — 84153 ASSAY OF PSA TOTAL: CPT

## 2019-08-17 PROCEDURE — 85025 COMPLETE CBC W/AUTO DIFF WBC: CPT

## 2019-08-19 LAB
TESTOST FREE SERPL-MCNC: 7.9 PG/ML (ref 6.8–21.5)
TESTOST SERPL-MCNC: 154 NG/DL (ref 264–916)

## 2019-08-20 LAB — ALDOST SERPL-MCNC: 34.7 NG/DL (ref 0–30)

## 2019-08-21 LAB
CORTIS F 24H UR-MRATE: 128 UG/24 HR (ref 5–64)
CORTIS F UR-MCNC: 51 UG/L

## 2019-08-30 ENCOUNTER — TELEPHONE (OUTPATIENT)
Dept: GASTROENTEROLOGY | Facility: AMBULARY SURGERY CENTER | Age: 44
End: 2019-08-30

## 2019-08-30 DIAGNOSIS — K21.9 GASTROESOPHAGEAL REFLUX DISEASE WITHOUT ESOPHAGITIS: ICD-10-CM

## 2019-08-30 RX ORDER — OMEPRAZOLE 20 MG/1
20 CAPSULE, DELAYED RELEASE ORAL 2 TIMES DAILY
Qty: 180 CAPSULE | Refills: 0 | Status: SHIPPED | OUTPATIENT
Start: 2019-08-30 | End: 2019-11-15 | Stop reason: SDUPTHER

## 2019-08-30 NOTE — TELEPHONE ENCOUNTER
Please advise   Darell Talbot pt  Incoming fax for a new refill request  Omeprazole  20 mg   180 caps   1 cap bid  Please send to pt pharm on file

## 2019-08-30 NOTE — TELEPHONE ENCOUNTER
I sent renewal for patient's prescription, but please make sure he has office visit, he has not been seen in over a year and has Florian's esophagus    Thank you

## 2019-10-04 DIAGNOSIS — I10 ESSENTIAL HYPERTENSION: ICD-10-CM

## 2019-10-08 RX ORDER — EPLERENONE 50 MG/1
50 TABLET, FILM COATED ORAL 2 TIMES DAILY
Qty: 60 TABLET | Refills: 5 | Status: SHIPPED | OUTPATIENT
Start: 2019-10-08 | End: 2020-04-14 | Stop reason: SDUPTHER

## 2019-11-11 ENCOUNTER — OFFICE VISIT (OUTPATIENT)
Dept: URGENT CARE | Age: 44
End: 2019-11-11
Payer: COMMERCIAL

## 2019-11-11 VITALS
DIASTOLIC BLOOD PRESSURE: 86 MMHG | TEMPERATURE: 97.7 F | SYSTOLIC BLOOD PRESSURE: 140 MMHG | HEART RATE: 74 BPM | RESPIRATION RATE: 20 BRPM | OXYGEN SATURATION: 99 %

## 2019-11-11 DIAGNOSIS — T14.90XA INJURY: ICD-10-CM

## 2019-11-11 DIAGNOSIS — S39.92XA TAILBONE INJURY, INITIAL ENCOUNTER: Primary | ICD-10-CM

## 2019-11-11 PROCEDURE — S9088 SERVICES PROVIDED IN URGENT: HCPCS | Performed by: PHYSICIAN ASSISTANT

## 2019-11-11 PROCEDURE — 99213 OFFICE O/P EST LOW 20 MIN: CPT | Performed by: PHYSICIAN ASSISTANT

## 2019-11-11 RX ORDER — CHLORTHALIDONE 25 MG/1
25 TABLET ORAL
COMMUNITY
End: 2020-09-09 | Stop reason: SDUPTHER

## 2019-11-11 RX ORDER — TESTOSTERONE CYPIONATE 200 MG/ML
INJECTION INTRAMUSCULAR
COMMUNITY
Start: 2019-05-31 | End: 2020-09-09 | Stop reason: SDUPTHER

## 2019-11-11 RX ORDER — TESTOSTERONE 16.2 MG/G
GEL TRANSDERMAL
COMMUNITY
Start: 2017-07-12 | End: 2020-09-09 | Stop reason: ALTCHOICE

## 2019-11-11 RX ORDER — CARVEDILOL 12.5 MG/1
TABLET ORAL
COMMUNITY
End: 2019-12-30 | Stop reason: ALTCHOICE

## 2019-11-11 RX ORDER — TORSEMIDE 10 MG/1
TABLET ORAL AS NEEDED
COMMUNITY

## 2019-11-11 RX ORDER — EPLERENONE 50 MG/1
TABLET, FILM COATED ORAL
COMMUNITY
End: 2020-09-09 | Stop reason: SDUPTHER

## 2019-11-11 RX ORDER — NICOTINE POLACRILEX 4 MG/1
GUM, CHEWING ORAL
COMMUNITY
End: 2019-12-30 | Stop reason: DRUGHIGH

## 2019-11-11 NOTE — PROGRESS NOTES
[facility] [employer] [facility]        NAME: Myra Arguello is a 38 y o  male  : 1975    MRN: [id number]  DATE: 2019  TIME: 8:01 PM    Assessment and Plan   Tailbone injury, initial encounter [S39 [id number]]  1  Tailbone injury, initial encounter  Ambulatory referral to Orthopedic Surgery    Ambulatory Referral to Comprehensive Sp[facility] Program   2  Injury  XR sacrum and coccyx    CANCELED: XR spine lumbar minimum 4 views non injury     Xray- negative for obvious acute osseous abnormality, pending final read  Patient given a prescription for a foam doughnut to sit on for comfort      Patient Instructions     Ice to the area  Sit on foam doughnut for comfort  Call tomorrow for official x-ray read  Follow-up with Ortho and [address] [place] [address] in the next 1-2 days for further evaluation and to ensure resolution of symptoms  Go to an ED immediately if any fevers, bowel/bladder dysfunction, numbness, tingling, weakness, worsening pain, change in location or intensity of pain, abdominal pain, chest pain, new or other concerning symptoms  Chief Complaint     Chief Complaint   Patient presents with    Back Pain     patient fell down hurting back ( tailbone area)  it happened today         History of Present Illness       79-year-old male presents with tailbone pain after an injury that occurred at 11:00 a m  this morning  States he is cutting his toenails when he fell backwards landing directly on his buttocks/tailbone  Denies hitting his head or losing consciousness  States he was able to get himself up and has been walking around without any problems  States the pain is on his tailbone when he sits  Denies any radiation of pain  Denies any hip pain, other back pain, neck pain, numbness, tingling, weakness, bowel/bladder dysfunction, saddle anesthesia, abdominal pain, chest pain, shortness of breath, nausea, vomiting or other complaints  Has not tried anything for it  Denies any previous injuries to the tailbone  Review of Systems   Review of Systems   Constitutional: Negative for activity change, appetite change, fatigue and fever  Respiratory: Negative for shortness of breath  Cardiovascular: Negative for chest pain  Gastrointestinal: Negative for abdominal pain, diarrhea, nausea and vomiting  Genitourinary: Negative for difficulty urinating, dysuria, flank pain, hematuria, penile swelling, scrotal swelling, testicular pain and urgency  Musculoskeletal: Positive for back pain ("tailbone pain")  Negative for arthralgias, joint swelling, neck pain and neck stiffness  Skin: Negative for rash and wound  Neurological: Negative for dizziness, syncope, weakness, numbness and headaches  All other systems reviewed and are negative  Current Medications       Current Outpatient Medications:     SYRINGE-NEEDLE, DISP, 3 ML 21G X 1-1/2" 3 ML MISC, For testerone injection, Disp: , Rfl:     testosterone (ANDROGEL) 1 62 % TD gel pump, Apply four depressions of the pump bottle daily, Disp: , Rfl:     testosterone cypionate (DEPO-TESTOSTERONE) 200 mg/mL SOLN, Inject 0 8 mL deep IM once every two weeks  , Disp: , Rfl:     atorvastatin (LIPITOR) 20 mg tablet, Take 1 tablet (20 mg total) by mouth daily, Disp: 90 tablet, Rfl: 3    carvedilol (COREG) 12 5 mg tablet, Take by mouth, Disp: , Rfl:     carvedilol (COREG) 25 mg tablet, Take 1 tablet (25 mg total) by mouth 2 (two) times a day with meals, Disp: 180 tablet, Rfl: 3    chlorthalidone 25 mg tablet, Take 1 tablet (25 mg total) by mouth daily, Disp: 90 tablet, Rfl: 3    chlorthalidone 25 mg tablet, Take 25 mg by mouth, Disp: , Rfl:     Cholecalciferol (VITAMIN D) 2000 units CAPS, Take 1 tablet by mouth daily, Disp: , Rfl:     eplerenone (INSPRA) 50 MG tablet, Take 1 tablet (50 mg total) by mouth 2 (two) times a day, Disp: 60 tablet, Rfl: 5    eplerenone (INSPRA) 50 MG tablet, Take by mouth, Disp: , Rfl:     felodipine (PLENDIL) 10 MG 24 hr tablet, Take 1 tablet (10 mg total) by mouth daily, Disp: 90 tablet, Rfl: 3    metyrapone (METOPIRONE) 250 MG capsule, Take 750 mg by mouth 4 (four) times a day, Disp: , Rfl:     omeprazole (PriLOSEC) 20 mg delayed release capsule, Take 1 capsule (20 mg total) by mouth 2 (two) times a day, Disp: 180 capsule, Rfl: 0    Omeprazole 20 MG TBEC, Take by mouth, Disp: , Rfl:     Potassium Citrate ER 15 MEQ (1620 MG) TBCR, Take 1 tablet by mouth 2 (two) times a day, Disp: 180 tablet, Rfl: 2    POTASSIUM CITRATE ER PO, Take by mouth, Disp: , Rfl:     SYRINGE-NEEDLE, DISP, 3 ML 21G X 1-1/2" 3 ML MISC, For testerone injection, Disp: , Rfl:     testosterone cypionate (DEPO-TESTOSTERONE) 200 mg/mL SOLN, Inject 0 8 mL deep IM once every two weeks  , Disp: , Rfl:     torsemide (DEMADEX) 10 mg tablet, Take by mouth, Disp: , Rfl:     Current Allergies     Allergies as of 11/11/2019    (No Known Allergies)            The following portions of the patient's history were reviewed and updated as appropriate: allergies, current medications, past family history, past medical history, past social history, past surgical history and problem list      Past Medical History:   Diagnosis Date    Adrenal mass ([address] )     Chronic kidney disease     Disease of thyroid gland     GERD (gastroesophageal reflux disease)     Hyperlipidemia     Hypertension     Kidney stones     Low testosterone     Sleep apnea     not currently using his CPAP    Thyroid disease        Past Surgical History:   Procedure Laterality Date    FL RETROGRADE PYELOGRAM  8/21/2018    KIDNEY STONE SURGERY      [place] CYSTO/URETERO W/LITHOTRIPSY &INDWELL STENT INSRT Left 8/21/2018    Procedure: CYSTOSCOPY URETEROSCOPY, RETROGRADE PYELOGRAM AND INSERTION STENT URETERAL;  Surgeon: Radha Santana MD;  [facility];  Service: Urology    ME ESOPHAGOGASTRODUODENOSCOPY TRANSORAL DIAGNOSTIC N/A 6/21/2018    Procedure: ESOPHAGOGASTRODUODENOSCOPY (EGD);   Surgeon: Frank Montes Kosta Beaulieu MD;  [facility]; Service: Gastroenterology       Family History   Problem Relation Age of Onset    Asthma Mother     Diabetes Mother     Other Father         Endocarditis    Hypertension Father     Gout Father          Medications have been verified  Objective   /86 (BP Location: Left arm, Patient Position: Sitting, Cuff Size: Large)   Pulse 74   Temp 97 7 °F (36 5 °C) (Temporal)   Resp 20   SpO2 99%        Physical Exam     Physical Exam   Constitutional: He is oriented to person, place, and time  He appears well-developed and well-nourished  No distress  Smiling, nontoxic-appearing   HENT:   Head: Normocephalic and atraumatic  Cardiovascular: Normal rate, regular rhythm and normal heart sounds  Pulmonary/Chest: Effort normal and breath sounds normal  He has no wheezes  Musculoskeletal: Normal range of motion  Right hip: He exhibits normal range of motion, no tenderness and no bony tenderness  Left hip: He exhibits normal range of motion, no tenderness and no bony tenderness  Cervical back: Normal  He exhibits normal range of motion, no tenderness and no bony tenderness  Thoracic back: Normal  He exhibits normal range of motion, no tenderness and no bony tenderness  Lumbar back: Normal  He exhibits normal range of motion, no tenderness and no bony tenderness  No ecchymoses or swelling  Mild diffuse tenderness over sacrum/coccyx area, no other bony tenderness  Negative straight leg raise  Able to stand on toes and heels, dorsiflexion/plantar flexion intact  Able to do a full squat  No hip/pelvic tenderness  Neurological: He is alert and oriented to person, place, and time  He has normal reflexes  No sensory deficit  NV intact with sensation and strong peripheral pulses  5/5 strength of LE bilaterally   Skin: Skin is warm and dry  Psychiatric: He has a normal mood and affect   His behavior is normal    Nursing note and vitals reviewed

## 2019-11-12 ENCOUNTER — TELEPHONE (OUTPATIENT)
Dept: PHYSICAL THERAPY | Facility: OTHER | Age: 44
End: 2019-11-12

## 2019-11-12 ENCOUNTER — OFFICE VISIT (OUTPATIENT)
Dept: OBGYN CLINIC | Facility: HOSPITAL | Age: 44
End: 2019-11-12
Payer: COMMERCIAL

## 2019-11-12 VITALS
HEIGHT: 69 IN | WEIGHT: 193 LBS | SYSTOLIC BLOOD PRESSURE: 156 MMHG | DIASTOLIC BLOOD PRESSURE: 110 MMHG | HEART RATE: 62 BPM | BODY MASS INDEX: 28.58 KG/M2

## 2019-11-12 DIAGNOSIS — S30.0XXA CONTUSION OF COCCYX, INITIAL ENCOUNTER: ICD-10-CM

## 2019-11-12 PROCEDURE — 99203 OFFICE O/P NEW LOW 30 MIN: CPT | Performed by: PHYSICIAN ASSISTANT

## 2019-11-12 RX ORDER — MELOXICAM 15 MG/1
15 TABLET ORAL DAILY
Qty: 30 TABLET | Refills: 0 | Status: SHIPPED | OUTPATIENT
Start: 2019-11-12 | End: 2019-12-30 | Stop reason: ALTCHOICE

## 2019-11-12 NOTE — TELEPHONE ENCOUNTER
Called patient per referral  Patient states he hurt his tailbone and it is feeling better  Patient states he "saw orthopedic physician today and they said x rays were fine "  Patient has no spine or back pain  Referral closed

## 2019-11-12 NOTE — PROGRESS NOTES
Assessment/Plan   Diagnoses and all orders for this visit:    Contusion of coccyx, initial encounter  -     Ambulatory referral to Orthopedic Surgery    - Ice as needed  - Mobic  - Donut pillow as needed  - Follow up if not fully resolved in the next 3 weeks      Subjective   Patient ID: Raymon Balderrama is a 38 y o  male  Vitals:    11/12/19 0935   BP: (!) 156/110   Pulse: 58     43yo male comes in for an evaluation of his coccyx  He was injured 11-11-19  He was sitting on a 6 inch stool in his bedroom  When he lifted one foot to cut his toenails, he slid off the stool and struck his tailbone on the ground  He went to urgent care, but they were unsure whether the xray showed a fracture  Since then, the xray has been read by radiology as showing no fracture and his symptoms have mildly improved  He c/o coccygeal pain  The pain is dull in character, mild in severity, pain does not radiate and is not associated with numbness          The following portions of the patient's history were reviewed and updated as appropriate: allergies, current medications, past family history, past medical history, past social history, past surgical history and problem list     Review of Systems  Ortho Exam  Past Medical History:   Diagnosis Date    Adrenal mass ([address] )     Chronic kidney disease     Disease of thyroid gland     GERD (gastroesophageal reflux disease)     Hyperlipidemia     Hypertension     Kidney stones     Low testosterone     Sleep apnea     not currently using his CPAP    Thyroid disease      Past Surgical History:   Procedure Laterality Date    FL RETROGRADE PYELOGRAM  8/21/2018    KIDNEY STONE SURGERY      [place] CYSTO/URETERO W/LITHOTRIPSY &INDWELL STENT INSRT Left 8/21/2018    Procedure: CYSTOSCOPY URETEROSCOPY, RETROGRADE PYELOGRAM AND INSERTION STENT URETERAL;  Surgeon: Frances Pitts MD;  [facility];  Service: Urology    [place] ESOPHAGOGASTRODUODENOSCOPY TRANSORAL DIAGNOSTIC N/A 6/21/2018 Procedure: ESOPHAGOGASTRODUODENOSCOPY (EGD); Surgeon: Sarmad Das MD;  [facility]; Service: Gastroenterology     Family History   Problem Relation Age of Onset    Asthma Mother     Diabetes Mother     Other Father         Endocarditis    Hypertension Father     Gout Father      Social History     Occupational History    Not on file   Tobacco Use    Smoking status: Former Smoker     Packs/day: 1 00     Years: 17 00     Pack years: 17 00     Last attempt to quit:      Years since quittin 8    Smokeless tobacco: Never Used   Substance and Sexual Activity    Alcohol use: Yes     Comment: 1 drink every two weeks or so    Drug use: No    Sexual activity: Yes         Objective   Physical Exam    · Constitutional: Awake, Alert, Oriented  · Eyes: EOMI  · Psych: Mood and affect appropriate  · Heart: regular rate and rhythm  · Lungs: No audible wheezing  · Abdomen: soft  · Lymph: no lymphedema     bilateral Low back / lumbar spine:  - Appearance   Inspection of back is normal with normal lordosis  - ROM   Full  active ROM, flexion 100, extension +30, rotation 30,30 horizontal flexion 30,30  Mild pain with full flexion  - Motor   abductor 5/5; quadraceps 5/5; hamstrings 5/5; adductors 5/5; iliopsoas 5/5, anterior tibial 5/5; gastrosoleus 5/5; EHL 5/5; peroneal posterior tibial 5/5; EHL 5/5  - Sensation   No numbness in all LE dermatomes bilaterally  - DTRs   DTRs 2+ and symmetric bilaterally  and No clonus  - Special Tests   SLR negative    I have personally reviewed pertinent films in PACS and my interpretation is no fracture

## 2019-11-12 NOTE — PATIENT INSTRUCTIONS
Ice to the area  Sit on foam doughnut for comfort  Call tomorrow for official x-ray read  Follow-up with Ortho and [facility] in the next 1-2 days for further evaluation and to ensure resolution of symptoms  Go to an ED immediately if any fevers, bowel/bladder dysfunction, numbness, tingling, weakness, worsening pain, change in location or intensity of pain, abdominal pain, chest pain, new or other concerning symptoms

## 2019-11-15 DIAGNOSIS — K21.9 GASTROESOPHAGEAL REFLUX DISEASE WITHOUT ESOPHAGITIS: ICD-10-CM

## 2019-11-15 RX ORDER — OMEPRAZOLE 20 MG/1
20 CAPSULE, DELAYED RELEASE ORAL 2 TIMES DAILY
Qty: 180 CAPSULE | Refills: 0 | Status: SHIPPED | OUTPATIENT
Start: 2019-11-15 | End: 2019-12-30 | Stop reason: DRUGHIGH

## 2019-12-30 ENCOUNTER — OFFICE VISIT (OUTPATIENT)
Dept: GASTROENTEROLOGY | Facility: AMBULARY SURGERY CENTER | Age: 44
End: 2019-12-30
Payer: COMMERCIAL

## 2019-12-30 VITALS
RESPIRATION RATE: 16 BRPM | TEMPERATURE: 98.4 F | BODY MASS INDEX: 30.36 KG/M2 | SYSTOLIC BLOOD PRESSURE: 128 MMHG | DIASTOLIC BLOOD PRESSURE: 80 MMHG | HEART RATE: 78 BPM | WEIGHT: 205 LBS | HEIGHT: 69 IN

## 2019-12-30 DIAGNOSIS — K21.00 GASTROESOPHAGEAL REFLUX DISEASE WITH ESOPHAGITIS: ICD-10-CM

## 2019-12-30 DIAGNOSIS — K22.70 BARRETT'S ESOPHAGUS DETERMINED BY ENDOSCOPY: Primary | ICD-10-CM

## 2019-12-30 PROCEDURE — 99213 OFFICE O/P EST LOW 20 MIN: CPT | Performed by: PHYSICIAN ASSISTANT

## 2019-12-30 RX ORDER — OMEPRAZOLE 40 MG/1
40 CAPSULE, DELAYED RELEASE ORAL 2 TIMES DAILY
Qty: 60 CAPSULE | Refills: 5 | Status: SHIPPED | OUTPATIENT
Start: 2019-12-30 | End: 2020-08-31 | Stop reason: SDUPTHER

## 2019-12-30 NOTE — PROGRESS NOTES
Follow-up Note -  [facility]  Zachariah Mcdaniel 1975 38 y o  male         Reason:  Follow-up; GERD, Florian's esophagus    HPI:  18-year-old male with history of hypertension, hyperlipidemia who presents for follow-up; he was last seen when he had EGD in June 2018, for follow-up of GERD; there were 2 columnar mucosal tongue was noted measuring about 1 5 cm, which were biopsied negative for intestinal metaplasia, but given the endoscopic appearance was recommended he have EGD in 2-3 years  At this time, the patient says he is taking omeprazole 20 mg twice daily every day, nonetheless he does experience fairly frequent reflux symptoms  He says the medication does seem to help, as he becomes rather acutely aware of when he forgets to take a dose  He says he does drink coffee frequently, about 4 coffees a day on average  He denies any regular NSAID use  Denies any alcohol use or tobacco use  Denies any difficulty swallowing, any loss of appetite or weight, any irregularities with his bowel habits, any blood or mucus in the stools  REVIEW OF SYSTEMS:      CONSTITUTIONAL: Denies any fever, chills, or rigors  Good appetite, and no recent weight loss  HEENT: No earache or tinnitus  Denies hearing loss or visual disturbances  CARDIOVASCULAR: No chest pain or palpitations  RESPIRATORY: Denies any cough, hemoptysis, shortness of breath or dyspnea on exertion  GASTROINTESTINAL: As noted in the History of Present Illness  GENITOURINARY: No problems with urination  Denies any hematuria or dysuria  NEUROLOGIC: No dizziness or vertigo, denies headaches  MUSCULOSKELETAL: Denies any muscle or joint pain  SKIN: Denies skin rashes or itching  ENDOCRINE: Denies excessive thirst  Denies intolerance to heat or cold  PSYCHOSOCIAL: Denies depression or anxiety  Denies any recent memory loss       Past Medical History:   Diagnosis Date    [address] [address]     Chronic kidney disease     Disease of thyroid gland     GERD (gastroesophageal reflux disease)     Hyperlipidemia     Hypertension     Kidney stones     Low testosterone     Sleep apnea     not currently using his CPAP    Thyroid disease       Past Surgical History:   Procedure Laterality Date    FL RETROGRADE PYELOGRAM  8/21/2018    KIDNEY STONE SURGERY      [place] CYSTO/URETERO W/LITHOTRIPSY &INDWELL STENT INSRT Left 8/21/2018    Procedure: CYSTOSCOPY URETEROSCOPY, RETROGRADE PYELOGRAM AND INSERTION STENT URETERAL;  Surgeon: Too Velazquez MD;  [facility];  Service: Urology    [place] ESOPHAGOGASTRODUODENOSCOPY TRANSORAL DIAGNOSTIC N/A 6/21/2018    Procedure: ESOPHAGOGASTRODUODENOSCOPY (EGD); Surgeon: Eryn Johnson MD;  [facility];   Service: Gastroenterology     Social History     Socioeconomic History    Marital status: /Civil Union     Spouse name: Not on file    Number of children: Not on file    Years of education: Not on file    Highest education level: Not on file   Occupational History    Not on file   Social Needs    Financial resource strain: Not on file    Food insecurity:     Worry: Not on file     Inability: Not on file    Transportation needs:     Medical: Not on file     Non-medical: Not on file   Tobacco Use    Smoking status: Former Smoker     Packs/day: 1 00     Years: 17 00     Pack years: 17 00     Last attempt to quit: 2005     Years since quitting: 15 0    Smokeless tobacco: Never Used   Substance and Sexual Activity    Alcohol use: Not Currently     Comment: 1 drink every two weeks or so    Drug use: No    Sexual activity: Yes   Lifestyle    Physical activity:     Days per week: Not on file     Minutes per session: Not on file    Stress: Not on file   Relationships    Social connections:     Talks on phone: Not on file     Gets together: Not on file     Attends Yazidi service: Not on file     Active member of club or organization: Not on file     Attends meetings of clubs or organizations: Not on file     Relationship status: Not on file    Intimate partner violence:     Fear of current or ex partner: Not on file     Emotionally abused: Not on file     Physically abused: Not on file     Forced sexual activity: Not on file   Other Topics Concern    Not on file   Social History Narrative    Not on file     Family History   Problem Relation Age of Onset    Asthma Mother     Diabetes Mother     Other Father         Endocarditis    Hypertension Father     Gout Father      Patient has no known allergies  Current Outpatient Medications   Medication Sig Dispense Refill    carvedilol (COREG) 25 mg tablet Take 1 tablet (25 mg total) by mouth 2 (two) times a day with meals 180 tablet 3    Cholecalciferol (VITAMIN D) 2000 units CAPS Take 1 tablet by mouth daily      eplerenone (INSPRA) 50 MG tablet Take 1 tablet (50 mg total) by mouth 2 (two) times a day 60 tablet 5    felodipine (PLENDIL) 10 MG 24 hr tablet Take 1 tablet (10 mg total) by mouth daily 90 tablet 3    omeprazole (PriLOSEC) 20 mg delayed release capsule Take 1 capsule (20 mg total) by mouth 2 (two) times a day 180 capsule 0    testosterone cypionate (DEPO-TESTOSTERONE) 200 mg/mL SOLN Inject 0 8 mL deep IM once every two weeks   torsemide (DEMADEX) 10 mg tablet Take by mouth      chlorthalidone 25 mg tablet Take 1 tablet (25 mg total) by mouth daily 90 tablet 3    chlorthalidone 25 mg tablet Take 25 mg by mouth      eplerenone (INSPRA) 50 MG tablet Take by mouth      Omeprazole 20 MG TBEC Take by mouth      SYRINGE-NEEDLE, DISP, 3 ML 21G X 1-1/2" 3 ML MISC For testerone injection      SYRINGE-NEEDLE, DISP, 3 ML 21G X 1-1/2" 3 ML MISC For testerone injection      testosterone (ANDROGEL) 1 62 % TD gel pump Apply four depressions of the pump bottle daily      testosterone cypionate (DEPO-TESTOSTERONE) 200 mg/mL SOLN Inject 0 8 mL deep IM once every two weeks         No current facility-administered medications for this visit  Blood pressure 128/80, pulse 78, temperature 98 4 °F (36 9 °C), temperature source Tympanic, resp  rate 16, height 5' 9" (1 753 m), weight 93 kg (205 lb)  PHYSICAL EXAM:      General Appearance:   Alert, cooperative, no distress, appears stated age    HEENT:   Normocephalic, atraumatic, anicteric      Neck:  Supple, symmetrical, trachea midline, no adenopathy;    thyroid: no enlargement/tenderness/nodules; no carotid  bruit or JVD    Lungs:   Clear to auscultation bilaterally; no rales, rhonchi or wheezing; respirations unlabored    Heart[de-identified]   S1 and S2 normal; regular rate and rhythm; no murmur, rub, or gallop  Abdomen:   Soft, non-tender, non-distended; normal bowel sounds; no masses, no organomegaly    Extremities: No edema, erythema, wounds, rashes   Rectal:   Deferred                      Lab Results   Component Value Date    WBC 8 93 08/17/2019    HGB 14 6 08/17/2019    HCT 45 2 08/17/2019    MCV 86 08/17/2019     08/17/2019     Lab Results   Component Value Date    GLUCOSE 106 11/05/2014    CALCIUM 8 7 08/17/2019     11/05/2014    K 3 6 08/17/2019    CO2 32 08/17/2019     08/17/2019    BUN 15 08/17/2019    CREATININE 1 07 08/17/2019     Lab Results   Component Value Date    ALT 36 08/17/2019    AST 14 08/17/2019    ALKPHOS 85 08/17/2019    BILITOT 0 14 (L) 11/05/2014     No results found for: INR, PROTIME    Xr Abdomen 1 View Kub    Result Date: 5/7/2019  Impression: No radiopaque urinary tract calculi  Workstation performed: [id number]     [facility]    Result Date: 5/7/2019  Impression: Unremarkable ultrasound  Tiny calculi seen on CT are nonvisualized by ultrasound  Workstation performed: [id number]       ASSESSMENT & PLAN:    Florian's esophagus determined by endoscopy  Appears to be suboptimally controlled with respect to his underlying GERD, he is taking omeprazole 20 mg twice daily    He does report the medication seems to help somewhat, he also admits to dietary indiscretions; particularly intake of red sauces and especially coffee    - I advised patient that we can increase his omeprazole dosage to 40 mg twice daily, we will update his prescription    - I also advised patient to decrease his caffeine and red sauce intake, also avoid eating within several hours of bedtime, also elevate the head of the bed    Given the endoscopic findings, he will most likely require long-term use of some sort of acid reducing regimen, but it would be helpful to minimize the need for this as much as possible in order to minimize any risk of long-term adverse effects    - I did advise patient at this time about the natural history and management and prognosis of Florian's esophagus

## 2019-12-30 NOTE — ASSESSMENT & PLAN NOTE
Appears to be suboptimally controlled with respect to his underlying GERD, he is taking omeprazole 20 mg twice daily  He does report the medication seems to help somewhat, he also admits to dietary indiscretions; particularly intake of red sauces and especially coffee    - I advised patient that we can increase his omeprazole dosage to 40 mg twice daily, we will update his prescription    - I also advised patient to decrease his caffeine and red sauce intake, also avoid eating within several hours of bedtime, also elevate the head of the bed    Given the endoscopic findings, he will most likely require long-term use of some sort of acid reducing regimen, but it would be helpful to minimize the need for this as much as possible in order to minimize any risk of long-term adverse effects    - I did advise patient at this time about the natural history and management and prognosis of Florian's esophagus    - Plan for EGD in June 2020, I advised our office staff to set a reminder

## 2020-01-28 DIAGNOSIS — I10 ESSENTIAL HYPERTENSION: ICD-10-CM

## 2020-01-28 RX ORDER — FELODIPINE 10 MG/1
10 TABLET, EXTENDED RELEASE ORAL DAILY
Qty: 90 TABLET | Refills: 3 | Status: SHIPPED | OUTPATIENT
Start: 2020-01-28 | End: 2021-03-29 | Stop reason: SDUPTHER

## 2020-04-14 DIAGNOSIS — I10 ESSENTIAL HYPERTENSION: ICD-10-CM

## 2020-04-14 RX ORDER — EPLERENONE 50 MG/1
50 TABLET, FILM COATED ORAL 2 TIMES DAILY
Qty: 60 TABLET | Refills: 5 | Status: SHIPPED | OUTPATIENT
Start: 2020-04-14 | End: 2020-12-10 | Stop reason: SDUPTHER

## 2020-06-19 ENCOUNTER — APPOINTMENT (OUTPATIENT)
Dept: LAB | Facility: HOSPITAL | Age: 45
End: 2020-06-19
Payer: COMMERCIAL

## 2020-06-19 ENCOUNTER — TRANSCRIBE ORDERS (OUTPATIENT)
Dept: LAB | Facility: HOSPITAL | Age: 45
End: 2020-06-19

## 2020-06-19 DIAGNOSIS — M79.10 MYALGIA: ICD-10-CM

## 2020-06-19 DIAGNOSIS — Z12.5 SPECIAL SCREENING FOR MALIGNANT NEOPLASM OF PROSTATE: ICD-10-CM

## 2020-06-19 DIAGNOSIS — R53.83 FATIGUE, UNSPECIFIED TYPE: ICD-10-CM

## 2020-06-19 DIAGNOSIS — Z79.01 LONG TERM (CURRENT) USE OF ANTICOAGULANTS: Primary | ICD-10-CM

## 2020-06-19 DIAGNOSIS — R35.0 URINARY FREQUENCY: ICD-10-CM

## 2020-06-19 DIAGNOSIS — E78.41 ELEVATED LIPOPROTEIN A LEVEL: ICD-10-CM

## 2020-06-19 DIAGNOSIS — I10 ESSENTIAL HYPERTENSION, MALIGNANT: ICD-10-CM

## 2020-06-19 LAB
ALBUMIN SERPL BCP-MCNC: 3.9 G/DL (ref 3.5–5)
ALP SERPL-CCNC: 52 U/L (ref 46–116)
ALT SERPL W P-5'-P-CCNC: 43 U/L (ref 12–78)
ANION GAP SERPL CALCULATED.3IONS-SCNC: 8 MMOL/L (ref 4–13)
AST SERPL W P-5'-P-CCNC: 18 U/L (ref 5–45)
BASOPHILS # BLD MANUAL: 0 THOUSAND/UL (ref 0–0.1)
BASOPHILS NFR MAR MANUAL: 0 % (ref 0–1)
BILIRUB SERPL-MCNC: 0.35 MG/DL (ref 0.2–1)
BUN SERPL-MCNC: 23 MG/DL (ref 5–25)
CALCIUM SERPL-MCNC: 9.9 MG/DL (ref 8.3–10.1)
CHLORIDE SERPL-SCNC: 101 MMOL/L (ref 100–108)
CHOLEST SERPL-MCNC: 268 MG/DL (ref 50–200)
CK SERPL-CCNC: 88 U/L (ref 39–308)
CO2 SERPL-SCNC: 26 MMOL/L (ref 21–32)
CREAT SERPL-MCNC: 1.3 MG/DL (ref 0.6–1.3)
EOSINOPHIL # BLD MANUAL: 0 THOUSAND/UL (ref 0–0.4)
EOSINOPHIL NFR BLD MANUAL: 0 % (ref 0–6)
ERYTHROCYTE [DISTWIDTH] IN BLOOD BY AUTOMATED COUNT: 13.6 % (ref 11.6–15.1)
ERYTHROCYTE [SEDIMENTATION RATE] IN BLOOD: 24 MM/HOUR (ref 0–10)
GFR SERPL CREATININE-BSD FRML MDRD: 65 ML/MIN/1.73SQ M
GLUCOSE P FAST SERPL-MCNC: 126 MG/DL (ref 65–99)
HCT VFR BLD AUTO: 53.7 % (ref 36.5–49.3)
HDLC SERPL-MCNC: 68 MG/DL
HGB BLD-MCNC: 17.2 G/DL (ref 12–17)
LDLC SERPL CALC-MCNC: 177 MG/DL (ref 0–100)
LYMPHOCYTES # BLD AUTO: 0 % (ref 14–44)
LYMPHOCYTES # BLD AUTO: 0 THOUSAND/UL (ref 0.6–4.47)
MCH RBC QN AUTO: 28.1 PG (ref 26.8–34.3)
MCHC RBC AUTO-ENTMCNC: 32 G/DL (ref 31.4–37.4)
MCV RBC AUTO: 88 FL (ref 82–98)
MONOCYTES # BLD AUTO: 0.6 THOUSAND/UL (ref 0–1.22)
MONOCYTES NFR BLD: 4 % (ref 4–12)
NEUTROPHILS # BLD MANUAL: 13.39 THOUSAND/UL (ref 1.85–7.62)
NEUTS SEG NFR BLD AUTO: 89 % (ref 43–75)
NONHDLC SERPL-MCNC: 200 MG/DL
NRBC BLD AUTO-RTO: 0 /100 WBCS
PLATELET # BLD AUTO: 357 THOUSANDS/UL (ref 149–390)
PLATELET BLD QL SMEAR: ADEQUATE
PMV BLD AUTO: 9.1 FL (ref 8.9–12.7)
POTASSIUM SERPL-SCNC: 4.2 MMOL/L (ref 3.5–5.3)
PROT SERPL-MCNC: 7.7 G/DL (ref 6.4–8.2)
PSA SERPL-MCNC: 1.5 NG/ML (ref 0–4)
RBC # BLD AUTO: 6.13 MILLION/UL (ref 3.88–5.62)
SODIUM SERPL-SCNC: 135 MMOL/L (ref 136–145)
T3 SERPL-MCNC: 0.6 NG/ML (ref 0.6–1.8)
T4 FREE SERPL-MCNC: 0.7 NG/DL (ref 0.76–1.46)
TRIGL SERPL-MCNC: 116 MG/DL
TSH SERPL DL<=0.05 MIU/L-ACNC: 0.27 UIU/ML (ref 0.36–3.74)
VARIANT LYMPHS # BLD AUTO: 7 %
WBC # BLD AUTO: 15.04 THOUSAND/UL (ref 4.31–10.16)

## 2020-06-19 PROCEDURE — 86618 LYME DISEASE ANTIBODY: CPT

## 2020-06-19 PROCEDURE — 84443 ASSAY THYROID STIM HORMONE: CPT

## 2020-06-19 PROCEDURE — 86038 ANTINUCLEAR ANTIBODIES: CPT

## 2020-06-19 PROCEDURE — 85652 RBC SED RATE AUTOMATED: CPT

## 2020-06-19 PROCEDURE — 85007 BL SMEAR W/DIFF WBC COUNT: CPT

## 2020-06-19 PROCEDURE — 84480 ASSAY TRIIODOTHYRONINE (T3): CPT

## 2020-06-19 PROCEDURE — 84153 ASSAY OF PSA TOTAL: CPT

## 2020-06-19 PROCEDURE — 82550 ASSAY OF CK (CPK): CPT

## 2020-06-19 PROCEDURE — 85027 COMPLETE CBC AUTOMATED: CPT

## 2020-06-19 PROCEDURE — 86430 RHEUMATOID FACTOR TEST QUAL: CPT

## 2020-06-19 PROCEDURE — 36415 COLL VENOUS BLD VENIPUNCTURE: CPT

## 2020-06-19 PROCEDURE — 80053 COMPREHEN METABOLIC PANEL: CPT

## 2020-06-19 PROCEDURE — 80061 LIPID PANEL: CPT

## 2020-06-19 PROCEDURE — 84439 ASSAY OF FREE THYROXINE: CPT

## 2020-06-20 LAB — B BURGDOR IGG+IGM SER-ACNC: <0.91 ISR (ref 0–0.9)

## 2020-06-22 LAB
RHEUMATOID FACT SER QL LA: NEGATIVE
RYE IGE QN: NEGATIVE

## 2020-07-21 ENCOUNTER — TELEPHONE (OUTPATIENT)
Dept: UROLOGY | Facility: MEDICAL CENTER | Age: 45
End: 2020-07-21

## 2020-07-21 ENCOUNTER — NURSE TRIAGE (OUTPATIENT)
Dept: OTHER | Facility: OTHER | Age: 45
End: 2020-07-21

## 2020-07-21 NOTE — TELEPHONE ENCOUNTER
Patient at the [place] office, patient known to the office for nephrolithiasis  Patient last office visit 11/6/2018       Attempted to call patient at this time no answer left message with office number for call back

## 2020-07-21 NOTE — TELEPHONE ENCOUNTER
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

## 2020-07-21 NOTE — TELEPHONE ENCOUNTER
Reason for Disposition   MODERATE pain (e g , interferes with normal activities or awakens from sleep)    Answer Assessment - Initial Assessment Questions  1  LOCATION: "Where does it hurt?" (e g , left, right)      Right side  2  ONSET: "When did the pain start?"      Started Friday became worse through Sunday  Thought he passed a kidney stone  Rather than get better he is getting worse  3  SEVERITY: "How bad is the pain?" (e g , Scale 1-10; mild, moderate, or severe)    - MILD (1-3): doesn't interfere with normal activities     - MODERATE (4-7): interferes with normal activities or awakens from sleep     - SEVERE (8-10): excruciating pain and patient unable to do normal activities (stays in bed)        6  4  PATTERN: "Does the pain come and go, or is it constant?"       constant  5  CAUSE: "What do you think is causing the pain?"      Kidney stone  6  OTHER SYMPTOMS:  "Do you have any other symptoms?" (e g , fever, abdominal pain, vomiting, leg weakness, burning with urination, blood in urine)      Nausea    Protocols used:  FLANK PAIN-ADULT-

## 2020-07-21 NOTE — TELEPHONE ENCOUNTER
Patient of Dr Kacie Boss last seen in [facility] office on 11/06/2018  Patient has stones level of 6  Wife requesting to get him in for a office visit for 07/22/20  Please advise

## 2020-07-21 NOTE — TELEPHONE ENCOUNTER
Pt is at work  Taking tylenol and ibuprofen for pain  S/W patients wife  Advised wife to have patient go to ED if pain worsens or if urine changes to tea colored

## 2020-07-22 ENCOUNTER — HOSPITAL ENCOUNTER (OUTPATIENT)
Dept: RADIOLOGY | Facility: HOSPITAL | Age: 45
Discharge: HOME | End: 2020-07-22
Payer: COMMERCIAL

## 2020-07-22 ENCOUNTER — TRANSCRIBE ORDERS (OUTPATIENT)
Dept: RADIOLOGY | Facility: HOSPITAL | Age: 45
End: 2020-07-22

## 2020-07-22 DIAGNOSIS — Z12.5 SPECIAL SCREENING FOR MALIGNANT NEOPLASM OF PROSTATE: ICD-10-CM

## 2020-07-22 DIAGNOSIS — N20.0 NEPHROLITHIASIS: ICD-10-CM

## 2020-07-22 DIAGNOSIS — N20.0 NEPHROLITHIASIS: Primary | ICD-10-CM

## 2020-07-22 DIAGNOSIS — R10.9 ACUTE ABDOMINAL PAIN: ICD-10-CM

## 2020-07-22 DIAGNOSIS — Z79.01 LONG TERM (CURRENT) USE OF ANTICOAGULANTS: Primary | ICD-10-CM

## 2020-07-22 PROCEDURE — 74018 RADEX ABDOMEN 1 VIEW: CPT

## 2020-07-22 NOTE — TELEPHONE ENCOUNTER
Called and spoke to [address] [place] [address] at this time, advised order for KUB placed at this time  Advised office will monitor for results  Patient wife states patient will be going at this time       Office to monitor for results

## 2020-07-22 NOTE — TELEPHONE ENCOUNTER
Patients wife called back and patient is uncomfortable and would like to get a KUB  Patient goes to [facility] for testing  Please call patients wife back at [phone]  Patients wife said a message can be left on her voice mail if she does not answer

## 2020-07-24 ENCOUNTER — TELEPHONE (OUTPATIENT)
Dept: UROLOGY | Facility: CLINIC | Age: 45
End: 2020-07-24

## 2020-07-24 ENCOUNTER — HOSPITAL ENCOUNTER (OUTPATIENT)
Dept: RADIOLOGY | Facility: HOSPITAL | Age: 45
Discharge: HOME | End: 2020-07-24
Attending: UROLOGY
Payer: COMMERCIAL

## 2020-07-24 DIAGNOSIS — N20.0 NEPHROLITHIASIS: Primary | ICD-10-CM

## 2020-07-24 DIAGNOSIS — N20.0 NEPHROLITHIASIS: ICD-10-CM

## 2020-07-24 PROCEDURE — 74176 CT ABD & PELVIS W/O CONTRAST: CPT

## 2020-07-24 NOTE — TELEPHONE ENCOUNTER
Called and spoke with patient's wife again  Advised of provider recommendations  She was very frustrated  She states 'the ER is going to give him pain medication and send him on his way ' She is very frustrated with this whole issue as she feels like our office is not taking his pain seriously  She states unfortunately now she sill be reaching out to their out of network PCP  She reports again her  reported the pain to be very similar when he had previous kidney stone  This time there is no blood, which they thought was odd  Also after he had bowel movement on Sunday he felt like he potentially passed kidney stone  Now pain is worsening again  She voiced again her frustration  Advised I would route directly to Dr Nicolasa Moscoso and hopefully call her back by end of day  Patient's wife thankful for this and verbalized understanding

## 2020-07-24 NOTE — TELEPHONE ENCOUNTER
Called and spoke to the patient, typical kidney stone pain, he has previously had kidney stones multiple times, I have ordered a stat CT scan for further evaluation, his KUB does not show stones, however he does have flank pain similar to previous stone passage events

## 2020-07-24 NOTE — TELEPHONE ENCOUNTER
Patients wife called back and patient had kub yesterday and pain is a 5 to 6  Please call her back at [phone]

## 2020-07-24 NOTE — TELEPHONE ENCOUNTER
A CT scan would be more definitive  Most expeditious way to have this done, and thorough, would be to go to the ER

## 2020-07-24 NOTE — TELEPHONE ENCOUNTER
Blayne Cary, [address] [facility] Clinical             Patient's x-ray shows no radiopaque stones   If symptoms are worsening, should go to ER  Called and spoke with patient's wife at this time  Advised KUB showed now radiopaque stones  She is concerned as now no clear explanation for pain  She reports patient started with right flank pain last Friday 7/17/20  It was intermittent stabbing flank pain  Also he also sometimes had sciatic pain down his leg  Sunday (7/19/20) she reports patient had a bowel movement and did feel better with only mild lingering pain  Since then pain has gotten back up to level 6-7/10  She is wondering if different imaging needed  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

## 2020-07-25 ENCOUNTER — NURSE TRIAGE (OUTPATIENT)
Dept: OTHER | Facility: OTHER | Age: 45
End: 2020-07-25

## 2020-07-26 NOTE — TELEPHONE ENCOUNTER
Regarding: kidney pain  ----- Message from [facility] sent at 7/25/2020  8:21 PM EDT -----  Patient is experiencing kidney pain and believes he has a stone

## 2020-07-26 NOTE — TELEPHONE ENCOUNTER
Reason for Disposition   MODERATE pain (e g , interferes with normal activities or awakens from sleep)    Answer Assessment - Initial Assessment Questions  1  LOCATION: "Where does it hurt?" (e g , left, right)      Right  2  ONSET: "When did the pain start?"      Last weekend  3  SEVERITY: "How bad is the pain?" (e g , Scale 1-10; mild, moderate, or severe)    - MILD (1-3): doesn't interfere with normal activities     - MODERATE (4-7): interferes with normal activities or awakens from sleep     - SEVERE (8-10): excruciating pain and patient unable to do normal activities (stays in bed)        Moderate  4  PATTERN: "Does the pain come and go, or is it constant?"      Constant  5  CAUSE: "What do you think is causing the pain?"       Kidney stone  6  OTHER SYMPTOMS:  "Do you have any other symptoms?" (e g , fever, abdominal pain, vomiting, leg weakness, burning with urination, blood in urine)      No other S/S    Protocols used:  FLANK PAIN-ADULT-

## 2020-07-28 ENCOUNTER — APPOINTMENT (OUTPATIENT)
Dept: LAB | Facility: HOSPITAL | Age: 45
End: 2020-07-28
Payer: COMMERCIAL

## 2020-07-28 DIAGNOSIS — Z12.5 SPECIAL SCREENING FOR MALIGNANT NEOPLASM OF PROSTATE: ICD-10-CM

## 2020-07-28 DIAGNOSIS — R10.9 ACUTE ABDOMINAL PAIN: ICD-10-CM

## 2020-07-28 DIAGNOSIS — Z79.01 LONG TERM (CURRENT) USE OF ANTICOAGULANTS: ICD-10-CM

## 2020-07-28 LAB
ALBUMIN SERPL BCP-MCNC: 3.8 G/DL (ref 3.5–5)
ALP SERPL-CCNC: 57 U/L (ref 46–116)
ALT SERPL W P-5'-P-CCNC: 38 U/L (ref 12–78)
ANION GAP SERPL CALCULATED.3IONS-SCNC: 4 MMOL/L (ref 4–13)
AST SERPL W P-5'-P-CCNC: 24 U/L (ref 5–45)
BACTERIA UR QL AUTO: ABNORMAL /HPF
BASOPHILS # BLD AUTO: 0.09 THOUSANDS/ΜL (ref 0–0.1)
BASOPHILS NFR BLD AUTO: 1 % (ref 0–1)
BILIRUB SERPL-MCNC: 0.38 MG/DL (ref 0.2–1)
BILIRUB UR QL STRIP: NEGATIVE
BUN SERPL-MCNC: 21 MG/DL (ref 5–25)
CALCIUM SERPL-MCNC: 9.9 MG/DL (ref 8.3–10.1)
CHLORIDE SERPL-SCNC: 100 MMOL/L (ref 100–108)
CLARITY UR: CLEAR
CO2 SERPL-SCNC: 33 MMOL/L (ref 21–32)
COLOR UR: YELLOW
CREAT SERPL-MCNC: 1.26 MG/DL (ref 0.6–1.3)
EOSINOPHIL # BLD AUTO: 0.11 THOUSAND/ΜL (ref 0–0.61)
EOSINOPHIL NFR BLD AUTO: 1 % (ref 0–6)
ERYTHROCYTE [DISTWIDTH] IN BLOOD BY AUTOMATED COUNT: 13.6 % (ref 11.6–15.1)
ERYTHROCYTE [SEDIMENTATION RATE] IN BLOOD: 25 MM/HOUR (ref 0–14)
GFR SERPL CREATININE-BSD FRML MDRD: 68 ML/MIN/1.73SQ M
GLUCOSE P FAST SERPL-MCNC: 106 MG/DL (ref 65–99)
GLUCOSE UR STRIP-MCNC: NEGATIVE MG/DL
HCT VFR BLD AUTO: 49.9 % (ref 36.5–49.3)
HGB BLD-MCNC: 16.5 G/DL (ref 12–17)
HGB UR QL STRIP.AUTO: NEGATIVE
HYALINE CASTS #/AREA URNS LPF: ABNORMAL /LPF
IMM GRANULOCYTES # BLD AUTO: 0.12 THOUSAND/UL (ref 0–0.2)
IMM GRANULOCYTES NFR BLD AUTO: 1 % (ref 0–2)
KETONES UR STRIP-MCNC: NEGATIVE MG/DL
LEUKOCYTE ESTERASE UR QL STRIP: NEGATIVE
LYMPHOCYTES # BLD AUTO: 1.94 THOUSANDS/ΜL (ref 0.6–4.47)
LYMPHOCYTES NFR BLD AUTO: 21 % (ref 14–44)
MCH RBC QN AUTO: 28.7 PG (ref 26.8–34.3)
MCHC RBC AUTO-ENTMCNC: 33.1 G/DL (ref 31.4–37.4)
MCV RBC AUTO: 87 FL (ref 82–98)
MONOCYTES # BLD AUTO: 1.03 THOUSAND/ΜL (ref 0.17–1.22)
MONOCYTES NFR BLD AUTO: 11 % (ref 4–12)
NEUTROPHILS # BLD AUTO: 6.16 THOUSANDS/ΜL (ref 1.85–7.62)
NEUTS SEG NFR BLD AUTO: 65 % (ref 43–75)
NITRITE UR QL STRIP: NEGATIVE
NON-SQ EPI CELLS URNS QL MICRO: ABNORMAL /HPF
NRBC BLD AUTO-RTO: 0 /100 WBCS
PH UR STRIP.AUTO: 7 [PH]
PLATELET # BLD AUTO: 308 THOUSANDS/UL (ref 149–390)
PMV BLD AUTO: 8.5 FL (ref 8.9–12.7)
POTASSIUM SERPL-SCNC: 3.3 MMOL/L (ref 3.5–5.3)
PROT SERPL-MCNC: 7.4 G/DL (ref 6.4–8.2)
PROT UR STRIP-MCNC: ABNORMAL MG/DL
RBC # BLD AUTO: 5.75 MILLION/UL (ref 3.88–5.62)
RBC #/AREA URNS AUTO: ABNORMAL /HPF
SODIUM SERPL-SCNC: 137 MMOL/L (ref 136–145)
SP GR UR STRIP.AUTO: 1.02 (ref 1–1.03)
UROBILINOGEN UR QL STRIP.AUTO: 1 E.U./DL
WBC # BLD AUTO: 9.45 THOUSAND/UL (ref 4.31–10.16)
WBC #/AREA URNS AUTO: ABNORMAL /HPF

## 2020-07-28 PROCEDURE — 85652 RBC SED RATE AUTOMATED: CPT

## 2020-07-28 PROCEDURE — 81001 URINALYSIS AUTO W/SCOPE: CPT | Performed by: INTERNAL MEDICINE

## 2020-07-28 PROCEDURE — 80053 COMPREHEN METABOLIC PANEL: CPT

## 2020-07-28 PROCEDURE — 36415 COLL VENOUS BLD VENIPUNCTURE: CPT

## 2020-07-28 PROCEDURE — 85025 COMPLETE CBC W/AUTO DIFF WBC: CPT

## 2020-08-14 ENCOUNTER — APPOINTMENT (OUTPATIENT)
Dept: LAB | Facility: HOSPITAL | Age: 45
End: 2020-08-14
Payer: COMMERCIAL

## 2020-08-14 DIAGNOSIS — Z12.5 SPECIAL SCREENING FOR MALIGNANT NEOPLASM OF PROSTATE: ICD-10-CM

## 2020-08-14 DIAGNOSIS — R10.9 ACUTE ABDOMINAL PAIN: ICD-10-CM

## 2020-08-14 DIAGNOSIS — Z79.01 LONG TERM (CURRENT) USE OF ANTICOAGULANTS: ICD-10-CM

## 2020-08-14 LAB
ANION GAP SERPL CALCULATED.3IONS-SCNC: 8 MMOL/L (ref 4–13)
BUN SERPL-MCNC: 19 MG/DL (ref 5–25)
CALCIUM SERPL-MCNC: 9.2 MG/DL (ref 8.3–10.1)
CHLORIDE SERPL-SCNC: 103 MMOL/L (ref 100–108)
CO2 SERPL-SCNC: 30 MMOL/L (ref 21–32)
CREAT SERPL-MCNC: 1.22 MG/DL (ref 0.6–1.3)
GFR SERPL CREATININE-BSD FRML MDRD: 71 ML/MIN/1.73SQ M
GLUCOSE SERPL-MCNC: 91 MG/DL (ref 65–140)
POTASSIUM SERPL-SCNC: 3.2 MMOL/L (ref 3.5–5.3)
SODIUM SERPL-SCNC: 141 MMOL/L (ref 136–145)

## 2020-08-14 PROCEDURE — 36415 COLL VENOUS BLD VENIPUNCTURE: CPT

## 2020-08-14 PROCEDURE — 80048 BASIC METABOLIC PNL TOTAL CA: CPT

## 2020-08-17 ENCOUNTER — HOSPITAL ENCOUNTER (OUTPATIENT)
Dept: RADIOLOGY | Facility: HOSPITAL | Age: 45
Discharge: HOME | End: 2020-08-17
Attending: ORTHOPAEDIC SURGERY
Payer: COMMERCIAL

## 2020-08-17 ENCOUNTER — OFFICE VISIT (OUTPATIENT)
Dept: OBGYN CLINIC | Facility: MEDICAL CENTER | Age: 45
End: 2020-08-17
Payer: COMMERCIAL

## 2020-08-17 VITALS
TEMPERATURE: 98.6 F | DIASTOLIC BLOOD PRESSURE: 80 MMHG | WEIGHT: 195 LBS | HEART RATE: 90 BPM | SYSTOLIC BLOOD PRESSURE: 126 MMHG | HEIGHT: 69 IN | BODY MASS INDEX: 28.88 KG/M2

## 2020-08-17 DIAGNOSIS — I10 ESSENTIAL HYPERTENSION: ICD-10-CM

## 2020-08-17 DIAGNOSIS — M25.551 PAIN IN RIGHT HIP: ICD-10-CM

## 2020-08-17 DIAGNOSIS — M16.11 PRIMARY OSTEOARTHRITIS OF ONE HIP, RIGHT: Primary | ICD-10-CM

## 2020-08-17 DIAGNOSIS — I10 BENIGN ESSENTIAL HYPERTENSION: ICD-10-CM

## 2020-08-17 DIAGNOSIS — N18.2 CKD (CHRONIC KIDNEY DISEASE), STAGE II: Primary | ICD-10-CM

## 2020-08-17 PROCEDURE — 99214 OFFICE O/P EST MOD 30 MIN: CPT | Performed by: ORTHOPAEDIC SURGERY

## 2020-08-17 RX ORDER — CHLORTHALIDONE 25 MG/1
25 TABLET ORAL DAILY
Qty: 90 TABLET | Refills: 0 | Status: SHIPPED | OUTPATIENT
Start: 2020-08-17 | End: 2020-12-08 | Stop reason: SDUPTHER

## 2020-08-17 RX ORDER — CARVEDILOL 25 MG/1
25 TABLET ORAL 2 TIMES DAILY WITH MEALS
Qty: 180 TABLET | Refills: 0 | Status: SHIPPED | OUTPATIENT
Start: 2020-08-17 | End: 2020-12-10 | Stop reason: SDUPTHER

## 2020-08-17 RX ORDER — DICLOFENAC SODIUM 75 MG/1
75 TABLET, DELAYED RELEASE ORAL 2 TIMES DAILY
Qty: 60 TABLET | Refills: 1 | Status: SHIPPED | OUTPATIENT
Start: 2020-08-17 | End: 2020-12-28

## 2020-08-17 NOTE — PROGRESS NOTES
Assessment/Plan     1  Primary osteoarthritis of one hip, right    2  Pain in right hip      Orders Placed This Encounter   Procedures    XR hip/pelv 2-3 vws right if performed    Ambulatory referral to Physical Therapy     · Patient has mild right hip arthritis and possible labrum tear  · Discussed with patient conservative treatments: steroid injection, medications, and physical therapy  · Will be ordering physical therapy for the right hip  Diclofenac prn pain, medications warnings were reviewed with patient  Add in Tylenol 1000 mg  as needed for pain  Do not exceed 3000 mg a day   · Use Ice and heat as needed   · If pain persists, consider ordering MRI arthrogram right hip, steroid injection and refer patient to Dr Daniel Sow to discuss MRI   Return in about 4 weeks (around 9/14/2020) for Recheck Right hip   I answered all of the patient's questions during the visit and provided education of the patient's condition during the visit  The patient verbalized understanding of the information given and agrees with the plan  This note was dictated using [address] software  It may contain errors including improperly dictated words  Please contact physician directly for any questions  History of Present Illness   Chief complaint:   Chief Complaint   Patient presents with    Right Hip - Pain       HPI: Arthur Macias is a 54 y o  male that c/o right hip pain  He was referred here today by his PCP Dr Reji Almeida  He states he has been having right hip pain for three months, denies any falls or trauma  He is having constant stiffness pain over posterior and anterior right hip  He notes occasional groin pain  He notes occasional sensation radiating to the top of the right foot  Pain is worse with any movement  He was taking Tramadol 50mg for two week per PCP with relief  He is currently taking OTC IBU and Tylenol 1000 mg pnr for pain with some relief   He has no history of having injections, physical therapy or surgeries on the right hip  Denies having any lower back pain  ROS:    See HPI for musculoskeletal review  All other systems reviewed are negative     Historical Information   Past Medical History:   Diagnosis Date    Adrenal mass ([address] )     Chronic kidney disease     Disease of thyroid gland     GERD (gastroesophageal reflux disease)     Hyperlipidemia     Hypertension     Kidney stones     Low testosterone     Sleep apnea     not currently using his CPAP    Thyroid disease      Past Surgical History:   Procedure Laterality Date    FL RETROGRADE PYELOGRAM  8/21/2018    KIDNEY STONE SURGERY      NC CYSTO/URETERO W/LITHOTRIPSY &INDWELL STENT INSRT Left 8/21/2018    Procedure: CYSTOSCOPY URETEROSCOPY, RETROGRADE PYELOGRAM AND INSERTION STENT URETERAL;  Surgeon: Daniel Will MD;  [facility];  Service: Urology    NC ESOPHAGOGASTRODUODENOSCOPY TRANSORAL DIAGNOSTIC N/A 6/21/2018    Procedure: ESOPHAGOGASTRODUODENOSCOPY (EGD); Surgeon: Jareth Salomon MD;  Location: [facility];   Service: Gastroenterology     Social History   Social History     Substance and Sexual Activity   Alcohol Use Not Currently    Comment: 1 drink every two weeks or so     Social History     Substance and Sexual Activity   Drug Use No     Social History     Tobacco Use   Smoking Status Former Smoker    Packs/day: 1 00    Years: 17 00    Pack years: 17 00    Last attempt to quit: 2005    Years since quitting: 15 6   Smokeless Tobacco Never Used     Family History:   Family History   Problem Relation Age of Onset    Asthma Mother     Diabetes Mother     Other Father         Endocarditis    Hypertension Father     Gout Father        Current Outpatient Medications on File Prior to Visit   Medication Sig Dispense Refill    carvedilol (COREG) 25 mg tablet Take 1 tablet (25 mg total) by mouth 2 (two) times a day with meals 180 tablet 0    chlorthalidone 25 mg tablet Take 25 mg by mouth      chlorthalidone 25 mg tablet Take 1 tablet (25 mg total) by mouth daily 90 tablet 0    Cholecalciferol (VITAMIN D) 2000 units CAPS Take 1 tablet by mouth daily      eplerenone (INSPRA) 50 MG tablet Take by mouth      eplerenone (INSPRA) 50 MG tablet Take 1 tablet (50 mg total) by mouth 2 (two) times a day 60 tablet 5    felodipine (PLENDIL) 10 MG 24 hr tablet Take 1 tablet (10 mg total) by mouth daily 90 tablet 3    omeprazole (PriLOSEC) 40 MG capsule Take 1 capsule (40 mg total) by mouth 2 (two) times a day 60 capsule 5    SYRINGE-NEEDLE, DISP, 3 ML 21G X 1-1/2" 3 ML MISC For testerone injection      SYRINGE-NEEDLE, DISP, 3 ML 21G X 1-1/2" 3 ML MISC For testerone injection      testosterone (ANDROGEL) 1 62 % TD gel pump Apply four depressions of the pump bottle daily      testosterone cypionate (DEPO-TESTOSTERONE) 200 mg/mL SOLN Inject 0 8 mL deep IM once every two weeks   testosterone cypionate (DEPO-TESTOSTERONE) 200 mg/mL SOLN Inject 0 8 mL deep IM once every two weeks   torsemide (DEMADEX) 10 mg tablet Take by mouth      [DISCONTINUED] carvedilol (COREG) 25 mg tablet Take 1 tablet (25 mg total) by mouth 2 (two) times a day with meals 180 tablet 3    [DISCONTINUED] chlorthalidone 25 mg tablet Take 1 tablet (25 mg total) by mouth daily 90 tablet 3     No current facility-administered medications on file prior to visit  No Known Allergies    Objective   Vitals: Blood pressure 126/80, pulse 90, temperature 98 6 °F (37 °C), height 5' 9" (1 753 m), weight 88 5 kg (195 lb)  ,Body mass index is 28 8 kg/m²  PE:  AAOx 3  WDWN  Hearing intact, no drainage from eyes  Regular rate  no audible wheezing  no abdominal distension  LE compartments soft, skin intact    right hip:   No dislocation/deformity  Neg   + Stinchfield  ROM: FF 0-120 with pain/ IR 0-30 with pain/ ER 0-45 with pain   +Kathryn Test  + Impingement test  No TTP over greater trochanter  Abduction: 5/5 : Mild pain with resistant abduction   Neg   Fouzia's test  No TTP over SIJ    bilateralLE:    LLE:  EHL/AT/GS/quads/hamstrings/iliopsoas 5/5, sensation grossly intact L4, L5, S1, palpable pedal pulse  RLE:  EHL/AT/GS/quads/hamstrings/iliopsoas 5/5, sensation grossly intact L4, L5, S1, palpable pedal pulse    Back:    No TTP over lumbar spinous processes, paraspinal musculature  SLR: Neg       Imaging Studies: I have personally reviewed pertinent films in PACS  righthip:  Mild DJD     Scribe Attestation    I,:   Paola Werner am acting as a scribe while in the presence of the attending physician :        I,:   Elaina Thomas, DO personally performed the services described in this documentation    as scribed in my presence :

## 2020-08-28 ENCOUNTER — EVALUATION (OUTPATIENT)
Dept: PHYSICAL THERAPY | Age: 45
End: 2020-08-28
Payer: COMMERCIAL

## 2020-08-28 DIAGNOSIS — M25.551 RIGHT HIP PAIN: Primary | ICD-10-CM

## 2020-08-28 PROCEDURE — 97140 MANUAL THERAPY 1/> REGIONS: CPT | Performed by: PHYSICAL THERAPIST

## 2020-08-28 PROCEDURE — 97162 PT EVAL MOD COMPLEX 30 MIN: CPT | Performed by: PHYSICAL THERAPIST

## 2020-08-28 NOTE — PROGRESS NOTES
PT Evaluation     Today's date: 2020  Patient name: Elzbieta Soto  : 1975  MRN: [id number]  Referring provider: Anne-Marie Benjamin DO  Dx:   Encounter Diagnosis     ICD-10-CM    1  Right hip pain  M25 551                   Assessment  Assessment details: PT IE: 2020  Patient reported onset of right hip pain for 2-3 months ago  Patient noted he originally thought he had kidney stones was the cause of his right hip pain  Patient noted his right hip pain is insidious onset  Patient noted his pain is located at anterior right hip region  Patient noted his right anterior hip pain is constant  Patient noted the following deficits due to right hip pain: walking, jumping, stair climbing, static standing activities most pain aggravating, squatting, bending are all limited by pain presentation  Patient noted overall his pain remains constant  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  Patient noted his back movements will aggravate pain as well  Patient denies right hip weakness and he noted he has felt right le sensation that is intermittent  Patient denies antalgic gait pattern  Patient denies right hip injection  Patient noted oral pain medication has been beneficial in reducing his pain  Patient noted PMHx is remarkable for HTN, GERD, kidney stones, sleep apnea  Patient does exhibit relative right le length discrepancy thus pt to utilize small heel lift in left shoe to determine leg length discrepancy a true pain generator or not  Impairments: abnormal gait, abnormal or restricted ROM, abnormal movement, activity intolerance, lacks appropriate home exercise program and pain with function  Understanding of Dx/Px/POC: excellent   Prognosis: good  Prognosis details: Patient is a 54y o  year old male seen for outpatient PT evaluation with pain, mobility and functional deficits due to right hip pain   Patient presents to PT IE with the following problems, concerns, deficits and impairments: right anterior hip pain, decreased right le range of motion, decreased right le strength, + TTP, + special tests, functional limitations and decreased tolerance to activity  Patient would benefit from skilled PT services under the following PT treatment plan to address the above noted deficits: therapeutic exercises and activities to facilitate right le rom and strength, modalities, manual therapy techniques, Kinesio taping techniques, balance and proprioception activities, IASTM techniques, and a hep  Thank you for the referral      Goals  Short Term goals - 4 weeks  1  Patient will be independent HEP  2   Patient will report a 25 - 50% decrease in pain complaints  3   Increase strength 1/2 grade  4   Increase ROM 5-10 degrees  Long Term goals - 8 weeks  1  Patient will report elimination of pain complaints  2   Patient will return to all work related activities without restriction  3   Patient will return to all recreational activities without restriction  4   ROM WFL  5   Strength 5/5   6   Patient will report walking at home and with work activities improved by > 50 % due to pain reduction  7   Patient will report stair climbing improved by > 50 % due to pain reduction  8   Patient will report static standing activities at home and during work improved by > 50 % due to pain reduction  9   Patient will report being able to squat and bend during work and house hold activities without pain aggravation      Plan  Patient would benefit from: skilled physical therapy  Planned modality interventions: cryotherapy, TENS, thermotherapy: hydrocollator packs, unattended electrical stimulation and traction  Planned therapy interventions: joint mobilization, manual therapy, massage, aquatic therapy, balance, balance/weight bearing training, neuromuscular re-education, patient education, postural training, body mechanics training, self care, strengthening, stretching, compression, therapeutic activities, therapeutic exercise, therapeutic training, flexibility, functional ROM exercises, graded activity, graded exercise and graded motor  Frequency: 2x week  Duration in weeks: 8  Treatment plan discussed with: patient        Subjective Evaluation    History of Present Illness  Mechanism of injury: Patient noted PMHx is remarkable for HTN, GERD, kidney stones, sleep apnea  Pain  At best pain ratin  At worst pain ratin  Location: anterior right hip pain    Patient Goals  Patient goals for therapy: independence with ADLs/IADLs, decreased pain, increased motion and return to sport/leisure activities          Objective     Tenderness     Additional Tenderness Details  Patient is + minimal to moderate  TTP at right anterior hip at iliopsoas musculature  Active Range of Motion     Lumbar   Flexion: 90 degrees  with pain  Extension: 30 degrees  with pain  Left lateral flexion: 30 degrees    with pain  Right lateral flexion: 30 degrees  with pain  Left rotation: 75 degrees   Right rotation: 70 degrees   Left Hip   Flexion: 116 degrees   Abduction: 32 degrees   External rotation (90/90): 42 degrees   Internal rotation (90/90): 22 degrees     Right Hip   Flexion: 102 degrees with pain  Abduction: 24 degrees with pain  External rotation (90/90): 38 degrees with pain  Internal rotation (90/90): 42 degrees     Additional Active Range of Motion Details  Lumbar spine testing did aggravate right hip pain but he did not exhibit any pain reduction with single movements or repeated lumbar spine extension testing  Hamstring flexibility on right at 40 degrees and left at 62 degrees      Strength/Myotome Testing     Left Hip   Planes of Motion   Flexion: 5  Extension: 5  Abduction: 5  Adduction: 5  External rotation: 4  Internal rotation: 4+    Right Hip   Planes of Motion   Flexion: 5  Extension: 5  Abduction: 5  Adduction: 5  External rotation: 4+  Internal rotation: 4+    Left Knee   Flexion: 4+  Extension: 5    Right Knee Flexion: 4+  Extension: 5    Left Ankle/Foot   Dorsiflexion: 5  Plantar flexion: 5    Right Ankle/Foot   Dorsiflexion: 5  Plantar flexion: 5    Tests     Right Hip   Positive KEENA, piriformis and scour  90/90 SLR: Positive  SLR: Positive  Flowsheet Rows      Most Recent Value   PT/OT G-Codes   Current Score  77   Projected Score  86             Precautions: Patient noted PMHx is remarkable for HTN, GERD, kidney stones, sleep apnea         Manuals 8/28            IASTM to right anterior hip while supine 10 min            Right hip lateral distraction with active hip flexion MWM                                       Neuro Re-Ed                                                                                                        Ther Ex             Recumbent bike             Standing hip flexor stretch off step:B:             LAQ:B:             Hip flexion:B:             Hamstring stretch:B:             LTR:B:             Piriformis stretch:B:             slr flexion:B:             Bridges             Prone hip extension:B:             Prone hip extension with knee flexed:B:             Prone hip IR and ER:B:             Prone press ups                          Mini squats             Lunges:B:             Forward step ups:B:             Lateral step ups:B:             Step downs:B:             Hip hiking:B:                                       Ther Activity                                       Gait Training                                       Modalities             MHP to anterior hip PRN

## 2020-08-31 ENCOUNTER — OFFICE VISIT (OUTPATIENT)
Dept: PHYSICAL THERAPY | Age: 45
End: 2020-08-31
Payer: COMMERCIAL

## 2020-08-31 DIAGNOSIS — K21.00 GASTROESOPHAGEAL REFLUX DISEASE WITH ESOPHAGITIS: ICD-10-CM

## 2020-08-31 DIAGNOSIS — K22.70 BARRETT'S ESOPHAGUS DETERMINED BY ENDOSCOPY: ICD-10-CM

## 2020-08-31 DIAGNOSIS — M25.551 RIGHT HIP PAIN: Primary | ICD-10-CM

## 2020-08-31 PROCEDURE — 97110 THERAPEUTIC EXERCISES: CPT

## 2020-08-31 PROCEDURE — 97140 MANUAL THERAPY 1/> REGIONS: CPT

## 2020-08-31 PROCEDURE — 97112 NEUROMUSCULAR REEDUCATION: CPT

## 2020-08-31 RX ORDER — OMEPRAZOLE 40 MG/1
40 CAPSULE, DELAYED RELEASE ORAL 2 TIMES DAILY
Qty: 60 CAPSULE | Refills: 2 | Status: SHIPPED | OUTPATIENT
Start: 2020-08-31 | End: 2021-01-21 | Stop reason: SDUPTHER

## 2020-08-31 NOTE — PROGRESS NOTES
Daily Note     Today's date: 2020  Patient name: Crow Nickerson  : 1975  MRN: [id number]  Referring provider: Blaire Contreras DO  Dx:   Encounter Diagnosis     ICD-10-CM    1  Right hip pain  M25 551                   Subjective: Pt states he felt really good after his last session as he feels the IASTM really helped  Objective: See treatment diary below      Assessment: Tolerated treatment well with no increase in pain  Pt demonstrates good form with all exercises  Pt was able to tolerated some strengthening to day with no pain  Patient would benefit from continued PT      Plan: Continue per plan of care  Precautions: Patient noted PMHx is remarkable for HTN, GERD, kidney stones, sleep apnea         Manuals            IASTM to right anterior hip while supine 10 min 10 mins           Right hip lateral distraction with active hip flexion MWM                                       Neuro Re-Ed                                                                                                        Ther Ex             Recumbent bike  5 mins           Standing hip flexor stretch off step:B:  3 x 20" ea           LAQ:B:             Hip flexion:B:             Hamstring stretch:B:  EOT  3 x 20" ea           LTR:B:  5 x 10" ea            Piriformis stretch:B:  3 x 20" ea            slr flexion:B:  10x            Bridges  10x x2            Prone hip extension:B:             Prone hip extension with knee flexed:B:             Prone hip IR and ER:B:             Prone press ups                          Mini squats             Lunges:B:             Forward step ups:B:             Lateral step ups:B:             Step downs:B:             Hip hiking:B:                                       Ther Activity                                       Gait Training                                       Modalities             MHP to anterior hip PRN

## 2020-08-31 NOTE — TELEPHONE ENCOUNTER
GI Physician: Dr Eduardo High for Medication: Omeprazole    Dose: 40 mg    Quantity: 60    Pharmacy and Location: [address] [place] [address]

## 2020-09-03 ENCOUNTER — OFFICE VISIT (OUTPATIENT)
Dept: PHYSICAL THERAPY | Age: 45
End: 2020-09-03
Payer: COMMERCIAL

## 2020-09-03 DIAGNOSIS — M25.551 RIGHT HIP PAIN: Primary | ICD-10-CM

## 2020-09-03 PROCEDURE — 97140 MANUAL THERAPY 1/> REGIONS: CPT | Performed by: PHYSICAL THERAPIST

## 2020-09-03 PROCEDURE — 97110 THERAPEUTIC EXERCISES: CPT | Performed by: PHYSICAL THERAPIST

## 2020-09-03 NOTE — PROGRESS NOTES
Daily Note     Today's date: 9/3/2020  Patient name: Krysta Herman  : 1975  MRN: [id number]  Referring provider: Vladimir Ayers DO  Dx:   Encounter Diagnosis     ICD-10-CM    1  Right hip pain  M25 551                   Subjective: Patient reported right anterior hip pain is at 2 of 10 and he noted better overall since onset of PT treatment, hep and anterior hip massage  Objective: See treatment diary below  Assessment: Tolerated treatment well with no increase in pain  Patient presents with stretching of right hip flexor / iliopsoas and manual therapy techniques providing short term pain elimination but he lacks long term pain elimination which limits all prolonged standing based functional activities  Patient would benefit from continued PT      Plan: Continue per plan of care  Precautions: Patient noted PMHx is remarkable for HTN, GERD, kidney stones, sleep apnea         Manuals 8/28 8/31 9/3          IASTM to right anterior hip while supine 10 min 10 mins 10 min          Right hip lateral distraction with active hip flexion MWM                                       Neuro Re-Ed                                                                                                        Ther Ex             Recumbent bike  5 mins 8 min          Standing hip flexor stretch off step:B:  3 x 20" ea 20 sec x 5          LAQ:B:   2 x 10          Hip flexion:B:   NT          Hamstring stretch:B:  EOT  3 x 20" ea 20 sec x 5          LTR:B:  5 x 10" ea  10 sec x 5          Piriformis stretch:B:  3 x 20" ea  20 sec x 3          Hip flexor stretch olamide position:R   15 sec x 4          slr flexion:B:  10x  NT          Bridges  10 x 2  3 sec x 20          Prone hip extension:B:   2 x 10          Prone hip extension with knee flexed:B:   NT          Prone hip IR and ER:B:   NT          Prone press ups   NT          Prone knee flexion:R   2 x 10          Mini squats   NT          Lunges:B:   NT Forward step ups:B:   NT          Lateral step ups:B:   NT          Step downs:B:   NT          Hip hiking:B:   NT                                    Ther Activity                                       Gait Training                                       Modalities             MHP to anterior hip PRN

## 2020-09-08 ENCOUNTER — OFFICE VISIT (OUTPATIENT)
Dept: PHYSICAL THERAPY | Age: 45
End: 2020-09-08
Payer: COMMERCIAL

## 2020-09-08 DIAGNOSIS — M25.551 RIGHT HIP PAIN: Primary | ICD-10-CM

## 2020-09-08 PROCEDURE — 97110 THERAPEUTIC EXERCISES: CPT | Performed by: PHYSICAL THERAPIST

## 2020-09-08 PROCEDURE — 97140 MANUAL THERAPY 1/> REGIONS: CPT | Performed by: PHYSICAL THERAPIST

## 2020-09-08 NOTE — PROGRESS NOTES
Recorded as Task   Date: 06/12/2017 02:40 PM, Created By: IRISPATIENT   Task Name: ePat Message   Assigned To: IRIS,PATIENT   Regarding Patient: BHAVNA NEVAREZ, Status: Active   Comment:    IRIS,PATIENT - 12 Jun 2017 2:40 PM     [id number]**date:[id number]    Ask a Medical Question  Inquiry: Dr. Lyon -    I hope you are having a great time in [facility].  This is to let you know I have good news!       In my last report to you on June 6, I was having very good energy from the [facility] Adrenal Cortex, but was having to take Allegra antihistamine every day to contain the heavy itching reaction to the pills.  Didn't know if I could even continue with the AC.  However, on June 8 I  tried eliminating the antihistamine and discovered there was no more itching! I am now at five days with no antihistamine, no itching, and continued good energy!  It appears my body has acclimated to the AC??  I hope so.    Bhavna Teran Ebonie Hudson - 13 Jun 2017 8:37 AM     UNDELEGATED TASK   BLADE CHAPMAN - 14 Jun 2017 6:54 AM     TASK REPLIED TO: Previously Assigned To BLADE CHAPMAN                      I like those results...acclimatizing?...could be but in some allergy circles...the Benadryl can cause allergic response too!    Lets see what time tells...continue on! (and report in 1 week please)  Thanks Mrs Nevarez        Signatures   Electronically signed by : BLADE CHAPMAN, ; Jun 14 2017  6:54AM CST     Daily Note     Today's date: 2020  Patient name: Crow Nickerson  : 1975  MRN: [id number]  Referring provider: Blaire Contreras DO  Dx:   Encounter Diagnosis     ICD-10-CM    1  Right hip pain  M25 551                   Subjective: Patient reported intermittent right anterior hip pain is at 2-3 of 10 and he noted better overall since onset of PT treatment, hep and anterior hip massage since pain prior to onset of PT was constant           Objective: See treatment diary below  Assessment: Tolerated treatment well with no increase in pain  Patient continue to progress with right anterior hip pain reduction with PT treatment plan of stretching, manual therapy techniques and active right le movements  But, due to lack of long term pain reduction and resolution of right hip symptoms his standing based functional activities continues to be limited  Patient would benefit from continued PT  Plan: Continue per plan of care  Precautions: Patient noted PMHx is remarkable for HTN, GERD, kidney stones, sleep apnea         Manuals 8/28 8/31 9/3 9/8         IASTM to right anterior hip while supine 10 min 10 mins 10 min 10 min         Right hip lateral distraction with active hip flexion MWM                                       Neuro Re-Ed                                                                                                        Ther Ex             Recumbent bike  5 mins 8 min 8 min         Standing hip flexor stretch off step:B:  3 x 20" ea 20 sec x 5 20 sec x 5         LAQ:B:   2 x 10 3 sec 2 x 10         Hip flexion:B:   NT NT         Hamstring stretch:B:  EOT  3 x 20" ea 20 sec x 5 20 sec x 5         LTR:B:  5 x 10" ea  10 sec x 5 10 sec x 5         Piriformis stretch:B:  3 x 20" ea  20 sec x 3 20 sec x 5         Hip flexor stretch olamide position:R   15 sec x 4 20 sec x 5         slr flexion:B:  10x  NT NT         Bridges  10 x 2  3 sec x 20 5 sec x 20         Prone hip extension:B:   2 x 10 1 x 10:B:         Prone hip extension with knee flexed:B:   NT 1  10:B:         Prone hip IR and ER:B:   NT NT         Prone press ups   NT NT         Prone knee flexion:R   2 x 10 1 x 10:B:         Mini squats   NT NT         Lunges:B:   NT NT         Forward step ups:B:   NT NT         Lateral step ups:B:   NT NT         Step downs:B:   NT NT         Hip hiking:B:   NT NT                                   Ther Activity                                       Gait Training                                       Modalities             MHP to anterior hip PRN

## 2020-09-09 ENCOUNTER — OFFICE VISIT (OUTPATIENT)
Dept: OCCUPATIONAL THERAPY | Facility: HOSPITAL | Age: 45
End: 2020-09-09

## 2020-09-09 ENCOUNTER — OFFICE VISIT (OUTPATIENT)
Dept: OBGYN CLINIC | Facility: HOSPITAL | Age: 45
End: 2020-09-09
Payer: COMMERCIAL

## 2020-09-09 VITALS
HEIGHT: 69 IN | HEART RATE: 65 BPM | WEIGHT: 198 LBS | DIASTOLIC BLOOD PRESSURE: 92 MMHG | SYSTOLIC BLOOD PRESSURE: 142 MMHG | BODY MASS INDEX: 29.33 KG/M2

## 2020-09-09 DIAGNOSIS — M77.02 MEDIAL EPICONDYLITIS OF ELBOW, LEFT: ICD-10-CM

## 2020-09-09 DIAGNOSIS — M77.12 LATERAL EPICONDYLITIS OF LEFT ELBOW: Primary | ICD-10-CM

## 2020-09-09 DIAGNOSIS — M75.42 IMPINGEMENT SYNDROME OF LEFT SHOULDER: ICD-10-CM

## 2020-09-09 DIAGNOSIS — M77.02 MEDIAL EPICONDYLITIS OF LEFT ELBOW: ICD-10-CM

## 2020-09-09 PROCEDURE — 99213 OFFICE O/P EST LOW 20 MIN: CPT | Performed by: ORTHOPAEDIC SURGERY

## 2020-09-09 PROCEDURE — 97760 ORTHOTIC MGMT&TRAING 1ST ENC: CPT

## 2020-09-09 NOTE — PROGRESS NOTES
Orthosis    Diagnosis:   1  Lateral epicondylitis of left elbow     2  Medial epicondylitis of left elbow     3  Impingement syndrome of left shoulder       Indication: Volar wrist splint for Medial and Lateral Epicondylitis    Location: Left wrist   Supplies: Custom Fit Orthotic  Orthosis type: forearm based wrist orthosis  Wearing Schedule: Night only, may wear as needed during day for pain control  Describe Position: Wrist in neurtal    Precautions: Universal (skin contact/breakdown)    Patient or Caregiver expresses understanding of wearing Schedule and Precautions? Yes  Patient or Caregiver able to don/doff orthotic independently? Yes    Written orders provided to patient? Yes- HEP and splint fabrication  Patient instructed in forearm flexor and extensor stretching exercises     Orders Obtained: Written   Orders Obtained from: Dr Nilay Strong    Return for evaluation and treatment Yes

## 2020-09-09 NOTE — PATIENT INSTRUCTIONS
Lateral Epicondylitis  (Tennis Elbow)  What is it? Lateral epicondylitis, commonly known as tennis elbow, is a painful condition involving the tendons that attach to the bone on the outside (lateral) part of the elbow  Tendons anchor the muscle to bone  The muscle involved in this condition, the extensor carpi radialis brevis, helps to extend and stabilize the wrist (see Figure 1)  With lateral epicondylitis, there is degeneration of the tendon's attachment, weakening the anchor site and placing greater stress on the area  This can then lead to pain associated with activities in which this muscle is active, such as lifting, gripping, and/or grasping  Sports such as tennis are commonly associated with this, but the problem can occur with many different types of activities, athletic and otherwise  What causes it? Overuse - The cause can be both non-work and work related  An activity that places stress on the tendon attachments, through stress on the extensor muscle-tendon unit, increases the strain on the tendon  These stresses can be from holding too large a racquet  or from repetitive gripping and grasping activities, i e  meat-cutting, plumbing, painting, weaving, etc   Trauma - A direct blow to the elbow may result in swelling of the tendon that can lead to degeneration  A sudden extreme action, force, or activity could also injure the tendon  Who gets it? The most common age group that this condition affects is between 27to 48years old, but it may occur in younger and older age groups, and in both men and women  Signs and symptoms  Pain is the primary reason for patients to seek medical evaluation  The pain is located over the outside aspect of the elbow, over the bone region known as the lateral epicondyle  This area becomes tender to touch  Pain is also produced by any activity which places stress on the tendon, such as gripping or lifting   With activity, the pain usually starts at the elbow and may travel down the forearm to the hand  Occasionally, any motion of the elbow can be painful  Treatment  Conservative (non-surgical)  Activity modification - Initially, the activity causing the condition should be limited  Limiting the aggravating activity, not total rest, is recommended  Modifying  or techniques, such as use of a different size racket and/or use of 2-handed backhands in tennis, may relieve the problem  Medication - anti-inflammatory medications may help alleviate the pain  Brace - a tennis elbow brace, a band worn over the muscle of the forearm, just below the elbow, can reduce the tension on the tendon and allow it to heal   Physical Therapy may be helpful, providing stretching and/or strengthening exercises  Modalities such as ultrasound or heat treatments may be helpful  Steroid injections - A steroid is a strong anti-inflammatory medication that can be injected into the area  No more than (3) injections should  Shockwave treatment - A new type of treatment, available in the office setting, has shown some success in 50-60% of patients  This is a shock wave delivered to the affected area around the elbow, which can be used as a last resort prior to the consideration of surgery  Surgery  Surgery is only considered when the pain is incapacitating and has not responded to conservative care, and symptoms have lasted more than six months  Surgery involves removing the diseased, degenerated tendon tissue  Two surgical approaches are available; traditional open surgery (incision), and arthroscopy--a procedure performed with instruments inserted into the joint through small incisions  Both options are performed in the outpatient setting  Recovery  Recovery from surgery includes physical therapy to regain motion of the arm  A strengthening program will be necessary in order to return to prior activities  Recovery can be expected to take 4-6 months          [employer]  www handcare  org

## 2020-09-09 NOTE — PROGRESS NOTES
ASSESSMENT/PLAN:    Assessment:   Left lateral epicondylitis   Left medial epicondylitis  Left Triceps Tendonitis     Plan:   Hand therapy today: HEP and volar wrist splint    Follow Up:  6-8  week(s)    To Do Next Visit:  Recheck current issue    General Discussions:     Lateral Epicondylitis: The anatomy and physiology of lateral epicondylitis was discussed with the patient today  Typically, a traumatic injury or repetitive use may cause a partial or complete tear of the extensor carpi radialis brevis muscle  This creates pain over the lateral epicondyle  This pain typically is made worse with palm down lifting activities as well as anything that involves strength and stability of the wrist   The pain may radiate from the wrist up to the elbow  At times, the shoulder may be weak as well which can predispose or cause continuation of the problem  Conservative treatment usually cures a majority of patients; however, this may take up to 6-9 months  Conservative treatment options typically include activity modification, therapy for strengthening of the shoulder and elbow, nocturnal wrist support splints, tennis elbow straps, and possible corticosteroid injections  Corticosteroid injections do not change the natural history of this process, may decrease the pain temporarily, and may increase the risk of recurrence  Surgery is required in fewer than 10% of patients  Medial Epicondylitis: The anatomy and physiology of medial epicondylitis was discussed with the patient today  Typically, one traumatic injury, or repetitive use may cause a partial tear of the flexor pronator mass  This creates pain over the medial epicondyle  This pain typically is made worse with palm up lifting activities as well as anything that involves strength and stability of the wrist   The pain may radiate from the wrist up to the elbow    At times, the shoulder may be weak as well which can predispose or cause continuation of the problem  Conservative treatment usually cures a vast majority of patients; however, this may take up to 6-9 months  Conservative treatment options typically include activity modification, therapy for strengthening of the shoulder and elbow, nocturnal wrist support splints, and possible corticosteroid injections  Corticosteroid injections do not change the natural history of this process, but may decrease the pain temporarily  Surgery is required in fewer than 5% of patients  At times, the ulnar nerve may be aggravated with this condition  _____________________________________________________  CHIEF COMPLAINT:  Chief Complaint   Patient presents with    Left Elbow - Pain         SUBJECTIVE:  Isaias Bass is a 54y o  year old male who presents with Pain  Moderate  Intermittant  Dull and Aching to the left elbow  This started  5 month(s) ago as Insidious  Radiation: Yes to the  forearm  Previous Treatments: None without relief  Associated symptoms: Pain  Moderate  Intermittant  Dull and Aching    PAST MEDICAL HISTORY:  Past Medical History:   Diagnosis Date    Adrenal mass ([address] )     Chronic kidney disease     Disease of thyroid gland     GERD (gastroesophageal reflux disease)     Hyperlipidemia     Hypertension     Kidney stones     Low testosterone     Sleep apnea     not currently using his CPAP    Thyroid disease        PAST SURGICAL HISTORY:  Past Surgical History:   Procedure Laterality Date    FL RETROGRADE PYELOGRAM  8/21/2018    KIDNEY STONE SURGERY      MO CYSTO/URETERO W/LITHOTRIPSY &INDWELL STENT INSRT Left 8/21/2018    Procedure: CYSTOSCOPY URETEROSCOPY, RETROGRADE PYELOGRAM AND INSERTION STENT URETERAL;  Surgeon: Liss Duran MD;  [facility];  Service: Urology    MO ESOPHAGOGASTRODUODENOSCOPY TRANSORAL DIAGNOSTIC N/A 6/21/2018    Procedure: ESOPHAGOGASTRODUODENOSCOPY (EGD); Surgeon: Dami Dixon MD;  Location: [facility];   Service: Gastroenterology       FAMILY HISTORY:  Family History   Problem Relation Age of Onset    Asthma Mother     Diabetes Mother     Other Father         Endocarditis    Hypertension Father     Gout Father        SOCIAL HISTORY:  Social History     Tobacco Use    Smoking status: Former Smoker     Packs/day: 1 00     Years: 17 00     Pack years: 17 00     Last attempt to quit: 2005     Years since quitting: 15 6    Smokeless tobacco: Never Used   Substance Use Topics    Alcohol use: Not Currently     Comment: 1 drink every two weeks or so    Drug use: No       MEDICATIONS:    Current Outpatient Medications:     carvedilol (COREG) 25 mg tablet, Take 1 tablet (25 mg total) by mouth 2 (two) times a day with meals, Disp: 180 tablet, Rfl: 0    chlorthalidone 25 mg tablet, Take 1 tablet (25 mg total) by mouth daily, Disp: 90 tablet, Rfl: 0    Cholecalciferol (VITAMIN D) 2000 units CAPS, Take 1 tablet by mouth daily, Disp: , Rfl:     diclofenac (VOLTAREN) 75 mg EC tablet, Take 1 tablet (75 mg total) by mouth 2 (two) times a day PRN for pain, Disp: 60 tablet, Rfl: 1    eplerenone (INSPRA) 50 MG tablet, Take 1 tablet (50 mg total) by mouth 2 (two) times a day, Disp: 60 tablet, Rfl: 5    felodipine (PLENDIL) 10 MG 24 hr tablet, Take 1 tablet (10 mg total) by mouth daily, Disp: 90 tablet, Rfl: 3    omeprazole (PriLOSEC) 40 MG capsule, Take 1 capsule (40 mg total) by mouth 2 (two) times a day, Disp: 60 capsule, Rfl: 2    SYRINGE-NEEDLE, DISP, 3 ML 21G X 1-1/2" 3 ML MISC, For testerone injection, Disp: , Rfl:     SYRINGE-NEEDLE, DISP, 3 ML 21G X 1-1/2" 3 ML MISC, For testerone injection, Disp: , Rfl:     testosterone cypionate (DEPO-TESTOSTERONE) 200 mg/mL SOLN, Inject 0 8 mL deep IM once every two weeks  , Disp: , Rfl:     torsemide (DEMADEX) 10 mg tablet, Take by mouth, Disp: , Rfl:     ALLERGIES:  No Known Allergies    REVIEW OF SYSTEMS:  Pertinent items are noted in HPI  A comprehensive review of systems was negative      LABS:  HgA1c:   Lab Results   Component Value Date    HGBA1C 6 0 03/15/2019     BMP:   Lab Results   Component Value Date    GLUCOSE 106 11/05/2014    CALCIUM 9 2 08/14/2020     11/05/2014    K 3 2 (L) 08/14/2020    CO2 30 08/14/2020     08/14/2020    BUN 19 08/14/2020    CREATININE 1 22 08/14/2020         _____________________________________________________  PHYSICAL EXAMINATION:  /92   Pulse 65   Ht 5' 9" (1 753 m)   Wt 89 8 kg (198 lb)   BMI 29 24 kg/m²   General: well developed and well nourished, alert, oriented times 3 and appears comfortable  Psychiatric: Normal  HEENT: Trachea Midline, No torticollis  Cardiovascular: No discernable arrhythmia  Pulmonary: No wheezing or stridor  Skin: No masses, erythema, lacerations, fluctation, ulcerations  Neurovascular: Sensation Intact to the Median, Ulnar, Radial Nerve, Motor Intact to the Median, Ulnar, Radial Nerve and Pulses Intact    MUSCULOSKELETAL EXAMINATION:  Left elbow  Full ROM without crepitation  +TTP lateral epicondyle, medial epicondyle, triceps insertion, bicipital bursa  No palpable defect biceps and triceps  Positive pain with resisted wrist extension, no pain with wrist flexion  No instability medial and lateral collateral ligament     Negative long finger test  Supraspinatus 4/5  _____________________________________________________  STUDIES REVIEWED:  No Studies to review      PROCEDURES PERFORMED:  Procedures  No Procedures performed today    Scribe Attestation    I,:   Radha Toribio am acting as a scribe while in the presence of the attending physician :        I,:   Jearldine Goodell, MD personally performed the services described in this documentation    as scribed in my presence :

## 2020-09-10 ENCOUNTER — OFFICE VISIT (OUTPATIENT)
Dept: PHYSICAL THERAPY | Age: 45
End: 2020-09-10
Payer: COMMERCIAL

## 2020-09-10 DIAGNOSIS — M25.551 RIGHT HIP PAIN: Primary | ICD-10-CM

## 2020-09-10 PROCEDURE — 97140 MANUAL THERAPY 1/> REGIONS: CPT | Performed by: PHYSICAL THERAPIST

## 2020-09-10 PROCEDURE — 97110 THERAPEUTIC EXERCISES: CPT | Performed by: PHYSICAL THERAPIST

## 2020-09-10 NOTE — PROGRESS NOTES
Daily Note     Today's date: 9/10/2020  Patient name: Mary Stoeks  : 1975  MRN: [id number]  Referring provider: Savannah Luna DO  Dx:   Encounter Diagnosis     ICD-10-CM    1  Right hip pain  M25 551                   Subjective: Patient reported one intermittent right anterior hip pain episode at 2-3 of 10 which he feels was due to tight belt  Objective: See treatment diary below  Assessment: Tolerated treatment well with no increase in pain  Patient presents with right hip weakness limiting long term pain elimination thus PT emphasis on manual therapy techniques for pain reduction and right le mobility to promote strength  Patient would benefit from continued PT  Plan: Continue per plan of care  Precautions: Patient noted PMHx is remarkable for HTN, GERD, kidney stones, sleep apnea         Manuals 8/28 8/31 9/3 9/8 9/10        IASTM to right anterior hip while supine 10 min 10 mins 10 min 10 min 10 min        Right hip lateral distraction with active hip flexion MWM                                       Neuro Re-Ed                                                                                                        Ther Ex             Recumbent bike  5 mins 8 min 8 min 10 min        Standing hip flexor stretch off step:B:  3 x 20" ea 20 sec x 5 20 sec x 5 20 sec x 5        Right ITB stretch     20 sec x 5        LAQ:B:   2 x 10 3 sec 2 x 10 2# x 20        Hip flexion:B:   NT NT 2# x 20        Hamstring stretch:B:  EOT  3 x 20" ea 20 sec x 5 20 sec x 5 20 sec x 5        LTR:B:  5 x 10" ea  10 sec x 5 10 sec x 5 10 sec x 5        Piriformis stretch:B:  3 x 20" ea  20 sec x 3 20 sec x 5 20 sec x 5        Hip flexor stretch olamide position:R   15 sec x 4 20 sec x 5 20 sec x 5        slr flexion:B:  10x  NT NT NT        Bridges  10 x 2  3 sec x 20 5 sec x 20 2 x 10        Prone hip extension:B:   2 x 10 1 x 10:B: 2 x 10        Prone hip extension with knee flexed:B:   NT 1  10:B: 10 x Prone hip IR and ER:B:   NT NT 2 x 10        Prone press ups   NT NT NT        Prone knee flexion:R   2 x 10 1 x 10:B: 2 x 10        Mini squats   NT NT NT        Lunges:B:   NT NT NT        Forward step ups:B:   NT NT NT        Lateral step ups:B:   NT NT NT        Step downs:B:   NT NT NT        Hip hiking:B:   NT NT NT                                  Ther Activity                                       Gait Training                                       Modalities             MHP to anterior hip PRN

## 2020-09-14 ENCOUNTER — OFFICE VISIT (OUTPATIENT)
Dept: PHYSICAL THERAPY | Age: 45
End: 2020-09-14
Payer: COMMERCIAL

## 2020-09-14 DIAGNOSIS — M25.551 RIGHT HIP PAIN: Primary | ICD-10-CM

## 2020-09-14 PROCEDURE — 97140 MANUAL THERAPY 1/> REGIONS: CPT | Performed by: PHYSICAL THERAPIST

## 2020-09-14 PROCEDURE — 97110 THERAPEUTIC EXERCISES: CPT | Performed by: PHYSICAL THERAPIST

## 2020-09-14 NOTE — PROGRESS NOTES
Daily Note     Today's date: 2020  Patient name: Becky Moncada  : 1975  MRN: [id number]  Referring provider: Zeenat Hamilton DO  Dx:   Encounter Diagnosis     ICD-10-CM    1  Right hip pain  M25 551                   Subjective: Patient denies right hip pain today but noted he had one episode of right anterior hip pain yesterday while working with a specific belt on his waist while at work yesterday  Objective: See treatment diary below  Assessment: Patient presents with right anterior hip pain fully resolved with stretching and manual therapy techniques, but he lacks long term pain elimination due to pain production with work clothing  Plan: Continue per plan of care  Precautions: Patient noted PMHx is remarkable for HTN, GERD, kidney stones, sleep apnea         Manuals 8/28 8/31 9/3 9/8 9/10 9/14       IASTM to right anterior hip while supine 10 min 10 mins 10 min 10 min 10 min 10 min       Right hip lateral distraction with active hip flexion MWM                                       Neuro Re-Ed                                                                                                        Ther Ex             Recumbent bike  5 mins 8 min 8 min 10 min 10 min       Standing hip flexor stretch off step:B:  3 x 20" ea 20 sec x 5 20 sec x 5 20 sec x 5 20 sec x 5       Right ITB stretch     20 sec x 5 20 sec x 5       LAQ:B:   2 x 10 3 sec 2 x 10 2# x 20 2 5# x 20       Hip flexion:B:   NT NT 2# x 20 2 5# x 20       Hamstring stretch:B:  EOT  3 x 20" ea 20 sec x 5 20 sec x 5 20 sec x 5 20 sec x 5       LTR:B:  5 x 10" ea  10 sec x 5 10 sec x 5 10 sec x 5 10 sec x 5       Piriformis stretch:B:  3 x 20" ea  20 sec x 3 20 sec x 5 20 sec x 5 20 sec x 5       Hip flexor stretch olamide position:R   15 sec x 4 20 sec x 5 20 sec x 5 20 sec x 5       slr flexion:B:  10x  NT NT NT NT       Bridges  10 x 2  3 sec x 20 5 sec x 20 2 x 10 3 sec x 20       Prone hip extension:B:   2 x 10 1 x 10:B: 2 x 10 2 5# x 20       Prone hip extension with knee flexed:B:   NT 1  10:B: 10 x  NT       Prone hip IR and ER:B:   NT NT 2 x 10 2 5# x 20       Prone press ups   NT NT NT NT       Prone knee flexion:R   2 x 10 1 x 10:B: 2 x 10 2 5# x 20       Mini squats   NT NT NT NT       Lunges:B:   NT NT NT NT       Forward step ups:B:   NT NT NT NT       Lateral step ups:B:   NT NT NT NT       Step downs:B:   NT NT NT NT       Hip hiking:B:   NT NT NT NT                                 Ther Activity                                       Gait Training                                       Modalities             MHP to anterior hip PRN

## 2020-09-15 ENCOUNTER — EVALUATION (OUTPATIENT)
Dept: OCCUPATIONAL THERAPY | Age: 45
End: 2020-09-15
Payer: COMMERCIAL

## 2020-09-15 DIAGNOSIS — N20.0 NEPHROLITHIASIS: Primary | ICD-10-CM

## 2020-09-15 DIAGNOSIS — M77.12 LATERAL EPICONDYLITIS OF LEFT ELBOW: Primary | ICD-10-CM

## 2020-09-15 PROCEDURE — 97110 THERAPEUTIC EXERCISES: CPT

## 2020-09-15 PROCEDURE — 97165 OT EVAL LOW COMPLEX 30 MIN: CPT

## 2020-09-15 RX ORDER — POTASSIUM CITRATE 15 MEQ/1
TABLET, EXTENDED RELEASE ORAL
Qty: 60 TABLET | Refills: 0 | Status: SHIPPED | OUTPATIENT
Start: 2020-09-15 | End: 2020-12-08 | Stop reason: SDUPTHER

## 2020-09-15 RX ORDER — POTASSIUM CITRATE 15 MEQ/1
15 TABLET, EXTENDED RELEASE ORAL 2 TIMES DAILY
COMMUNITY
End: 2020-09-15 | Stop reason: SDUPTHER

## 2020-09-15 NOTE — PROGRESS NOTES
OT Evaluation     Today's date: 9/15/2020  Patient name: Agustin Daniels  : 1975  MRN: [id number]  Referring provider: Duran Grimm MD  Dx:   Encounter Diagnosis     ICD-10-CM    1  Lateral epicondylitis of left elbow  M77 12        Start Time: 1240          Assessment  Assessment details: Pt is a 53 y/o male with lateral epicondylitis, medial epicondylitis, and shoulder impingement syndrome  Pt presents with pain and tenderness over medial epicondyl and lateral epicondyl  Pt noted shoulder pain with distal arm movement  Shoulder pain most likely due to compensation from elbow pain- no ROM or strength deficits noted (WNL in all planes)  Pt has decreased ROM with wrist flexion/extension and ulnar/radial deviation as well as decreased wrist, elbow,  and pinch strength  Shoulder pain likely due to elbow compensation- no neural tension noted  Pt would benefit from skilled OT services in order to increase ROM of L wrist and strength or L wrist, elbow,  and pinch  HEP was established today which included lateral upper trapezius stretches, wrist AROM, elbow AROM, and median nerve stretches  Making positional modifications such as avoiding elbow flexion was discussed today as well     Impairments: abnormal or restricted ROM, activity intolerance, impaired physical strength, lacks appropriate home exercise program and pain with function  Understanding of Dx/Px/POC: good   Prognosis: good    Goals  STG (1-2 weeks):  1) Establish HEP  2) Decrease L elbow pain by 50%  3) Increase wrist, elbow, pinch, and  strength by 50%     LTG (6 weeks): 1) Restore full wrist, elbow, pinch and  strength  2) Restore full AROM of wrist  3) Pain free function of L UE    Plan  Patient would benefit from: skilled occupational therapy  Planned modality interventions: cryotherapy, ultrasound and thermotherapy: hydrocollator packs  Planned therapy interventions: manual therapy, joint mobilization, functional ROM exercises, home exercise program, strengthening, stretching, therapeutic activities, therapeutic exercise, patient education and activity modification  Frequency: 2x week  Duration in weeks: 6  Treatment plan discussed with: patient        Subjective Evaluation    History of Present Illness  Mechanism of injury: Pt presents with medial and lateral epicondylitis of left elbow  Pt reports having pain in his elbow 3-4 months ago  He was given an oral steroid twice by PCP  He had relief but the pain came back so he had an appt  With Dr Zuleyka Mccartney  Pain  Current pain rating: 3  At best pain rating: 3  At worst pain ratin  Quality: dull ache  Relieving factors: medications  Aggravating factors: lifting  Progression: worsening    Hand dominance: right    Patient Goals  Patient goals for therapy: decreased pain, increased motion, increased strength and independence with ADLs/IADLs          Objective     Static Posture     Comments  Shoulder pain likely due to elbow compensation- no neural tension noted  Tenderness     Left Elbow   Tenderness in the cubital tunnel, lateral epicondyle and medial epicondyle  Left Wrist/Hand   Tenderness in the lateral epicondyle and medial epicondyle       Additional Tenderness Details  (+) ECRB     Active Range of Motion   Left Shoulder   Normal active range of motion    Right Shoulder   Normal active range of motion    Left Elbow   Flexion: 130 degrees   Extension: 0 degrees   Forearm supination: 90 degrees   Forearm pronation: 90 degrees     Right Elbow   Flexion: 140 degrees   Extension: 0 degrees   Forearm supination: 90 degrees   Forearm pronation: 90 degrees     Left Wrist   Wrist flexion: 42 degrees   Wrist extension: 74 degrees   Radial deviation: 15 degrees   Ulnar deviation: 30 degrees     Right Wrist   Wrist flexion: 78 degrees   Wrist extension: 78 degrees   Radial deviation: 17 degrees   Ulnar deviation: 10 degrees with pain    Strength/Myotome Testing     Left Shoulder Planes of Motion   Flexion: 4+   Extension: 4+   Abduction: 4+   Adduction: 4+   External rotation at 0°: 4+   External rotation at 45°: 4+   External rotation at 90°: 4+   External rotation BTH: 4+   Internal rotation at 0°: 4+   Internal rotation at 45°: 4+   Internal rotation at 90°: 4+   Internal rotation BTB: 4+   Horizontal abduction: 4+   Horizontal adduction: 4+     Left Elbow   Flexion: 5  Extension: 4+  Forearm supination: 3+  Forearm pronation: 3+    Left Wrist/Hand   Wrist extension: 3+  Wrist flexion: 4  Radial deviation: 3+  Ulnar deviation: 5     (2nd hand position)     Trial 1: 35    Trial 2: 18    Trial 3: 18    Thumb Strength  Key/Lateral Pinch     Trial 1: 20  Tip/Two-Point Pinch     Trial 1: 4  Palmar/Three-Point Pinch     Trial 1: 8    Right Wrist/Hand      (2nd hand position)     Trial 1: 95    Trial 2: 93    Trial 3: 100    Thumb Strength   Key/Lateral Pinch     Trial 1: 25  Tip/Two-Point Pinch     Trial 1: 16  Palmar/Three-Point Pinch     Trial 1: 18    Additional Strength Details  Pain with sup/pron    Tests     Left Elbow   Positive Cozen's and Mill's  Left Wrist/Hand   Positive resisted middle finger         Flowsheet Rows      Most Recent Value   PT/OT G-Codes   Current Score  60   Projected Score  70             Precautions: Universal       9/15             Manuals              IASTM              KT                                          Neuro Re-Ed                                                                                                                Ther Ex              AROM Elbow flex/ext HEP             AROM Wrist flex/ext HEP             Wrist strengthening              Forearm strenthening              Median nerve stretching HEP             AROM forearm roation HEP                                         Ther Activity                                          Gait Training                                          Modalities              U/S

## 2020-09-15 NOTE — TELEPHONE ENCOUNTER
We have not seen since January 2019  Per office staff note, wife is going to call back to make appt   I have given him a 1 month refill of K citrate and then once he schedules appt we can give more refills

## 2020-09-17 ENCOUNTER — OFFICE VISIT (OUTPATIENT)
Dept: PHYSICAL THERAPY | Age: 45
End: 2020-09-17
Payer: COMMERCIAL

## 2020-09-17 DIAGNOSIS — M25.551 RIGHT HIP PAIN: Primary | ICD-10-CM

## 2020-09-17 PROCEDURE — 97140 MANUAL THERAPY 1/> REGIONS: CPT | Performed by: PHYSICAL THERAPIST

## 2020-09-17 PROCEDURE — 97110 THERAPEUTIC EXERCISES: CPT | Performed by: PHYSICAL THERAPIST

## 2020-09-17 NOTE — PROGRESS NOTES
Daily Note     Today's date: 2020  Patient name: Elzbieta Soto  : 1975  MRN: [id number]  Referring provider: Anne-Marie Benjamin DO  Dx:   Encounter Diagnosis     ICD-10-CM    1  Right hip pain  M25 551                   Subjective: Patient reported right anterior hip pain aggravation yesterday with work and house hold activities thus he took an oral prescription pain medication which did reduce his pain and now he has pain at a 2-3 of 10  Objective: See treatment diary below  Assessment: Patient presents with right anterior hip pain fully resolved with stretching and manual therapy techniques of MFR more beneficial than IASTM techniques  Thus, pt provided with education on having his spouse utilize MFR on his anterior right iliopsoas musculature for pain reduction  Plus, pt was educated on use of Kinesio taping to right hip flexor musculature for long term pain reduction  Plan: Continue per plan of care  Precautions: Patient noted PMHx is remarkable for HTN, GERD, kidney stones, sleep apnea         Manuals 8/28 8/31 9/3 9/8 9/10 9/14 9/17      IASTM to right anterior hip while supine 10 min 10 mins 10 min 10 min 10 min 10 min 10 min      Right hip lateral distraction with active hip flexion MWM                                       Neuro Re-Ed                                                                                                        Ther Ex             Recumbent bike  5 mins 8 min 8 min 10 min 10 min 10 min      Standing hip flexor stretch off step:B:  3 x 20" ea 20 sec x 5 20 sec x 5 20 sec x 5 20 sec x 5 20 sec x 5      Right ITB stretch     20 sec x 5 20 sec x 5 20 sec x 5      LAQ:B:   2 x 10 3 sec 2 x 10 2# x 20 2 5# x 20 2 5# x 20      Hip flexion:B:   NT NT 2# x 20 2 5# x 20 2 5# x 20      Hamstring stretch:B:  EOT  3 x 20" ea 20 sec x 5 20 sec x 5 20 sec x 5 20 sec x 5 20 sec x 5      LTR:B:  5 x 10" ea  10 sec x 5 10 sec x 5 10 sec x 5 10 sec x 5 10 sec x 5 Piriformis stretch:B:  3 x 20" ea  20 sec x 3 20 sec x 5 20 sec x 5 20 sec x 5 20 sec x 5      Hip flexor stretch olamide position:R   15 sec x 4 20 sec x 5 20 sec x 5 20 sec x 5 20 sec x 5      slr flexion:B:  10x  NT NT NT NT 2 5# x 20      Bridges  10 x 2  3 sec x 20 5 sec x 20 2 x 10 3 sec x 20 3 sec x 30      Prone hip extension:B:   2 x 10 1 x 10:B: 2 x 10 2 5# x 20 2 5# x 20      Prone hip extension with knee flexed:B:   NT 1  10:B: 10 x  NT NT      Prone hip IR and ER:B:   NT NT 2 x 10 2 5# x 20 2 5# x 20      Prone press ups   NT NT NT NT NT      Prone knee flexion:R   2 x 10 1 x 10:B: 2 x 10 2 5# x 20 2 5# x 20      Mini squats   NT NT NT NT NT      Lunges:B:   NT NT NT NT NT      Forward step ups:B:   NT NT NT NT NT      Lateral step ups:B:   NT NT NT NT NT      Step downs:B:   NT NT NT NT NT      Hip hiking:B:   NT NT NT NT NT                                Ther Activity                                       Gait Training                                       Modalities             MHP to anterior hip PRN

## 2020-09-21 ENCOUNTER — OFFICE VISIT (OUTPATIENT)
Dept: PHYSICAL THERAPY | Age: 45
End: 2020-09-21
Payer: COMMERCIAL

## 2020-09-21 DIAGNOSIS — M25.551 RIGHT HIP PAIN: Primary | ICD-10-CM

## 2020-09-21 PROCEDURE — 97140 MANUAL THERAPY 1/> REGIONS: CPT | Performed by: PHYSICAL THERAPIST

## 2020-09-21 PROCEDURE — 97110 THERAPEUTIC EXERCISES: CPT | Performed by: PHYSICAL THERAPIST

## 2020-09-21 PROCEDURE — 97750 PHYSICAL PERFORMANCE TEST: CPT | Performed by: PHYSICAL THERAPIST

## 2020-09-21 NOTE — PROGRESS NOTES
PT Evaluation / PT Reassessment    Today's date: 2020  Patient name: Mary Stokes  : 1975  MRN: [id number]  Referring provider: Savannah Luna DO  Dx:   Encounter Diagnosis     ICD-10-CM    1  Right hip pain  M25 551                   Assessment  Assessment details: PT Reassessment: 2020  Patient reported the following progress since onset of PT: decrease in right hip pain which is now intermittent, not constant as it was at onset of PT IE  Patient noted right le mobility is great  Patient noted the following deficits due to right hip pain presentations: right hip pain with standing, walking, stair climbing, house hold and work activities  PT IE: 2020  Patient reported onset of right hip pain for 2-3 months ago  Patient noted he originally thought he had kidney stones was the cause of his right hip pain  Patient noted his right hip pain is insidious onset  Patient noted his pain is located at anterior right hip region  Patient noted his right anterior hip pain is constant  Patient noted the following deficits due to right hip pain: walking, jumping, stair climbing, static standing activities most pain aggravating, squatting, bending are all limited by pain presentation  Patient noted overall his pain remains constant  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  Patient noted his back movements will aggravate pain as well  Patient denies right hip weakness and he noted he has felt right le sensation that is intermittent  Patient denies antalgic gait pattern  Patient denies right hip injection  Patient noted oral pain medication has been beneficial in reducing his pain  Patient noted PMHx is remarkable for HTN, GERD, kidney stones, sleep apnea  Patient does exhibit relative right le length discrepancy thus pt to utilize small heel lift in left shoe to determine leg length discrepancy a true pain generator or not    Impairments: abnormal gait, abnormal or restricted ROM, abnormal movement, activity intolerance, lacks appropriate home exercise program and pain with function  Understanding of Dx/Px/POC: excellent   Prognosis: good  Prognosis details: Patient is a 54y o  year old male seen for outpatient PT reevaluation with pain, mobility and functional deficits due to right hip pain  Patient presents with the following progress since onset of PT: right hip pain from constant to intermittent and pain rating when present decreased, increase in right le rom and strength, decrease in special tests, and standing based functional improvements  Patient presents to PT on reassessment with the following problems, concerns, deficits and impairments that still persist: intermittent right anterior hip pain, decreased right le range of motion, decreased right le strength, + TTP, + special tests, functional limitations and decreased tolerance to activity  Patient would benefit from skilled PT services under the following PT treatment plan to address the above noted deficits: therapeutic exercises and activities to facilitate right le rom and strength, modalities, manual therapy techniques, Kinesio taping techniques, balance and proprioception activities, IASTM techniques, and a hep  Thank you for the referral      Goals  Short Term goals - 4 weeks  1  Patient will be independent HEP  MET  2   Patient will report a 25 - 50% decrease in pain complaints  MET  3   Increase strength 1/2 grade  MET  4   Increase ROM 5-10 degrees  MET  Long Term goals - 8 weeks  1  Patient will report elimination of pain complaints  Partially MET  2   Patient will return to all work related activities without restriction  Partially MET  3   Patient will return to all recreational activities without restriction  Partially MET  4   ROM WFL  Partially MET  5   Strength 5/5  Partially MET  6   Patient will report walking at home and with work activities improved by > 50 % due to pain reduction   Partially MET   7   Patient will report stair climbing improved by > 50 % due to pain reduction  Partially MET  8   Patient will report static standing activities at home and during work improved by > 50 % due to pain reduction  Partially MET  9   Patient will report being able to squat and bend during work and house hold activities without pain aggravation  Partially MET  Plan  Patient would benefit from: skilled physical therapy  Planned modality interventions: cryotherapy, TENS, thermotherapy: hydrocollator packs, unattended electrical stimulation and traction  Planned therapy interventions: joint mobilization, manual therapy, massage, aquatic therapy, balance, balance/weight bearing training, neuromuscular re-education, patient education, postural training, body mechanics training, self care, strengthening, stretching, compression, therapeutic activities, therapeutic exercise, therapeutic training, flexibility, functional ROM exercises, graded activity, graded exercise and graded motor  Frequency: 2x week  Duration in weeks: 8  Treatment plan discussed with: patient        Subjective Evaluation    History of Present Illness  Mechanism of injury: Patient noted PMHx is remarkable for HTN, GERD, kidney stones, sleep apnea  Pain  At best pain ratin  At worst pain rating: 3  Location: anterior right hip pain    Patient Goals  Patient goals for therapy: independence with ADLs/IADLs, decreased pain, increased motion and return to sport/leisure activities          Objective     Tenderness     Additional Tenderness Details  Patient is + minimal to moderate  TTP at right anterior hip at iliopsoas musculature      Active Range of Motion     Lumbar   Flexion: 90 degrees  with pain  Extension: 30 degrees  with pain  Left lateral flexion: 30 degrees    with pain  Right lateral flexion: 30 degrees  with pain  Left rotation: 75 degrees   Right rotation: 70 degrees   Left Hip   Flexion: 120 degrees   Abduction: 32 degrees External rotation (90/90): 42 degrees   Internal rotation (90/90): 22 degrees     Right Hip   Flexion: 118 degrees   Abduction: 24 degrees with pain  External rotation (90/90): 40 degrees with pain  Internal rotation (90/90): 42 degrees     Additional Active Range of Motion Details  Lumbar spine testing did aggravate right hip pain but he did not exhibit any pain reduction with single movements or repeated lumbar spine extension testing  Hamstring flexibility on right at 60 degrees and left at 66 degrees  Strength/Myotome Testing     Left Hip   Planes of Motion   Flexion: 5  Extension: 5  Abduction: 5  Adduction: 5  External rotation: 4+  Internal rotation: 4+    Right Hip   Planes of Motion   Flexion: 5  Extension: 5  Abduction: 5  Adduction: 5  External rotation: 4+  Internal rotation: 4+    Left Knee   Flexion: 5  Extension: 5    Right Knee   Flexion: 5  Extension: 5    Left Ankle/Foot   Dorsiflexion: 5  Plantar flexion: 5    Right Ankle/Foot   Dorsiflexion: 5  Plantar flexion: 5    Tests     Right Hip   Positive KEENA and scour  Negative piriformis  90/90 SLR: Negative  SLR: Positive  Precautions: Patient noted PMHx is remarkable for HTN, GERD, kidney stones, sleep apnea         Manuals 8/28 8/31 9/3 9/8 9/10 9/14 9/17 9/21     IASTM to right anterior hip while supine 10 min 10 mins 10 min 10 min 10 min 10 min 10 min 10 min     Right hip lateral distraction with active hip flexion MWM                                       Neuro Re-Ed                                                                                                        Ther Ex             Recumbent bike  5 mins 8 min 8 min 10 min 10 min 10 min 10 min     Standing hip flexor stretch off step:B:  3 x 20" ea 20 sec x 5 20 sec x 5 20 sec x 5 20 sec x 5 20 sec x 5 20 sec x 5      Right ITB stretch     20 sec x 5 20 sec x 5 20 sec x 5 20 sec x 5     LAQ:B:   2 x 10 3 sec 2 x 10 2# x 20 2 5# x 20 2 5# x 20 NT     Hip flexion:B:   NT NT 2# x 20 2 5# x 20 2 5# x 20 NT     Hamstring stretch:B:  EOT  3 x 20" ea 20 sec x 5 20 sec x 5 20 sec x 5 20 sec x 5 20 sec x 5 20 sec x 5     LTR:B:  5 x 10" ea  10 sec x 5 10 sec x 5 10 sec x 5 10 sec x 5 10 sec x 5 10 sec x 5     Piriformis stretch:B:  3 x 20" ea  20 sec x 3 20 sec x 5 20 sec x 5 20 sec x 5 20 sec x 5 20 sec x 5     Hip flexor stretch olamide position:R   15 sec x 4 20 sec x 5 20 sec x 5 20 sec x 5 20 sec x 5 20 sec x 5     slr flexion:B:  10x  NT NT NT NT 2 5# x 20 3# x 20     Bridges  10 x 2  3 sec x 20 5 sec x 20 2 x 10 3 sec x 20 3 sec x 30 3 sec x 30     Prone hip extension:B:   2 x 10 1 x 10:B: 2 x 10 2 5# x 20 2 5# x 20 3# x 20     Prone hip extension with knee flexed:B:   NT 1  10:B: 10 x  NT NT NT     Prone hip IR and ER:B:   NT NT 2 x 10 2 5# x 20 2 5# x 20 3# x 20     Prone press ups   NT NT NT NT NT NT     Prone knee flexion:R   2 x 10 1 x 10:B: 2 x 10 2 5# x 20 2 5# x 20 3# x 20     Mini squats   NT NT NT NT NT 2 x 10     Lunges:B:   NT NT NT NT NT 2 x 10 on blue foam     Forward step ups:B:   NT NT NT NT NT 6" x 20     Lateral step ups:B:   NT NT NT NT NT 6" x 20     Step downs:B:   NT NT NT NT NT NT     Hip hiking:B:   NT NT NT NT NT NT                               Ther Activity                                       Gait Training                                       Modalities             MHP to anterior hip PRN

## 2020-09-24 ENCOUNTER — OFFICE VISIT (OUTPATIENT)
Dept: PHYSICAL THERAPY | Age: 45
End: 2020-09-24
Payer: COMMERCIAL

## 2020-09-24 DIAGNOSIS — M25.551 RIGHT HIP PAIN: Primary | ICD-10-CM

## 2020-09-24 PROCEDURE — 97750 PHYSICAL PERFORMANCE TEST: CPT | Performed by: PHYSICAL THERAPIST

## 2020-09-24 PROCEDURE — 97110 THERAPEUTIC EXERCISES: CPT | Performed by: PHYSICAL THERAPIST

## 2020-09-24 PROCEDURE — 97140 MANUAL THERAPY 1/> REGIONS: CPT | Performed by: PHYSICAL THERAPIST

## 2020-09-24 NOTE — PROGRESS NOTES
PT Evaluation / PT Reassessment / PT Discharge    Today's date: 2020  Patient name: Georgie Tripathi  : 1975  MRN: [id number]  Referring provider: Delma Haile DO  Dx:   Encounter Diagnosis     ICD-10-CM    1  Right hip pain  M25 [id number]                   Assessment  Assessment details: PT Reassessment: 2020  Patient reported significant reduction in right anterior hip pain at site of iliopsoas tendon region, work and house hold activities improved  But, he noted use of one of the types of his work belt has aggravated his right anterior hip region pain, but this only occurred two times since onset of PT  Patient provided with education on self stretching, self manual therapy techniques and active right le movements to promote long term pain reduction  Thus, pt noted overall improvement since onset of PT and he agrees with PT POC to d/c to hep  Thus, pt provided with final hep and d/c to hep  PT Reassessment: 2020  Patient reported the following progress since onset of PT: decrease in right hip pain which is now intermittent, not constant as it was at onset of PT IE  Patient noted right le mobility is great  Patient noted the following deficits due to right hip pain presentations: right hip pain with standing, walking, stair climbing, house hold and work activities  PT IE: 2020  Patient reported onset of right hip pain for 2-3 months ago  Patient noted he originally thought he had kidney stones was the cause of his right hip pain  Patient noted his right hip pain is insidious onset  Patient noted his pain is located at anterior right hip region  Patient noted his right anterior hip pain is constant  Patient noted the following deficits due to right hip pain: walking, jumping, stair climbing, static standing activities most pain aggravating, squatting, bending are all limited by pain presentation  Patient noted overall his pain remains constant    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  Patient noted his back movements will aggravate pain as well  Patient denies right hip weakness and he noted he has felt right le sensation that is intermittent  Patient denies antalgic gait pattern  Patient denies right hip injection  Patient noted oral pain medication has been beneficial in reducing his pain  Patient noted PMHx is remarkable for HTN, GERD, kidney stones, sleep apnea  Patient does exhibit relative right le length discrepancy thus pt to utilize small heel lift in left shoe to determine leg length discrepancy a true pain generator or not  Impairments: abnormal gait, abnormal or restricted ROM, abnormal movement, activity intolerance, lacks appropriate home exercise program and pain with function  Understanding of Dx/Px/POC: excellent   Prognosis: good  Prognosis details: Patient is a 54y o  year old male seen for outpatient PT reevaluation with pain, mobility and functional deficits due to right hip pain  Patient presents with the following progress since onset of PT: right hip pain from constant to intermittent and pain rating when present decreased, increase in right le rom and strength, decrease in special tests, and standing based functional improvements  Thus, pt noted overall improvement since onset of PT and he agrees with PT POC to d/c to hep  Thus, pt provided with final hep and d/c to hep  Thank you for the referral        Goals  Short Term goals - 4 weeks  1  Patient will be independent HEP  MET  2   Patient will report a 25 - 50% decrease in pain complaints  MET  3   Increase strength 1/2 grade  MET  4   Increase ROM 5-10 degrees  MET  Long Term goals - 8 weeks  1  Patient will report elimination of pain complaints  Partially MET  2   Patient will return to all work related activities without restriction  Partially MET  3   Patient will return to all recreational activities without restriction  Partially MET  4   ROM WFL  Partially MET    5  Strength 5/5  Partially MET  6   Patient will report walking at home and with work activities improved by > 50 % due to pain reduction  Partially MET  7   Patient will report stair climbing improved by > 50 % due to pain reduction  Partially MET  8   Patient will report static standing activities at home and during work improved by > 50 % due to pain reduction  Partially MET  9   Patient will report being able to squat and bend during work and house hold activities without pain aggravation  Partially MET  Plan  Patient would benefit from: skilled physical therapy  Planned modality interventions: cryotherapy, TENS, thermotherapy: hydrocollator packs, unattended electrical stimulation and traction  Planned therapy interventions: joint mobilization, manual therapy, massage, aquatic therapy, balance, balance/weight bearing training, neuromuscular re-education, patient education, postural training, body mechanics training, self care, strengthening, stretching, compression, therapeutic activities, therapeutic exercise, therapeutic training, flexibility, functional ROM exercises, graded activity, graded exercise and graded motor  Frequency: 2x week  Duration in weeks: 8  Treatment plan discussed with: patient        Subjective Evaluation    History of Present Illness  Mechanism of injury: Patient noted PMHx is remarkable for HTN, GERD, kidney stones, sleep apnea  Pain  At best pain ratin  At worst pain rating: 3  Location: anterior right hip pain    Patient Goals  Patient goals for therapy: independence with ADLs/IADLs, decreased pain, increased motion and return to sport/leisure activities          Objective     Tenderness     Additional Tenderness Details  Patient is + minimal to moderate  TTP at right anterior hip at iliopsoas musculature      Active Range of Motion     Lumbar   Flexion: 90 degrees  with pain  Extension: 30 degrees  with pain  Left lateral flexion: 30 degrees    with pain  Right lateral flexion: 30 degrees  with pain  Left rotation: 75 degrees   Right rotation: 70 degrees   Left Hip   Flexion: 120 degrees   Abduction: 32 degrees   External rotation (90/90): 42 degrees   Internal rotation (90/90): 22 degrees     Right Hip   Flexion: 118 degrees   Abduction: 24 degrees with pain  External rotation (90/90): 40 degrees with pain  Internal rotation (90/90): 42 degrees     Additional Active Range of Motion Details  Lumbar spine testing did aggravate right hip pain but he did not exhibit any pain reduction with single movements or repeated lumbar spine extension testing  Hamstring flexibility on right at 60 degrees and left at 66 degrees  Strength/Myotome Testing     Left Hip   Planes of Motion   Flexion: 5  Extension: 5  Abduction: 5  Adduction: 5  External rotation: 4+  Internal rotation: 4+    Right Hip   Planes of Motion   Flexion: 5  Extension: 5  Abduction: 5  Adduction: 5  External rotation: 4+  Internal rotation: 4+    Left Knee   Flexion: 5  Extension: 5    Right Knee   Flexion: 5  Extension: 5    Left Ankle/Foot   Dorsiflexion: 5  Plantar flexion: 5    Right Ankle/Foot   Dorsiflexion: 5  Plantar flexion: 5    Tests     Right Hip   Positive KEENA and scour  Negative piriformis  90/90 SLR: Negative  SLR: Positive  Precautions: Patient noted PMHx is remarkable for HTN, GERD, kidney stones, sleep apnea         Manuals 8/28 8/31 9/3 9/8 9/10 9/14 9/17 9/21 9/24    IASTM to right anterior hip while supine 10 min 10 mins 10 min 10 min 10 min 10 min 10 min 10 min 10 min    Right hip lateral distraction with active hip flexion MWM                                       Neuro Re-Ed                                                                                                        Ther Ex             Recumbent bike  5 mins 8 min 8 min 10 min 10 min 10 min 10 min 10 min    Standing hip flexor stretch off step:B:  3 x 20" ea 20 sec x 5 20 sec x 5 20 sec x 5 20 sec x 5 20 sec x 5 20 sec x 5  20 sec x 5    Right ITB stretch     20 sec x 5 20 sec x 5 20 sec x 5 20 sec x 5 20 sec  x5    LAQ:B:   2 x 10 3 sec 2 x 10 2# x 20 2 5# x 20 2 5# x 20 NT NT    Hip flexion:B:   NT NT 2# x 20 2 5# x 20 2 5# x 20 NT NT    Hamstring stretch:B:  EOT  3 x 20" ea 20 sec x 5 20 sec x 5 20 sec x 5 20 sec x 5 20 sec x 5 20 sec x 5 20 sec  X 5    LTR:B:  5 x 10" ea  10 sec x 5 10 sec x 5 10 sec x 5 10 sec x 5 10 sec x 5 10 sec x 5 10 sec x 5    Piriformis stretch:B:  3 x 20" ea  20 sec x 3 20 sec x 5 20 sec x 5 20 sec x 5 20 sec x 5 20 sec x 5 20 sec x 5    Hip flexor stretch olamide position:R   15 sec x 4 20 sec x 5 20 sec x 5 20 sec x 5 20 sec x 5 20 sec x 5 20 sec x 5    slr flexion:B:  10x  NT NT NT NT 2 5# x 20 3# x 20 3# x 20    Bridges  10 x 2  3 sec x 20 5 sec x 20 2 x 10 3 sec x 20 3 sec x 30 3 sec x 30 3 sec x 30    Prone hip extension:B:   2 x 10 1 x 10:B: 2 x 10 2 5# x 20 2 5# x 20 3# x 20 3# x 20    Prone hip extension with knee flexed:B:   NT 1  10:B: 10 x  NT NT NT NT    Prone hip IR and ER:B:   NT NT 2 x 10 2 5# x 20 2 5# x 20 3# x 20 3# x 20    Prone press ups   NT NT NT NT NT NT NT    Prone knee flexion:R   2 x 10 1 x 10:B: 2 x 10 2 5# x 20 2 5# x 20 3# x 20 3# x 20    Mini squats   NT NT NT NT NT 2 x 10 2 x 10    Lunges:B:   NT NT NT NT NT 2 x 10 on blue foam 2 x 10 on blue foam    Forward step ups:B:   NT NT NT NT NT 6" x 20 6" x 20    Lateral step ups:B:   NT NT NT NT NT 6" x 20 6" x 20    Step downs:B:   NT NT NT NT NT NT NT    Hip hiking:B:   NT NT NT NT NT NT NT                              Ther Activity                                       Gait Training                                       Modalities             MHP to anterior hip PRN

## 2020-12-08 DIAGNOSIS — I10 BENIGN ESSENTIAL HYPERTENSION: ICD-10-CM

## 2020-12-08 DIAGNOSIS — I10 ESSENTIAL HYPERTENSION: ICD-10-CM

## 2020-12-08 DIAGNOSIS — N18.2 CKD (CHRONIC KIDNEY DISEASE), STAGE II: ICD-10-CM

## 2020-12-08 DIAGNOSIS — N20.0 NEPHROLITHIASIS: ICD-10-CM

## 2020-12-09 RX ORDER — POTASSIUM CITRATE 15 MEQ/1
TABLET, EXTENDED RELEASE ORAL
Qty: 60 TABLET | Refills: 1 | Status: SHIPPED | OUTPATIENT
Start: 2020-12-09 | End: 2021-01-21 | Stop reason: SDUPTHER

## 2020-12-09 RX ORDER — CHLORTHALIDONE 25 MG/1
25 TABLET ORAL DAILY
Qty: 30 TABLET | Refills: 1 | Status: SHIPPED | OUTPATIENT
Start: 2020-12-09 | End: 2021-01-21 | Stop reason: SDUPTHER

## 2020-12-10 DIAGNOSIS — N18.2 CKD (CHRONIC KIDNEY DISEASE), STAGE II: ICD-10-CM

## 2020-12-10 DIAGNOSIS — I10 BENIGN ESSENTIAL HYPERTENSION: ICD-10-CM

## 2020-12-10 DIAGNOSIS — I10 ESSENTIAL HYPERTENSION: ICD-10-CM

## 2020-12-10 RX ORDER — CARVEDILOL 25 MG/1
25 TABLET ORAL 2 TIMES DAILY WITH MEALS
Qty: 60 TABLET | Refills: 1 | Status: SHIPPED | OUTPATIENT
Start: 2020-12-10 | End: 2021-01-21 | Stop reason: SDUPTHER

## 2020-12-10 RX ORDER — EPLERENONE 50 MG/1
50 TABLET, FILM COATED ORAL 2 TIMES DAILY
Qty: 60 TABLET | Refills: 1 | Status: SHIPPED | OUTPATIENT
Start: 2020-12-10 | End: 2021-01-21 | Stop reason: SDUPTHER

## 2020-12-19 ENCOUNTER — IMMUNIZATIONS (OUTPATIENT)
Dept: FAMILY MEDICINE CLINIC | Facility: HOSPITAL | Age: 45
End: 2020-12-19
Payer: COMMERCIAL

## 2020-12-19 DIAGNOSIS — Z23 ENCOUNTER FOR IMMUNIZATION: ICD-10-CM

## 2020-12-19 PROCEDURE — 0001A SARS-COV-2 / COVID-19 MRNA VACCINE (PFIZER-BIONTECH) 30 MCG: CPT

## 2020-12-19 PROCEDURE — 91300 SARS-COV-2 / COVID-19 MRNA VACCINE (PFIZER-BIONTECH) 30 MCG: CPT

## 2020-12-24 DIAGNOSIS — N20.0 NEPHROLITHIASIS: ICD-10-CM

## 2020-12-24 DIAGNOSIS — I10 BENIGN ESSENTIAL HYPERTENSION: Primary | ICD-10-CM

## 2020-12-26 ENCOUNTER — LAB (OUTPATIENT)
Dept: LAB | Facility: HOSPITAL | Age: 45
End: 2020-12-26
Attending: INTERNAL MEDICINE
Payer: COMMERCIAL

## 2020-12-26 DIAGNOSIS — N20.0 NEPHROLITHIASIS: ICD-10-CM

## 2020-12-26 DIAGNOSIS — I10 BENIGN ESSENTIAL HYPERTENSION: ICD-10-CM

## 2020-12-26 LAB
ANION GAP SERPL CALCULATED.3IONS-SCNC: 6 MMOL/L (ref 4–13)
BUN SERPL-MCNC: 20 MG/DL (ref 5–25)
CALCIUM SERPL-MCNC: 9.7 MG/DL (ref 8.3–10.1)
CHLORIDE SERPL-SCNC: 103 MMOL/L (ref 100–108)
CO2 SERPL-SCNC: 30 MMOL/L (ref 21–32)
CREAT SERPL-MCNC: 1.31 MG/DL (ref 0.6–1.3)
GFR SERPL CREATININE-BSD FRML MDRD: 65 ML/MIN/1.73SQ M
GLUCOSE P FAST SERPL-MCNC: 117 MG/DL (ref 65–99)
POTASSIUM SERPL-SCNC: 3.3 MMOL/L (ref 3.5–5.3)
SODIUM SERPL-SCNC: 139 MMOL/L (ref 136–145)

## 2020-12-26 PROCEDURE — 36415 COLL VENOUS BLD VENIPUNCTURE: CPT

## 2020-12-26 PROCEDURE — 80048 BASIC METABOLIC PNL TOTAL CA: CPT

## 2020-12-28 ENCOUNTER — OFFICE VISIT (OUTPATIENT)
Dept: NEPHROLOGY | Facility: CLINIC | Age: 45
End: 2020-12-28
Payer: COMMERCIAL

## 2020-12-28 VITALS
HEART RATE: 66 BPM | RESPIRATION RATE: 18 BRPM | DIASTOLIC BLOOD PRESSURE: 82 MMHG | BODY MASS INDEX: 28.68 KG/M2 | WEIGHT: 193.6 LBS | HEIGHT: 69 IN | SYSTOLIC BLOOD PRESSURE: 130 MMHG

## 2020-12-28 DIAGNOSIS — E55.9 VITAMIN D DEFICIENCY: ICD-10-CM

## 2020-12-28 DIAGNOSIS — E87.6 HYPOKALEMIA: ICD-10-CM

## 2020-12-28 DIAGNOSIS — I12.9 BENIGN HYPERTENSION WITH CKD (CHRONIC KIDNEY DISEASE), STAGE II: Primary | ICD-10-CM

## 2020-12-28 DIAGNOSIS — N18.2 BENIGN HYPERTENSION WITH CKD (CHRONIC KIDNEY DISEASE), STAGE II: Primary | ICD-10-CM

## 2020-12-28 DIAGNOSIS — N18.2 CKD (CHRONIC KIDNEY DISEASE) STAGE 2, GFR 60-89 ML/MIN: ICD-10-CM

## 2020-12-28 DIAGNOSIS — R82.991 HYPOCITRATURIA: ICD-10-CM

## 2020-12-28 DIAGNOSIS — N20.0 NEPHROLITHIASIS: ICD-10-CM

## 2020-12-28 PROCEDURE — 99214 OFFICE O/P EST MOD 30 MIN: CPT | Performed by: INTERNAL MEDICINE

## 2020-12-28 RX ORDER — ROSUVASTATIN CALCIUM 10 MG/1
10 TABLET, COATED ORAL EVERY EVENING
COMMUNITY
Start: 2020-12-10

## 2020-12-28 RX ORDER — POTASSIUM CHLORIDE 20 MEQ/1
20 TABLET, EXTENDED RELEASE ORAL DAILY
Qty: 15 TABLET | Refills: 0 | Status: SHIPPED | OUTPATIENT
Start: 2020-12-28 | End: 2021-01-14 | Stop reason: SDUPTHER

## 2021-01-08 ENCOUNTER — IMMUNIZATIONS (OUTPATIENT)
Dept: FAMILY MEDICINE CLINIC | Facility: HOSPITAL | Age: 46
End: 2021-01-08

## 2021-01-08 DIAGNOSIS — Z23 ENCOUNTER FOR IMMUNIZATION: ICD-10-CM

## 2021-01-08 PROCEDURE — 91300 SARS-COV-2 / COVID-19 MRNA VACCINE (PFIZER-BIONTECH) 30 MCG: CPT

## 2021-01-08 PROCEDURE — 0002A SARS-COV-2 / COVID-19 MRNA VACCINE (PFIZER-BIONTECH) 30 MCG: CPT

## 2021-01-09 ENCOUNTER — LAB (OUTPATIENT)
Dept: LAB | Facility: HOSPITAL | Age: 46
End: 2021-01-09
Attending: INTERNAL MEDICINE
Payer: COMMERCIAL

## 2021-01-09 ENCOUNTER — TRANSCRIBE ORDERS (OUTPATIENT)
Dept: LAB | Facility: HOSPITAL | Age: 46
End: 2021-01-09

## 2021-01-09 DIAGNOSIS — E87.6 HYPOKALEMIA: ICD-10-CM

## 2021-01-09 DIAGNOSIS — E24.9 CUSHING'S SYNDROME (HCC): ICD-10-CM

## 2021-01-09 DIAGNOSIS — E24.9 CUSHING'S SYNDROME (HCC): Primary | ICD-10-CM

## 2021-01-09 LAB
ANION GAP SERPL CALCULATED.3IONS-SCNC: 4 MMOL/L (ref 4–13)
BUN SERPL-MCNC: 26 MG/DL (ref 5–25)
CALCIUM SERPL-MCNC: 9.7 MG/DL (ref 8.3–10.1)
CHLORIDE SERPL-SCNC: 103 MMOL/L (ref 100–108)
CO2 SERPL-SCNC: 31 MMOL/L (ref 21–32)
CORTIS SERPL-MCNC: 15.5 UG/DL
CREAT SERPL-MCNC: 1.17 MG/DL (ref 0.6–1.3)
GFR SERPL CREATININE-BSD FRML MDRD: 74 ML/MIN/1.73SQ M
GLUCOSE P FAST SERPL-MCNC: 152 MG/DL (ref 65–99)
MAGNESIUM SERPL-MCNC: 1.9 MG/DL (ref 1.6–2.6)
POTASSIUM SERPL-SCNC: 3.3 MMOL/L (ref 3.5–5.3)
SODIUM SERPL-SCNC: 138 MMOL/L (ref 136–145)

## 2021-01-09 PROCEDURE — 84402 ASSAY OF FREE TESTOSTERONE: CPT

## 2021-01-09 PROCEDURE — 36415 COLL VENOUS BLD VENIPUNCTURE: CPT

## 2021-01-09 PROCEDURE — 83735 ASSAY OF MAGNESIUM: CPT

## 2021-01-09 PROCEDURE — 82024 ASSAY OF ACTH: CPT

## 2021-01-09 PROCEDURE — 82533 TOTAL CORTISOL: CPT

## 2021-01-09 PROCEDURE — 80048 BASIC METABOLIC PNL TOTAL CA: CPT

## 2021-01-09 PROCEDURE — 84403 ASSAY OF TOTAL TESTOSTERONE: CPT

## 2021-01-10 ENCOUNTER — APPOINTMENT (OUTPATIENT)
Dept: LAB | Facility: HOSPITAL | Age: 46
End: 2021-01-10
Payer: COMMERCIAL

## 2021-01-10 DIAGNOSIS — E24.9 CUSHING'S SYNDROME (HCC): ICD-10-CM

## 2021-01-10 PROCEDURE — 82530 CORTISOL FREE: CPT

## 2021-01-11 LAB
TESTOST FREE SERPL-MCNC: 8.7 PG/ML (ref 6.8–21.5)
TESTOST SERPL-MCNC: 91 NG/DL (ref 264–916)

## 2021-01-12 LAB — ACTH PLAS-MCNC: <1.5 PG/ML (ref 7.2–63.3)

## 2021-01-14 ENCOUNTER — TELEPHONE (OUTPATIENT)
Dept: NEPHROLOGY | Facility: CLINIC | Age: 46
End: 2021-01-14

## 2021-01-14 DIAGNOSIS — E87.6 HYPOKALEMIA: ICD-10-CM

## 2021-01-14 RX ORDER — POTASSIUM CHLORIDE 20 MEQ/1
20 TABLET, EXTENDED RELEASE ORAL DAILY
Qty: 30 TABLET | Refills: 3 | Status: SHIPPED | OUTPATIENT
Start: 2021-01-14 | End: 2021-03-03 | Stop reason: SDUPTHER

## 2021-01-14 NOTE — TELEPHONE ENCOUNTER
I spoke to the patient, he advised he has yet to finish the original prescription of 20 mEq daily for 15 days currently has 7 pills left  Please advise if you are still wanting to increase to 40 mEq daily for 3 days and continue with 20 mEq daily following

## 2021-01-14 NOTE — TELEPHONE ENCOUNTER
Patient aware of instructions and will go forward tith increase dosage of potassium 40 mEq for 3 days thereafter continue to 20 mEq daily

## 2021-01-14 NOTE — TELEPHONE ENCOUNTER
His creatinine remains stable  I recently started him on potassium chloride 20 mEq one tablet daily for total 15 days  Potassium level still remains lower 3 3 without significant improvement  Would like him to increase potassium chloride to 40 mEq daily for next three days and then continue 20 mg daily afterwards  I have sent refill to the pharmacy  He should have repeat BMP in 2 to 3 weeks

## 2021-01-15 LAB
CORTIS F 24H UR-MRATE: 97 UG/24 HR (ref 5–64)
CORTIS F UR-MCNC: 69 UG/L

## 2021-01-21 ENCOUNTER — TELEPHONE (OUTPATIENT)
Dept: GASTROENTEROLOGY | Facility: AMBULARY SURGERY CENTER | Age: 46
End: 2021-01-21

## 2021-01-21 DIAGNOSIS — N18.2 CKD (CHRONIC KIDNEY DISEASE), STAGE II: ICD-10-CM

## 2021-01-21 DIAGNOSIS — K22.70 BARRETT'S ESOPHAGUS DETERMINED BY ENDOSCOPY: ICD-10-CM

## 2021-01-21 DIAGNOSIS — K21.00 GASTROESOPHAGEAL REFLUX DISEASE WITH ESOPHAGITIS: ICD-10-CM

## 2021-01-21 DIAGNOSIS — N20.0 NEPHROLITHIASIS: ICD-10-CM

## 2021-01-21 DIAGNOSIS — I10 BENIGN ESSENTIAL HYPERTENSION: ICD-10-CM

## 2021-01-21 DIAGNOSIS — I10 ESSENTIAL HYPERTENSION: ICD-10-CM

## 2021-01-21 RX ORDER — OMEPRAZOLE 40 MG/1
40 CAPSULE, DELAYED RELEASE ORAL 2 TIMES DAILY
Qty: 180 CAPSULE | Refills: 0 | Status: SHIPPED | OUTPATIENT
Start: 2021-01-21 | End: 2021-05-03 | Stop reason: SDUPTHER

## 2021-01-21 RX ORDER — POTASSIUM CITRATE 15 MEQ/1
TABLET, EXTENDED RELEASE ORAL
Qty: 90 TABLET | Refills: 3 | Status: SHIPPED | OUTPATIENT
Start: 2021-01-21 | End: 2021-11-30 | Stop reason: SDUPTHER

## 2021-01-21 RX ORDER — EPLERENONE 50 MG/1
50 TABLET, FILM COATED ORAL 2 TIMES DAILY
Qty: 180 TABLET | Refills: 3 | Status: SHIPPED | OUTPATIENT
Start: 2021-01-21 | End: 2022-05-24 | Stop reason: SDUPTHER

## 2021-01-21 RX ORDER — CHLORTHALIDONE 25 MG/1
25 TABLET ORAL DAILY
Qty: 90 TABLET | Refills: 3 | Status: SHIPPED | OUTPATIENT
Start: 2021-01-21 | End: 2022-05-24 | Stop reason: SDUPTHER

## 2021-01-21 RX ORDER — CARVEDILOL 25 MG/1
25 TABLET ORAL 2 TIMES DAILY WITH MEALS
Qty: 180 TABLET | Refills: 3 | Status: SHIPPED | OUTPATIENT
Start: 2021-01-21 | End: 2022-05-24 | Stop reason: SDUPTHER

## 2021-01-21 NOTE — TELEPHONE ENCOUNTER
Last office visit Dec 2019, hx of allison's esophagus  Omeprazole increased to 40 mg twice daily  Clerical Team:  Please call patient to schedule f/u office visit, dx allison's esophagus  He hasn't been seen in over one year and will need visit in order to continue to reorder medications  Thank you

## 2021-01-21 NOTE — TELEPHONE ENCOUNTER
Patients GI provider:  Dr Virgil Alvarez    Number to return call: (    Reason for call: [facility]  calling b/c pt needs a 90 day suply w/ refills for the Prilosec 40mg bid     Scheduled procedure/appointment date if applicable: Apt/procedure

## 2021-02-09 ENCOUNTER — TRANSCRIBE ORDERS (OUTPATIENT)
Dept: ADMINISTRATIVE | Facility: HOSPITAL | Age: 46
End: 2021-02-09

## 2021-02-09 DIAGNOSIS — D35.02 BENIGN NEOPLASM OF LEFT ADRENAL GLAND: ICD-10-CM

## 2021-02-09 DIAGNOSIS — D35.01 BENIGN NEOPLASM OF RIGHT ADRENAL GLAND: Primary | ICD-10-CM

## 2021-03-01 ENCOUNTER — TELEPHONE (OUTPATIENT)
Dept: RADIOLOGY | Facility: HOSPITAL | Age: 46
End: 2021-03-01

## 2021-03-02 ENCOUNTER — LAB (OUTPATIENT)
Dept: LAB | Facility: HOSPITAL | Age: 46
End: 2021-03-02
Attending: INTERNAL MEDICINE
Payer: COMMERCIAL

## 2021-03-02 DIAGNOSIS — N18.2 CKD (CHRONIC KIDNEY DISEASE) STAGE 2, GFR 60-89 ML/MIN: ICD-10-CM

## 2021-03-02 DIAGNOSIS — E87.6 HYPOKALEMIA: ICD-10-CM

## 2021-03-02 LAB
ANION GAP SERPL CALCULATED.3IONS-SCNC: 8 MMOL/L (ref 4–13)
BUN SERPL-MCNC: 23 MG/DL (ref 5–25)
CALCIUM SERPL-MCNC: 9.7 MG/DL (ref 8.3–10.1)
CHLORIDE SERPL-SCNC: 105 MMOL/L (ref 100–108)
CO2 SERPL-SCNC: 28 MMOL/L (ref 21–32)
CREAT SERPL-MCNC: 1.36 MG/DL (ref 0.6–1.3)
GFR SERPL CREATININE-BSD FRML MDRD: 62 ML/MIN/1.73SQ M
GLUCOSE P FAST SERPL-MCNC: 82 MG/DL (ref 65–99)
MAGNESIUM SERPL-MCNC: 1.9 MG/DL (ref 1.6–2.6)
POTASSIUM SERPL-SCNC: 3.3 MMOL/L (ref 3.5–5.3)
SODIUM SERPL-SCNC: 141 MMOL/L (ref 136–145)

## 2021-03-02 PROCEDURE — 83735 ASSAY OF MAGNESIUM: CPT

## 2021-03-02 PROCEDURE — 36415 COLL VENOUS BLD VENIPUNCTURE: CPT

## 2021-03-02 PROCEDURE — 80048 BASIC METABOLIC PNL TOTAL CA: CPT

## 2021-03-03 ENCOUNTER — TELEPHONE (OUTPATIENT)
Dept: NEPHROLOGY | Facility: CLINIC | Age: 46
End: 2021-03-03

## 2021-03-03 DIAGNOSIS — E87.6 HYPOKALEMIA: ICD-10-CM

## 2021-03-03 RX ORDER — POTASSIUM CHLORIDE 20 MEQ/1
40 TABLET, EXTENDED RELEASE ORAL DAILY
Qty: 60 TABLET | Refills: 5 | Status: SHIPPED | OUTPATIENT
Start: 2021-03-03 | End: 2021-03-04 | Stop reason: SDUPTHER

## 2021-03-03 NOTE — TELEPHONE ENCOUNTER
Please let him know creatinine remains stable  Serum potassium still remains lower 3 3  Would like him to  Increase potassium chloride to 40 mEq daily  Will do repeat BMP in 10 days

## 2021-03-04 ENCOUNTER — TELEPHONE (OUTPATIENT)
Dept: NEPHROLOGY | Facility: CLINIC | Age: 46
End: 2021-03-04

## 2021-03-04 DIAGNOSIS — N18.2 CKD (CHRONIC KIDNEY DISEASE), STAGE II: ICD-10-CM

## 2021-03-04 DIAGNOSIS — E87.6 HYPOKALEMIA: Primary | ICD-10-CM

## 2021-03-04 RX ORDER — POTASSIUM CHLORIDE 20 MEQ/1
40 TABLET, EXTENDED RELEASE ORAL DAILY
Qty: 60 TABLET | Refills: 5 | Status: SHIPPED | OUTPATIENT
Start: 2021-03-04 | End: 2021-03-17 | Stop reason: SDUPTHER

## 2021-03-04 NOTE — TELEPHONE ENCOUNTER
I called and left a detail message asking patient to contact the [place] office to schedule the follow up for may with Dr Posey  Called patient from the recall list  Please schedule when patient returns the call back

## 2021-03-04 NOTE — TELEPHONE ENCOUNTER
I called and spoke with the patient informed the following:  -Creatinine remains stable     -Serum potassium still remains lower 3 3    -Increase potassium chloride to 40 mEq daily -I placed script in chart-order pending Dr Marcia Lomeli  Patient states he will repeat BMP in 10 days through [address] labs-order in system

## 2021-03-17 ENCOUNTER — APPOINTMENT (OUTPATIENT)
Dept: LAB | Facility: HOSPITAL | Age: 46
End: 2021-03-17
Attending: INTERNAL MEDICINE
Payer: COMMERCIAL

## 2021-03-17 ENCOUNTER — OFFICE VISIT (OUTPATIENT)
Dept: OBGYN CLINIC | Facility: MEDICAL CENTER | Age: 46
End: 2021-03-17
Payer: COMMERCIAL

## 2021-03-17 ENCOUNTER — TELEPHONE (OUTPATIENT)
Dept: OTHER | Facility: HOSPITAL | Age: 46
End: 2021-03-17

## 2021-03-17 VITALS
HEART RATE: 71 BPM | DIASTOLIC BLOOD PRESSURE: 86 MMHG | TEMPERATURE: 98.4 F | HEIGHT: 69 IN | SYSTOLIC BLOOD PRESSURE: 132 MMHG | WEIGHT: 190 LBS | BODY MASS INDEX: 28.14 KG/M2

## 2021-03-17 DIAGNOSIS — M16.11 PRIMARY OSTEOARTHRITIS OF ONE HIP, RIGHT: Primary | ICD-10-CM

## 2021-03-17 DIAGNOSIS — E87.6 HYPOKALEMIA: ICD-10-CM

## 2021-03-17 DIAGNOSIS — N18.2 CKD (CHRONIC KIDNEY DISEASE), STAGE II: ICD-10-CM

## 2021-03-17 DIAGNOSIS — M25.551 PAIN IN RIGHT HIP: ICD-10-CM

## 2021-03-17 LAB
ANION GAP SERPL CALCULATED.3IONS-SCNC: 5 MMOL/L (ref 4–13)
BUN SERPL-MCNC: 36 MG/DL (ref 5–25)
CALCIUM SERPL-MCNC: 9.9 MG/DL (ref 8.3–10.1)
CHLORIDE SERPL-SCNC: 106 MMOL/L (ref 100–108)
CO2 SERPL-SCNC: 27 MMOL/L (ref 21–32)
CREAT SERPL-MCNC: 1.42 MG/DL (ref 0.6–1.3)
GFR SERPL CREATININE-BSD FRML MDRD: 58 ML/MIN/1.73SQ M
GLUCOSE P FAST SERPL-MCNC: 112 MG/DL (ref 65–99)
POTASSIUM SERPL-SCNC: 4 MMOL/L (ref 3.5–5.3)
SODIUM SERPL-SCNC: 138 MMOL/L (ref 136–145)

## 2021-03-17 PROCEDURE — 80048 BASIC METABOLIC PNL TOTAL CA: CPT

## 2021-03-17 PROCEDURE — 99213 OFFICE O/P EST LOW 20 MIN: CPT | Performed by: ORTHOPAEDIC SURGERY

## 2021-03-17 PROCEDURE — 36415 COLL VENOUS BLD VENIPUNCTURE: CPT

## 2021-03-17 RX ORDER — DIAZEPAM 2 MG/1
2 TABLET ORAL SEE ADMIN INSTRUCTIONS
Qty: 2 TABLET | Refills: 0 | Status: SHIPPED | OUTPATIENT
Start: 2021-03-17 | End: 2021-12-07

## 2021-03-17 RX ORDER — POTASSIUM CHLORIDE 20 MEQ/1
TABLET, EXTENDED RELEASE ORAL
Qty: 90 TABLET | Refills: 5 | Status: SHIPPED | OUTPATIENT
Start: 2021-03-17 | End: 2021-05-19

## 2021-03-17 NOTE — PROGRESS NOTES
Assessment/Plan     1  Primary osteoarthritis of one hip, right    2  Pain in right hip      Orders Placed This Encounter   Procedures    MRI arthrogram right hip    FL injection right hip (arthrogram)       · MRI arthrogram right hip ordered to evaluate for labral tear  · Provided script for valium x 2 tabs to take prior to MRI due to claustrophobia  · Continue tylenol as needed up to 3000 mg per day  Avoids NSAIDs due to CKD  · Continue home exercises  · Discussed with patient that we may discuss MRI results with Dr Terri Montemayor if labral tear present  He may also consider IA steroid injection as well  Return for MRI arthrogram review  I answered all of the patient's questions during the visit and provided education of the patient's condition during the visit  The patient verbalized understanding of the information given and agrees with the plan  This note was dictated using [employer] software  It may contain errors including improperly dictated words  Please contact physician directly for any questions  Subjective   Chief Complaint:   Chief Complaint   Patient presents with    Right Hip - Follow-up       HPI:  Deena Epperson is a 51 y o  male who presents for follow up for right hip pain  Patient reports persistent right hip pain  He states since his last visit he did attend PT and felt it helped significantly with his pain  However his right hip pain returned despite continuing home exercises  He notes pain that starts in the right hip and radiates into the groin and occasionally across the lower abdomen  Pain is described as sharp  He has tried loosening his belt to ensure it was not squeezing his hip and stomach  He cannot identify other specific palliating or provoking factors  He takes tylenol for pain  He was told to discontinue diclofenac due to kidney disease  Denies pain in the back or down the leg  Review of Systems  See HPI for musculoskeletal review     All other systems reviewed are negative     History:  Past Medical History:   Diagnosis Date    Adrenal mass ([address] )     Chronic kidney disease     Disease of thyroid gland     GERD (gastroesophageal reflux disease)     Hyperlipidemia     Hypertension     Kidney stones     Low testosterone     Sleep apnea     not currently using his CPAP    Thyroid disease      Past Surgical History:   Procedure Laterality Date    FL RETROGRADE PYELOGRAM  2018    KIDNEY STONE SURGERY      IL CYSTO/URETERO W/LITHOTRIPSY &INDWELL STENT INSRT Left 2018    Procedure: CYSTOSCOPY URETEROSCOPY, RETROGRADE PYELOGRAM AND INSERTION STENT URETERAL;  Surgeon: Nestor Reyes MD;  [facility];  Service: Urology    IL ESOPHAGOGASTRODUODENOSCOPY TRANSORAL DIAGNOSTIC N/A 2018    Procedure: ESOPHAGOGASTRODUODENOSCOPY (EGD); Surgeon: Bryan Laird MD;  Location: [facility];   Service: Gastroenterology     Social History   Social History     Substance and Sexual Activity   Alcohol Use Not Currently    Comment: 1 drink every two weeks or so     Social History     Substance and Sexual Activity   Drug Use No     Social History     Tobacco Use   Smoking Status Former Smoker    Packs/day: 1 00    Years: 17 00    Pack years: 17 00    Quit date:     Years since quittin 2   Smokeless Tobacco Never Used     Family History:   Family History   Problem Relation Age of Onset    Asthma Mother     Diabetes Mother     Other Father         Endocarditis    Hypertension Father     Gout Father        Current Outpatient Medications on File Prior to Visit   Medication Sig Dispense Refill    carvedilol (COREG) 25 mg tablet Take 1 tablet (25 mg total) by mouth 2 (two) times a day with meals 180 tablet 3    chlorthalidone 25 mg tablet Take 1 tablet (25 mg total) by mouth daily 90 tablet 3    Cholecalciferol (VITAMIN D) 2000 units CAPS Take 1 tablet by mouth daily      eplerenone (INSPRA) 50 MG tablet Take 1 tablet (50 mg total) by mouth 2 (two) times a day 180 tablet 3    felodipine (PLENDIL) 10 MG 24 hr tablet Take 1 tablet (10 mg total) by mouth daily 90 tablet 3    omeprazole (PriLOSEC) 40 MG capsule Take 1 capsule (40 mg total) by mouth 2 (two) times a day 180 capsule 0    potassium chloride (K-DUR,KLOR-CON) 20 mEq tablet Take 2 tablets (40 mEq total) by mouth daily 60 tablet 5    Potassium Citrate ER 15 MEQ (1620 MG) TBCR Take one tablet twice a day 90 tablet 3    rosuvastatin (CRESTOR) 10 MG tablet Take 10 mg by mouth daily      SYRINGE-NEEDLE, DISP, 3 ML 21G X 1-1/2" 3 ML MISC For testerone injection      SYRINGE-NEEDLE, DISP, 3 ML 21G X 1-1/2" 3 ML MISC For testerone injection      testosterone cypionate (DEPO-TESTOSTERONE) 200 mg/mL SOLN Inject 0 8 mL deep IM once every two weeks   torsemide (DEMADEX) 10 mg tablet Take by mouth as needed        No current facility-administered medications on file prior to visit  No Known Allergies     Objective     /86   Pulse 71   Temp 98 4 °F (36 9 °C)   Ht 5' 9" (1 753 m)   Wt 86 2 kg (190 lb)   BMI 28 06 kg/m²      PE:  AAOx 3  WDWN  Hearing intact, no drainage from eyes  no audible wheezing  no abdominal distension  LE compartments soft, skin intact    right hip:   No dislocation/deformity  Positive  [facility]  ROM: 0- 150  Int rot- 35  Ext rot- 50  Positive  Kathryn Test  Neg  Impingement test  No TTP over greater trochanter  Abduction: 5/5  Positive   Fouzia's test  No TTP over SIJ    AT/GS intact

## 2021-03-17 NOTE — TELEPHONE ENCOUNTER
Creatinine 1 4 relatively stable  Serum potassium is improved to 4 0 within normal range  Would reduce potassium chloride from 40 mEq daily to potassium chloride 40 mEq alternating with 20 mEq on alternate days

## 2021-03-18 NOTE — TELEPHONE ENCOUNTER
I spoke with the patient and went of the following:  -Creatinine 1 4 relatively stable     -Serum potassium is improved to 4 0 within normal range     -Reduce potassium chloride from 40 mEq daily to potassium chloride 40 mEq alternating with 20 mEq on alternate days  -patient states he understands and has questions regarding his current medications  States he would like Dr Jaylin Osborn to look through medications and see if he can come off any at this time  He does have an appointment in May but wanted to talk about this with you prior to appointment if possible  Thank you

## 2021-03-18 NOTE — TELEPHONE ENCOUNTER
Discussed with patient as he had concerns about taking potassium supplement on also taking chlorthalidone  Advised that given his history of nephrolithiasis, this is a better choice for diuretic  He is agreeable with this plan  He will be doing 24 hour urine stone risk profile before his office visit

## 2021-03-22 NOTE — NURSING NOTE
Call placed to patient to discuss upcoming right hip arthrogram at [facility]  RN reviewed patient allergies, verified that patient does not currently take any anticoagulant medications and discussed pre and post procedure expectations  Questions answered  Patient reminded of location and time of the expected procedure  Patient verbalizes understanding  Contact number provided in case patient has any further questions

## 2021-03-29 DIAGNOSIS — I10 ESSENTIAL HYPERTENSION: ICD-10-CM

## 2021-03-29 RX ORDER — FELODIPINE 10 MG/1
10 TABLET, EXTENDED RELEASE ORAL DAILY
Qty: 90 TABLET | Refills: 3 | Status: SHIPPED | OUTPATIENT
Start: 2021-03-29 | End: 2022-05-24 | Stop reason: SDUPTHER

## 2021-03-31 ENCOUNTER — HOSPITAL ENCOUNTER (OUTPATIENT)
Dept: MRI IMAGING | Facility: HOSPITAL | Age: 46
Discharge: HOME | End: 2021-03-31
Payer: COMMERCIAL

## 2021-03-31 ENCOUNTER — HOSPITAL ENCOUNTER (OUTPATIENT)
Dept: RADIOLOGY | Facility: HOSPITAL | Age: 46
Discharge: HOME | End: 2021-03-31
Admitting: RADIOLOGY
Payer: COMMERCIAL

## 2021-03-31 DIAGNOSIS — M25.551 PAIN IN RIGHT HIP: ICD-10-CM

## 2021-03-31 PROCEDURE — 27095 INJECTION FOR HIP X-RAY: CPT

## 2021-03-31 PROCEDURE — A9585 GADOBUTROL INJECTION: HCPCS | Performed by: PHYSICIAN ASSISTANT

## 2021-03-31 PROCEDURE — 77002 NEEDLE LOCALIZATION BY XRAY: CPT

## 2021-03-31 PROCEDURE — G1004 CDSM NDSC: HCPCS

## 2021-03-31 PROCEDURE — 73722 MRI JOINT OF LWR EXTR W/DYE: CPT

## 2021-03-31 RX ORDER — LIDOCAINE HYDROCHLORIDE 10 MG/ML
5 INJECTION, SOLUTION EPIDURAL; INFILTRATION; INTRACAUDAL; PERINEURAL ONCE
Status: DISCONTINUED | OUTPATIENT
Start: 2021-03-31 | End: 2021-04-01 | Stop reason: HOSPADM

## 2021-03-31 RX ADMIN — GADOBUTROL 0.2 ML: 604.72 INJECTION INTRAVENOUS at 15:00

## 2021-03-31 RX ADMIN — IOHEXOL 1 ML: 300 INJECTION, SOLUTION INTRAVENOUS at 15:00

## 2021-04-05 ENCOUNTER — OFFICE VISIT (OUTPATIENT)
Dept: OBGYN CLINIC | Facility: MEDICAL CENTER | Age: 46
End: 2021-04-05
Payer: COMMERCIAL

## 2021-04-05 VITALS
HEIGHT: 69 IN | SYSTOLIC BLOOD PRESSURE: 143 MMHG | DIASTOLIC BLOOD PRESSURE: 90 MMHG | TEMPERATURE: 97.8 F | HEART RATE: 64 BPM | BODY MASS INDEX: 28.06 KG/M2

## 2021-04-05 DIAGNOSIS — S73.191A TEAR OF RIGHT ACETABULAR LABRUM, INITIAL ENCOUNTER: Primary | ICD-10-CM

## 2021-04-05 DIAGNOSIS — M25.551 PAIN IN RIGHT HIP: ICD-10-CM

## 2021-04-05 PROCEDURE — 99213 OFFICE O/P EST LOW 20 MIN: CPT | Performed by: ORTHOPAEDIC SURGERY

## 2021-04-05 NOTE — PROGRESS NOTES
Assessment/Plan     1  Tear of right acetabular labrum, initial encounter    2  Pain in right hip      No orders of the defined types were placed in this encounter  · MRI arthrogram right hip demonstrates acetabular labrum tear  · Patient declined referral for right hip IA steroid injection today  Explained the diagnostic and therapeutic purpose of IA hip injection  · Continue tylenol as needed for pain up to 3000 mg per day  No NSAIDS due to CKD  · Continue home exercises  Patient previously completed outpatient PT    · I will place referral for patient to see Dr Kei Spears  Return if symptoms worsen or fail to improve  I answered all of the patient's questions during the visit and provided education of the patient's condition during the visit  The patient verbalized understanding of the information given and agrees with the plan  This note was dictated using [employer] software  It may contain errors including improperly dictated words  Please contact physician directly for any questions  Subjective   Chief Complaint:   Chief Complaint   Patient presents with    Right Hip - Follow-up       HPI:  Jessica Valerio is a 51 y o  male who presents for follow up for right hip pain  Patient is here today to review right hip MRI arthrogram  Patient reports persistent constant pain in the right groin area  He notes pain radiates across his anterior pubic area  Patient denies back pain or radiating pain down the leg  He notes nothing makes his pain better  He is taking tylenol for pain  He is unable to take NSAIDs due to CKD  Patient has done PT in the past (8/28/2020- 9/24/2020) and noted some improvement in his pain but states he has since returned to worse pain  He is working as a critical care paramedic  Review of Systems  See HPI for musculoskeletal review     All other systems reviewed are negative     History:  Past Medical History:   Diagnosis Date    Adrenal mass (HCC)     Chronic kidney disease     Disease of thyroid gland     GERD (gastroesophageal reflux disease)     Hyperlipidemia     Hypertension     Kidney stones     Low testosterone     Sleep apnea     not currently using his CPAP    Thyroid disease      Past Surgical History:   Procedure Laterality Date    FL INJECTION RIGHT HIP (ARTHROGRAM)  3/31/2021    FL RETROGRADE PYELOGRAM  2018    KIDNEY STONE SURGERY      [place] CYSTO/URETERO W/LITHOTRIPSY &INDWELL STENT INSRT Left 2018    Procedure: CYSTOSCOPY URETEROSCOPY, RETROGRADE PYELOGRAM AND INSERTION STENT URETERAL;  Surgeon: Chaitanya Whitaker MD;  [facility];  Service: Urology    [place] ESOPHAGOGASTRODUODENOSCOPY TRANSORAL DIAGNOSTIC N/A 2018    Procedure: ESOPHAGOGASTRODUODENOSCOPY (EGD); Surgeon: Yasmine Leigh MD;  [facility];   Service: Gastroenterology     Social History   Social History     Substance and Sexual Activity   Alcohol Use Not Currently    Comment: 1 drink every two weeks or so     Social History     Substance and Sexual Activity   Drug Use No     Social History     Tobacco Use   Smoking Status Former Smoker    Packs/day: 1 00    Years: 17 00    Pack years: 17 00    Quit date:     Years since quittin 2   Smokeless Tobacco Never Used     Family History:   Family History   Problem Relation Age of Onset    Asthma Mother     Diabetes Mother     Other Father         Endocarditis    Hypertension Father     Gout Father        Current Outpatient Medications on File Prior to Visit   Medication Sig Dispense Refill    carvedilol (COREG) 25 mg tablet Take 1 tablet (25 mg total) by mouth 2 (two) times a day with meals 180 tablet 3    chlorthalidone 25 mg tablet Take 1 tablet (25 mg total) by mouth daily 90 tablet 3    Cholecalciferol (VITAMIN D) 2000 units CAPS Take 1 tablet by mouth daily      diazepam (VALIUM) 2 mg tablet Take 1 tablet (2 mg total) by mouth see administration instructions Take 1 tablet (2 mg total) by mouth 30 minutes prior to MRI  Then take 1 tablet (2 mg total) by mouth immediately prior to MRI  2 tablet 0    eplerenone (INSPRA) 50 MG tablet Take 1 tablet (50 mg total) by mouth 2 (two) times a day 180 tablet 3    felodipine (PLENDIL) 10 MG 24 hr tablet Take 1 tablet (10 mg total) by mouth daily 90 tablet 3    omeprazole (PriLOSEC) 40 MG capsule Take 1 capsule (40 mg total) by mouth 2 (two) times a day 180 capsule 0    potassium chloride (K-DUR,KLOR-CON) 20 mEq tablet Take two tablets 40 mEq alternate with one tablet 20 mEq on alternate days  90 tablet 5    Potassium Citrate ER 15 MEQ (1620 MG) TBCR Take one tablet twice a day 90 tablet 3    rosuvastatin (CRESTOR) 10 MG tablet Take 10 mg by mouth daily      SYRINGE-NEEDLE, DISP, 3 ML 21G X 1-1/2" 3 ML MISC For testerone injection      SYRINGE-NEEDLE, DISP, 3 ML 21G X 1-1/2" 3 ML MISC For testerone injection      testosterone cypionate (DEPO-TESTOSTERONE) 200 mg/mL SOLN Inject 0 8 mL deep IM once every two weeks   torsemide (DEMADEX) 10 mg tablet Take by mouth as needed        No current facility-administered medications on file prior to visit  No Known Allergies     Objective     /90   Pulse 64   Temp 97 8 °F (36 6 °C)   Ht 5' 9" (1 753 m)   BMI 28 06 kg/m²      PE:  AAOx 3  WDWN  Hearing intact, no drainage from eyes  no audible wheezing  no abdominal distension  LE compartments soft, skin intact    right hip:   No dislocation/deformity  Neg  Stinchfield  ROM: 0- 140  Int rot- 30  Ext rot- 45  positive  Kathryn Test  positive  Impingement test  No TTP over greater trochanter  Abduction: 5/5  Neg   Fouzia's test  No TTP over SIJ, lumbar spinous processes, or paraspinal muscles  Neg SLR    AT/GS intact    Imaging Studies: I have personally reviewed pertinent films in PACS  MRI arthrogram right hip: anterior superior acetabular labrum tear

## 2021-04-14 ENCOUNTER — OFFICE VISIT (OUTPATIENT)
Dept: OBGYN CLINIC | Facility: OTHER | Age: 46
End: 2021-04-14
Payer: COMMERCIAL

## 2021-04-14 VITALS
SYSTOLIC BLOOD PRESSURE: 154 MMHG | HEIGHT: 69 IN | DIASTOLIC BLOOD PRESSURE: 93 MMHG | BODY MASS INDEX: 29.18 KG/M2 | HEART RATE: 64 BPM | WEIGHT: 197 LBS

## 2021-04-14 DIAGNOSIS — S73.191A TEAR OF RIGHT ACETABULAR LABRUM, INITIAL ENCOUNTER: ICD-10-CM

## 2021-04-14 DIAGNOSIS — M25.551 PAIN IN RIGHT HIP: ICD-10-CM

## 2021-04-14 PROCEDURE — 99214 OFFICE O/P EST MOD 30 MIN: CPT | Performed by: ORTHOPAEDIC SURGERY

## 2021-04-14 RX ORDER — CEFAZOLIN SODIUM 2 G/50ML
2000 SOLUTION INTRAVENOUS ONCE
Status: CANCELLED | OUTPATIENT
Start: 2021-07-19 | End: 2021-04-14

## 2021-04-14 RX ORDER — CHLORHEXIDINE GLUCONATE 0.12 MG/ML
15 RINSE ORAL ONCE
Status: CANCELLED | OUTPATIENT
Start: 2021-07-19 | End: 2021-04-14

## 2021-04-14 NOTE — PROGRESS NOTES
Orthopaedic Surgery - Office Note  Agustin Daniels (81 y o  male)   : 1975   MRN: [id number]  Encounter Date: 2021    Chief Complaint   Patient presents with    Right Hip - Pain       Assessment / Plan  Right hip acetabular labral tear    · After discussion of treatment options for labral tear in hips including conservative versus surgical the patient did decide to proceed with surgical treatment  · Risk and benefits were discussed and consent was signed in the office at this time  Surgery will be scheduled in the near future  · Patient can continue with activity as tolerated at this time that we did discuss with them postoperatively his recovery will take normally 4-6 months before he will be able work without restrictions  · Continue with ice and  Analgesics as needed  · Patient was provided with a physical therapy prescription which he can scheduled to start 1 week postoperatively  Protocol will be provided with him at his 1st postop visit  · Follow-up 4 to 5 days postoperatively  History of Present Illness  Agustin Dainels is a 51 y o  male who presents for evaluation of right hip pain secondary to right acetabular labral tear which has been slowly increasing over the last year  Patient has been undergoing treatment with Dr Favian Pryor for this pain including physical therapy from 2020 till 2020 which she felt offered only little improvement  He has continued with his home exercise program since  He also takes anti-inflammatories and Tylenol regularly which she feels helps a little  He states that most of his pain is in the right groin and occasionally does have some radiation deep through the hip to the posterior glute area  He denies any catching or locking but states he has  Pain with any rotation of the hip and especially later in the day after walking and moving for extended period of time    Patient denies any radiation of the pain down past his knee and denies any radiation of the pain into his back at this time  He does have back pain occasionally but does not feel this is related to this  He denies any distal numbness or tingling  He states that he did not remember if he had any relief after having the contrast injection for the arthrogram and has not undergone a steroid injection in the hip  Review of Systems  Pertinent items are noted in HPI  All other systems were reviewed and are negative  Physical Exam  /93   Pulse 64   Ht 5' 9" (1 753 m)   Wt 89 4 kg (197 lb)   BMI 29 09 kg/m²   Cons: Appears well  No apparent distress  Psych: Alert  Oriented x3  Mood and affect normal   Eyes: PERRLA, EOMI  Resp: Normal effort  No audible wheezing or stridor  CV: Palpable pulse  No discernable arrhythmia  No LE edema  Lymph:  No palpable cervical, axillary, or inguinal lymphadenopathy  Skin: Warm  No palpable masses  No visible lesions  Neuro: Normal muscle tone  Normal and symmetric DTR's  Right Hip Exam  Alignment / Posture:  Normal resting hip posture  Inspection:  No swelling  No erythema  No deformity  Palpation:  Mild tenderness at the anterior aspect of the hip and laterally over the soft tissue  ROM:  Hip Flexion 110  Hip ER 60  Hip IR 40  All ROM is causing pain in the groin  Strength:  5/5 hip flexors and abductors  Stability:  (+) Logroll  Tests:  (+) Stinchfield  (+) FADDIR  (+) KEENA  (-) Straight leg raise  Neurovascular:  Sensation intact in DP/SP/[place]/Sa/T nerve distributions  Sensation intact in all digital nerve distributions  Toes warm and perfused  Gait:  Antalgic  Studies Reviewed  I have personally reviewed pertinent films in PACS  XR of right hip - Show mild spurring on the lateral aspect of the acetabulum otherwise well maintained joint space with no fracture or dislocation  MRI arthrogram of right hip - From 3/31/21 shows Cam lesion with Moderate anterior superior labral tear      Procedures  No procedures today     Medical, Surgical, Family, and Social History  The patient's medical history, family history, and social history, were reviewed and updated as appropriate  Past Medical History:   Diagnosis Date    Adrenal mass ([address] )     Chronic kidney disease     Disease of thyroid gland     GERD (gastroesophageal reflux disease)     Hyperlipidemia     Hypertension     Kidney stones     Low testosterone     Sleep apnea     not currently using his CPAP    Thyroid disease        Past Surgical History:   Procedure Laterality Date    FL INJECTION RIGHT HIP (ARTHROGRAM)  3/31/2021    FL RETROGRADE PYELOGRAM  2018    KIDNEY STONE SURGERY      [place] CYSTO/URETERO W/LITHOTRIPSY &INDWELL STENT INSRT Left 2018    Procedure: CYSTOSCOPY URETEROSCOPY, RETROGRADE PYELOGRAM AND INSERTION STENT URETERAL;  Surgeon: Marcus Prather MD;  [facility];  Service: Urology    [place] ESOPHAGOGASTRODUODENOSCOPY TRANSORAL DIAGNOSTIC N/A 2018    Procedure: ESOPHAGOGASTRODUODENOSCOPY (EGD); Surgeon: Cleve Henry MD;  [facility];   Service: Gastroenterology       Family History   Problem Relation Age of Onset    Asthma Mother     Diabetes Mother     Other Father         Endocarditis    Hypertension Father     Gout Father        Social History     Occupational History    Not on file   Tobacco Use    Smoking status: Former Smoker     Packs/day: 1 00     Years: 17 00     Pack years: 17 00     Quit date:      Years since quittin 2    Smokeless tobacco: Never Used   Substance and Sexual Activity    Alcohol use: Not Currently     Comment: 1 drink every two weeks or so    Drug use: No    Sexual activity: Yes       No Known Allergies      Current Outpatient Medications:     carvedilol (COREG) 25 mg tablet, Take 1 tablet (25 mg total) by mouth 2 (two) times a day with meals, Disp: 180 tablet, Rfl: 3    chlorthalidone 25 mg tablet, Take 1 tablet (25 mg total) by mouth daily, Disp: 90 tablet, Rfl: 3    Cholecalciferol (VITAMIN D) 2000 units CAPS, Take 1 tablet by mouth daily, Disp: , Rfl:     diazepam (VALIUM) 2 mg tablet, Take 1 tablet (2 mg total) by mouth see administration instructions Take 1 tablet (2 mg total) by mouth 30 minutes prior to MRI  Then take 1 tablet (2 mg total) by mouth immediately prior to MRI , Disp: 2 tablet, Rfl: 0    eplerenone (INSPRA) 50 MG tablet, Take 1 tablet (50 mg total) by mouth 2 (two) times a day, Disp: 180 tablet, Rfl: 3    felodipine (PLENDIL) 10 MG 24 hr tablet, Take 1 tablet (10 mg total) by mouth daily, Disp: 90 tablet, Rfl: 3    omeprazole (PriLOSEC) 40 MG capsule, Take 1 capsule (40 mg total) by mouth 2 (two) times a day, Disp: 180 capsule, Rfl: 0    potassium chloride (K-DUR,KLOR-CON) 20 mEq tablet, Take two tablets 40 mEq alternate with one tablet 20 mEq on alternate days  , Disp: 90 tablet, Rfl: 5    Potassium Citrate ER 15 MEQ (1620 MG) TBCR, Take one tablet twice a day, Disp: 90 tablet, Rfl: 3    rosuvastatin (CRESTOR) 10 MG tablet, Take 10 mg by mouth daily, Disp: , Rfl:     SYRINGE-NEEDLE, DISP, 3 ML 21G X 1-1/2" 3 ML MISC, For testerone injection, Disp: , Rfl:     SYRINGE-NEEDLE, DISP, 3 ML 21G X 1-1/2" 3 ML MISC, For testerone injection, Disp: , Rfl:     testosterone cypionate (DEPO-TESTOSTERONE) 200 mg/mL SOLN, Inject 0 8 mL deep IM once every two weeks  , Disp: , Rfl:     torsemide (DEMADEX) 10 mg tablet, Take by mouth as needed , Disp: , Rfl:       Marcianne Lesches, PA-C    Scribe Attestation    I,:   am acting as a scribe while in the presence of the attending physician :       I,:   personally performed the services described in this documentation    as scribed in my presence :

## 2021-05-03 ENCOUNTER — APPOINTMENT (OUTPATIENT)
Dept: LAB | Facility: HOSPITAL | Age: 46
End: 2021-05-03
Attending: INTERNAL MEDICINE
Payer: COMMERCIAL

## 2021-05-03 ENCOUNTER — TELEPHONE (OUTPATIENT)
Dept: GASTROENTEROLOGY | Facility: CLINIC | Age: 46
End: 2021-05-03

## 2021-05-03 ENCOUNTER — TRANSCRIBE ORDERS (OUTPATIENT)
Dept: LAB | Facility: HOSPITAL | Age: 46
End: 2021-05-03

## 2021-05-03 DIAGNOSIS — E55.9 VITAMIN D DEFICIENCY: ICD-10-CM

## 2021-05-03 DIAGNOSIS — I15.2 ADRENAL HYPERTENSION (HCC): ICD-10-CM

## 2021-05-03 DIAGNOSIS — E23.7 DISEASE OF PITUITARY GLAND (HCC): ICD-10-CM

## 2021-05-03 DIAGNOSIS — K22.70 BARRETT'S ESOPHAGUS DETERMINED BY ENDOSCOPY: ICD-10-CM

## 2021-05-03 DIAGNOSIS — E04.1 NONTOXIC UNINODULAR GOITER: Primary | ICD-10-CM

## 2021-05-03 DIAGNOSIS — I12.9 BENIGN HYPERTENSION WITH CKD (CHRONIC KIDNEY DISEASE), STAGE II: ICD-10-CM

## 2021-05-03 DIAGNOSIS — E66.8 OBESITY OF ENDOCRINE ORIGIN: ICD-10-CM

## 2021-05-03 DIAGNOSIS — N18.2 BENIGN HYPERTENSION WITH CKD (CHRONIC KIDNEY DISEASE), STAGE II: ICD-10-CM

## 2021-05-03 DIAGNOSIS — E27.9 ADRENAL HYPERTENSION (HCC): ICD-10-CM

## 2021-05-03 DIAGNOSIS — N20.0 NEPHROLITHIASIS: ICD-10-CM

## 2021-05-03 DIAGNOSIS — N18.2 CKD (CHRONIC KIDNEY DISEASE) STAGE 2, GFR 60-89 ML/MIN: ICD-10-CM

## 2021-05-03 DIAGNOSIS — I10 ESSENTIAL HYPERTENSION, MALIGNANT: ICD-10-CM

## 2021-05-03 DIAGNOSIS — K21.00 GASTROESOPHAGEAL REFLUX DISEASE WITH ESOPHAGITIS: ICD-10-CM

## 2021-05-03 LAB
25(OH)D3 SERPL-MCNC: 22.6 NG/ML (ref 30–100)
ALBUMIN SERPL BCP-MCNC: 3.7 G/DL (ref 3.5–5)
ALP SERPL-CCNC: 66 U/L (ref 46–116)
ALT SERPL W P-5'-P-CCNC: 40 U/L (ref 12–78)
ANION GAP SERPL CALCULATED.3IONS-SCNC: 8 MMOL/L (ref 4–13)
AST SERPL W P-5'-P-CCNC: 17 U/L (ref 5–45)
BILIRUB SERPL-MCNC: 0.39 MG/DL (ref 0.2–1)
BILIRUB UR QL STRIP: NEGATIVE
BUN SERPL-MCNC: 22 MG/DL (ref 5–25)
CALCIUM SERPL-MCNC: 9.5 MG/DL (ref 8.3–10.1)
CHLORIDE SERPL-SCNC: 104 MMOL/L (ref 100–108)
CLARITY UR: CLEAR
CO2 SERPL-SCNC: 29 MMOL/L (ref 21–32)
COLOR UR: YELLOW
CORTIS AM PEAK SERPL-MCNC: 14 UG/DL (ref 4.2–22.4)
CREAT SERPL-MCNC: 1.29 MG/DL (ref 0.6–1.3)
CREAT UR-MCNC: 251 MG/DL
ERYTHROCYTE [DISTWIDTH] IN BLOOD BY AUTOMATED COUNT: 12.7 % (ref 11.6–15.1)
EST. AVERAGE GLUCOSE BLD GHB EST-MCNC: 117 MG/DL
FSH SERPL-ACNC: 0.4 MIU/ML (ref 0.7–10.8)
GFR SERPL CREATININE-BSD FRML MDRD: 66 ML/MIN/1.73SQ M
GLUCOSE P FAST SERPL-MCNC: 91 MG/DL (ref 65–99)
GLUCOSE UR STRIP-MCNC: NEGATIVE MG/DL
HBA1C MFR BLD: 5.7 %
HCT VFR BLD AUTO: 50.9 % (ref 36.5–49.3)
HGB BLD-MCNC: 16.8 G/DL (ref 12–17)
HGB UR QL STRIP.AUTO: NEGATIVE
KETONES UR STRIP-MCNC: NEGATIVE MG/DL
LEUKOCYTE ESTERASE UR QL STRIP: NEGATIVE
LH SERPL-ACNC: <0.2 MIU/ML (ref 1.2–10.6)
MAGNESIUM SERPL-MCNC: 1.9 MG/DL (ref 1.6–2.6)
MCH RBC QN AUTO: 29.3 PG (ref 26.8–34.3)
MCHC RBC AUTO-ENTMCNC: 33 G/DL (ref 31.4–37.4)
MCV RBC AUTO: 89 FL (ref 82–98)
MICROALBUMIN UR-MCNC: 26.7 MG/L (ref 0–20)
MICROALBUMIN/CREAT 24H UR: 11 MG/G CREATININE (ref 0–30)
NITRITE UR QL STRIP: NEGATIVE
PH UR STRIP.AUTO: 7 [PH]
PHOSPHATE SERPL-MCNC: 3.3 MG/DL (ref 2.7–4.5)
PLATELET # BLD AUTO: 313 THOUSANDS/UL (ref 149–390)
PMV BLD AUTO: 9.2 FL (ref 8.9–12.7)
POTASSIUM SERPL-SCNC: 3.5 MMOL/L (ref 3.5–5.3)
PROLACTIN SERPL-MCNC: 43.9 NG/ML (ref 2.5–17.4)
PROT SERPL-MCNC: 7.3 G/DL (ref 6.4–8.2)
PROT UR STRIP-MCNC: NEGATIVE MG/DL
PSA SERPL-MCNC: 1.8 NG/ML (ref 0–4)
PTH-INTACT SERPL-MCNC: 46.4 PG/ML (ref 18.4–80.1)
RBC # BLD AUTO: 5.74 MILLION/UL (ref 3.88–5.62)
SODIUM SERPL-SCNC: 141 MMOL/L (ref 136–145)
SP GR UR STRIP.AUTO: 1.02 (ref 1–1.03)
T4 FREE SERPL-MCNC: 0.76 NG/DL (ref 0.76–1.46)
TESTOST SERPL-MCNC: 607 NG/DL (ref 113–1065)
TSH SERPL DL<=0.05 MIU/L-ACNC: 1.05 UIU/ML (ref 0.36–3.74)
URATE SERPL-MCNC: 6.8 MG/DL (ref 4.2–8)
UROBILINOGEN UR QL STRIP.AUTO: 0.2 E.U./DL
WBC # BLD AUTO: 9.21 THOUSAND/UL (ref 4.31–10.16)

## 2021-05-03 PROCEDURE — 82043 UR ALBUMIN QUANTITATIVE: CPT

## 2021-05-03 PROCEDURE — 84146 ASSAY OF PROLACTIN: CPT

## 2021-05-03 PROCEDURE — 84153 ASSAY OF PSA TOTAL: CPT

## 2021-05-03 PROCEDURE — 84403 ASSAY OF TOTAL TESTOSTERONE: CPT

## 2021-05-03 PROCEDURE — 82436 ASSAY OF URINE CHLORIDE: CPT

## 2021-05-03 PROCEDURE — 85027 COMPLETE CBC AUTOMATED: CPT

## 2021-05-03 PROCEDURE — 84105 ASSAY OF URINE PHOSPHORUS: CPT

## 2021-05-03 PROCEDURE — 81003 URINALYSIS AUTO W/O SCOPE: CPT

## 2021-05-03 PROCEDURE — 83001 ASSAY OF GONADOTROPIN (FSH): CPT

## 2021-05-03 PROCEDURE — 84392 ASSAY OF URINE SULFATE: CPT

## 2021-05-03 PROCEDURE — 82533 TOTAL CORTISOL: CPT

## 2021-05-03 PROCEDURE — 83036 HEMOGLOBIN GLYCOSYLATED A1C: CPT

## 2021-05-03 PROCEDURE — 80053 COMPREHEN METABOLIC PANEL: CPT

## 2021-05-03 PROCEDURE — 84443 ASSAY THYROID STIM HORMONE: CPT

## 2021-05-03 PROCEDURE — 82507 ASSAY OF CITRATE: CPT

## 2021-05-03 PROCEDURE — 82140 ASSAY OF AMMONIA: CPT

## 2021-05-03 PROCEDURE — 83735 ASSAY OF MAGNESIUM: CPT

## 2021-05-03 PROCEDURE — 84300 ASSAY OF URINE SODIUM: CPT

## 2021-05-03 PROCEDURE — 84560 ASSAY OF URINE/URIC ACID: CPT

## 2021-05-03 PROCEDURE — 84100 ASSAY OF PHOSPHORUS: CPT

## 2021-05-03 PROCEDURE — 82306 VITAMIN D 25 HYDROXY: CPT

## 2021-05-03 PROCEDURE — 84439 ASSAY OF FREE THYROXINE: CPT

## 2021-05-03 PROCEDURE — 83935 ASSAY OF URINE OSMOLALITY: CPT

## 2021-05-03 PROCEDURE — 82131 AMINO ACIDS SINGLE QUANT: CPT

## 2021-05-03 PROCEDURE — 84133 ASSAY OF URINE POTASSIUM: CPT

## 2021-05-03 PROCEDURE — 83970 ASSAY OF PARATHORMONE: CPT

## 2021-05-03 PROCEDURE — 83835 ASSAY OF METANEPHRINES: CPT

## 2021-05-03 PROCEDURE — 83002 ASSAY OF GONADOTROPIN (LH): CPT

## 2021-05-03 PROCEDURE — 82570 ASSAY OF URINE CREATININE: CPT

## 2021-05-03 PROCEDURE — 83945 ASSAY OF OXALATE: CPT

## 2021-05-03 PROCEDURE — 82340 ASSAY OF CALCIUM IN URINE: CPT

## 2021-05-03 PROCEDURE — 84550 ASSAY OF BLOOD/URIC ACID: CPT

## 2021-05-03 PROCEDURE — 36415 COLL VENOUS BLD VENIPUNCTURE: CPT

## 2021-05-03 RX ORDER — OMEPRAZOLE 40 MG/1
40 CAPSULE, DELAYED RELEASE ORAL 2 TIMES DAILY
Qty: 180 CAPSULE | Refills: 0 | Status: SHIPPED | OUTPATIENT
Start: 2021-05-03 | End: 2021-07-28 | Stop reason: SDUPTHER

## 2021-05-03 NOTE — TELEPHONE ENCOUNTER
Hx gerd, [facility]  Last office visit 12/2019  Script entered  Patient aware to schedule office visit  **clerical  -please contact patient for office visit

## 2021-05-03 NOTE — TELEPHONE ENCOUNTER
GI Physician: Dr Neysa Najjar for Medication: Omeprazole    Dose: 40 mg    Quantity: 180    Pharmacy and Location: [facility]    Informed pt of needed to sched an office visit as we have not seen him since 2019   Pt states he will need to call back to sched visit after he checks his work sched

## 2021-05-04 ENCOUNTER — TELEPHONE (OUTPATIENT)
Dept: NEPHROLOGY | Facility: CLINIC | Age: 46
End: 2021-05-04

## 2021-05-04 DIAGNOSIS — E55.9 VITAMIN D DEFICIENCY: Primary | ICD-10-CM

## 2021-05-04 RX ORDER — MULTIVIT-MIN/IRON/FOLIC ACID/K 18-600-40
4000 CAPSULE ORAL DAILY
Qty: 60 CAPSULE | Refills: 3 | Status: SHIPPED | OUTPATIENT
Start: 2021-05-04

## 2021-05-04 NOTE — TELEPHONE ENCOUNTER
Can you let him know vitamin-D level remains lower and would like him to increase vitamin-D supplement from 2000 units daily to 4000 units daily  All other labs are unremarkable  24 hour urine stone risk profile is still pending and will discuss during office visit later this month

## 2021-05-08 LAB
METANEPH FREE SERPL-MCNC: 32.3 PG/ML (ref 0–88)
NORMETANEPHRINE SERPL-MCNC: 156.2 PG/ML (ref 0–125.8)

## 2021-05-10 LAB
AMMONIA 24H UR-MRATE: 17 MEQ/24 HR
AMMONIA UR-SCNC: ABNORMAL UG/DL
CA H2 PHOS DIHYD CRY URNS QL MICRO: 4.37 RATIO (ref 0–3)
CALCIUM 24H UR-MCNC: 11.3 MG/DL
CALCIUM 24H UR-MRATE: 113 MG/24 HR (ref 100–300)
CHLORIDE 24H UR-SCNC: 99 MMOL/L
CHLORIDE 24H UR-SRATE: 99 MMOL/24 HR (ref 110–250)
CITRATE 24H UR-MCNC: 52 MG/L
CITRATE 24H UR-MRATE: 52 MG/24 HR (ref 320–1240)
COM CRY STONE QL IR: 5.44 RATIO (ref 0–6)
CREAT 24H UR-MCNC: 105.5 MG/DL
CREAT 24H UR-MRATE: 1055 MG/24 HR (ref 1000–2000)
CYSTINE 24H UR-MCNC: 7.98 MG/L
CYSTINE 24H UR-MRATE: 7.98 MG/24 HR (ref 10–100)
MAGNESIUM 24H UR-MRATE: 61 MG/24 HR (ref 12–293)
MAGNESIUM UR-MCNC: 6.1 MG/DL
NA URATE CRY STONE QL IR: 3.9 RATIO (ref 0–4)
OSMOLALITY UR: 527 MOSMOL/KG (ref 300–900)
OXALATE 24H UR-MRATE: 18 MG/24 HR (ref 7–44)
OXALATE UR-MCNC: 18 MG/L
PH 24H UR: 6.8 [PH]
PHOSPHATE 24H UR-MCNC: 57.5 MG/DL
PHOSPHATE 24H UR-MRATE: 575 MG/24 HR (ref 400–1300)
PLEASE NOTE (STONE RISK): ABNORMAL
POTASSIUM 24H UR-SCNC: 59.2 MMOL/24 HR (ref 25–125)
POTASSIUM UR-SCNC: 59.2 MMOL/L
PRESERVED URINE: 1000 ML/24 HR (ref 800–1800)
SODIUM 24H UR-SCNC: 101 MMOL/L
SODIUM 24H UR-SRATE: 101 MMOL/24 HR (ref 58–337)
SPECIMEN VOL 24H UR: 1000 ML/24 HR (ref 800–1800)
SULFATE 24H UR-MCNC: 18 MEQ/24 HR (ref 0–30)
SULFATE UR-MCNC: 18 MEQ/L
TRI-PHOS CRY STONE MICRO: 0.32 RATIO (ref 0–1)
URATE 24H UR-MCNC: 32.6 MG/DL
URATE 24H UR-MRATE: 326 MG/24 HR (ref 250–750)
URATE DIHYD CRY STONE QL IR: 0.25 RATIO (ref 0–1.2)

## 2021-05-19 ENCOUNTER — OFFICE VISIT (OUTPATIENT)
Dept: NEPHROLOGY | Facility: CLINIC | Age: 46
End: 2021-05-19
Payer: COMMERCIAL

## 2021-05-19 VITALS
HEIGHT: 69 IN | SYSTOLIC BLOOD PRESSURE: 166 MMHG | BODY MASS INDEX: 29.03 KG/M2 | DIASTOLIC BLOOD PRESSURE: 92 MMHG | HEART RATE: 63 BPM | WEIGHT: 196 LBS

## 2021-05-19 DIAGNOSIS — E87.6 HYPOKALEMIA: ICD-10-CM

## 2021-05-19 DIAGNOSIS — N18.2 CKD (CHRONIC KIDNEY DISEASE) STAGE 2, GFR 60-89 ML/MIN: ICD-10-CM

## 2021-05-19 DIAGNOSIS — I12.9 BENIGN HYPERTENSION WITH CKD (CHRONIC KIDNEY DISEASE), STAGE II: Primary | ICD-10-CM

## 2021-05-19 DIAGNOSIS — N20.0 NEPHROLITHIASIS: ICD-10-CM

## 2021-05-19 DIAGNOSIS — N18.2 BENIGN HYPERTENSION WITH CKD (CHRONIC KIDNEY DISEASE), STAGE II: Primary | ICD-10-CM

## 2021-05-19 DIAGNOSIS — R82.991 HYPOCITRATURIA: ICD-10-CM

## 2021-05-19 PROCEDURE — 99214 OFFICE O/P EST MOD 30 MIN: CPT | Performed by: INTERNAL MEDICINE

## 2021-05-19 RX ORDER — POTASSIUM CHLORIDE 20 MEQ/1
40 TABLET, EXTENDED RELEASE ORAL DAILY
Qty: 90 TABLET | Refills: 0 | Status: SHIPPED | OUTPATIENT
Start: 2021-05-19 | End: 2022-01-18 | Stop reason: SDUPTHER

## 2021-05-19 NOTE — PROGRESS NOTES
NEPHROLOGY OUTPATIENT PROGRESS NOTE   Bam Dixon 51 y o  male MRN: [id number]  DATE: 5/19/2021  Reason for visit:   Chief Complaint   Patient presents with    Follow-up     CKD2     ASSESSMENT and PLAN:  Hypertension  -Likely thought to be essential in origin although another differential remains secondary hypertension due to primary hyperaldosteronism given his bilateral adrenal nodules  -Currently patient is on Coreg, eplerenone, felodipine and chlorthalidone  -BP remains above goal in the office today  He admits to be missing all his antihypertensive medications for some time and still not taking felodipine    -again strongly recommended to take home medications on a regular basis  He will be restarting felodipine today  -recommended to continue to monitor BP and call back if BP remains persistently greater than 140/90     -his home wrist BP cuff was compared with our office readings in the past which were identical   -plasma metanephrine and catecholamines are nonsignificant in December 2018  Repeat plasma normetanephrine slightly elevated 156 in May 2021  Unable to check serum aldosterone/PRA while being on eplerenone     -he is being followed by endocrine regarding adrenal nodule in [place]  he was last evaluated in January 2021  Continue close follow-up   -Patient was found to have high aldosterone level along with high aldosterone/renin ratio although patient was also taking eplerenone at that time and makes the test results unreliable    - Salt restricted diet     Recurrent nephrolithiasis  -patient had an episode of nephrolithiasis requiring ureteral stent placement with eventual removal in past   Patient says his stone composition was calcium stone in the past although I do not have documentation   -advised to drink plenty of free water with goal urine output greater than 2 L per day   -last 24 hour urine stone risk profile from shows very low urine volume 1 L, overall significant under collection makes this unreliable  Will do repeat 24 hour urine profile before next visit    -advised to follow low salt diet   -continue chlorthalidone 25 mg p  O  Daily   -continue potassium citrate 15 mEq b i d  Dosing  -CT scan in July 2020 shows 1 millimeter nonobstructing calculus in left kidney, no calculi on the right side      Hypocitraturia  -management as above    Hypokalemia, serum potassium overall improved with replacement and stable  Currently remains on potassium chloride 20 mEq b i d  Dalia Garcia    CKD stage II- IIIa, baseline Cr 1 2-1 3  -last creatinine 1 2 in May 2021 stable       -He could have a component of CKD given his long-term hypertension history  -UA in May 2021 bland without hematuria or proteinuria   urine microalbumin/creatinine ratio nonsignificant       Leg edema  -seems to be much improved and stable   Currently remains on chlorthalidone  Vitamin-D insufficiency, last vitamin-D level 22 6  Recently increased to 4000 units daily  Continue same  Diagnoses and all orders for this visit:    Benign hypertension with CKD (chronic kidney disease), stage II  -     Basic metabolic panel; Future  -     CBC; Future  -     Phosphorus; Future  -     PTH, intact; Future  -     Microalbumin / creatinine urine ratio; Future  -     Uric acid; Future  -     Vitamin D 25 hydroxy; Future  -     Magnesium; Future  -     Stone risk profile; Future    CKD (chronic kidney disease) stage 2, GFR 60-89 ml/min  -     Basic metabolic panel; Future  -     CBC; Future  -     Phosphorus; Future  -     PTH, intact; Future  -     Microalbumin / creatinine urine ratio; Future  -     Uric acid; Future  -     Vitamin D 25 hydroxy; Future  -     Magnesium; Future  -     Stone risk profile; Future    Hypocitraturia  -     Stone risk profile; Future    Nephrolithiasis  -     Phosphorus; Future  -     PTH, intact; Future  -     Uric acid; Future  -     Vitamin D 25 hydroxy;  Future  -     Stone risk profile; Future    Hypokalemia  -     potassium chloride (K-DUR,KLOR-CON) 20 mEq tablet; Take 2 tablets (40 mEq total) by mouth daily  -     Basic metabolic panel; Future        SUBJECTIVE / HPI:  Patient is 58-year-old male with significant medical issues of hypertension diagnosed early in the age, bilateral adrenal nodule, history of nephrolithiasis with history of requiring ureteral stent placement with eventual removal, history of lithotripsy, sleep apnea, comes for regular follow-up of hypertension and nephrolithiasis  Baseline serum creatinine 1 2 to 1 3  Recently has stopped taking all his antihypertensive medications for some time  He still not taking felodipine  Blood pressure remains above goal in the office today  He denies any hematuria, no dysuria  No flank or abdominal pain  No fever or chills  Claims to be compliant with taking his potassium supplements  No kidney stone flare-up over last one year requiring any ER visits        No recent NSAID exposure  He tries to be compliant with salt restricted diet and tries to drink more water    REVIEW OF SYSTEMS:  More than 10 point review of systems were obtained and discussed in length with the patient  Complete review of systems were negative / unremarkable except mentioned above  PHYSICAL EXAM:  Vitals:    05/19/21 1354 05/19/21 1427   BP: (!) 160/102 166/92   BP Location: Left arm    Patient Position: Sitting    Cuff Size: Adult    Pulse: 63    Weight: 88 9 kg (196 lb)    Height: 5' 9" (1 753 m)      Body mass index is 28 94 kg/m²  Physical Exam  Vitals signs reviewed  Constitutional:       Appearance: He is well-developed  HENT:      Head: Normocephalic and atraumatic  Right Ear: External ear normal       Left Ear: External ear normal       Nose: Nose normal    Eyes:      General: No scleral icterus       Conjunctiva/sclera: Conjunctivae normal    Cardiovascular:      Comments: S1, S2 present  Pulmonary:      Effort: Pulmonary effort is normal  No respiratory distress  Breath sounds: Normal breath sounds  No wheezing or rales  Abdominal:      General: Bowel sounds are normal  There is no distension  Palpations: Abdomen is soft  Tenderness: There is no abdominal tenderness  Musculoskeletal:         General: No tenderness  Right lower leg: No edema  Left lower leg: No edema  Lymphadenopathy:      Cervical: No cervical adenopathy  Skin:     Findings: No rash  Neurological:      Mental Status: He is alert and oriented to person, place, and time  Psychiatric:         Behavior: Behavior normal          PAST MEDICAL HISTORY:  Past Medical History:   Diagnosis Date    Adrenal mass ([address] )     Chronic kidney disease     Disease of thyroid gland     GERD (gastroesophageal reflux disease)     Hyperlipidemia     Hypertension     Kidney stones     Low testosterone     Sleep apnea     not currently using his CPAP    Thyroid disease        PAST SURGICAL HISTORY:  Past Surgical History:   Procedure Laterality Date    FL INJECTION RIGHT HIP (ARTHROGRAM)  3/31/2021    FL RETROGRADE PYELOGRAM  8/21/2018    KIDNEY STONE SURGERY      ID CYSTO/URETERO W/LITHOTRIPSY &INDWELL STENT INSRT Left 8/21/2018    Procedure: CYSTOSCOPY URETEROSCOPY, RETROGRADE PYELOGRAM AND INSERTION STENT URETERAL;  Surgeon: Marla Chapa MD;  [facility];  Service: Urology    ID ESOPHAGOGASTRODUODENOSCOPY TRANSORAL DIAGNOSTIC N/A 6/21/2018    Procedure: ESOPHAGOGASTRODUODENOSCOPY (EGD); Surgeon: Elana Marin MD;  Location: [facility];   Service: Gastroenterology       SOCIAL HISTORY:  Social History     Substance and Sexual Activity   Alcohol Use Not Currently    Comment: 1 drink every two weeks or so     Social History     Substance and Sexual Activity   Drug Use No     Social History     Tobacco Use   Smoking Status Former Smoker    Packs/day: 1 00    Years: 17 00    Pack years: 17 00    Quit date: 2005    Years since quittin 3   Smokeless Tobacco Never Used       FAMILY HISTORY:  Family History   Problem Relation Age of Onset    Asthma Mother     Diabetes Mother     Other Father         Endocarditis    Hypertension Father     Gout Father        MEDICATIONS:    Current Outpatient Medications:     carvedilol (COREG) 25 mg tablet, Take 1 tablet (25 mg total) by mouth 2 (two) times a day with meals, Disp: 180 tablet, Rfl: 3    chlorthalidone 25 mg tablet, Take 1 tablet (25 mg total) by mouth daily, Disp: 90 tablet, Rfl: 3    Cholecalciferol (Vitamin D) 50 MCG (2000 UT) CAPS, Take 2 capsules (4,000 Units total) by mouth daily, Disp: 60 capsule, Rfl: 3    eplerenone (INSPRA) 50 MG tablet, Take 1 tablet (50 mg total) by mouth 2 (two) times a day, Disp: 180 tablet, Rfl: 3    felodipine (PLENDIL) 10 MG 24 hr tablet, Take 1 tablet (10 mg total) by mouth daily, Disp: 90 tablet, Rfl: 3    omeprazole (PriLOSEC) 40 MG capsule, Take 1 capsule (40 mg total) by mouth 2 (two) times a day, Disp: 180 capsule, Rfl: 0    potassium chloride (K-DUR,KLOR-CON) 20 mEq tablet, Take 2 tablets (40 mEq total) by mouth daily, Disp: 90 tablet, Rfl: 0    Potassium Citrate ER 15 MEQ (1620 MG) TBCR, Take one tablet twice a day, Disp: 90 tablet, Rfl: 3    rosuvastatin (CRESTOR) 10 MG tablet, Take 10 mg by mouth daily, Disp: , Rfl:     SYRINGE-NEEDLE, DISP, 3 ML 21G X 1-1/2" 3 ML MISC, For testerone injection, Disp: , Rfl:     testosterone cypionate (DEPO-TESTOSTERONE) 200 mg/mL SOLN, Inject 0 8 mL deep IM once every two weeks  , Disp: , Rfl:     torsemide (DEMADEX) 10 mg tablet, Take by mouth as needed , Disp: , Rfl:     diazepam (VALIUM) 2 mg tablet, Take 1 tablet (2 mg total) by mouth see administration instructions Take 1 tablet (2 mg total) by mouth 30 minutes prior to MRI   Then take 1 tablet (2 mg total) by mouth immediately prior to MRI , Disp: 2 tablet, Rfl: 0    SYRINGE-NEEDLE, DISP, 3 ML 21G X 1-1/2" 3 ML MISC, For testerone injection, Disp: , Rfl:     Lab Results:   Results for orders placed or performed in visit on 05/03/21   Stone risk profile   Result Value Ref Range    Total Volume, Ur 1000 800 - 1800 mL/24 hr    Preserved Urine Volume 1000 800 - 1800 mL/24 hr    Ammonia, Urine 65565 Not Estab  ug/dL    Ammonia, 24HR Ur 17 Not Estab  mEq/24 hr    Calcium, Ur 11 3 Not Estab  mg/dL    Calcium 24HR Ur 113 0 100 0 - 300 0 mg/24 hr    Citrate, Ur 52 Not Estab  mg/L    Citrate 24H UR 52 (L) 320 - 1240 mg/24 hr    Chloride, Ur 99 Not Estab  mmol/L    Chloride 24HR Ur 99 (L) 110 - 250 mmol/24 hr    Creatinine, Ur 105 5 Not Estab  mg/dL    Creatinine,Ur 24hr 1055 0 1000 0 - 2000 0 mg/24 hr    Cystine, Mehran   Urine 7 98 Not Estab  mg/L    Cystine, Quant, Ur, 24hr 7 98 (L) 10 00 - 100 00 mg/24 hr    Magnesium, Ur 6 1 Not Estab  mg/dL    Magnesium, 24H Ur 61 12 - 293 mg/24 hr    Oxalate, Ur 18 Not Estab  mg/L    Oxalate, 24HR Ur 18 7 - 44 mg/24 hr    Osmolality, Ur 527 300 - 900 mOsmol/kg    pH, 24 Hr Urine 6 8     Phosphorus, Ur 57 5 Not Estab  mg/dL    Phosphorus, 24HR Ur 575 0 400 0 - 1300 0 mg/24 hr    Potassium, Ur 59 2 Not Estab  mmol/L    Potassium, 24HR Ur 59 2 25 0 - 125 0 mmol/24 hr    Sodium, Ur 101 Not Estab  mmol/L    Sodium, 24HR Ur 101 58 - 337 mmol/24 hr    Sulfate, Ur, mEq/L 18 Not Estab  mEq/L    Sulfate, 24H Ur 18 0 - 30 mEq/24 hr    Uric Acid, Ur 32 6 Not Estab  mg/dL    Uric Acid, 24HR Ur 326 250 - 750 mg/24 hr    Brushite 4 37 (H) 0 00 - 3 00 ratio    Calcium Oxalate 5 44 0 00 - 6 00 ratio    Monosodium Urate 3 90 0 00 - 4 00 ratio    Struvite 0 32 0 00 - 1 00 ratio    Uric Acid 0 25 0 00 - 1 20 ratio    Please Note Comment    CBC   Result Value Ref Range    WBC 9 21 4 31 - 10 16 Thousand/uL    RBC 5 74 (H) 3 88 - 5 62 Million/uL    Hemoglobin 16 8 12 0 - 17 0 g/dL    Hematocrit 50 9 (H) 36 5 - 49 3 %    MCV 89 82 - 98 fL    MCH 29 3 26 8 - 34 3 pg    MCHC 33 0 31 4 - 37 4 g/dL    RDW 12 7 11 6 - 15 1 %    Platelets 255 149 - 390 Thousands/uL    MPV 9 2 8 9 - 12 7 fL   UA (URINE) with reflex to Scope   Result Value Ref Range    Color, UA Yellow     Clarity, UA Clear     Specific Gravity, UA 1 021 1 003 - 1 030    pH, UA 7 0 4 5, 5 0, 5 5, 6 0, 6 5, 7 0, 7 5, 8 0    Leukocytes, UA Negative Negative    Nitrite, UA Negative Negative    Protein, UA Negative Negative mg/dl    Glucose, UA Negative Negative mg/dl    Ketones, UA Negative Negative mg/dl    Urobilinogen, UA 0 2 0 2, 1 0 E U /dl E U /dl    Bilirubin, UA Negative Negative    Blood, UA Negative Negative   Microalbumin / creatinine urine ratio   Result Value Ref Range    Creatinine, Ur 251 0 mg/dL    Microalbum  ,U,Random 26 7 (H) 0 0 - 20 0 mg/L    Microalb Creat Ratio 11 0 - 30 mg/g creatinine   Phosphorus   Result Value Ref Range    Phosphorus 3 3 2 7 - 4 5 mg/dL   PTH, intact   Result Value Ref Range    PTH 46 4 18 4 - 80 1 pg/mL   Uric acid   Result Value Ref Range    Uric Acid 6 8 4 2 - 8 0 mg/dL   Vitamin D 25 hydroxy   Result Value Ref Range    Vit D, 25-Hydroxy 22 6 (L) 30 0 - 100 0 ng/mL

## 2021-06-22 ENCOUNTER — HOSPITAL ENCOUNTER (OUTPATIENT)
Dept: RADIOLOGY | Facility: HOSPITAL | Age: 46
Discharge: HOME | End: 2021-06-22
Payer: COMMERCIAL

## 2021-06-22 DIAGNOSIS — D35.01 BENIGN NEOPLASM OF RIGHT ADRENAL GLAND: ICD-10-CM

## 2021-06-22 DIAGNOSIS — D35.02 BENIGN NEOPLASM OF LEFT ADRENAL GLAND: ICD-10-CM

## 2021-06-22 PROCEDURE — G1004 CDSM NDSC: HCPCS

## 2021-06-22 PROCEDURE — 74170 CT ABD WO CNTRST FLWD CNTRST: CPT

## 2021-06-22 RX ADMIN — IOHEXOL 100 ML: 350 INJECTION, SOLUTION INTRAVENOUS at 08:26

## 2021-06-26 ENCOUNTER — LAB (OUTPATIENT)
Dept: LAB | Facility: HOSPITAL | Age: 46
End: 2021-06-26
Payer: COMMERCIAL

## 2021-06-26 ENCOUNTER — APPOINTMENT (OUTPATIENT)
Dept: LAB | Facility: HOSPITAL | Age: 46
End: 2021-06-26
Payer: COMMERCIAL

## 2021-06-26 DIAGNOSIS — E29.1 3-OXO-5 ALPHA-STEROID DELTA 4-DEHYDROGENASE DEFICIENCY: ICD-10-CM

## 2021-06-26 DIAGNOSIS — Z00.8 HEALTH EXAMINATION IN POPULATION SURVEY: ICD-10-CM

## 2021-06-26 DIAGNOSIS — S73.191A TEAR OF RIGHT ACETABULAR LABRUM, INITIAL ENCOUNTER: ICD-10-CM

## 2021-06-26 DIAGNOSIS — I15.2 ADRENAL HYPERTENSION (HCC): ICD-10-CM

## 2021-06-26 DIAGNOSIS — I10 ESSENTIAL HYPERTENSION, MALIGNANT: ICD-10-CM

## 2021-06-26 DIAGNOSIS — E27.9 ADRENAL HYPERTENSION (HCC): ICD-10-CM

## 2021-06-26 DIAGNOSIS — E22.1 IDIOPATHIC HYPERPROLACTINEMIA (HCC): ICD-10-CM

## 2021-06-26 DIAGNOSIS — Z01.812 PRE-OPERATIVE LABORATORY EXAMINATION: ICD-10-CM

## 2021-06-26 LAB
ANION GAP SERPL CALCULATED.3IONS-SCNC: 7 MMOL/L (ref 4–13)
BUN SERPL-MCNC: 34 MG/DL (ref 5–25)
CALCIUM SERPL-MCNC: 9.1 MG/DL (ref 8.3–10.1)
CHLORIDE SERPL-SCNC: 101 MMOL/L (ref 100–108)
CHOLEST SERPL-MCNC: 213 MG/DL (ref 50–200)
CO2 SERPL-SCNC: 26 MMOL/L (ref 21–32)
CREAT SERPL-MCNC: 1.11 MG/DL (ref 0.6–1.3)
EST. AVERAGE GLUCOSE BLD GHB EST-MCNC: 128 MG/DL
GFR SERPL CREATININE-BSD FRML MDRD: 79 ML/MIN/1.73SQ M
GLUCOSE P FAST SERPL-MCNC: 93 MG/DL (ref 65–99)
HBA1C MFR BLD: 6.1 %
HDLC SERPL-MCNC: 61 MG/DL
LDLC SERPL CALC-MCNC: 116 MG/DL (ref 0–100)
NONHDLC SERPL-MCNC: 152 MG/DL
POTASSIUM SERPL-SCNC: 4 MMOL/L (ref 3.5–5.3)
PROLACTIN SERPL-MCNC: 22.2 NG/ML (ref 2.5–17.4)
SODIUM SERPL-SCNC: 134 MMOL/L (ref 136–145)
TRIGL SERPL-MCNC: 181 MG/DL

## 2021-06-26 PROCEDURE — 80048 BASIC METABOLIC PNL TOTAL CA: CPT

## 2021-06-26 PROCEDURE — 80061 LIPID PANEL: CPT

## 2021-06-26 PROCEDURE — 36415 COLL VENOUS BLD VENIPUNCTURE: CPT

## 2021-06-26 PROCEDURE — 93005 ELECTROCARDIOGRAM TRACING: CPT

## 2021-06-26 PROCEDURE — 84146 ASSAY OF PROLACTIN: CPT

## 2021-06-26 PROCEDURE — 83036 HEMOGLOBIN GLYCOSYLATED A1C: CPT

## 2021-06-28 LAB
ATRIAL RATE: 60 BPM
ATRIAL RATE: 60 BPM
P AXIS: 52 DEGREES
P AXIS: 53 DEGREES
PR INTERVAL: 142 MS
PR INTERVAL: 146 MS
QRS AXIS: 20 DEGREES
QRS AXIS: 22 DEGREES
QRSD INTERVAL: 78 MS
QRSD INTERVAL: 78 MS
QT INTERVAL: 406 MS
QT INTERVAL: 406 MS
QTC INTERVAL: 406 MS
QTC INTERVAL: 406 MS
T WAVE AXIS: 69 DEGREES
T WAVE AXIS: 74 DEGREES
VENTRICULAR RATE: 60 BPM
VENTRICULAR RATE: 60 BPM

## 2021-06-28 PROCEDURE — 93010 ELECTROCARDIOGRAM REPORT: CPT | Performed by: INTERNAL MEDICINE

## 2021-07-02 ENCOUNTER — TELEPHONE (OUTPATIENT)
Dept: OBGYN CLINIC | Facility: HOSPITAL | Age: 46
End: 2021-07-02

## 2021-07-02 NOTE — TELEPHONE ENCOUNTER
Patient can have a raised toilet seat this may help him initially  The question will be whether not the insurance approves her pays for it  I will put in a prescription to see if he can obtain it  He should be allowed to bend past 90° and sit on a toilet seat afterwards but this may help him get on and off of it

## 2021-07-02 NOTE — TELEPHONE ENCOUNTER
Patient sees Dr Urszula Singh  Patient spouse Anibal Vaughn is calling in stating that he is scheduled to have surgery on 7/19 for his right hip and is asking what he is going to be needing for after surgery? They were speaking with the physical therapist and wanted to know the protocol and see if a raised toilet seat would be needed and what he all needed  She is asking for a call back relating this          Call back# [phone]

## 2021-07-06 ENCOUNTER — TELEPHONE (OUTPATIENT)
Dept: OBGYN CLINIC | Facility: MEDICAL CENTER | Age: 46
End: 2021-07-06

## 2021-07-06 NOTE — TELEPHONE ENCOUNTER
Returned call to wife to let her know order is in, she has access to My Chart and he will take care of it  Phone cut out as she was asking me another question and disconnected

## 2021-07-06 NOTE — TELEPHONE ENCOUNTER
Patient's wife called back  She stated the call got disconnected  She states she has more questions she would like to ask Rose Mary Simon         Please advise,

## 2021-07-06 NOTE — TELEPHONE ENCOUNTER
Attempted to call patient's wife Von Aquino with no response  Left voicemail for her to call back  Dr Camila Joel typically does nonweightbearing with crutches x2 weeks for labral debridement but this could be up to 6 weeks if microfracture is involved  He does allow the patient to do some touchdown weight-bearing with the heel if needed during this time

## 2021-07-07 ENCOUNTER — OFFICE VISIT (OUTPATIENT)
Dept: PHYSICAL THERAPY | Age: 46
End: 2021-07-07
Payer: COMMERCIAL

## 2021-07-07 ENCOUNTER — LAB REQUISITION (OUTPATIENT)
Dept: LAB | Facility: HOSPITAL | Age: 46
End: 2021-07-07
Payer: COMMERCIAL

## 2021-07-07 DIAGNOSIS — D48.5 NEOPLASM OF UNCERTAIN BEHAVIOR OF SKIN: ICD-10-CM

## 2021-07-07 DIAGNOSIS — S73.191D TEAR OF RIGHT ACETABULAR LABRUM, SUBSEQUENT ENCOUNTER: ICD-10-CM

## 2021-07-07 DIAGNOSIS — M25.551 RIGHT HIP PAIN: Primary | ICD-10-CM

## 2021-07-07 PROCEDURE — 88305 TISSUE EXAM BY PATHOLOGIST: CPT | Performed by: STUDENT IN AN ORGANIZED HEALTH CARE EDUCATION/TRAINING PROGRAM

## 2021-07-07 PROCEDURE — 97162 PT EVAL MOD COMPLEX 30 MIN: CPT | Performed by: PHYSICAL THERAPIST

## 2021-07-07 PROCEDURE — 97110 THERAPEUTIC EXERCISES: CPT | Performed by: PHYSICAL THERAPIST

## 2021-07-07 NOTE — PROGRESS NOTES
PT Evaluation : right hip pre-op  Today's date: 2021  Patient name: Miranda Pandey  : 1975  MRN: [id number]  Referring provider: Sheila Contreras, PT  Dx:   Encounter Diagnosis     ICD-10-CM    1  Right hip pain  M25 551    2  Tear of right acetabular labrum, subsequent encounter  [id number]                   Assessment  Assessment details: PT IE: 21  Patient reported onset of right hip pain for over 1 year  Patient noted his right hip pain is constant  Patient noted the intensity is variable, which he noted movement and weight baring are pain aggravating factors  Plus, he noted walking and standing on an incline is pain aggravating  Patient denies right hip weakness  Patient denies distal right le pain or paresthesias  Patient noted inside and outside house hold activities, right side lying and sleep, prolonged standing and walking on uneven surfaces and hills  Also, he noted his right hip pain is worse at the end of the day  Patient noted his right hip labral repair is scheduled for 21  Patient noted right hip pain at lowest / least is at 4 of 10 and worst at a 7 of 10  Patient noted location of pain is anterior deep right hip pain  Patient returns to the surgeon, Dr Camila Joel on 21  Patient noted he is concerned with gait with bilateral axillary crutches as well as stair climbing with crutches  Patient was educated and performed supine / seated pre surgery hep with written hep provided  Patient was educated and performed right LE NWB gait training with bilateral axillary crutches as well as stair climbing with right le NWB with bilateral axillary crutches in a non reciprocal pattern  Patient presents with good understanding and proper technique of right le NWB gait and stair climbing but he was recommended to obtain a pair of bilateral ue axillary crutches and practice so he feels confident with gait and stair climbing with them    Also, patient educated and performed leg  sit to supine and supine to sit transfers which he perform properly  Impairments: abnormal gait, abnormal or restricted ROM, abnormal movement, activity intolerance, lacks appropriate home exercise program and pain with function  Understanding of Dx/Px/POC: excellent   Prognosis: good  Prognosis details: Patient is a 51y o  year old male seen for outpatient PT evaluation with pain, mobility and functional deficits due to right hip pain secondary to right hip labral tear  Patient presents to PT IE with the following problems, concerns, deficits and impairments: right hip pain, decreased right le range of motion, decreased right le strength, functional limitations and decreased tolerance to activity  Patient seen 1 x for gait and stair training with bilateral axillary crutches, sit to supine transfers with leg  technique with belt or towel and pre surgery hep  After right hip labral repair surgery, patient would benefit from skilled PT services under the following PT treatment plan to address the above noted deficits: therapeutic exercises and activities to facilitate right le rom and strength as per surgeon specific protocol, gait and stair training with right le reduced / NWB status as per surgeon specific restrictions and limitations, transfer training as per surgeon specific restrictions and limitations, IASTM techniques, Kinesio taping techniques, modalities, manual therapy techniques, and a hep  Thank you for the referral      Goals  Short Term goals 4 - 6 weeks  1  Patient will be independent HEP  2   Patient will report a 25 - 50% decrease in pain complaints  3   Increase strength 1/2 grade  4   Increase ROM 5-10 degrees  Long Term goals 8 - 12   weeks  1  Patient will report elimination of pain complaints  2   Patient will return to all work related activities without restriction  3   Patient will return to all recreational activities without restriction  4   ROM WFL    5  Strength 5/5   6   Patient will ambulate without assistive device  7   Patient will perform stairs in a reciprocal pattern with unilateral railing  8   Patient will perform all transfers independently and without right hip symptoms of limitations  9   Patient will resume all standing activities without right hip symptoms or limitations  10   Patient will perform all dressing and shower activities without right hip symptoms or limitations  11   Patient will perform all squatting and lifting activities without right hip symptoms or limitations  Plan  Patient would benefit from: skilled physical therapy  Planned modality interventions: cryotherapy, TENS, thermotherapy: hydrocollator packs and unattended electrical stimulation  Planned therapy interventions: joint mobilization, manual therapy, massage, balance, balance/weight bearing training, neuromuscular re-education, patient education, postural training, body mechanics training, compression, self care, strengthening, stretching, therapeutic activities, therapeutic exercise, therapeutic training, transfer training, flexibility, functional ROM exercises, gait training, graded activity, graded exercise, graded motor and home exercise program  Frequency: 1x week  Duration in weeks: 1  Treatment plan discussed with: patient        Subjective Evaluation    History of Present Illness  Mechanism of injury:  Patient noted PMHx is remarkable for HTN, GERD, kidney stones, sleep apnea  MRI ARTHROGRAM RIGHT HIP     INDICATION:   M25 551: Pain in right hip  Eduard Salcido's note from 3/17/2021 was reviewed  Patient has right hip pain radiating into the groin  This MR arthrogram was performed to evaluate for labral tear      COMPARISON: None      TECHNIQUE:  MR sequences were obtained of the right hip and pelvis including: Localizer, coronal pelvis T1, coronal pelvis T2 fat sat   Smaller field of view sequences of the affected hip were obtained with axial oblique T1 fat sat, axial oblique T2 fat   sat, coronal T1 fat sat, sagittal T2 fat sat, sagittal T1 fat sat        Images were acquired after intra-articular injection of gadolinium utilizing direct MR arthrography technique      FINDINGS:     RIGHT HIP:     -JOINT EFFUSION: There is good distention of the right hip joint with injected contrast      -BONES: Normal marrow signal demonstrated without hip fracture or AVN     -ARTICULAR SURFACES: There is a moderate-sized anterosuperior right acetabular labral tear (series 6 images 10-11 and series 8 images 22-23 )     -ACETABULAR LABRUM: Intact      -TROCHANTERIC BURSA: Normal      LEFT HIP: (please note dedicated small field of view images were not made of the left hip joint limiting its evaluation )      -JOINT EFFUSION: None      -BONES: Normal marrow signal demonstrated without hip fracture or AVN     -ARTICULAR SURFACES: There is no osteoarthritis      -ACETABULAR LABRUM: No gross abnormalities although evaluation is very limited      -TROCHANTERIC BURSA: Normal      REST OF PELVIS:     -BONES: Normal      -SI JOINTS AND SYMPHYSIS PUBIS:  Intact      -VISUALIZED LUMBAR SPINE:  unremarkable      -MUSCULATURE: Intact with intact hamstring origins bilaterally      -PELVIC SOFT TISSUES: Normal      SUBCUTANEOUS TISSUES: Normal     IMPRESSION:     There is a moderate-sized anterosuperior right acetabular labral tear (series 6 images 10-11 and series 8 images 22-23 )  Pain  Current pain ratin  At best pain ratin  At worst pain ratin  Location: Right anterior and deep hip region          Objective     Tenderness     Additional Tenderness Details  Patient is - TTP at right lateral and anterior hip      Active Range of Motion   Left Hip   Flexion: 108 degrees   Abduction: 30 degrees   External rotation (90/90): 44 degrees   Internal rotation (90/90): 40 degrees     Right Hip   Flexion: 104 degrees with pain  Abduction: 24 degrees with pain  External rotation (90/90): 34 degrees with pain  Internal rotation (90/90): 44 degrees with pain    Additional Active Range of Motion Details  Hamstring stretch: right at 54 degrees and left at 48 degrees  Strength/Myotome Testing     Left Hip   Planes of Motion   Flexion: 5  Extension: 5  Abduction: 5  Adduction: 5  External rotation: 4+  Internal rotation: 4+    Right Hip   Planes of Motion   Flexion: 4+  Extension: 5  Abduction: 5  Adduction: 5  External rotation: 4+  Internal rotation: 4+    Left Knee   Flexion: 5  Extension: 5    Right Knee   Flexion: 5  Extension: 5    Left Ankle/Foot   Dorsiflexion: 4+  Plantar flexion: 5    Right Ankle/Foot   Dorsiflexion: 4+  Plantar flexion: 5             Precautions:  Patient noted PMHx is remarkable for HTN, GERD, kidney stones, sleep apnea  Follow patient specific protocol from Dr Mare Yepez        Manuals 7/7            Right hip prom as per surgeon protocol                                                    Neuro Re-Ed                                                                                                        Ther Ex             Nu step             Seated heel slides:R             Seated hr and tr:B:             Seated LAQ:R 10 x with 5 sec hold                         Supine ankle pumps:B:             Quad sets:R 5 sec x 10            glute sets:B 5 sec x 10            Heel slides:R 10 x             slr flexion:R 10 x             bridges 10 x             Supine hip abduction:R 10 x             SAQ:B:                          Standing slr x 3 on right le             Standing hamstring curls on right le                                                                                           Ther Activity                                       Gait Training                                       Modalities             CP to right hip in seated or supine

## 2021-07-09 ENCOUNTER — ANESTHESIA EVENT (OUTPATIENT)
Dept: PERIOP | Facility: HOSPITAL | Age: 46
End: 2021-07-09
Payer: COMMERCIAL

## 2021-07-09 NOTE — PRE-PROCEDURE INSTRUCTIONS
Pre-Surgery Instructions:   Medication Instructions    carvedilol (COREG) 25 mg tablet Instructed patient per Anesthesia Guidelines   chlorthalidone 25 mg tablet Instructed patient per Anesthesia Guidelines   Cholecalciferol (Vitamin D) 50 MCG (2000 UT) CAPS Instructed patient per Anesthesia Guidelines   eplerenone (INSPRA) 50 MG tablet Instructed patient per Anesthesia Guidelines   felodipine (PLENDIL) 10 MG 24 hr tablet Instructed patient per Anesthesia Guidelines   omeprazole (PriLOSEC) 40 MG capsule Instructed patient per Anesthesia Guidelines   potassium chloride (K-DUR,KLOR-CON) 20 mEq tablet Instructed patient per Anesthesia Guidelines   Potassium Citrate ER 15 MEQ (1620 MG) TBCR Instructed patient per Anesthesia Guidelines   rosuvastatin (CRESTOR) 10 MG tablet Instructed patient per Anesthesia Guidelines   testosterone cypionate (DEPO-TESTOSTERONE) 200 mg/mL BALJINDER Patient was instructed to contact Physician for medication instruction  seeing Dr Yoel Evans 7/14     torsemide (DEMADEX) 10 mg tablet Instructed patient per Anesthesia Guidelines  Patient  instructed *to take*carvedilol and omeprazole*with a sip of water the morning of surgery  Patient  instructed on use of chlorhexidine soap per hospital protocol    Patient instructed to stop all ASA, NSAIDS, vitamins and herbal supplements one week prior to surgery or per Dr Yoel Evans

## 2021-07-10 ENCOUNTER — HOSPITAL ENCOUNTER (OUTPATIENT)
Dept: RADIOLOGY | Facility: HOSPITAL | Age: 46
Discharge: HOME | End: 2021-07-10
Attending: SPECIALIST
Payer: COMMERCIAL

## 2021-07-10 DIAGNOSIS — E27.9 DISORDER OF ADRENAL GLAND, UNSPECIFIED (HCC): ICD-10-CM

## 2021-07-10 DIAGNOSIS — E22.1 HYPERPROLACTINEMIA (HCC): ICD-10-CM

## 2021-07-10 DIAGNOSIS — E04.9 NONTOXIC GOITER, UNSPECIFIED: ICD-10-CM

## 2021-07-10 DIAGNOSIS — E34.9 ENDOCRINE DISORDER, UNSPECIFIED: ICD-10-CM

## 2021-07-10 PROCEDURE — G1004 CDSM NDSC: HCPCS

## 2021-07-10 PROCEDURE — 75571 CT HRT W/O DYE W/CA TEST: CPT

## 2021-07-13 ENCOUNTER — TELEPHONE (OUTPATIENT)
Dept: PODIATRY | Facility: CLINIC | Age: 46
End: 2021-07-13

## 2021-07-13 NOTE — TELEPHONE ENCOUNTER
Called patient and left message  Patient requires pre-op H&P for his surgery scheduled with Dr Ilsa Calhoun for Monday  Patient was scheduled for H&P with Dr Ilsa Calhoun, but he is out of the office  Scheduled patient with his PCP- Dr Annita Vang for 7/15 at 10:30  Left detailed message and faxed clearance for to Dr Annita Vang

## 2021-07-19 ENCOUNTER — HOSPITAL ENCOUNTER (OUTPATIENT)
Facility: HOSPITAL | Age: 46
Setting detail: OUTPATIENT SURGERY
Discharge: HOME | End: 2021-07-19
Attending: ORTHOPAEDIC SURGERY | Admitting: ORTHOPAEDIC SURGERY
Payer: COMMERCIAL

## 2021-07-19 ENCOUNTER — ANESTHESIA (OUTPATIENT)
Dept: PERIOP | Facility: HOSPITAL | Age: 46
End: 2021-07-19
Payer: COMMERCIAL

## 2021-07-19 ENCOUNTER — HOSPITAL ENCOUNTER (OUTPATIENT)
Dept: RADIOLOGY | Facility: HOSPITAL | Age: 46
Setting detail: OUTPATIENT SURGERY
Discharge: HOME | End: 2021-07-19
Payer: COMMERCIAL

## 2021-07-19 VITALS
HEART RATE: 69 BPM | DIASTOLIC BLOOD PRESSURE: 107 MMHG | TEMPERATURE: 97.7 F | BODY MASS INDEX: 28.44 KG/M2 | SYSTOLIC BLOOD PRESSURE: 178 MMHG | WEIGHT: 192 LBS | OXYGEN SATURATION: 98 % | RESPIRATION RATE: 18 BRPM | HEIGHT: 69 IN

## 2021-07-19 DIAGNOSIS — S73.191A TEAR OF RIGHT ACETABULAR LABRUM, INITIAL ENCOUNTER: ICD-10-CM

## 2021-07-19 DIAGNOSIS — S73.191A TEAR OF RIGHT ACETABULAR LABRUM, INITIAL ENCOUNTER: Primary | ICD-10-CM

## 2021-07-19 PROCEDURE — 29914 HIP ARTHRO W/FEMOROPLASTY: CPT | Performed by: ORTHOPAEDIC SURGERY

## 2021-07-19 PROCEDURE — 73501 X-RAY EXAM HIP UNI 1 VIEW: CPT

## 2021-07-19 PROCEDURE — NC001 PR NO CHARGE: Performed by: ORTHOPAEDIC SURGERY

## 2021-07-19 RX ORDER — HYDROMORPHONE HCL/PF 1 MG/ML
0.5 SYRINGE (ML) INJECTION
Status: DISCONTINUED | OUTPATIENT
Start: 2021-07-19 | End: 2021-07-19 | Stop reason: HOSPADM

## 2021-07-19 RX ORDER — LABETALOL 20 MG/4 ML (5 MG/ML) INTRAVENOUS SYRINGE
AS NEEDED
Status: DISCONTINUED | OUTPATIENT
Start: 2021-07-19 | End: 2021-07-19

## 2021-07-19 RX ORDER — CEFAZOLIN SODIUM 2 G/50ML
SOLUTION INTRAVENOUS AS NEEDED
Status: DISCONTINUED | OUTPATIENT
Start: 2021-07-19 | End: 2021-07-19

## 2021-07-19 RX ORDER — DEXAMETHASONE SODIUM PHOSPHATE 10 MG/ML
INJECTION, SOLUTION INTRAMUSCULAR; INTRAVENOUS AS NEEDED
Status: DISCONTINUED | OUTPATIENT
Start: 2021-07-19 | End: 2021-07-19

## 2021-07-19 RX ORDER — CEFAZOLIN SODIUM 2 G/50ML
2000 SOLUTION INTRAVENOUS ONCE
Status: DISCONTINUED | OUTPATIENT
Start: 2021-07-19 | End: 2021-07-19 | Stop reason: HOSPADM

## 2021-07-19 RX ORDER — ROCURONIUM BROMIDE 10 MG/ML
INJECTION, SOLUTION INTRAVENOUS AS NEEDED
Status: DISCONTINUED | OUTPATIENT
Start: 2021-07-19 | End: 2021-07-19

## 2021-07-19 RX ORDER — SODIUM CHLORIDE 9 MG/ML
125 INJECTION, SOLUTION INTRAVENOUS CONTINUOUS
Status: DISCONTINUED | OUTPATIENT
Start: 2021-07-19 | End: 2021-07-19 | Stop reason: HOSPADM

## 2021-07-19 RX ORDER — OXYCODONE HYDROCHLORIDE 5 MG/1
5 TABLET ORAL EVERY 4 HOURS PRN
Qty: 45 TABLET | Refills: 0 | Status: SHIPPED | OUTPATIENT
Start: 2021-07-19 | End: 2021-07-29

## 2021-07-19 RX ORDER — FENTANYL CITRATE 50 UG/ML
INJECTION, SOLUTION INTRAMUSCULAR; INTRAVENOUS AS NEEDED
Status: DISCONTINUED | OUTPATIENT
Start: 2021-07-19 | End: 2021-07-19

## 2021-07-19 RX ORDER — MIDAZOLAM HYDROCHLORIDE 2 MG/2ML
INJECTION, SOLUTION INTRAMUSCULAR; INTRAVENOUS AS NEEDED
Status: DISCONTINUED | OUTPATIENT
Start: 2021-07-19 | End: 2021-07-19

## 2021-07-19 RX ORDER — NEOSTIGMINE METHYLSULFATE 1 MG/ML
INJECTION INTRAVENOUS AS NEEDED
Status: DISCONTINUED | OUTPATIENT
Start: 2021-07-19 | End: 2021-07-19

## 2021-07-19 RX ORDER — GLYCOPYRROLATE 0.2 MG/ML
INJECTION INTRAMUSCULAR; INTRAVENOUS AS NEEDED
Status: DISCONTINUED | OUTPATIENT
Start: 2021-07-19 | End: 2021-07-19

## 2021-07-19 RX ORDER — ONDANSETRON 2 MG/ML
4 INJECTION INTRAMUSCULAR; INTRAVENOUS ONCE AS NEEDED
Status: DISCONTINUED | OUTPATIENT
Start: 2021-07-19 | End: 2021-07-19 | Stop reason: HOSPADM

## 2021-07-19 RX ORDER — LIDOCAINE HYDROCHLORIDE 10 MG/ML
INJECTION, SOLUTION EPIDURAL; INFILTRATION; INTRACAUDAL; PERINEURAL AS NEEDED
Status: DISCONTINUED | OUTPATIENT
Start: 2021-07-19 | End: 2021-07-19

## 2021-07-19 RX ORDER — OXYCODONE HYDROCHLORIDE 5 MG/1
5 TABLET ORAL EVERY 4 HOURS PRN
Status: DISCONTINUED | OUTPATIENT
Start: 2021-07-19 | End: 2021-07-19 | Stop reason: HOSPADM

## 2021-07-19 RX ORDER — ASPIRIN 325 MG
325 TABLET, DELAYED RELEASE (ENTERIC COATED) ORAL 2 TIMES DAILY
Qty: 28 TABLET | Refills: 0 | Status: SHIPPED | OUTPATIENT
Start: 2021-07-19 | End: 2021-12-07

## 2021-07-19 RX ORDER — PROPOFOL 10 MG/ML
INJECTION, EMULSION INTRAVENOUS AS NEEDED
Status: DISCONTINUED | OUTPATIENT
Start: 2021-07-19 | End: 2021-07-19

## 2021-07-19 RX ORDER — OXYCODONE HYDROCHLORIDE 5 MG/1
10 TABLET ORAL EVERY 4 HOURS PRN
Status: DISCONTINUED | OUTPATIENT
Start: 2021-07-19 | End: 2021-07-19 | Stop reason: HOSPADM

## 2021-07-19 RX ORDER — CHLORHEXIDINE GLUCONATE 0.12 MG/ML
15 RINSE ORAL ONCE
Status: COMPLETED | OUTPATIENT
Start: 2021-07-19 | End: 2021-07-19

## 2021-07-19 RX ORDER — ONDANSETRON 4 MG/1
4 TABLET, ORALLY DISINTEGRATING ORAL EVERY 8 HOURS PRN
Qty: 15 TABLET | Refills: 0 | Status: SHIPPED | OUTPATIENT
Start: 2021-07-19 | End: 2021-12-07

## 2021-07-19 RX ORDER — ONDANSETRON 2 MG/ML
INJECTION INTRAMUSCULAR; INTRAVENOUS AS NEEDED
Status: DISCONTINUED | OUTPATIENT
Start: 2021-07-19 | End: 2021-07-19

## 2021-07-19 RX ADMIN — GLYCOPYRROLATE 0.8 MG: 0.2 INJECTION, SOLUTION INTRAMUSCULAR; INTRAVENOUS at 11:26

## 2021-07-19 RX ADMIN — SODIUM CHLORIDE 125 ML/HR: 0.9 INJECTION, SOLUTION INTRAVENOUS at 07:42

## 2021-07-19 RX ADMIN — FENTANYL CITRATE 50 MCG: 50 INJECTION INTRAMUSCULAR; INTRAVENOUS at 10:42

## 2021-07-19 RX ADMIN — NEOSTIGMINE METHYLSULFATE 4 MG: 1 INJECTION INTRAVENOUS at 11:26

## 2021-07-19 RX ADMIN — HYDROMORPHONE HYDROCHLORIDE 0.5 MG: 1 INJECTION, SOLUTION INTRAMUSCULAR; INTRAVENOUS; SUBCUTANEOUS at 12:17

## 2021-07-19 RX ADMIN — LABETALOL 20 MG/4 ML (5 MG/ML) INTRAVENOUS SYRINGE 5 MG: at 11:02

## 2021-07-19 RX ADMIN — HYDROMORPHONE HYDROCHLORIDE 0.5 MG: 1 INJECTION, SOLUTION INTRAMUSCULAR; INTRAVENOUS; SUBCUTANEOUS at 12:07

## 2021-07-19 RX ADMIN — CHLORHEXIDINE GLUCONATE 0.12% ORAL RINSE 15 ML: 1.2 LIQUID ORAL at 07:38

## 2021-07-19 RX ADMIN — HYDROMORPHONE HYDROCHLORIDE 0.5 MG: 1 INJECTION, SOLUTION INTRAMUSCULAR; INTRAVENOUS; SUBCUTANEOUS at 11:56

## 2021-07-19 RX ADMIN — FENTANYL CITRATE 50 MCG: 50 INJECTION INTRAMUSCULAR; INTRAVENOUS at 10:57

## 2021-07-19 RX ADMIN — CEFAZOLIN SODIUM 2000 MG: 2 SOLUTION INTRAVENOUS at 10:10

## 2021-07-19 RX ADMIN — FENTANYL CITRATE 50 MCG: 50 INJECTION INTRAMUSCULAR; INTRAVENOUS at 10:52

## 2021-07-19 RX ADMIN — LIDOCAINE HYDROCHLORIDE 80 MG: 10 INJECTION, SOLUTION EPIDURAL; INFILTRATION; INTRACAUDAL; PERINEURAL at 10:13

## 2021-07-19 RX ADMIN — ROCURONIUM BROMIDE 50 MG: 50 INJECTION, SOLUTION INTRAVENOUS at 10:13

## 2021-07-19 RX ADMIN — ONDANSETRON 4 MG: 2 INJECTION INTRAMUSCULAR; INTRAVENOUS at 11:24

## 2021-07-19 RX ADMIN — SODIUM CHLORIDE: 0.9 INJECTION, SOLUTION INTRAVENOUS at 11:00

## 2021-07-19 RX ADMIN — FENTANYL CITRATE 50 MCG: 50 INJECTION INTRAMUSCULAR; INTRAVENOUS at 10:11

## 2021-07-19 RX ADMIN — MIDAZOLAM 2 MG: 1 INJECTION INTRAMUSCULAR; INTRAVENOUS at 10:11

## 2021-07-19 RX ADMIN — PROPOFOL 200 MG: 10 INJECTION, EMULSION INTRAVENOUS at 10:13

## 2021-07-19 RX ADMIN — LABETALOL 20 MG/4 ML (5 MG/ML) INTRAVENOUS SYRINGE 5 MG: at 10:24

## 2021-07-19 RX ADMIN — LABETALOL 20 MG/4 ML (5 MG/ML) INTRAVENOUS SYRINGE 5 MG: at 10:45

## 2021-07-19 RX ADMIN — DEXAMETHASONE SODIUM PHOSPHATE 6 MG: 10 INJECTION, SOLUTION INTRAMUSCULAR; INTRAVENOUS at 10:42

## 2021-07-19 NOTE — ANESTHESIA POSTPROCEDURE EVALUATION
Post-Op Assessment Note    CV Status:  Stable    Pain management: adequate     Mental Status:  Alert and awake   Hydration Status:  Euvolemic   PONV Controlled:  Controlled   Airway Patency:  Patent      Post Op Vitals Reviewed: Yes      Staff: Anesthesiologist   Reason for prolonged intubation > 24 hours:  Was not intubated for 24 hours Reason for prolonged intubation > 48 hours:  Was not intubated for 48 hours      No complications documented      BP     Temp      Pulse     Resp      SpO2

## 2021-07-19 NOTE — ANESTHESIA PREPROCEDURE EVALUATION
Procedure:  ARTHROSCOPY HIP with labral repair verses debridgement (Right Hip)    Relevant Problems   CARDIO   (+) Atypical chest pain   (+) Benign hypertension with CKD (chronic kidney disease), stage II   (+) Hyperlipidemia      ENDO   (-) Diabetes mellitus, type 2 (HCC)      GI/HEPATIC   (+) GERD (gastroesophageal reflux disease)      /RENAL   (+) CKD (chronic kidney disease) stage 2, GFR 60-89 ml/min   (+) Nephrolithiasis      PULMONARY   (+) Obstructive sleep apnea        Physical Exam    Airway    Mallampati score: II  TM Distance: >3 FB  Neck ROM: full     Dental   No notable dental hx     Cardiovascular  Rhythm: regular, Rate: normal, Cardiovascular exam normal    Pulmonary  Pulmonary exam normal Breath sounds clear to auscultation,     Other Findings        Anesthesia Plan  ASA Score- 2     Anesthesia Type- general with ASA Monitors  Additional Monitors:   Airway Plan:           Plan Factors-    Chart reviewed  Existing labs reviewed  Patient summary reviewed  Patient is not a current smoker  Patient instructed to abstain from smoking on day of procedure  Patient did not smoke on day of surgery  Induction- intravenous  Postoperative Plan-     Informed Consent- Anesthetic plan and risks discussed with patient and spouse

## 2021-07-19 NOTE — INTERVAL H&P NOTE
H&P reviewed  After examining the patient I find no changes in the patients condition since the H&P had been written      Vitals:    07/19/21 0718   BP: (!) 171/102   Pulse: 72   Resp: 16   Temp: 98 1 °F (36 7 °C)   SpO2: 96%

## 2021-07-19 NOTE — H&P
Orthopaedic Surgery - History & Physical Exam  Kelton Hankins (13 y o  male)   : 1975   MRN: [id number]  Date: 2021   Encounter: [id number]   Unit/Bed#: OR POOL        Assessment / Plan  Right acetabular labral tear    · Proceed with surgery today as planned  · F/U 4-5 days postop as scheduled    History of Present Illness  Kelton Hankins is a 51 y o  male who presents for surgery for right acetabular labral debridement vs repair  He reports no interval changes in his symptoms since I last saw him in 2021  Review of Systems  Negative for chest pain and shortness of breath  Review of all other systems is negative    Medical, Surgical, Family, and Social History    Past Medical History:   Diagnosis Date    Adrenal mass ([address] )     Chest pain     "long ago stress related to family issue seen ER and cleared"    Chronic kidney disease     pt reports from kidney stones    Chronic pain disorder     right hip    Claustrophobia     Dental crowns present     Disease of thyroid gland     nodule sees Dr Maldonado/endocrinologist and Dr Francisco Javier Rossi([facility])    Exercise involving weight training     1-2x/week    GERD (gastroesophageal reflux disease)     Hyperlipidemia     Hypertension     Kidney stones     Low testosterone     Motion sickness     Right hip pain     Sleep apnea     not currently using his CPAP       Past Surgical History:   Procedure Laterality Date    FL INJECTION RIGHT HIP (ARTHROGRAM)  3/31/2021    pt cant recall this    FL RETROGRADE PYELOGRAM  2018    KIDNEY STONE SURGERY      DC CYSTO/URETERO W/LITHOTRIPSY &INDWELL STENT INSRT Left 2018    Procedure: CYSTOSCOPY URETEROSCOPY, RETROGRADE PYELOGRAM AND INSERTION STENT URETERAL;  Surgeon: Jaye Andres MD;  [facility];  Service: Urology    DC ESOPHAGOGASTRODUODENOSCOPY TRANSORAL DIAGNOSTIC N/A 2018    Procedure: ESOPHAGOGASTRODUODENOSCOPY (EGD);   Surgeon: Jace Hoffmann MD;  Location: [facility]; Service: Gastroenterology    WISDOM TOOTH EXTRACTION         Family History   Problem Relation Age of Onset    Asthma Mother     Diabetes Mother     Other Father         Endocarditis    Hypertension Father     Gout Father        Social History     Occupational History    Not on file   Tobacco Use    Smoking status: Former Smoker     Packs/day: 1 00     Years: 17 00     Pack years: 17 00     Quit date:      Years since quittin 5    Smokeless tobacco: Never Used   Vaping Use    Vaping Use: Never used   Substance and Sexual Activity    Alcohol use: Not Currently     Comment: 1 drink every two weeks or so    Drug use: No    Sexual activity: Not on file     Comment: defer       No Known Allergies    Medications Prior to Admission   Medication    carvedilol (COREG) 25 mg tablet    chlorthalidone 25 mg tablet    Cholecalciferol (Vitamin D) 50 MCG ( UT) CAPS    eplerenone (INSPRA) 50 MG tablet    felodipine (PLENDIL) 10 MG 24 hr tablet    omeprazole (PriLOSEC) 40 MG capsule    potassium chloride (K-DUR,KLOR-CON) 20 mEq tablet    Potassium Citrate ER 15 MEQ (1620 MG) TBCR    rosuvastatin (CRESTOR) 10 MG tablet    testosterone cypionate (DEPO-TESTOSTERONE) 200 mg/mL SOLN    diazepam (VALIUM) 2 mg tablet    SYRINGE-NEEDLE, DISP, 3 ML 21G X 1-1/2" 3 ML MISC    SYRINGE-NEEDLE, DISP, 3 ML 21G X 1-1/2" 3 ML MISC    torsemide (DEMADEX) 10 mg tablet     Current Facility-Administered Medications   Medication Dose Route Frequency    ceFAZolin (ANCEF) IVPB (premix in dextrose) 2,000 mg 50 mL  2,000 mg Intravenous Once    morphine injection 2 mg  2 mg Intravenous Q4H PRN    oxyCODONE (ROXICODONE) IR tablet 10 mg  10 mg Oral Q4H PRN    oxyCODONE (ROXICODONE) IR tablet 5 mg  5 mg Oral Q4H PRN    sodium chloride 0 9 % infusion  125 mL/hr Intravenous Continuous       Vitals  Temp:  [98 1 °F (36 7 °C)] 98 1 °F (36 7 °C)  HR:  [72] 72  Resp:  [16] 16  BP: (171)/(102) 171/102  Body mass index is 28 35 kg/m²  I/O last 24 hours: In: 150 [I V :150]  Out: -     Physical Exam  General:  Alert & oriented x3, no distress, appears stated age  [de-identified]:  EOMI, eyes clear, hearing intact, mucous membranes moist  Neck:  Supple, non-tender, trachea midline, no lymphadenopathy  Cardiovascular:  Regular rate, no discernable arrhythmia  Pulmonary:  Regular rate, respirations non-labored   Abdominal:  Soft, non-tender    Ortho Exam - Right Hip Exam  Alignment / Posture:  Normal resting hip posture  Inspection:  No swelling  No erythema  No deformity  Palpation:  Mild tenderness at the anterior aspect of the hip and laterally over the soft tissue  ROM:  Hip Flexion 110  Hip ER 60  Hip IR 40  All ROM is causing pain in the groin  Strength:  5/5 hip flexors and abductors  Stability:  (+) Logroll  Tests:  (+) Stinchfield  (+) FADDIR  (+) KEENA  (-) Straight leg raise  Neurovascular:  Sensation intact in DP/SP/[place]/Sa/T nerve distributions  Sensation intact in all digital nerve distributions  Toes warm and perfused  Gait:  Antalgic      Imaging  No studies to review    Lab Results  (I have personally reviewed pertinent lab results )  Lab Results   Component Value Date    WBC 9 21 05/03/2021    HGB 16 8 05/03/2021    HCT 50 9 (H) 05/03/2021     05/03/2021    RBC 5 74 (H) 05/03/2021    MCV 89 05/03/2021    MCH 29 3 05/03/2021    MCHC 33 0 05/03/2021    RDW 12 7 05/03/2021    MPV 9 2 05/03/2021    NRBC 0 07/28/2020     No results found for: PTT, PT, INR  Lab Results   Component Value Date     11/05/2014    K 4 0 06/26/2021     06/26/2021    CO2 26 06/26/2021    ANIONGAP 4 11/05/2014    BUN 34 (H) 06/26/2021    CREATININE 1 11 06/26/2021    EGFR 79 06/26/2021    CALCIUM 9 1 06/26/2021    PHOS 3 3 05/03/2021    ALKPHOS 66 05/03/2021    PROT 6 8 11/05/2014    ALT 40 05/03/2021    AST 17 05/03/2021    BILITOT 0 14 (L) 11/05/2014    GLUCOSE 106 11/05/2014     Lab Results   Component Value Date    ESR 25 (H) 07/28/2020     Lab Results   Component Value Date    URINECX 7905-4175 cfu/ml Gamma Hemolytic Streptococcus 08/07/2018       EKG, Pathology, and Other Studies  (I have personally reviewed pertinent reports )  na    Code Status  No Order    Counseling / Coordination of Care  Total floor / unit time spent today 20 minutes  Greater than 50% of total time was spent with the patient and / or family counseling and / or coordination of care      Ady Cervantes MD

## 2021-07-19 NOTE — OP NOTE
OPERATIVE REPORT    PATIENT NAME: Bruce Magaña   :  1975  MRN: [id number]  Pt Location: [facility] OR ROOM 02    SURGERY DATE: 2021    SURGEON(S) and ROLE:  Primary: Shankar Perales MD    NOTE:  I was present for the entire procedure and performed all essential portions of the surgery  PREOPERATIVE DIAGNOSES:  Right Hip  Acetabular Labral Tear  CAM JOSE    POSTOPERATIVE DIAGNOSES:  Right Hip  Same as Preoperative Diagnoses     PROCEDURES:  Right Hip Arthroscopy with:  Labral Debridement  Femoroplasty      ANESTHESIA TYPE:  General endotracheal    ANESTHESIA STAFF:   Anesthesiologist: Rico Troncoso DO  CRNA: Eileen El CRNA    ESTIMATED BLOOD LOSS:  5 mL    PERIOPERATIVE ANTIBIOTICS:  cefazolin, 2 grams    IMPLANTS:  none    * No implants in log *    SPECIMENS:  * No specimens in log *    DRAINS:  None      OPERATIVE INDICATIONS:  The patient is a 51 y o  male with right hip pain and a labral tear and CAM JOSE  Surgical treatment was indicated due to persistent symptoms despite non-surgical treatment  After a thorough discussion of the potential risks, benefits, and alternative treatments, the patient agreed to proceed with surgery  The patient understands that the risks of surgery include, but are not limited to: infection, neurovascular injury, wound healing complications, venous thromboembolism, persistent pain, stiffness, instability, recurrence of symptoms, potential need for additional surgeries, and loss of limb or life  Oral and written consent for surgery was obtained from the patient preoperatively  PROCEDURE AND TECHNIQUE:  On the day of surgery, the patient was identified in the preoperative holding area  The operative site was marked by the surgeon  The patient was taken into the operating room  A time-out was conducted to confirm the patient's identity, the operative site, and the proposed procedure    The patient was anesthetized, and perioperative antibiotics were administered  The patient was positioned supine on the [place] table  All bony prominences were padded  The operative site was prepped and draped using standard sterile technique  Longitudinal traction was applied to the operative hip using the [place] table  Under fluoroscopic guidance, an anterolateral portal was established using a spinal needle, nitinol wire, and cannulated dilators  The 70 degree arthroscope was inserted into the central compartment  A mid-anterior portal was established under direct visualization using a spinal needle and fluoroscopic guidance  A capsulotomy was made connecting the portals with a [place] blade  Diagnostic arthroscopy was performed  The hip joint demonstrated mild synovitis which was debrided with a motorized shaver  The acetabulum demonstrated diffuse grade 2 chondral wear at the chondrolabral junction which was treated with chondroplasty to remove all loose flaps of tissue   The femoral head demonstrated diffuse grade 2 chondral wear which required no treatment   The acetabular labrum had a labral tear from 12 o'clock superior to 2 o'clock anterosuperior  The labral tissue was frayed and not suitable for repair  All torn labral tissue was debrided back to stable tissue with a motorized shaver       Traction was released at 23 minutes  The arthroscope was positioned in the peripheral compartment  The capsulotomy was extended laterally to improve visualization of the femoral neck  The femoral neck did demonstrate a CAM lesion  Dynamic arthroscopic examination did demonstrate head/neck conflict with the acetabulum as the hip was taken into flexion and internal rotation  A femoroplasty was performed to remove the CAM lesion from the femoral neck  Under fluoroscopic guidance and direct visualization, a chely was used to reshape the femoral neck and eliminate the CAM deformity    Repeat dynamic examination demonstrated no residual head/neck conflict with the acetabulum  At the conclusion of the procedure, the instruments were withdrawn  The portals and incisions were closed with absorbable sutures and steri-strips  A sterile dressing was applied  The surgical drapes were removed  The patient was awakened from anesthesia and transported to the PACU in stable condition        COMPLICATIONS:  None    PATIENT DISPOSITION:  PACU       SIGNATURE:  Triston Walker MD  DATE:  July 19, 2021  TIME:  11:39 AM

## 2021-07-19 NOTE — DISCHARGE INSTRUCTIONS
POSTOPERATIVE INSTRUCTIONS    MEDICATIONS:  · Resume all home medications unless otherwise instructed by your surgeon  · Pain Medication:  Oxycodone 5 mg, 1-3 tablets every 3 hours as needed  · If you were given a regional anesthetic (nerve block), please begin taking the pain medication as soon as you get home, even if you have minimal or no pain  DO NOT WAIT FOR THE NERVE BLOCK TO WEAR OFF  · Possible side effects include nausea, constipation, and urinary retention  If you experience these side effects, please call our office for assistance  · Pain med refills are authorized only during office hours (8am-4pm, Mon-Fri)  · Anti-Inflammatory:  Resume your home anti-inflammatory medication  · Take with food  Stop if you experience nausea, reflux, or stomach pain  · Nausea Medication:  Zofran ODT 4 mg, 1 tablet every 6 hours as needed  · Fill prescription ONLY if you expericnce severe nausea  · Blood Clot Prevention:  Aspirin 81 mg, 1 tablet twice daily for 2 weeks  · Pump your foot up and down 20 times per hour while you are less mobile  WOUND CARE:  · Keep the dressing clean and dry  Light drainage may occur the first 2 days postop  · You may remove the dressings and get the incision wet in the shower 72 hours after surgery  DO NOT remove steri-strips or sutures  DO NOT immerse the incision under water  Carefully pat the incision dry  If there is wound drainage, re-apply a fresh dry gauze dressing  · Please call our office ([phone]) if you experience any of the following:  · Sudden increase in swelling, redness, or warmth at the surgical site  · Excessive incisional drainage that persists beyond the 3rd day after surgery  · Oral temperature greater than 101 degrees, not relieved with Tylenol  · Shortness of breath, chest pain, nausea, or any other concerning symptoms    SWELLING CONTROL:  · Cold Therapy:  Apply ice (20 min on, 20 min off) as often as you feel is necessary    · Elevation: Elevate the entire leg above heart level as much as possible  · Compression:  Apply ACE wraps or a compression stocking as needed  RANGE OF MOTION:  · You are allowed FULL RANGE OF MOTION as tolerated  IMMOBILIZATION:  · None  You are allowed full range of motion as tolerated  ACTIVITY:  · DO NOT BEAR WEIGHT on the operative leg  Always use crutches  You may rest your foot on the ground for balance only  · Using Crutches on Stairs:  Going up, lead with your "good" (nonoperative) leg  Going down, lead with your "bad" (operative) leg  Use a hand rail when available  · Quad Sets:  Sit or lie with your knee straight  Tighten your quadriceps (front thigh) muscle  Hold for 3 seconds, then relax  Repeat 20 times per hour while awake  PHYSICAL THERAPY:  · Begin therapy 5 TO 7 DAYS AFTER SURGERY  You were given a prescription for therapy at your preoperative office visit  If you do not have physical therapy scheduled yet, please call our office for assistance  FOLLOW-UP APPOINTMENT:  · 4-5 days after surgery with:    Dr Angely Dhaliwal, [address] [facility]  [address] [place] [address], [address] [place] [address], [place], [address] [facility]  [phone] ([facility] [address])  [phone] (After Hours)

## 2021-07-23 ENCOUNTER — OFFICE VISIT (OUTPATIENT)
Dept: OBGYN CLINIC | Facility: MEDICAL CENTER | Age: 46
End: 2021-07-23

## 2021-07-23 VITALS
HEIGHT: 69 IN | HEART RATE: 71 BPM | BODY MASS INDEX: 28.44 KG/M2 | SYSTOLIC BLOOD PRESSURE: 144 MMHG | DIASTOLIC BLOOD PRESSURE: 95 MMHG | WEIGHT: 192 LBS

## 2021-07-23 DIAGNOSIS — S73.191A TEAR OF RIGHT ACETABULAR LABRUM, INITIAL ENCOUNTER: Primary | ICD-10-CM

## 2021-07-23 PROCEDURE — 99024 POSTOP FOLLOW-UP VISIT: CPT | Performed by: ORTHOPAEDIC SURGERY

## 2021-07-23 NOTE — PROGRESS NOTES
Orthopaedic Surgery - Office Note  Tremayne Au (63 y o  male)   : 1975   MRN: [id number]  Encounter Date: 2021    Chief Complaint   Patient presents with    Right Hip - Post-op       Assessment / Plan  s/p of Right Hip arthroscopy with labral debridement performed on 2021  · Beginning outpatient PT 2021  Protocol was given to patient  · Continue use of crutches for 2 weeks and then progress to light WB activity   · Return in about 6 weeks (around 9/3/2021)  History of Present Illness  Tremayne Au is a 51 y o  male who presents  for 1 week s/p of Right Hip arthroscopy with labral debridement performed on 2021  He states that his pain is significantly better than before, he does have some lingering achy pain from the surgery  He stated that he manages his pain with medication  He mentions that he starts formal physical therapy on 2021  He denies any numbness or tingling radiating down his leg  He denies any new injury or trauma  Review of Systems  Pertinent items are noted in HPI  All other systems were reviewed and are negative  Physical Exam  /95   Pulse 71   Ht 5' 9" (1 753 m)   Wt 87 1 kg (192 lb)   BMI 28 35 kg/m²   Cons: Appears well  No apparent distress  Psych: Alert  Oriented x3  Mood and affect normal   Eyes: PERRLA, EOMI  Resp: Normal effort  No audible wheezing or stridor  CV: Palpable pulse  No discernable arrhythmia  No LE edema  Lymph:  No palpable cervical, axillary, or inguinal lymphadenopathy  Skin: Warm  No palpable masses  No visible lesions  Neuro: Normal muscle tone  Normal and symmetric DTR's  Right Hip Exam  Alignment / Posture:  Normal resting hip posture  Inspection:  No swelling  No edema  No erythema  Incision clean and dry  Palpation:  No tenderness  ROM:  Not tested  Strength:  Not tested  Stability:  No objective hip instability    Tests:  No Tests  Neurovascular:  Sensation intact in DP/SP/[place]/Sa/T nerve distributions  2+ DP & PT pulses  Gait:  Steady with use of crutches    Studies Reviewed  Intraoperative images from Acetabrular labral tear were reviewed with patient today    Procedures  No procedures today  Medical, Surgical, Family, and Social History  The patient's medical history, family history, and social history, were reviewed and updated as appropriate  Past Medical History:   Diagnosis Date    Adrenal mass ([address] )     Chest pain     "long ago stress related to family issue seen ER and cleared"    Chronic kidney disease     pt reports from kidney stones    Chronic pain disorder     right hip    Claustrophobia     Dental crowns present     Disease of thyroid gland     nodule sees Dr Maldonado/endocrinologist and [facility])    Exercise involving weight training     1-2x/week    GERD (gastroesophageal reflux disease)     Hyperlipidemia     Hypertension     Kidney stones     Low testosterone     Motion sickness     Right hip pain     Sleep apnea     not currently using his CPAP       Past Surgical History:   Procedure Laterality Date    FL INJECTION RIGHT HIP (ARTHROGRAM)  3/31/2021    pt cant recall this    FL RETROGRADE PYELOGRAM  8/21/2018    KIDNEY STONE SURGERY      WA ARTHROSCOPY HIP W/LABRAL REPAIR Right 7/19/2021    Procedure: ARTHROSCOPY HIP with labral debridement: femoroplasty;  Surgeon: Sussy Alcantar MD;  Location: [facility];  Service: Orthopedics    WA CYSTO/URETERO W/LITHOTRIPSY &INDWELL STENT INSRT Left 8/21/2018    Procedure: CYSTOSCOPY URETEROSCOPY, RETROGRADE PYELOGRAM AND INSERTION STENT URETERAL;  Surgeon: Sha Tarango MD;  [facility];  Service: Urology    WA ESOPHAGOGASTRODUODENOSCOPY TRANSORAL DIAGNOSTIC N/A 6/21/2018    Procedure: ESOPHAGOGASTRODUODENOSCOPY (EGD); Surgeon: Ashwini Castorena MD;  [facility]: [facility];   Service: Gastroenterology    WISDOM TOOTH EXTRACTION         Family History   Problem Relation Age of Onset    Asthma Mother     Diabetes Mother     Other Father         Endocarditis    Hypertension Father     Gout Father        Social History     Occupational History    Not on file   Tobacco Use    Smoking status: Former Smoker     Packs/day: 1 00     Years: 17 00     Pack years: 17 00     Quit date:      Years since quittin 5    Smokeless tobacco: Never Used   Vaping Use    Vaping Use: Never used   Substance and Sexual Activity    Alcohol use: Not Currently     Comment: 1 drink every two weeks or so    Drug use: No    Sexual activity: Not on file     Comment: defer       No Known Allergies      Current Outpatient Medications:     aspirin (ECOTRIN) 325 mg EC tablet, Take 1 tablet (325 mg total) by mouth 2 (two) times a day for 14 days, Disp: 28 tablet, Rfl: 0    carvedilol (COREG) 25 mg tablet, Take 1 tablet (25 mg total) by mouth 2 (two) times a day with meals, Disp: 180 tablet, Rfl: 3    chlorthalidone 25 mg tablet, Take 1 tablet (25 mg total) by mouth daily, Disp: 90 tablet, Rfl: 3    Cholecalciferol (Vitamin D) 50 MCG (2000 UT) CAPS, Take 2 capsules (4,000 Units total) by mouth daily, Disp: 60 capsule, Rfl: 3    eplerenone (INSPRA) 50 MG tablet, Take 1 tablet (50 mg total) by mouth 2 (two) times a day, Disp: 180 tablet, Rfl: 3    felodipine (PLENDIL) 10 MG 24 hr tablet, Take 1 tablet (10 mg total) by mouth daily (Patient taking differently: Take 10 mg by mouth every evening ), Disp: 90 tablet, Rfl: 3    omeprazole (PriLOSEC) 40 MG capsule, Take 1 capsule (40 mg total) by mouth 2 (two) times a day, Disp: 180 capsule, Rfl: 0    ondansetron (ZOFRAN-ODT) 4 mg disintegrating tablet, Take 1 tablet (4 mg total) by mouth every 8 (eight) hours as needed for nausea or vomiting, Disp: 15 tablet, Rfl: 0    oxyCODONE (ROXICODONE) 5 mg immediate release tablet, Take 1 tablet (5 mg total) by mouth every 4 (four) hours as needed for moderate pain for up to 10 daysMax Daily Amount: 30 mg, Disp: 45 tablet, Rfl: 0    potassium chloride (K-DUR,KLOR-CON) 20 mEq tablet, Take 2 tablets (40 mEq total) by mouth daily (Patient taking differently: Take 20 mEq by mouth 2 (two) times a day ), Disp: 90 tablet, Rfl: 0    Potassium Citrate ER 15 MEQ (1620 MG) TBCR, Take one tablet twice a day, Disp: 90 tablet, Rfl: 3    rosuvastatin (CRESTOR) 10 MG tablet, Take 10 mg by mouth every evening , Disp: , Rfl:     SYRINGE-NEEDLE, DISP, 3 ML 21G X 1-1/2" 3 ML MISC, For testerone injection, Disp: , Rfl:     testosterone cypionate (DEPO-TESTOSTERONE) 200 mg/mL SOLN, Inject 0 8 mL deep IM once every two weeks  , Disp: , Rfl:     torsemide (DEMADEX) 10 mg tablet, Take by mouth as needed , Disp: , Rfl:     diazepam (VALIUM) 2 mg tablet, Take 1 tablet (2 mg total) by mouth see administration instructions Take 1 tablet (2 mg total) by mouth 30 minutes prior to MRI   Then take 1 tablet (2 mg total) by mouth immediately prior to MRI , Disp: 2 tablet, Rfl: 0    SYRINGE-NEEDLE, DISP, 3 ML 21G X 1-1/2" 3 ML MISC, For testerone injection, Disp: , Rfl:       Nallely Rodriguez    I,:  Sonny Keller am acting as a scribe while in the presence of the attending physician :       I,:  June Carbone MD personally performed the services described in this documentation    as scribed in my presence :

## 2021-07-26 ENCOUNTER — OFFICE VISIT (OUTPATIENT)
Dept: PHYSICAL THERAPY | Age: 46
End: 2021-07-26
Payer: COMMERCIAL

## 2021-07-26 DIAGNOSIS — S73.191A TEAR OF RIGHT ACETABULAR LABRUM, INITIAL ENCOUNTER: Primary | ICD-10-CM

## 2021-07-26 PROCEDURE — 97110 THERAPEUTIC EXERCISES: CPT | Performed by: PHYSICAL THERAPIST

## 2021-07-26 PROCEDURE — 97164 PT RE-EVAL EST PLAN CARE: CPT | Performed by: PHYSICAL THERAPIST

## 2021-07-26 NOTE — PROGRESS NOTES
PT Evaluation : Right Hip Post Op labral debridement  Today's date: 2021  Patient name: Ana Reese  : 1975  MRN: [id number]  Referring provider: Chris Lux PA-C  Dx:   Encounter Diagnosis     ICD-10-CM    1  Tear of right acetabular labrum, initial encounter  S73 191A Ambulatory referral to Physical Therapy                  Assessment  Assessment details: PT Post Op PT IE: 21  Patient reported he had right hip arthroscopic labral debridement on 21  Patient noted he had a follow up with his surgeon, Dr Bailee Livingston last week  Patient noted he has two week after surgery of right le NWB then WBAT and he can wean off the crutches at that point  Patient noted current right hip pain has decreased since onset of right hip surgical intervention  Patient noted sitting and lying does not aggravate his right hip pain and his pain is at a 5 of 10  Patient noted his pain now is different than his hip pain prior to surgical intervention  Patient noted his pain is less and post surgical now  Patient denies right le paresthesias  Patient noted he is compliant with gait with bilateral axillary crutches as well as NWB  Patient noted walking, stair climbing, transfers are all difficult due to bilateral axillary crutches and NWB on right le  Patient noted he is unable to resume any standing, house hold and work activities due to right hip NWB status  Patient denies right le edema  Patient reported his right hip portal sites are covered with steri strips and he denies any portal site drainage  PT IE: 21  Patient reported onset of right hip pain for over 1 year  Patient noted his right hip pain is constant  Patient noted the intensity is variable, which he noted movement and weight baring are pain aggravating factors  Plus, he noted walking and standing on an incline is pain aggravating  Patient denies right hip weakness  Patient denies distal right le pain or paresthesias  Patient noted inside and outside house hold activities, right side lying and sleep, prolonged standing and walking on uneven surfaces and hills  Also, he noted his right hip pain is worse at the end of the day  Patient noted his right hip labral repair is scheduled for 7/19/21  Patient noted right hip pain at lowest / least is at 4 of 10 and worst at a 7 of 10  Patient noted location of pain is anterior deep right hip pain  Patient returns to the surgeon, Dr Lizzette Bee on 7-14-21  Patient noted he is concerned with gait with bilateral axillary crutches as well as stair climbing with crutches  Patient was educated and performed supine / seated pre surgery hep with written hep provided  Patient was educated and performed right LE NWB gait training with bilateral axillary crutches as well as stair climbing with right le NWB with bilateral axillary crutches in a non reciprocal pattern  Patient presents with good understanding and proper technique of right le NWB gait and stair climbing but he was recommended to obtain a pair of bilateral ue axillary crutches and practice so he feels confident with gait and stair climbing with them  Also, patient educated and performed leg  sit to supine and supine to sit transfers which he perform properly  Impairments: abnormal gait, abnormal or restricted ROM, abnormal movement, activity intolerance, lacks appropriate home exercise program and pain with function  Understanding of Dx/Px/POC: excellent   Prognosis: good  Prognosis details: Patient is a 51y o  year old male seen for outpatient PT evaluation with pain, mobility and functional deficits due to right hip pain secondary to right hip arthroscopic labral debridement   Patient presents to PT IE with the following problems, concerns, deficits and impairments: right hip pain, decreased right le range of motion, decreased right le strength, functional limitations, + TTP, gait and stair dysfunction, transfer dysfunctions, right LE NWB until 8-2-21 and decreased tolerance to activity  Patient would benefit from skilled PT services under the following PT treatment plan and as per surgeon specific protocol to address the above noted deficits: therapeutic exercises and activities to facilitate right le rom and strength as per surgeon specific protocol, gait and stair training with right le reduced / NWB status as per surgeon specific restrictions and limitations, transfer training as per surgeon specific restrictions and limitations, balance and proprioception activities, IASTM techniques, Kinesio taping techniques, modalities, manual therapy techniques, and a hep  Thank you for the referral      Goals  Short Term goals 4 - 6 weeks  1  Patient will be independent HEP  2   Patient will report a 25 - 50% decrease in pain complaints  3   Increase strength 1/2 grade  4   Increase ROM 5-10 degrees  Long Term goals 8 - 12   weeks  1  Patient will report elimination of pain complaints  2   Patient will return to all work related activities without restriction  3   Patient will return to all recreational activities without restriction  4   ROM WFL  5   Strength 5/5   6   Patient will ambulate without assistive device and normal gait pattern  7   Patient will perform stairs in a reciprocal pattern with unilateral railing  8   Patient will perform all transfers independently and without right hip symptoms of limitations  9   Patient will resume all standing activities without right hip symptoms or limitations  10   Patient will perform all dressing and shower activities without right hip symptoms or limitations  11   Patient will perform all squatting and lifting activities without right hip symptoms or limitations      Plan  Patient would benefit from: skilled physical therapy  Planned modality interventions: cryotherapy, TENS, thermotherapy: hydrocollator packs and unattended electrical stimulation  Planned therapy interventions: joint mobilization, manual therapy, massage, balance, balance/weight bearing training, neuromuscular re-education, patient education, postural training, body mechanics training, compression, self care, strengthening, stretching, therapeutic activities, therapeutic exercise, therapeutic training, transfer training, flexibility, functional ROM exercises, gait training, graded activity, graded exercise, graded motor and home exercise program  Frequency: 1x week  Duration in weeks: 1  Treatment plan discussed with: patient        Subjective Evaluation    History of Present Illness  Mechanism of injury:  Patient noted PMHx is remarkable for HTN, GERD, kidney stones, sleep apnea  MRI ARTHROGRAM RIGHT HIP     INDICATION:   M25 551: Pain in right hip  Kacey Lipscombbhupinder Loly's note from 3/17/2021 was reviewed  Patient has right hip pain radiating into the groin  This MR arthrogram was performed to evaluate for labral tear      COMPARISON: None      TECHNIQUE:  MR sequences were obtained of the right hip and pelvis including: Localizer, coronal pelvis T1, coronal pelvis T2 fat sat  Smaller field of view sequences of the affected hip were obtained with axial oblique T1 fat sat, axial oblique T2 fat   sat, coronal T1 fat sat, sagittal T2 fat sat, sagittal T1 fat sat        Images were acquired after intra-articular injection of gadolinium utilizing direct MR arthrography technique      FINDINGS:     RIGHT HIP:     -JOINT EFFUSION: There is good distention of the right hip joint with injected contrast      -BONES: Normal marrow signal demonstrated without hip fracture or AVN       -ARTICULAR SURFACES: There is a moderate-sized anterosuperior right acetabular labral tear (series 6 images 10-11 and series 8 images 22-23 )     -ACETABULAR LABRUM: Intact      -TROCHANTERIC BURSA: Normal      LEFT HIP: (please note dedicated small field of view images were not made of the left hip joint limiting its evaluation )    -JOINT EFFUSION: None      -BONES: Normal marrow signal demonstrated without hip fracture or AVN     -ARTICULAR SURFACES: There is no osteoarthritis      -ACETABULAR LABRUM: No gross abnormalities although evaluation is very limited      -TROCHANTERIC BURSA: Normal      REST OF PELVIS:     -BONES: Normal      -SI JOINTS AND SYMPHYSIS PUBIS:  Intact      -VISUALIZED LUMBAR SPINE:  unremarkable      -MUSCULATURE: Intact with intact hamstring origins bilaterally      -PELVIC SOFT TISSUES: Normal      SUBCUTANEOUS TISSUES: Normal     IMPRESSION:     There is a moderate-sized anterosuperior right acetabular labral tear (series 6 images 10-11 and series 8 images 22-23 )  Pain  Current pain ratin  At best pain ratin  At worst pain ratin  Location: Right anterior and deep hip region          Objective     Tenderness     Additional Tenderness Details  Patient is + TTP at right lateral hip region  Patient has steri strips covering his portal sites  Active Range of Motion   Left Hip   Flexion: 108 degrees   Abduction: 30 degrees   External rotation (90/90): 44 degrees   Internal rotation (90/90): 40 degrees     Additional Active Range of Motion Details  Hamstring stretch: right at 24 degrees and left at 48 degrees  Strength/Myotome Testing     Left Hip   Planes of Motion   Flexion: 5  Extension: 5  Abduction: 5  Adduction: 5  External rotation: 4+  Internal rotation: 4+    Left Knee   Flexion: 5  Extension: 5    Right Knee   Flexion: 3+  Extension: 3+    Left Ankle/Foot   Dorsiflexion: 4+  Plantar flexion: 5    Right Ankle/Foot   Dorsiflexion: 3+  Plantar flexion: 3+    Ambulation     Ambulation: Level Surfaces   Ambulation with assistive device: independent    Additional Level Surfaces Ambulation Details  Patient ambulates with bilateral axillary crutches with right le NWB until 21, which he is WBAT on right le      Ambulation: Stairs   Ascend stairs: contact guard assist  Pattern: non-reciprocal  Railings: two rails  Descend stairs: contact guard assist  Pattern: non-reciprocal  Railings: two rails             Precautions:  Patient noted PMHx is remarkable for HTN, GERD, kidney stones, sleep apnea  Right hip labral debridement 07/19/21  Right le NWB with bilateral axillary crutches until 8/2/21 then WBAT  Right hip flexion 90 degree limitation, right le arom as noted below until 08/16/21 8/16/21 to 9/13/21:progressively improve strength  slr x 4  Partial squats, leg press, SLS and Tandem stance, recumbent bike, balance activities  9/13/21 to 10/11/21: lunges, agility drills, lateral step downs  Manuals 7/26                                                                Neuro Re-Ed                                                                                                        Ther Ex             Nu step 90 degree hip flexion limitation for 4 weeks see above!             Seated heel slides:R             Seated hr and tr:B: 10 x             Seated LAQ:R 10 x with 5 sec hold                         Supine ankle pumps:B: 10 x             Quad sets:R 5 sec x 10            glute sets:B 5 sec x 10            Heel slides:R 10 x             Supine right bent knee fall outs             bridges 10 x             Supine hip abduction isometrics in hook lying:B: 10 x with 5 second hold             SAQ:B:                          Standing hip abd and ext slr x 3 on right le             Standing hamstring curls on right le                                                                                           Ther Activity                                       Gait Training                                       Modalities             CP to right hip in seated or supine 10 min supine

## 2021-07-28 ENCOUNTER — TELEPHONE (OUTPATIENT)
Dept: GASTROENTEROLOGY | Facility: AMBULARY SURGERY CENTER | Age: 46
End: 2021-07-28

## 2021-07-28 NOTE — TELEPHONE ENCOUNTER
Patient called in and is requesting a refill on Omeprazole for a 90 day supply      Patient is scheduled with Giovanni Harvey's 8/02/2021 at 1pm

## 2021-07-29 ENCOUNTER — OFFICE VISIT (OUTPATIENT)
Dept: PHYSICAL THERAPY | Age: 46
End: 2021-07-29
Payer: COMMERCIAL

## 2021-07-29 DIAGNOSIS — S73.191A TEAR OF RIGHT ACETABULAR LABRUM, INITIAL ENCOUNTER: Primary | ICD-10-CM

## 2021-07-29 DIAGNOSIS — M25.551 RIGHT HIP PAIN: ICD-10-CM

## 2021-07-29 DIAGNOSIS — S73.191D TEAR OF RIGHT ACETABULAR LABRUM, SUBSEQUENT ENCOUNTER: ICD-10-CM

## 2021-07-29 PROCEDURE — 97110 THERAPEUTIC EXERCISES: CPT | Performed by: PHYSICAL THERAPIST

## 2021-07-29 NOTE — PROGRESS NOTES
Daily Note     Today's date: 2021  Patient name: Miranda Pandey  : 1975  MRN: [id number]  Referring provider: David Hermosillo PA-C  Dx:   Encounter Diagnosis     ICD-10-CM    1  Tear of right acetabular labrum, initial encounter  S73 191A    2  Right hip pain  M25 551    3  Tear of right acetabular labrum, subsequent encounter  S73 191D                   Subjective: Patient reported right hip pain is at 2-3 of 10  Objective: See treatment diary below  Patient provided with new written hep  Assessment: Tolerated treatment well  Patient exhibited good technique with therapeutic exercises  Patient presents with ability to move right hip without pain during onset of open kinetic chain activities  Plan: Continue per plan of care  Precautions:  Patient noted PMHx is remarkable for HTN, GERD, kidney stones, sleep apnea  Right hip labral debridement 21  Right le NWB with bilateral axillary crutches until 21 then WBAT  Right hip flexion 90 degree limitation, right le arom as noted below until 21 to 21:progressively improve strength  slr x 4  Partial squats, leg press, SLS and Tandem stance, recumbent bike, balance activities  21 to 10/11/21: lunges, agility drills, lateral step downs          Manuals                                                                Neuro Re-Ed                                                                                                        Ther Ex             Nu step 90 degree hip flexion limitation for 4 weeks see above! 8 min           Seated heel slides:R  NT           Seated hr and tr:B: 10 x  2 x 10           Seated LAQ:R 10 x with 5 sec hold 3 sec x 20                        Supine ankle pumps:B: 10 x  NT           Quad sets:R 5 sec x 10 5 sec x 20           glute sets:B 5 sec x 10 5 sec x 20           Heel slides:R 10 x  2 x 10           Supine right bent knee fall outs  NT           bridges 10 x  2 x 10           Supine hip abduction isometrics in hook lying:B: 10 x with 5 second hold  NT           SAQ:B:  2 x 10                        Standing hip abd and ext slr x 3 on right le  2 x 10           Standing hamstring curls on right le  2 x 10                                                                                         Ther Activity                                       Gait Training                                       Modalities             CP to right hip in seated or supine 10 min supine 10 min

## 2021-08-02 ENCOUNTER — OFFICE VISIT (OUTPATIENT)
Dept: GASTROENTEROLOGY | Facility: AMBULARY SURGERY CENTER | Age: 46
End: 2021-08-02
Payer: COMMERCIAL

## 2021-08-02 ENCOUNTER — OFFICE VISIT (OUTPATIENT)
Dept: PHYSICAL THERAPY | Age: 46
End: 2021-08-02
Payer: COMMERCIAL

## 2021-08-02 VITALS
WEIGHT: 193 LBS | DIASTOLIC BLOOD PRESSURE: 84 MMHG | HEIGHT: 69 IN | BODY MASS INDEX: 28.58 KG/M2 | SYSTOLIC BLOOD PRESSURE: 138 MMHG

## 2021-08-02 DIAGNOSIS — K21.00 GASTROESOPHAGEAL REFLUX DISEASE WITH ESOPHAGITIS: ICD-10-CM

## 2021-08-02 DIAGNOSIS — S73.191A TEAR OF RIGHT ACETABULAR LABRUM, INITIAL ENCOUNTER: Primary | ICD-10-CM

## 2021-08-02 DIAGNOSIS — Z12.11 COLON CANCER SCREENING: ICD-10-CM

## 2021-08-02 DIAGNOSIS — K21.00 GASTROESOPHAGEAL REFLUX DISEASE WITH ESOPHAGITIS WITHOUT HEMORRHAGE: Primary | ICD-10-CM

## 2021-08-02 DIAGNOSIS — S73.191D TEAR OF RIGHT ACETABULAR LABRUM, SUBSEQUENT ENCOUNTER: ICD-10-CM

## 2021-08-02 DIAGNOSIS — M25.551 RIGHT HIP PAIN: ICD-10-CM

## 2021-08-02 DIAGNOSIS — K22.70 BARRETT'S ESOPHAGUS DETERMINED BY ENDOSCOPY: ICD-10-CM

## 2021-08-02 PROCEDURE — 97116 GAIT TRAINING THERAPY: CPT

## 2021-08-02 PROCEDURE — 97110 THERAPEUTIC EXERCISES: CPT

## 2021-08-02 PROCEDURE — 99214 OFFICE O/P EST MOD 30 MIN: CPT | Performed by: PHYSICIAN ASSISTANT

## 2021-08-02 RX ORDER — ADAPALENE AND BENZOYL PEROXIDE 3; 25 MG/G; MG/G
GEL TOPICAL DAILY
COMMUNITY
Start: 2021-07-07

## 2021-08-02 RX ORDER — OMEPRAZOLE 40 MG/1
40 CAPSULE, DELAYED RELEASE ORAL 2 TIMES DAILY
Qty: 180 CAPSULE | Refills: 1 | Status: SHIPPED | OUTPATIENT
Start: 2021-08-02 | End: 2021-10-05 | Stop reason: SDUPTHER

## 2021-08-02 RX ORDER — SODIUM, POTASSIUM,MAG SULFATES 17.5-3.13G
2 SOLUTION, RECONSTITUTED, ORAL ORAL SEE ADMIN INSTRUCTIONS
Qty: 354 ML | Refills: 0 | Status: SHIPPED | OUTPATIENT
Start: 2021-08-02 | End: 2021-10-05 | Stop reason: ALTCHOICE

## 2021-08-02 RX ORDER — OMEPRAZOLE 20 MG/1
TABLET, DELAYED RELEASE ORAL
COMMUNITY
End: 2021-08-02 | Stop reason: SDUPTHER

## 2021-08-02 NOTE — PROGRESS NOTES
Daily Note     Today's date: 2021  Patient name: Loulou Mike  : 1975  MRN: [id number]  Referring provider: Ashwin Lamas PA-C  Dx:   Encounter Diagnosis     ICD-10-CM    1  Tear of right acetabular labrum, initial encounter  S73 191A    2  Right hip pain  M25 551    3  Tear of right acetabular labrum, subsequent encounter  S73 191D                   Subjective: Patient noted no pain at right hip today  Objective: See treatment diary below  Patient provided with new written hep  Assessment: Good tolerance to exercise and assistive device progression  Step through gait pattern with [address] today with good tolerance and no pain  Plan: Continue per plan of care  Precautions:  Patient noted PMHx is remarkable for HTN, GERD, kidney stones, sleep apnea  Right hip labral debridement 21  Right le NWB with bilateral axillary crutches until 21 then WBAT  Right hip flexion 90 degree limitation, right le arom as noted below until 21 to 21:progressively improve strength  slr x 4  Partial squats, leg press, SLS and Tandem stance, recumbent bike, balance activities  21 to 10/11/21: lunges, agility drills, lateral step downs          Manuals                                                               Neuro Re-Ed                                       Ther Ex             Ambulated around the gym    7 min with crutches           Nu step 90 degree hip flexion limitation for 4 weeks see above! 8 min NT          Seated heel slides:R  NT NT          Seated hr and tr:B: 10 x  2 x 10 30X          Seated LAQ:R 10 x with 5 sec hold 3 sec x 20 30X 2SEC                        Supine ankle pumps:B: 10 x  NT NT          Quad sets:R 5 sec x 10 5 sec x 20 30X 5SEC           glute sets:B 5 sec x 10 5 sec x 20 30X 5SEC           Heel slides:R 10 x  2 x 10 NT          Supine right bent knee fall outs  NT NT          bridges 10 x  2 x 10 20X 3SEC           Supine hip abduction isometrics in hook lying:B: 10 x with 5 second hold  NT           SAQ:B:  2 x 10 20X 3SEC                          Standing hip abd and ext slr x 3 on right le  2 x 10 2X           Standing hamstring curls on right le  2 x 10 20X                                                                                         Ther Activity                                       Gait Training    Post session 10 min with SPC                                     Modalities             CP to right hip in seated or supine 10 min supine 10 min  deferred

## 2021-08-02 NOTE — ASSESSMENT & PLAN NOTE
Patient reports still having breakthrough symptomatology despite twice daily PPI therapy, but still also reports drinking copious amounts caffeine    Had endoscopic appearance of Florian's at the time of last EGD about 3 years ago although biopsies were negative; should rule out any new dysplasia or malignancy at this point, he is also a former smoker     -will schedule patient for EGD, along with colonoscopy    -patient was advised regarding risks and benefits of the procedures, risks including but not limited to infection, perforation bleeding     -continue omeprazole 40 mg twice daily; I offered to start H2 blocker in addition to this given his breakthrough symptomatology but patient said he would prefer to hold off on starting any new medications; I think this is reasonable at least until EGD is done and findings are ascertained    -advised patient about decreasing his coffee/caffeine intake, he can try using half caffeinated beverages to start rather than cutting out coffee altogether if this is an easier strategy for him     -advised patient about other dietary lifestyle modification strategies for the mitigation of GERD

## 2021-08-02 NOTE — PROGRESS NOTES
Follow-up Note -  [facility]  Anneliese Valdez 1975 51 y o  male         Reason:  Follow-up; GERD, suspected Florian's    HPI:  48-year-old male with history of chronic hip pain, sleep apnea who presents for follow-up; he was last seen in our office with myself in December 2019 for follow-up of GERD; his last EGD had been done in June 2018 noting to colon are mucosal tongues measuring about 1 5 cm, biopsied negative for intestinal metaplasia, but for which patient was recommended surveillance EGD in 2-3 years  At the time of office visit he was reporting GERD symptomatology breaking through omeprazole 20 mg twice daily therapy, also with fairly heavy caffeine use  His PPI prescription was increased to 40 mg twice daily and he was advised about decreasing his caffeine intake  At this time the patient says he is continuing to take omeprazole twice daily, denies any difficulties with swallowing or any disturbances to his appetite or his weight, but he says he has been having more burping than usual over the last few months  He admits that he is still drinking a lot of coffee daily  He has never had colonoscopy, denies any blood or mucus in his stools or any dark colored stools  REVIEW OF SYSTEMS:      CONSTITUTIONAL: Denies any fever, chills, or rigors  Good appetite, and no recent weight loss  HEENT: No earache or tinnitus  Denies hearing loss or visual disturbances  CARDIOVASCULAR: No chest pain or palpitations  RESPIRATORY: Denies any cough, hemoptysis, shortness of breath or dyspnea on exertion  GASTROINTESTINAL: As noted in the History of Present Illness  GENITOURINARY: No problems with urination  Denies any hematuria or dysuria  NEUROLOGIC: No dizziness or vertigo, denies headaches  MUSCULOSKELETAL: Denies any muscle or joint pain  SKIN: Denies skin rashes or itching  ENDOCRINE: Denies excessive thirst  Denies intolerance to heat or cold    PSYCHOSOCIAL: Denies depression or anxiety  Denies any recent memory loss  Past Medical History:   Diagnosis Date    Adrenal mass ([address] )     Chest pain     "long ago stress related to family issue seen ER and cleared"    Chronic kidney disease     pt reports from kidney stones    Chronic pain disorder     right hip    Claustrophobia     Dental crowns present     Disease of thyroid gland     nodule sees Dr Maldonado/endocrinologist and Dr Ivan Rossi([facility])    Exercise involving weight training     1-2x/week    GERD (gastroesophageal reflux disease)     Hyperlipidemia     Hypertension     Kidney stones     Low testosterone     Motion sickness     Right hip pain     Sleep apnea     not currently using his CPAP      Past Surgical History:   Procedure Laterality Date    FL INJECTION RIGHT HIP (ARTHROGRAM)  3/31/2021    pt cant recall this    FL RETROGRADE PYELOGRAM  8/21/2018    KIDNEY STONE SURGERY      DC ARTHROSCOPY HIP W/LABRAL REPAIR Right 7/19/2021    Procedure: ARTHROSCOPY HIP with labral debridement: femoroplasty;  Surgeon: Kera Subramanian MD;  Location: [facility];  Service: Orthopedics    DC CYSTO/URETERO W/LITHOTRIPSY &INDWELL STENT INSRT Left 8/21/2018    Procedure: CYSTOSCOPY URETEROSCOPY, RETROGRADE PYELOGRAM AND INSERTION STENT URETERAL;  Surgeon: Prosper Blanc MD;  [facility];  Service: Urology    DC ESOPHAGOGASTRODUODENOSCOPY TRANSORAL DIAGNOSTIC N/A 6/21/2018    Procedure: ESOPHAGOGASTRODUODENOSCOPY (EGD); Surgeon: Constantin Lowe MD;  [facility];   Service: Gastroenterology    UPPER GASTROINTESTINAL ENDOSCOPY      WISDOM TOOTH EXTRACTION       Social History     Socioeconomic History    Marital status: /Civil Union     Spouse name: Not on file    Number of children: Not on file    Years of education: Not on file    Highest education level: Not on file   Occupational History    Not on file   Tobacco Use    Smoking status: Former Smoker     Packs/day: 1 00 Years: 17 00     Pack years: 17 00     Quit date:      Years since quittin 5    Smokeless tobacco: Never Used   Vaping Use    Vaping Use: Never used   Substance and Sexual Activity    Alcohol use: Not Currently     Comment: Rare     Drug use: No    Sexual activity: Not on file     Comment: defer   Other Topics Concern    Not on file   Social History Narrative    Not on file     Social Determinants of Health     Financial Resource Strain:     Difficulty of Paying Living Expenses:    Food Insecurity:     Worried About Running Out of Food in the Last Year:     Ran Out of Food in the Last Year:    Transportation Needs:     Lack of Transportation (Medical):  Lack of Transportation (Non-Medical):    Physical Activity:     Days of Exercise per Week:     Minutes of Exercise per Session:    Stress:     Feeling of Stress :    Social Connections:     Frequency of Communication with Friends and Family:     Frequency of Social Gatherings with Friends and Family:     Attends Taoist Services:     Active Member of Clubs or Organizations:     Attends Club or Organization Meetings:     Marital Status:    Intimate Partner Violence:     Fear of Current or Ex-Partner:     Emotionally Abused:     Physically Abused:     Sexually Abused:      Family History   Problem Relation Age of Onset    Asthma Mother     Diabetes Mother     Other Father         Endocarditis    Hypertension Father     Gout Father      Patient has no known allergies    Current Outpatient Medications   Medication Sig Dispense Refill    carvedilol (COREG) 25 mg tablet Take 1 tablet (25 mg total) by mouth 2 (two) times a day with meals 180 tablet 3    chlorthalidone 25 mg tablet Take 1 tablet (25 mg total) by mouth daily 90 tablet 3    Cholecalciferol (Vitamin D) 50 MCG (2000 UT) CAPS Take 2 capsules (4,000 Units total) by mouth daily 60 capsule 3    Epiduo Forte 0 3-2 5 % GEL       eplerenone (INSPRA) 50 MG tablet Take 1 tablet (50 mg total) by mouth 2 (two) times a day 180 tablet 3    felodipine (PLENDIL) 10 MG 24 hr tablet Take 1 tablet (10 mg total) by mouth daily (Patient taking differently: Take 10 mg by mouth every evening ) 90 tablet 3    omeprazole (PriLOSEC) 40 MG capsule Take 1 capsule (40 mg total) by mouth 2 (two) times a day 180 capsule 1    potassium chloride (K-DUR,KLOR-CON) 20 mEq tablet Take 2 tablets (40 mEq total) by mouth daily (Patient taking differently: Take 20 mEq by mouth 2 (two) times a day ) 90 tablet 0    Potassium Citrate ER 15 MEQ (1620 MG) TBCR Take one tablet twice a day 90 tablet 3    rosuvastatin (CRESTOR) 10 MG tablet Take 10 mg by mouth every evening       testosterone cypionate (DEPO-TESTOSTERONE) 200 mg/mL SOLN Inject 0 8 mL deep IM once every two weeks   torsemide (DEMADEX) 10 mg tablet Take by mouth as needed       aspirin (ECOTRIN) 325 mg EC tablet Take 1 tablet (325 mg total) by mouth 2 (two) times a day for 14 days (Patient not taking: Reported on 8/2/2021) 28 tablet 0    diazepam (VALIUM) 2 mg tablet Take 1 tablet (2 mg total) by mouth see administration instructions Take 1 tablet (2 mg total) by mouth 30 minutes prior to MRI  Then take 1 tablet (2 mg total) by mouth immediately prior to MRI  2 tablet 0    Na Sulfate-K Sulfate-Mg Sulf (Suprep Bowel Prep Kit) 17 5-3 13-1 6 GM/177ML SOLN Take 2 Bottles by mouth see administration instructions Suprep bowel prep  Please follow the instructions from the office 354 mL 0    ondansetron (ZOFRAN-ODT) 4 mg disintegrating tablet Take 1 tablet (4 mg total) by mouth every 8 (eight) hours as needed for nausea or vomiting (Patient not taking: Reported on 8/2/2021) 15 tablet 0    SYRINGE-NEEDLE, DISP, 3 ML 21G X 1-1/2" 3 ML MISC For testerone injection (Patient not taking: Reported on 8/2/2021)      SYRINGE-NEEDLE, DISP, 3 ML 21G X 1-1/2" 3 ML MISC For testerone injection       No current facility-administered medications for this visit  Blood pressure 138/84, height 5' 9" (1 753 m), weight 87 5 kg (193 lb)  PHYSICAL EXAM:      General Appearance:   Alert, cooperative, no distress, appears stated age    HEENT:   Normocephalic, atraumatic, anicteric      Neck:  Supple, symmetrical, trachea midline, no adenopathy;    thyroid: no enlargement/tenderness/nodules; no carotid  bruit or JVD    Lungs:   Clear to auscultation bilaterally; no rales, rhonchi or wheezing; respirations unlabored    Heart[de-identified]   S1 and S2 normal; regular rate and rhythm; no murmur, rub, or gallop  Abdomen:   Soft, non-tender, non-distended; normal bowel sounds; no masses, no organomegaly    Extremities: No edema, erythema, wounds, rashes   Rectal:   Deferred                      Lab Results   Component Value Date    WBC 9 21 05/03/2021    HGB 16 8 05/03/2021    HCT 50 9 (H) 05/03/2021    MCV 89 05/03/2021     05/03/2021     Lab Results   Component Value Date    GLUCOSE 106 11/05/2014    CALCIUM 9 1 06/26/2021     11/05/2014    K 4 0 06/26/2021    CO2 26 06/26/2021     06/26/2021    BUN 34 (H) 06/26/2021    CREATININE 1 11 06/26/2021     Lab Results   Component Value Date    ALT 40 05/03/2021    AST 17 05/03/2021    ALKPHOS 66 05/03/2021    BILITOT 0 14 (L) 11/05/2014     No results found for: INR, PROTIME    XR hip/pelv 1 vw right if performed    Result Date: 7/23/2021  Impression: Fluoroscopic guidance provided for procedure guidance  Please refer to the separate procedure notes for additional details  Workstation performed: [id number]       ASSESSMENT & PLAN:    Gastroesophageal reflux disease with esophagitis without hemorrhage  Patient reports still having breakthrough symptomatology despite twice daily PPI therapy, but still also reports drinking copious amounts caffeine    Had endoscopic appearance of Florian's at the time of last EGD about 3 years ago although biopsies were negative; should rule out any new dysplasia or malignancy at this point, he is also a former smoker     -will schedule patient for EGD, along with colonoscopy    -patient was advised regarding risks and benefits of the procedures, risks including but not limited to infection, perforation bleeding     -continue omeprazole 40 mg twice daily; I offered to start H2 blocker in addition to this given his breakthrough symptomatology but patient said he would prefer to hold off on starting any new medications; I think this is reasonable at least until EGD is done and findings are ascertained    -advised patient about decreasing his coffee/caffeine intake, he can try using half caffeinated beverages to start rather than cutting out coffee altogether if this is an easier strategy for him     -advised patient about other dietary lifestyle modification strategies for the mitigation of GERD    Colon cancer screening  Patient is 51years old and appears at average colorectal cancer risk, according to ACS and [place]  PS TF guidelines he is due for colorectal cancer surveillance at this point, rule out adenomatous polyps or malignancy     -will schedule patient for colonoscopy at the same time as EGD; again, patient was advised regarding risks and benefits of the procedures     -prescription and instructions provided for colonoscopy prep

## 2021-08-02 NOTE — ASSESSMENT & PLAN NOTE
Patient is 51years old and appears at average colorectal cancer risk, according to ACS and [place]  PS TF guidelines he is due for colorectal cancer surveillance at this point, rule out adenomatous polyps or malignancy     -will schedule patient for colonoscopy at the same time as EGD; again, patient was advised regarding risks and benefits of the procedures     -prescription and instructions provided for colonoscopy prep

## 2021-08-04 ENCOUNTER — OFFICE VISIT (OUTPATIENT)
Dept: PHYSICAL THERAPY | Age: 46
End: 2021-08-04
Payer: COMMERCIAL

## 2021-08-04 DIAGNOSIS — S73.191A TEAR OF RIGHT ACETABULAR LABRUM, INITIAL ENCOUNTER: ICD-10-CM

## 2021-08-04 DIAGNOSIS — S73.191D TEAR OF RIGHT ACETABULAR LABRUM, SUBSEQUENT ENCOUNTER: ICD-10-CM

## 2021-08-04 DIAGNOSIS — M25.551 RIGHT HIP PAIN: Primary | ICD-10-CM

## 2021-08-04 PROCEDURE — 97110 THERAPEUTIC EXERCISES: CPT

## 2021-08-04 NOTE — PROGRESS NOTES
Daily Note     Today's date: 2021  Patient name: Cole Murphy  : 1975  MRN: [id number]  Referring provider: Giovanna Laird PA-C  Dx:   Encounter Diagnosis     ICD-10-CM    1  Right hip pain  M25 551    2  Tear of right acetabular labrum, subsequent encounter  S73 191D    3  Tear of right acetabular labrum, initial encounter  S73 191A                   Subjective: Patient noted no pain at right hip today  Objective: See treatment diary below  Patient provided with new written hep  Assessment: Good tolerance to progression  Ambulates   With [facility] and no assistive device at times indoors     Plan: Continue per plan of care  Precautions:  Patient noted PMHx is remarkable for HTN, GERD, kidney stones, sleep apnea  Right hip labral debridement 21  Right le NWB with bilateral axillary crutches until 21 then WBAT  Right hip flexion 90 degree limitation, right le arom as noted below until 21 to 21:progressively improve strength  slr x 4  Partial squats, leg press, SLS and Tandem stance, recumbent bike, balance activities  21 to 10/11/21: lunges, agility drills, lateral step downs  Manuals                                                              Neuro Re-Ed                                       Ther Ex             TM   10 min  2  6mph   10min  2 6 mph          Ambulated around the gym    7 min with crutches  NT         Nu step 90 degree hip flexion limitation for 4 weeks see above! 8 min NT NT         Seated heel slides:R  NT NT NT         Seated hr and tr:B: 10 x  2 x 10 30X NT         Seated LAQ:R 10 x with 5 sec hold 3 sec x 20 30X 2SEC  20x 3sec                       Supine ankle pumps:B: 10 x  NT NT NT         Quad sets:R 5 sec x 10 5 sec x 20 30X 5SEC  30X 5SEC          glute sets:B 5 sec x 10 5 sec x 20 30X 5SEC  30X 5SEC          Heel slides:R 10 x  2 x 10 NT NT         Supine right bent knee fall outs  NT NT NT bridges 10 x  2 x 10 20X 3SEC  20X 3SEC          Supine hip add with ball     30x 5sec          Supine hip abduction isometrics in hook lying:B: 10 x with 5 second hold  NT  30X 3SEC          SAQ:B:  2 x 10 20X 3SEC    30X 3SEC                       Standing hip abd and ext slr x 3 on right le  2 x 10 2X  20X         Standing hamstring curls on right le  2 x 10 20X  20X                                                                                        Ther Activity                                       Gait Training    Post session 10 min with SPC                                     Modalities             CP to right hip in seated or supine 10 min supine 10 min  deferred

## 2021-08-09 ENCOUNTER — OFFICE VISIT (OUTPATIENT)
Dept: PHYSICAL THERAPY | Age: 46
End: 2021-08-09
Payer: COMMERCIAL

## 2021-08-09 DIAGNOSIS — S73.191D TEAR OF RIGHT ACETABULAR LABRUM, SUBSEQUENT ENCOUNTER: ICD-10-CM

## 2021-08-09 DIAGNOSIS — M25.551 RIGHT HIP PAIN: Primary | ICD-10-CM

## 2021-08-09 DIAGNOSIS — S73.191A TEAR OF RIGHT ACETABULAR LABRUM, INITIAL ENCOUNTER: ICD-10-CM

## 2021-08-09 PROCEDURE — 97110 THERAPEUTIC EXERCISES: CPT | Performed by: PHYSICAL THERAPIST

## 2021-08-09 NOTE — PROGRESS NOTES
Daily Note     Today's date: 2021  Patient name: Rahul Vaca  : 1975  MRN: [id number]  Referring provider: Guicho Van PA-C  Dx:   Encounter Diagnosis     ICD-10-CM    1  Right hip pain  M25 551    2  Tear of right acetabular labrum, subsequent encounter  S73 191D    3  Tear of right acetabular labrum, initial encounter  S73 191A                   Subjective: Patient noted he is using spc to leave the house, but does not use it in the house  Patient denies right hip pain today  Objective: See treatment diary below  Patient provided with new written hep  Assessment:  Patient exhibits gait overall improved with no deficits with right stance with spc but exhibits slight decrease in right stance and right sided weight shift with out use of spc or assistive device  Patient exhibits right gluteus medius weakness with gait  Plan: Continue per plan of care  Make below changes to therapeutic exercise as per surgeon specific protocol on 21  Precautions:  Patient noted PMHx is remarkable for HTN, GERD, kidney stones, sleep apnea  Right hip labral debridement 21  Right le NWB with bilateral axillary crutches until 21 then WBAT  Right hip flexion 90 degree limitation, right le arom as noted below until 21 to 21:progressively improve strength  slr x 4  Partial squats, leg press, SLS and Tandem stance, recumbent bike, balance activities  21 to 10/11/21: lunges, agility drills, lateral step downs  Manuals                                                             Neuro Re-Ed                                       Ther Ex             TM   10 min  2  6mph   10min  2 6 mph  10 min at 2 mph        Ambulated around the gym    7 min with crutches  NT NT        Nu step 90 degree hip flexion limitation for 4 weeks see above! 8 min NT NT NT        Seated heel slides:R  NT NT NT NT        Seated hr and tr:B: 10 x  2 x 10 30X NT NT Seated LAQ:R 10 x with 5 sec hold 3 sec x 20 30X 2SEC  20x 3sec  3 sec x 20                     Supine ankle pumps:B: 10 x  NT NT NT NT        Quad sets:R 5 sec x 10 5 sec x 20 30X 5SEC  30X 5SEC  5 sec x 30        glute sets:B 5 sec x 10 5 sec x 20 30X 5SEC  30X 5SEC  D/C        Heel slides:R 10 x  2 x 10 NT NT 2 x 10        Supine right bent knee fall outs  NT NT NT 2 x 10        bridges 10 x  2 x 10 20X 3SEC  20X 3SEC  3 sec x 30        Supine hip add with ball     30x 5sec  3 sec x 30        Supine hip abduction isometrics in hook lying:B: 10 x with 5 second hold  NT  30X 3SEC  3 sec x 30        SAQ:B:  2 x 10 20X 3SEC    30X 3SEC  3 sec x 30                     Standing hip abd and ext slr x 3 on right le  2 x 10 2X  20X:B 2 x 10: B        Standing hamstring curls on right le  2 x 10 20X  20X  2 x 10        Hip hiking      Add nv                                                                         Ther Activity                                       Gait Training    Post session 10 min with SPC   deferred                                  Modalities             CP to right hip in seated or supine 10 min supine 10 min  deferred

## 2021-08-11 ENCOUNTER — OFFICE VISIT (OUTPATIENT)
Dept: PHYSICAL THERAPY | Age: 46
End: 2021-08-11
Payer: COMMERCIAL

## 2021-08-11 DIAGNOSIS — S73.191D TEAR OF RIGHT ACETABULAR LABRUM, SUBSEQUENT ENCOUNTER: ICD-10-CM

## 2021-08-11 DIAGNOSIS — S73.191A TEAR OF RIGHT ACETABULAR LABRUM, INITIAL ENCOUNTER: ICD-10-CM

## 2021-08-11 DIAGNOSIS — M25.551 RIGHT HIP PAIN: Primary | ICD-10-CM

## 2021-08-11 PROCEDURE — 97110 THERAPEUTIC EXERCISES: CPT | Performed by: PHYSICAL THERAPIST

## 2021-08-11 NOTE — PROGRESS NOTES
Daily Note     Today's date: 2021  Patient name: Tripp Garcia  : 1975  MRN: [id number]  Referring provider: Shelby Berry PA-C  Dx:   Encounter Diagnosis     ICD-10-CM    1  Right hip pain  M25 551    2  Tear of right acetabular labrum, subsequent encounter  S73 191D    3  Tear of right acetabular labrum, initial encounter  S73 191A                   Subjective: Patient noted he is only using spc intermittent in the community  Patient noted he has right hip tightness / weakness sensation that still persists  Objective: See treatment diary below  Assessment:  Patient presents without right hip pain production with all open and closed kinetic chain activities  Thus, due to current progress and symptom reduction patient to progress to next phase of surgeon specific protocol noted below  Plan: Continue per plan of care  Make below changes to therapeutic exercise as per surgeon specific protocol on 21  Precautions:  Patient noted PMHx is remarkable for HTN, GERD, kidney stones, sleep apnea  Right hip labral debridement 21  Right le NWB with bilateral axillary crutches until 21 then WBAT  Right hip flexion 90 degree limitation, right le arom as noted below until 21 to 21:progressively improve strength  slr x 4  Partial squats, leg press, SLS and Tandem stance, recumbent bike, balance activities  21 to 10/11/21: lunges, agility drills, lateral step downs  Manuals                                                            Neuro Re-Ed                                       Ther Ex             TM   10 min  2  6mph   10min  2 6 mph  10 min at 2 mph NT       Ambulated around the gym    7 min with crutches  NT NT NT       Nu step 90 degree hip flexion limitation for 4 weeks see above! 8 min NT NT NT 10 min       Seated heel slides:R  NT NT NT NT NT       Seated hr and tr:B: 10 x  2 x 10 30X NT NT NT       Seated LAQ:R 10 x with 5 sec hold 3 sec x 20 30X 2SEC  20x 3sec  3 sec x 20 2# x 30                    Supine ankle pumps:B: 10 x  NT NT NT NT NT       Quad sets:R 5 sec x 10 5 sec x 20 30X 5SEC  30X 5SEC  5 sec x 30 5 sec x 30       glute sets:B 5 sec x 10 5 sec x 20 30X 5SEC  30X 5SEC  D/C D/C       Heel slides:R 10 x  2 x 10 NT NT 2 x 10 3 x 10       Supine right bent knee fall outs  NT NT NT 2 x 10 3 x 10       bridges 10 x  2 x 10 20X 3SEC  20X 3SEC  3 sec x 30 3 sec x 30       Supine hip add with ball     30x 5sec  3 sec x 30 NT       Supine hip abduction isometrics in hook lying:B: 10 x with 5 second hold  NT  30X 3SEC  3 sec x 30 NT       SAQ:B:  2 x 10 20X 3SEC    30X 3SEC  3 sec x 30 3 sec x 30       Left side lying right hip abduction      2 x 10       Left side lying clam shells      Add nv       Prone hip extension:R      2 x 10       Prone knee flexion:R      2 x 10       Prone TKE:R      2 x 10                                 Standing hip abd and ext slr x 3 on right le  2 x 10 2X  20X:B 2 x 10: B 2 x 10 B       Standing hamstring curls on right le  2 x 10 20X  20X  2 x 10 2 x 10:B:       Hip hiking :B:     Add nv 2 x 10       Side stepping:B:      each direction in parallel bars x 4       sls and tandem stance:B:       add      Mini squats       add      Tandem ambulation       add                   Ther Activity                                       Gait Training    Post session 10 min with SPC   deferred                                  Modalities             CP to right hip in seated or supine 10 min supine 10 min  deferred

## 2021-08-16 ENCOUNTER — OFFICE VISIT (OUTPATIENT)
Dept: PHYSICAL THERAPY | Age: 46
End: 2021-08-16
Payer: COMMERCIAL

## 2021-08-16 DIAGNOSIS — S73.191A TEAR OF RIGHT ACETABULAR LABRUM, INITIAL ENCOUNTER: ICD-10-CM

## 2021-08-16 DIAGNOSIS — M25.551 RIGHT HIP PAIN: Primary | ICD-10-CM

## 2021-08-16 DIAGNOSIS — S73.191D TEAR OF RIGHT ACETABULAR LABRUM, SUBSEQUENT ENCOUNTER: ICD-10-CM

## 2021-08-16 PROCEDURE — 97110 THERAPEUTIC EXERCISES: CPT

## 2021-08-16 NOTE — PROGRESS NOTES
Daily Note     Today's date: 2021  Patient name: Ryland Manning  : 1975  MRN: [id number]  Referring provider: Jennifer Shah PA-C  Dx:   Encounter Diagnosis     ICD-10-CM    1  Right hip pain  M25 551    2  Tear of right acetabular labrum, subsequent encounter  S73 191D    3  Tear of right acetabular labrum, initial encounter  S73 191A                   Subjective: Pt noted no pain just stiffness at times  Objective: See treatment diary below  Assessment: Good tolerance to progression  No pain with exercises  Progress as able  Plan: Continue per plan of care  Make below changes to therapeutic exercise as per surgeon specific protocol on 21  Precautions:  Patient noted PMHx is remarkable for HTN, GERD, kidney stones, sleep apnea  Right hip labral debridement 21  Right le NWB with bilateral axillary crutches until 21 then WBAT  Right hip flexion 90 degree limitation, right le arom as noted below until 21 to 21:progressively improve strength  slr x 4  Partial squats, leg press, SLS and Tandem stance, recumbent bike, balance activities  21 to 10/11/21: lunges, agility drills, lateral step downs  Manuals                                                           Neuro Re-Ed                                       Ther Ex             TM   10 min  2  6mph   10min  2 6 mph  10 min at 2 mph NT 10 min   2 0 mph       Ambulated around the gym    7 min with crutches  NT NT NT NT      Nu step 90 degree hip flexion limitation for 4 weeks see above! 8 min NT NT NT 10 min NT      Seated heel slides:R  NT NT NT NT NT NT      Seated hr and tr:B: 10 x  2 x 10 30X NT NT NT NT      Seated LAQ:R 10 x with 5 sec hold 3 sec x 20 30X 2SEC  20x 3sec  3 sec x 20 2# x 30 NT                   Supine ankle pumps:B: 10 x  NT NT NT NT NT NT      Quad sets:R 5 sec x 10 5 sec x 20 30X 5SEC  30X 5SEC  5 sec x 30 5 sec x 30 NT      glute sets:B 5 sec x 10 5 sec x 20 30X 5SEC  30X 5SEC  D/C D/C NT      Heel slides:R 10 x  2 x 10 NT NT 2 x 10 3 x 10 NT      Supine right bent knee fall outs  NT NT NT 2 x 10 3 x 10 NT      bridges 10 x  2 x 10 20X 3SEC  20X 3SEC  3 sec x 30 3 sec x 30 30X 3SEC       Supine hip add with ball     30x 5sec  3 sec x 30 NT 30X 3SEC       Supine hip abduction isometrics in hook lying:B: 10 x with 5 second hold  NT  30X 3SEC  3 sec x 30 NT 30X 3SEC BTB       SAQ:B:  2 x 10 20X 3SEC    30X 3SEC  3 sec x 30 3 sec x 30 NT      Left side lying right hip abduction      2 x 10 20X BTB 1 5#       Left side lying clam shells      Add nv 20X BTB 3SEC       Prone hip extension:R      2 x 10 20X 1 5#       Prone knee flexion:R      2 x 10 20X 1 5#       Prone TKE:R      2 x 10 NT                                 Standing hip abd and ext slr x 3 on right le  2 x 10 2X  20X:B 2 x 10: B 2 x 10 B 20x 1 5#       Standing hamstring curls on right le  2 x 10 20X  20X  2 x 10 2 x 10:B: 20x 1 5#       Hip hiking :B:     Add nv 2 x 10 NT      Side stepping:B:      each direction in parallel bars x 4 6X 1 5#       sls and tandem stance:B:       5SEC 10X B      Mini squats        20x      Tandem ambulation       6X   1 5#       Standing march        30x 1 5#       Ther Activity                                       Gait Training    Post session 10 min with SPC   deferred                                  Modalities             CP to right hip in seated or supine 10 min supine 10 min  deferred

## 2021-08-18 ENCOUNTER — OFFICE VISIT (OUTPATIENT)
Dept: PHYSICAL THERAPY | Age: 46
End: 2021-08-18
Payer: COMMERCIAL

## 2021-08-18 DIAGNOSIS — M25.551 RIGHT HIP PAIN: ICD-10-CM

## 2021-08-18 DIAGNOSIS — S73.191D TEAR OF RIGHT ACETABULAR LABRUM, SUBSEQUENT ENCOUNTER: ICD-10-CM

## 2021-08-18 DIAGNOSIS — S73.191A TEAR OF RIGHT ACETABULAR LABRUM, INITIAL ENCOUNTER: Primary | ICD-10-CM

## 2021-08-18 PROCEDURE — 97110 THERAPEUTIC EXERCISES: CPT

## 2021-08-18 NOTE — PROGRESS NOTES
Daily Note     Today's date: 2021  Patient name: Roberto Lyons  : 1975  MRN: [id number]  Referring provider: Zilphia Homans, PA-C  Dx:   Encounter Diagnosis     ICD-10-CM    1  Tear of right acetabular labrum, initial encounter  S73 191A    2  Tear of right acetabular labrum, subsequent encounter  S73 191D    3  Right hip pain  M25 551                   Subjective: Pt noted no pain just stiffness at times  Objective: See treatment diary below  Assessment: Good tolerance to progression  Plan: Continue per plan of care  Make below changes to therapeutic exercise as per surgeon specific protocol on 21  Precautions:  Patient noted PMHx is remarkable for HTN, GERD, kidney stones, sleep apnea  Right hip labral debridement 21  Right le NWB with bilateral axillary crutches until 21 then WBAT  Right hip flexion 90 degree limitation, right le arom as noted below until 21 to 21:progressively improve strength  slr x 4  Partial squats, leg press, SLS and Tandem stance, recumbent bike, balance activities  21 to 10/11/21: lunges, agility drills, lateral step downs  Manuals                                                          Neuro Re-Ed                                       Ther Ex             TM   10 min  2  6mph   10min  2 6 mph  10 min at 2 mph NT 10 min   2 0 mph  10 min   2 2  Mph      Ambulated around the gym    7 min with crutches  NT NT NT NT NT     Nu step 90 degree hip flexion limitation for 4 weeks see above! 8 min NT NT NT 10 min NT NT     Seated heel slides:R  NT NT NT NT NT NT NT     Seated hr and tr:B: 10 x  2 x 10 30X NT NT NT NT NT     Seated LAQ:R 10 x with 5 sec hold 3 sec x 20 30X 2SEC  20x 3sec  3 sec x 20 2# x 30 NT NT                  Supine ankle pumps:B: 10 x  NT NT NT NT NT NT NT     Quad sets:R 5 sec x 10 5 sec x 20 30X 5SEC  30X 5SEC  5 sec x 30 5 sec x 30 NT NT     glute sets:B 5 sec x 10 5 sec x 20 30X 5SEC  30X 5SEC  D/C D/C NT NT     Heel slides:R 10 x  2 x 10 NT NT 2 x 10 3 x 10 NT NT     Supine right bent knee fall outs  NT NT NT 2 x 10 3 x 10 NT NT     bridges 10 x  2 x 10 20X 3SEC  20X 3SEC  3 sec x 30 3 sec x 30 30X 3SEC  30X 3SEC     Supine hip add with ball     30x 5sec  3 sec x 30 NT 30X 3SEC  30X 5SEC        Supine hip abduction isometrics in hook lying:B: 10 x with 5 second hold  NT  30X 3SEC  3 sec x 30 NT 30X 3SEC BTB  30X 5SEC   BTB        SAQ:B:  2 x 10 20X 3SEC    30X 3SEC  3 sec x 30 3 sec x 30 NT 30x 5sec 3#      Left side lying right hip abduction      2 x 10 20X BTB 1 5#  20x 2#      Left side lying clam shells      Add nv 20X BTB 3SEC  BTB 20X 3SEC      Prone hip extension:R      2 x 10 20X 1 5#  20X 2#      Prone knee flexion:R      2 x 10 20X 1 5#  20X 2#      Prone TKE:R      2 x 10 NT                                 Standing hip abd and ext slr x 3 on right le  2 x 10 2X  20X:B 2 x 10: B 2 x 10 B 20x 1 5#  20X 2#      Standing hamstring curls on right le  2 x 10 20X  20X  2 x 10 2 x 10:B: 20x 1 5#  20X 2#      Hip hiking :B:     Add nv 2 x 10 NT NT     Side stepping:B:      each direction in parallel bars x 4 6X 1 5#  NT     sls and tandem stance:B:       5SEC 10X B NT     Mini squats        20x 20X     Tandem ambulation       6X   1 5#  NT     Standing march        30x 1 5#  NT     Ther Activity                                       Gait Training    Post session 10 min with SPC   deferred                                  Modalities             CP to right hip in seated or supine 10 min supine 10 min  deferred

## 2021-08-23 ENCOUNTER — OFFICE VISIT (OUTPATIENT)
Dept: PHYSICAL THERAPY | Age: 46
End: 2021-08-23
Payer: COMMERCIAL

## 2021-08-23 DIAGNOSIS — S73.191A TEAR OF RIGHT ACETABULAR LABRUM, INITIAL ENCOUNTER: Primary | ICD-10-CM

## 2021-08-23 DIAGNOSIS — S73.191D TEAR OF RIGHT ACETABULAR LABRUM, SUBSEQUENT ENCOUNTER: ICD-10-CM

## 2021-08-23 DIAGNOSIS — M25.551 RIGHT HIP PAIN: ICD-10-CM

## 2021-08-23 PROCEDURE — 97110 THERAPEUTIC EXERCISES: CPT

## 2021-08-23 NOTE — PROGRESS NOTES
Daily Note     Today's date: 2021  Patient name: Lian Henderson  : 1975  MRN: [id number]  Referring provider: Frandy Platt PA-C  Dx:   Encounter Diagnosis     ICD-10-CM    1  Tear of right acetabular labrum, initial encounter  S73 191A    2  Tear of right acetabular labrum, subsequent encounter  S73 191D    3  Right hip pain  M25 551                   Subjective: Pt noted no new complaints       Objective: See treatment diary below  Assessment: Good tolerance to progression as per protocol  Plan: Continue per plan of care  Make below changes to therapeutic exercise as per surgeon specific protocol on 21  Precautions:  Patient noted PMHx is remarkable for HTN, GERD, kidney stones, sleep apnea  Right hip labral debridement 21  Right le NWB with bilateral axillary crutches until 21 then WBAT  Right hip flexion 90 degree limitation, right le arom as noted below until 21 to 21:progressively improve strength  slr x 4  Partial squats, leg press, SLS and Tandem stance, recumbent bike, balance activities  21 to 10/11/21: lunges, agility drills, lateral step downs  Manuals                                                         Neuro Re-Ed                                       Ther Ex             TM   10 min  2  6mph   10min  2 6 mph  10 min at 2 mph NT 10 min   2 0 mph  10 min   2 2  Mph  10 min                                   Supine right bent knee fall outs  NT NT NT 2 x 10 3 x 10 NT NT NT    bridges 10 x  2 x 10 20X 3SEC  20X 3SEC  3 sec x 30 3 sec x 30 30X 3SEC  30X 3SEC 30x 3sec     Supine hip add with ball     30x 5sec  3 sec x 30 NT 30X 3SEC  30X 5SEC    30x 5sec     Supine hip abduction isometrics in hook lying:B: 10 x with 5 second hold  NT  30X 3SEC  3 sec x 30 NT 30X 3SEC BTB  30X 5SEC   BTB    30x 5sec   BTB                   Left side lying right hip abduction      2 x 10 20X BTB 1 5#  20x 2#  20X 2 5#     Left side lying clam shells      Add nv 20X BTB 3SEC  BTB 20X 3SEC  BTB   20X 3SEC     Prone hip extension:R      2 x 10 20X 1 5#  20X 2#  20X 2 5#     Prone knee flexion:R      2 x 10 20X 1 5#  20X 2#  20X 2 5#     Prone TKE:R      2 x 10 NT                                 Standing hip abd and ext slr x 3 on right le  2 x 10 2X  20X:B 2 x 10: B 2 x 10 B 20x 1 5#  20X 2#  20X 2 5#     Standing hamstring curls on right le  2 x 10 20X  20X  2 x 10 2 x 10:B: 20x 1 5#  20X 2#  20X 2 5#     Hip hiking :B:     Add nv 2 x 10 NT NT NT    Side stepping:B:      each direction in parallel bars x 4 6X 1 5#  NT With TB   Black 6x     sls and tandem stance:B:       5SEC 10X B NT 15 sec 5x B    Mini squats        20x 20X 20x     Tandem ambulation       6X   1 5#  NT NT                                            Standing march        30x 1 5#  NT     Ther Activity                                       Gait Training    Post session 10 min with SPC   deferred                     Cybex leg ext          NT    Cybex flex          NT    Cybex Abd         35# 20x      Cybex Leg press         55# 20x                   Modalities             CP to right hip in seated or supine 10 min supine 10 min  deferred

## 2021-08-25 ENCOUNTER — OFFICE VISIT (OUTPATIENT)
Dept: PHYSICAL THERAPY | Age: 46
End: 2021-08-25
Payer: COMMERCIAL

## 2021-08-25 DIAGNOSIS — S73.191A TEAR OF RIGHT ACETABULAR LABRUM, INITIAL ENCOUNTER: Primary | ICD-10-CM

## 2021-08-25 DIAGNOSIS — M25.551 RIGHT HIP PAIN: ICD-10-CM

## 2021-08-25 DIAGNOSIS — S73.191D TEAR OF RIGHT ACETABULAR LABRUM, SUBSEQUENT ENCOUNTER: ICD-10-CM

## 2021-08-25 PROCEDURE — 97110 THERAPEUTIC EXERCISES: CPT

## 2021-08-25 NOTE — PROGRESS NOTES
Daily Note     Today's date: 2021  Patient name: Roberto Lyons  : 1975  MRN: [id number]  Referring provider: Zilphia Homans, PA-C  Dx:   Encounter Diagnosis     ICD-10-CM    1  Tear of right acetabular labrum, initial encounter  S73 191A    2  Tear of right acetabular labrum, subsequent encounter  S73 191D    3  Right hip pain  M25 551                   Subjective: Pt noted no pain at right right hip today  Objective: See treatment diary below  Assessment: Pt tolerated session well  Progress as per protocol  Plan: Continue per plan of care  Make below changes to therapeutic exercise as per surgeon specific protocol on 21  Precautions:  Patient noted PMHx is remarkable for HTN, GERD, kidney stones, sleep apnea  Right hip labral debridement 21  Right le NWB with bilateral axillary crutches until 21 then WBAT  Right hip flexion 90 degree limitation, right le arom as noted below until 21 to 21:progressively improve strength  slr x 4  Partial squats, leg press, SLS and Tandem stance, recumbent bike, balance activities  21 to 10/11/21: lunges, agility drills, lateral step downs  Manuals                                                        Neuro Re-Ed                                       Ther Ex             TM   10 min  2  6mph   10min  2 6 mph  10 min at 2 mph NT 10 min   2 0 mph  10 min   2 2  Mph  10 min    10 min   3 1 mph                                Supine right bent knee fall outs  NT NT NT 2 x 10 3 x 10 NT NT NT 30x sec   Black TB    bridges 10 x  2 x 10 20X 3SEC  20X 3SEC  3 sec x 30 3 sec x 30 30X 3SEC  30X 3SEC 30x 3sec  NT   Supine hip add with ball     30x 5sec  3 sec x 30 NT 30X 3SEC  30X 5SEC    30x 5sec  30X 5SEC   With bridges     Supine hip abduction isometrics in hook lying:B: 10 x with 5 second hold  NT  30X 3SEC  3 sec x 30 NT 30X 3SEC BTB  30X 5SEC   BTB    30x 5sec BTB  30X 5SEC   Black TB   With bridges                  Left side lying right hip abduction      2 x 10 20X BTB 1 5#  20x 2#  20X 2 5#  30x 2sec 3#    Left side lying clam shells      Add nv 20X BTB 3SEC  BTB 20X 3SEC  BTB   20X 3SEC  30x 5sec   Black TB    Prone hip extension:R      2 x 10 20X 1 5#  20X 2#  20X 2 5#  30x 2sec 3#    Prone knee flexion:R      2 x 10 20X 1 5#  20X 2#  20X 2 5#  30x 3sec 3#    Prone TKE:R      2 x 10 NT    30x 5sec    Prone hip ext with knee flex           30x 2sec 3#                 Standing hip abd and ext slr x 3 on right le  2 x 10 2X  20X:B 2 x 10: B 2 x 10 B 20x 1 5#  20X 2#  20X 2 5#  20x 4#    Standing hamstring curls on right le  2 x 10 20X  20X  2 x 10 2 x 10:B: 20x 1 5#  20X 2#  20X 2 5#  20x 4#    Hip hiking :B:     Add nv 2 x 10 NT NT NT NT   Side stepping:B:      each direction in parallel bars x 4 6X 1 5#  NT With TB   Black 6x  TB  Black    sls and tandem stance:B:       5SEC 10X B NT 15 sec 5x B 15 sec 5x   Mini squats        20x 20X 20x  20x   Tandem ambulation       6X   1 5#  NT NT NT                                           Standing march        30x 1 5#  NT  NT   Ther Activity                                       Gait Training    Post session 10 min with SPC   deferred                     Cybex leg ext          NT    Cybex flex          NT    Cybex Abd         35# 20x   35# 30x    Cybex Leg press         55# 20x   55# 30x                 Modalities             CP to right hip in seated or supine 10 min supine 10 min  deferred

## 2021-08-30 ENCOUNTER — OFFICE VISIT (OUTPATIENT)
Dept: PHYSICAL THERAPY | Age: 46
End: 2021-08-30
Payer: COMMERCIAL

## 2021-08-30 DIAGNOSIS — S73.191A TEAR OF RIGHT ACETABULAR LABRUM, INITIAL ENCOUNTER: Primary | ICD-10-CM

## 2021-08-30 DIAGNOSIS — M25.551 RIGHT HIP PAIN: ICD-10-CM

## 2021-08-30 DIAGNOSIS — S73.191D TEAR OF RIGHT ACETABULAR LABRUM, SUBSEQUENT ENCOUNTER: ICD-10-CM

## 2021-08-30 PROCEDURE — 97110 THERAPEUTIC EXERCISES: CPT

## 2021-08-30 NOTE — PROGRESS NOTES
Daily Note     Today's date: 2021  Patient name: Crystal Reina  : 1975  MRN: [id number]  Referring provider: Cheryl Heart PA-C  Dx:   Encounter Diagnosis     ICD-10-CM    1  Tear of right acetabular labrum, initial encounter  S73 191A    2  Tear of right acetabular labrum, subsequent encounter  S73 191D    3  Right hip pain  M25 551                   Subjective: pt reports he's feeling good with no significant pain to report      Objective: See treatment diary below      Assessment: Tolerated treatment well  Patient would benefit from continued PT      Plan: Continue per plan of care  Progress treatment as tolerated  Precautions:  Patient noted PMHx is remarkable for HTN, GERD, kidney stones, sleep apnea  Right hip labral debridement 21  Right le NWB with bilateral axillary crutches until 21 then WBAT  Right hip flexion 90 degree limitation, right le arom as noted below until 21 to 21:progressively improve strength  slr x 4  Partial squats, leg press, SLS and Tandem stance, recumbent bike, balance activities  21 to 10/11/21: lunges, agility drills, lateral step downs  Manuals 21                                                       Neuro Re-Ed                                       Ther Ex             TM  10 min  2  6mph   10min  2 6 mph  10 min at 2 mph NT 10 min   2 0 mph  10 min   2 2  Mph  10 min    10 min   3 1 mph  10' 3 0 MPH                               Supine right bent knee fall outs NT NT NT 2 x 10 3 x 10 NT NT NT 30x sec   Black TB  30X BLK   bridges 2 x 10 20X 3SEC  20X 3SEC  3 sec x 30 3 sec x 30 30X 3SEC  30X 3SEC 30x 3sec  NT    Supine hip add with ball    30x 5sec  3 sec x 30 NT 30X 3SEC  30X 5SEC    30x 5sec  30X 5SEC   With bridges   30x 5" with bridge   Supine hip abduction isometrics in hook lying:B: NT  30X 3SEC  3 sec x 30 NT 30X 3SEC BTB  30X 5SEC   BTB    30x 5sec   BTB  30X 5SEC   Black TB   With bridges  blk TB 30x                 Left side lying right hip abduction     2 x 10 20X BTB 1 5#  20x 2#  20X 2 5#  30x 2sec 3#  3# 30x2"   Left side lying clam shells     Add nv 20X BTB 3SEC  BTB 20X 3SEC  BTB   20X 3SEC  30x 5sec   Black TB  blk 30x5"   Prone hip extension:R     2 x 10 20X 1 5#  20X 2#  20X 2 5#  30x 2sec 3#  3# 30x2"   Prone knee flexion:R     2 x 10 20X 1 5#  20X 2#  20X 2 5#  30x 3sec 3#  3# 30x2"   Prone TKE:R     2 x 10 NT    30x 5sec  30x2"   Prone hip ext with knee flex          30x 2sec 3#  3# 30x2"                Standing hip abd and ext slr x 3 on right le 2 x 10 2X  20X:B 2 x 10: B 2 x 10 B 20x 1 5#  20X 2#  20X 2 5#  20x 4#  4# 20x   Standing hamstring curls on right le 2 x 10 20X  20X  2 x 10 2 x 10:B: 20x 1 5#  20X 2#  20X 2 5#  20x 4#  4# 20x   Hip hiking :B:    Add nv 2 x 10 NT NT NT NT    Side stepping:B:     each direction in parallel bars x 4 6X 1 5#  NT With TB   Black 6x  TB  Black  blk 6x ea   sls and tandem stance:B:      5SEC 10X B NT 15 sec 5x B 15 sec 5x 5x15"   Mini squats       20x 20X 20x  20x 20x   Tandem ambulation      6X   1 5#  NT NT NT                                            Standing march       30x 1 5#  NT  NT    Ther Activity                                       Gait Training   Post session 10 min with SPC   deferred                      Cybex leg ext         NT     Cybex flex         NT     Cybex Abd        35# 20x   35# 20x    Cybex Leg press        55# 20x   55# 20x                 Modalities             CP to right hip in seated or supine 10 min  deferred

## 2021-09-01 ENCOUNTER — EVALUATION (OUTPATIENT)
Dept: PHYSICAL THERAPY | Age: 46
End: 2021-09-01
Payer: COMMERCIAL

## 2021-09-01 DIAGNOSIS — M25.551 RIGHT HIP PAIN: ICD-10-CM

## 2021-09-01 DIAGNOSIS — S73.191A TEAR OF RIGHT ACETABULAR LABRUM, INITIAL ENCOUNTER: Primary | ICD-10-CM

## 2021-09-01 DIAGNOSIS — S73.191D TEAR OF RIGHT ACETABULAR LABRUM, SUBSEQUENT ENCOUNTER: ICD-10-CM

## 2021-09-01 PROCEDURE — 97110 THERAPEUTIC EXERCISES: CPT | Performed by: PHYSICAL THERAPIST

## 2021-09-01 PROCEDURE — 97750 PHYSICAL PERFORMANCE TEST: CPT | Performed by: PHYSICAL THERAPIST

## 2021-09-01 NOTE — PROGRESS NOTES
PT Evaluation : PT Reassessment:  Right Hip Post Op labral debridement  Today's date: 2021  Patient name: Mace Aase  : 1975  MRN: [id number]  Referring provider: Fidelina Bell PA-C  Dx:   Encounter Diagnosis     ICD-10-CM    1  Tear of right acetabular labrum, initial encounter  S73 191A    2  Tear of right acetabular labrum, subsequent encounter  S73 191D    3  Right hip pain  M25 551                   Assessment  Assessment details: PT Reassessment: 21  Patient reported the following progress since onset of PT: "everything" with specifics of no right hip pain, walking, standing, stair climbing, transfers  But, he noted the following deficits that still persist: unable to run, unable to resume work  Plus, he noted he did run for a short distance once without pain production  PT Post Op PT IE: 21  Patient reported he had right hip arthroscopic labral debridement on 21  Patient noted he had a follow up with his surgeon, Dr Ksenia Gamez last week  Patient noted he has two week after surgery of right le NWB then WBAT and he can wean off the crutches at that point  Patient noted current right hip pain has decreased since onset of right hip surgical intervention  Patient noted sitting and lying does not aggravate his right hip pain and his pain is at a 5 of 10  Patient noted his pain now is different than his hip pain prior to surgical intervention  Patient noted his pain is less and post surgical now  Patient denies right le paresthesias  Patient noted he is compliant with gait with bilateral axillary crutches as well as NWB  Patient noted walking, stair climbing, transfers are all difficult due to bilateral axillary crutches and NWB on right le  Patient noted he is unable to resume any standing, house hold and work activities due to right hip NWB status  Patient denies right le edema    Patient reported his right hip portal sites are covered with steri strips and he denies any portal site drainage  PT IE: 07/07/21  Patient reported onset of right hip pain for over 1 year  Patient noted his right hip pain is constant  Patient noted the intensity is variable, which he noted movement and weight baring are pain aggravating factors  Plus, he noted walking and standing on an incline is pain aggravating  Patient denies right hip weakness  Patient denies distal right le pain or paresthesias  Patient noted inside and outside house hold activities, right side lying and sleep, prolonged standing and walking on uneven surfaces and hills  Also, he noted his right hip pain is worse at the end of the day  Patient noted his right hip labral repair is scheduled for 7/19/21  Patient noted right hip pain at lowest / least is at 4 of 10 and worst at a 7 of 10  Patient noted location of pain is anterior deep right hip pain  Patient returns to the surgeon, Dr Earnestine Spangler on 7-14-21  Patient noted he is concerned with gait with bilateral axillary crutches as well as stair climbing with crutches  Patient was educated and performed supine / seated pre surgery hep with written hep provided  Patient was educated and performed right LE NWB gait training with bilateral axillary crutches as well as stair climbing with right le NWB with bilateral axillary crutches in a non reciprocal pattern  Patient presents with good understanding and proper technique of right le NWB gait and stair climbing but he was recommended to obtain a pair of bilateral ue axillary crutches and practice so he feels confident with gait and stair climbing with them  Also, patient educated and performed leg  sit to supine and supine to sit transfers which he perform properly    Impairments: abnormal gait, abnormal or restricted ROM, abnormal movement, activity intolerance, lacks appropriate home exercise program and pain with function  Understanding of Dx/Px/POC: excellent   Prognosis: good  Prognosis details: Patient is a 51y o  year old male seen for outpatient PT reevaluation with pain, mobility and functional deficits due to right hip pain secondary to right hip arthroscopic labral debridement  Patient presents to PT on Reassessment with the following progress since onset of PT: decrease in right hip pain, increase in right hip mobility, increase in right le strength, gait and stair improvements, transfers improvements, - TTP, and functional progress  Patient presents to PT reassessment with the following problems, concerns, deficits and impairments:  decreased right le range of motion, decreased right le strength, functional limitations, unable to resume running and work activities, unable to resume fitness and house hold activities  Patient would benefit from skilled PT services under the following PT treatment plan and as per surgeon specific protocol to address the above noted deficits: therapeutic exercises and activities to facilitate right le rom and strength as per surgeon specific protocol, gait and stair training with right le reduced / NWB status as per surgeon specific restrictions and limitations, transfer training as per surgeon specific restrictions and limitations, balance and proprioception activities, IASTM techniques, Kinesio taping techniques, modalities, manual therapy techniques, and a hep  Thank you for the referral      Goals  Short Term goals 4 - 6 weeks  1  Patient will be independent HEP   MET  2   Patient will report a 25 - 50% decrease in pain complaints  MET  3   Increase strength 1/2 grade  MET  4   Increase ROM 5-10 degrees  MET  Long Term goals 8 - 12   weeks  1  Patient will report elimination of pain complaints  MET  2   Patient will return to all work related activities without restriction  NOT MET  3   Patient will return to all recreational activities without restriction  NOT MET  4   ROM WFL  MET  5   Strength 5/5  Partially MET     6   Patient will ambulate without assistive device and normal gait pattern  MET  7   Patient will perform stairs in a reciprocal pattern with unilateral railing  MET  8   Patient will perform all transfers independently and without right hip symptoms of limitations  MET  9   Patient will resume all standing activities without right hip symptoms or limitations  Partially MET  10   Patient will perform all dressing and shower activities without right hip symptoms or limitations  MET  11   Patient will perform all squatting and lifting activities without right hip symptoms or limitations  Partially MET  Plan  Patient would benefit from: skilled physical therapy  Planned modality interventions: cryotherapy, TENS, thermotherapy: hydrocollator packs and unattended electrical stimulation  Planned therapy interventions: joint mobilization, manual therapy, massage, balance, balance/weight bearing training, neuromuscular re-education, patient education, postural training, body mechanics training, compression, self care, strengthening, stretching, therapeutic activities, therapeutic exercise, therapeutic training, transfer training, flexibility, functional ROM exercises, gait training, graded activity, graded exercise, graded motor and home exercise program  Frequency: 1x week  Duration in weeks: 1  Treatment plan discussed with: patient        Subjective Evaluation    History of Present Illness  Mechanism of injury:  Patient noted PMHx is remarkable for HTN, GERD, kidney stones, sleep apnea  MRI ARTHROGRAM RIGHT HIP     INDICATION:   M25 551: Pain in right hip  Foziafrancesco Salcido's note from 3/17/2021 was reviewed  Patient has right hip pain radiating into the groin  This MR arthrogram was performed to evaluate for labral tear      COMPARISON: None      TECHNIQUE:  MR sequences were obtained of the right hip and pelvis including: Localizer, coronal pelvis T1, coronal pelvis T2 fat sat   Smaller field of view sequences of the affected hip were obtained with axial oblique T1 fat sat, axial oblique T2 fat   sat, coronal T1 fat sat, sagittal T2 fat sat, sagittal T1 fat sat        Images were acquired after intra-articular injection of gadolinium utilizing direct MR arthrography technique      FINDINGS:     RIGHT HIP:     -JOINT EFFUSION: There is good distention of the right hip joint with injected contrast      -BONES: Normal marrow signal demonstrated without hip fracture or AVN     -ARTICULAR SURFACES: There is a moderate-sized anterosuperior right acetabular labral tear (series 6 images 10-11 and series 8 images 22-23 )     -ACETABULAR LABRUM: Intact      -TROCHANTERIC BURSA: Normal      LEFT HIP: (please note dedicated small field of view images were not made of the left hip joint limiting its evaluation )      -JOINT EFFUSION: None      -BONES: Normal marrow signal demonstrated without hip fracture or AVN     -ARTICULAR SURFACES: There is no osteoarthritis      -ACETABULAR LABRUM: No gross abnormalities although evaluation is very limited      -TROCHANTERIC BURSA: Normal      REST OF PELVIS:     -BONES: Normal      -SI JOINTS AND SYMPHYSIS PUBIS:  Intact      -VISUALIZED LUMBAR SPINE:  unremarkable      -MUSCULATURE: Intact with intact hamstring origins bilaterally      -PELVIC SOFT TISSUES: Normal      SUBCUTANEOUS TISSUES: Normal     IMPRESSION:     There is a moderate-sized anterosuperior right acetabular labral tear (series 6 images 10-11 and series 8 images 22-23 )  Pain  Current pain ratin  At best pain ratin  At worst pain ratin  Location: Right anterior and deep hip region          Objective     Tenderness     Additional Tenderness Details  Patient is - TTP at right lateral hip region        Active Range of Motion   Left Hip   Flexion: 108 degrees   Abduction: 30 degrees   External rotation (90/90): 45 degrees   Internal rotation (90/90): 58 degrees     Right Hip   Flexion: 100 degrees   Abduction: 24 degrees   External rotation (90/90): 40 degrees   Internal rotation (90/90): 52 degrees     Additional Active Range of Motion Details  Hamstring stretch: right at 52 degrees and left at 55 degrees  Strength/Myotome Testing     Left Hip   Planes of Motion   Flexion: 5  Extension: 4+  Abduction: 5  Adduction: 5  External rotation: 4+  Internal rotation: 4+    Right Hip   Planes of Motion   Flexion: 4+  Extension: 4+  Abduction: 5  Adduction: 5  External rotation: 4+  Internal rotation: 4+    Left Knee   Flexion: 5  Extension: 5    Right Knee   Flexion: 4+  Extension: 4+    Left Ankle/Foot   Dorsiflexion: 4+  Plantar flexion: 5    Right Ankle/Foot   Dorsiflexion: 4+  Plantar flexion: 5    Ambulation     Ambulation: Level Surfaces   Ambulation without assistive device: independent    Additional Level Surfaces Ambulation Details  Patient ambulates with normal gait mechanics  Ambulation: Stairs   Ascend stairs: independent  Pattern: reciprocal  Railings: without rails  Descend stairs: independent  Pattern: reciprocal  Railings: without rails               Precautions:  Patient noted PMHx is remarkable for HTN, GERD, kidney stones, sleep apnea  Right hip labral debridement 07/19/21  Right le NWB with bilateral axillary crutches until 8/2/21 then WBAT  Right hip flexion 90 degree limitation, right le arom as noted below until 08/16/21 8/16/21 to 9/13/21:progressively improve strength  slr x 4  Partial squats, leg press, SLS and Tandem stance, recumbent bike, balance activities  9/13/21 to 10/11/21: lunges, agility drills, lateral step downs          Manuals 9/1 8/30/21                                                       Neuro Re-Ed                                       Ther Ex             TM 13 min at 10 min         10' 3 0 MPH                               Supine right bent knee fall outs NT         30X BLK   bridges NT            Supine hip add with ball  NT         30x 5" with bridge   Supine hip abduction isometrics in hook lying:B: NT         blk TB 30x                 Left side lying right hip abduction NT         3# 30x2"   Left side lying clam shells NT         blk 30x5"   Prone hip extension:R NT         3# 30x2"   Prone knee flexion:R NT         3# 30x2"   Prone TKE:R NT         30x2"   Prone hip ext with knee flex  NT         3# 30x2"                Standing hip abd and ext slr x 3 on right le 4# x 2 x 10         4# 20x   Standing hamstring curls on right le 4# x 2 x 10         4# 20x   Hip hiking :B: NT            Side stepping:B: 5 x on foam         blk 6x ea   sls and tandem stance:B: 30 sec x 2 on each le on each          5x15"   Mini squats          20x   Tandem ambulation On foam x 5             side stepping and monster walking with tband Black tband 10 feet x 5 each                                      Standing march              Ther Activity                                       Gait Training                           Cybex leg ext              Cybex flex              Cybex Abd              Cybex Leg press                           Modalities             CP to right hip in seated or supine 10 min

## 2021-09-03 ENCOUNTER — OFFICE VISIT (OUTPATIENT)
Dept: OBGYN CLINIC | Facility: MEDICAL CENTER | Age: 46
End: 2021-09-03

## 2021-09-03 VITALS
DIASTOLIC BLOOD PRESSURE: 87 MMHG | HEART RATE: 67 BPM | SYSTOLIC BLOOD PRESSURE: 143 MMHG | BODY MASS INDEX: 28.58 KG/M2 | HEIGHT: 69 IN | WEIGHT: 193 LBS

## 2021-09-03 DIAGNOSIS — S73.191A TEAR OF RIGHT ACETABULAR LABRUM, INITIAL ENCOUNTER: Primary | ICD-10-CM

## 2021-09-03 PROCEDURE — 99024 POSTOP FOLLOW-UP VISIT: CPT | Performed by: ORTHOPAEDIC SURGERY

## 2021-09-03 NOTE — LETTER
September 3, 2021     Patient: David Osei   YOB: 1975   Date of Visit: 9/3/2021       To Whom it May Concern:    David Osei is under my professional care  He was seen in my office on 9/3/2021  He can return to work on 9/05/2021 with no restrictions  If you have any questions or concerns, please don't hesitate to call           Sincerely,          Nirmal Roa MD        CC: No Recipients

## 2021-09-03 NOTE — PROGRESS NOTES
Patient called and reported he has been cleared to resume work and d/c to hep as per surgeon  Thus, patient provided with final hep and d/c to hep

## 2021-09-03 NOTE — PROGRESS NOTES
Orthopaedic Surgery - Office Note  Zo Yates (51 y o  male)   : 1975   MRN: [id number]  Encounter Date: 9/3/2021    Chief Complaint   Patient presents with    Right Hip - Post-op       Assessment / Plan  s/p of Right Hip arthroscopy with labral debridement performed on 2021    · Activity as tolerated   · He is cleared to run short distances which his work may require but advised him to wait 3 months post op to do any running for distance  · Recommended keeping up with HEP  · Avoid painful activities but no formal restrictions at this time  · He is cleared to return to work- note given today, no restrictions   Return in about 6 weeks (around 10/15/2021)  History of Present Illness  Zo Yates is a 51 y o  male who presents for follow up s/p of Right Hip arthroscopy with labral debridement performed on 2021  He has completed PT which he feels went very well  He states he is not experiencing any pain or discomfort  He has not returned to work, as 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  He has not yet tried to run and is awaiting clearance to do so  Overall, he is very happy with his surgical outcomes and offers no complaints at this time  Review of Systems  Pertinent items are noted in HPI  All other systems were reviewed and are negative  Physical Exam  /87   Pulse 67   Ht 5' 9" (1 753 m)   Wt 87 5 kg (193 lb)   BMI 28 50 kg/m²   Cons: Appears well  No apparent distress  Psych: Alert  Oriented x3  Mood and affect normal   Eyes: PERRLA, EOMI  Resp: Normal effort  No audible wheezing or stridor  CV: Palpable pulse  No discernable arrhythmia  No LE edema  Lymph:  No palpable cervical, axillary, or inguinal lymphadenopathy  Skin: Warm  No palpable masses  No visible lesions  Neuro: Normal muscle tone  Normal and symmetric DTR's  Right Hip Exam  Alignment / Posture:  Normal resting hip posture  Inspection:  No swelling   No ecchymosis  Incision healed  Palpation:  No tenderness  No effusion  ROM:  Hip Flexion 120  Hip ER 60  Hip IR 30  Strength:  5/5 hip flexors and abductors  Stability:  No objective hip instability  Tests:  (-) Becka  (-) BRIANAIR  Neurovascular:  Sensation intact in DP/SP/[place]/Sa/T nerve distributions  2+ DP & PT pulses  Gait:  Normal     Studies Reviewed  No studies to review    Procedures  No procedures today  Medical, Surgical, Family, and Social History  The patient's medical history, family history, and social history, were reviewed and updated as appropriate      Past Medical History:   Diagnosis Date    Adrenal mass ([address] )     Chest pain     "long ago stress related to family issue seen ER and cleared"    Chronic kidney disease     pt reports from kidney stones    Chronic pain disorder     right hip    Claustrophobia     Dental crowns present     Disease of thyroid gland     nodule sees Dr Maldonado/endocrinologist and Dr Marcus Rossi([facility])    Exercise involving weight training     1-2x/week    GERD (gastroesophageal reflux disease)     Hyperlipidemia     Hypertension     Kidney stones     Low testosterone     Motion sickness     Right hip pain     Sleep apnea     not currently using his CPAP       Past Surgical History:   Procedure Laterality Date    FL INJECTION RIGHT HIP (ARTHROGRAM)  3/31/2021    pt cant recall this    FL RETROGRADE PYELOGRAM  8/21/2018    KIDNEY STONE SURGERY      ND ARTHROSCOPY HIP W/LABRAL REPAIR Right 7/19/2021    Procedure: ARTHROSCOPY HIP with labral debridement: femoroplasty;  Surgeon: Checo Mariano MD;  Location: [facility];  Service: Orthopedics    ND CYSTO/URETERO W/LITHOTRIPSY &INDWELL STENT INSRT Left 8/21/2018    Procedure: CYSTOSCOPY URETEROSCOPY, RETROGRADE PYELOGRAM AND INSERTION STENT URETERAL;  Surgeon: Teresita Montalvo MD;  [facility];  Service: Urology    ND ESOPHAGOGASTRODUODENOSCOPY TRANSORAL DIAGNOSTIC N/A 6/21/2018 Procedure: ESOPHAGOGASTRODUODENOSCOPY (EGD); Surgeon: Sarah Durand MD;  [facility]; Service: Gastroenterology    UPPER GASTROINTESTINAL ENDOSCOPY      WISDOM TOOTH EXTRACTION         Family History   Problem Relation Age of Onset    Asthma Mother     Diabetes Mother     Other Father         Endocarditis    Hypertension Father     Gout Father        Social History     Occupational History    Not on file   Tobacco Use    Smoking status: Former Smoker     Packs/day: 1 00     Years: 17 00     Pack years: 17 00     Quit date:      Years since quittin 6    Smokeless tobacco: Never Used   Vaping Use    Vaping Use: Never used   Substance and Sexual Activity    Alcohol use: Not Currently     Comment: Rare     Drug use: No    Sexual activity: Not on file     Comment: defer       No Known Allergies      Current Outpatient Medications:     carvedilol (COREG) 25 mg tablet, Take 1 tablet (25 mg total) by mouth 2 (two) times a day with meals, Disp: 180 tablet, Rfl: 3    chlorthalidone 25 mg tablet, Take 1 tablet (25 mg total) by mouth daily, Disp: 90 tablet, Rfl: 3    Cholecalciferol (Vitamin D) 50 MCG (2000 UT) CAPS, Take 2 capsules (4,000 Units total) by mouth daily, Disp: 60 capsule, Rfl: 3    Epiduo Forte 0 3-2 5 % GEL, , Disp: , Rfl:     eplerenone (INSPRA) 50 MG tablet, Take 1 tablet (50 mg total) by mouth 2 (two) times a day, Disp: 180 tablet, Rfl: 3    felodipine (PLENDIL) 10 MG 24 hr tablet, Take 1 tablet (10 mg total) by mouth daily (Patient taking differently: Take 10 mg by mouth every evening ), Disp: 90 tablet, Rfl: 3    Na Sulfate-K Sulfate-Mg Sulf (Suprep Bowel Prep Kit) 17 5-3 13-1 6 GM/177ML SOLN, Take 2 Bottles by mouth see administration instructions Suprep bowel prep    Please follow the instructions from the office, Disp: 354 mL, Rfl: 0    omeprazole (PriLOSEC) 40 MG capsule, Take 1 capsule (40 mg total) by mouth 2 (two) times a day, Disp: 180 capsule, Rfl: 1   potassium chloride (K-DUR,KLOR-CON) 20 mEq tablet, Take 2 tablets (40 mEq total) by mouth daily (Patient taking differently: Take 20 mEq by mouth 2 (two) times a day ), Disp: 90 tablet, Rfl: 0    Potassium Citrate ER 15 MEQ (1620 MG) TBCR, Take one tablet twice a day, Disp: 90 tablet, Rfl: 3    rosuvastatin (CRESTOR) 10 MG tablet, Take 10 mg by mouth every evening , Disp: , Rfl:     testosterone cypionate (DEPO-TESTOSTERONE) 200 mg/mL SOLN, Inject 0 8 mL deep IM once every two weeks  , Disp: , Rfl:     torsemide (DEMADEX) 10 mg tablet, Take by mouth as needed , Disp: , Rfl:     aspirin (ECOTRIN) 325 mg EC tablet, Take 1 tablet (325 mg total) by mouth 2 (two) times a day for 14 days (Patient not taking: Reported on 8/2/2021), Disp: 28 tablet, Rfl: 0    diazepam (VALIUM) 2 mg tablet, Take 1 tablet (2 mg total) by mouth see administration instructions Take 1 tablet (2 mg total) by mouth 30 minutes prior to MRI   Then take 1 tablet (2 mg total) by mouth immediately prior to MRI , Disp: 2 tablet, Rfl: 0    ondansetron (ZOFRAN-ODT) 4 mg disintegrating tablet, Take 1 tablet (4 mg total) by mouth every 8 (eight) hours as needed for nausea or vomiting (Patient not taking: Reported on 8/2/2021), Disp: 15 tablet, Rfl: 0    SYRINGE-NEEDLE, DISP, 3 ML 21G X 1-1/2" 3 ML MISC, For testerone injection (Patient not taking: Reported on 8/2/2021), Disp: , Rfl:     SYRINGE-NEEDLE, DISP, 3 ML 21G X 1-1/2" 3 ML MISC, For testerone injection, Disp: , Rfl:       [employer]    I,:  Tanmay Carvajal am acting as a scribe while in the presence of the attending physician :       I,:  Fela Carvalho MD personally performed the services described in this documentation    as scribed in my presence :

## 2021-10-04 ENCOUNTER — TELEPHONE (OUTPATIENT)
Dept: GASTROENTEROLOGY | Facility: AMBULARY SURGERY CENTER | Age: 46
End: 2021-10-04

## 2021-10-05 ENCOUNTER — ANESTHESIA EVENT (OUTPATIENT)
Dept: GASTROENTEROLOGY | Facility: AMBULARY SURGERY CENTER | Age: 46
End: 2021-10-05

## 2021-10-05 ENCOUNTER — ANESTHESIA EVENT (OUTPATIENT)
Dept: ANESTHESIOLOGY | Facility: HOSPITAL | Age: 46
End: 2021-10-05

## 2021-10-05 ENCOUNTER — ANESTHESIA (OUTPATIENT)
Dept: GASTROENTEROLOGY | Facility: AMBULARY SURGERY CENTER | Age: 46
End: 2021-10-05

## 2021-10-05 ENCOUNTER — HOSPITAL ENCOUNTER (OUTPATIENT)
Dept: GASTROENTEROLOGY | Facility: AMBULARY SURGERY CENTER | Age: 46
Setting detail: OUTPATIENT SURGERY
Discharge: HOME | End: 2021-10-05
Attending: INTERNAL MEDICINE
Payer: COMMERCIAL

## 2021-10-05 ENCOUNTER — ANESTHESIA (OUTPATIENT)
Dept: ANESTHESIOLOGY | Facility: HOSPITAL | Age: 46
End: 2021-10-05

## 2021-10-05 VITALS
OXYGEN SATURATION: 98 % | SYSTOLIC BLOOD PRESSURE: 170 MMHG | TEMPERATURE: 97.3 F | BODY MASS INDEX: 27.85 KG/M2 | HEIGHT: 69 IN | DIASTOLIC BLOOD PRESSURE: 90 MMHG | RESPIRATION RATE: 18 BRPM | HEART RATE: 60 BPM | WEIGHT: 188 LBS

## 2021-10-05 DIAGNOSIS — Z12.11 COLON CANCER SCREENING: ICD-10-CM

## 2021-10-05 DIAGNOSIS — K21.00 GASTROESOPHAGEAL REFLUX DISEASE WITH ESOPHAGITIS: ICD-10-CM

## 2021-10-05 DIAGNOSIS — K22.70 BARRETT'S ESOPHAGUS DETERMINED BY ENDOSCOPY: ICD-10-CM

## 2021-10-05 DIAGNOSIS — K21.00 GASTROESOPHAGEAL REFLUX DISEASE WITH ESOPHAGITIS WITHOUT HEMORRHAGE: ICD-10-CM

## 2021-10-05 PROCEDURE — 43251 EGD REMOVE LESION SNARE: CPT | Performed by: INTERNAL MEDICINE

## 2021-10-05 PROCEDURE — 45381 COLONOSCOPY SUBMUCOUS NJX: CPT | Performed by: INTERNAL MEDICINE

## 2021-10-05 PROCEDURE — 43239 EGD BIOPSY SINGLE/MULTIPLE: CPT | Performed by: INTERNAL MEDICINE

## 2021-10-05 PROCEDURE — 88305 TISSUE EXAM BY PATHOLOGIST: CPT | Performed by: PATHOLOGY

## 2021-10-05 PROCEDURE — 45385 COLONOSCOPY W/LESION REMOVAL: CPT | Performed by: INTERNAL MEDICINE

## 2021-10-05 RX ORDER — PROPOFOL 10 MG/ML
INJECTION, EMULSION INTRAVENOUS AS NEEDED
Status: DISCONTINUED | OUTPATIENT
Start: 2021-10-05 | End: 2021-10-05

## 2021-10-05 RX ORDER — OMEPRAZOLE 40 MG/1
40 CAPSULE, DELAYED RELEASE ORAL DAILY
Qty: 180 CAPSULE | Refills: 1 | Status: SHIPPED | OUTPATIENT
Start: 2021-10-05 | End: 2022-05-24 | Stop reason: SDUPTHER

## 2021-10-05 RX ORDER — SODIUM CHLORIDE, SODIUM LACTATE, POTASSIUM CHLORIDE, CALCIUM CHLORIDE 600; 310; 30; 20 MG/100ML; MG/100ML; MG/100ML; MG/100ML
INJECTION, SOLUTION INTRAVENOUS CONTINUOUS PRN
Status: DISCONTINUED | OUTPATIENT
Start: 2021-10-05 | End: 2021-10-05

## 2021-10-05 RX ORDER — GLYCOPYRROLATE 0.2 MG/ML
INJECTION INTRAMUSCULAR; INTRAVENOUS AS NEEDED
Status: DISCONTINUED | OUTPATIENT
Start: 2021-10-05 | End: 2021-10-05

## 2021-10-05 RX ORDER — LIDOCAINE HYDROCHLORIDE 10 MG/ML
INJECTION, SOLUTION EPIDURAL; INFILTRATION; INTRACAUDAL; PERINEURAL AS NEEDED
Status: DISCONTINUED | OUTPATIENT
Start: 2021-10-05 | End: 2021-10-05

## 2021-10-05 RX ADMIN — PROPOFOL 50 MG: 10 INJECTION, EMULSION INTRAVENOUS at 14:38

## 2021-10-05 RX ADMIN — SODIUM CHLORIDE, SODIUM LACTATE, POTASSIUM CHLORIDE, AND CALCIUM CHLORIDE: .6; .31; .03; .02 INJECTION, SOLUTION INTRAVENOUS at 13:46

## 2021-10-05 RX ADMIN — PROPOFOL 50 MG: 10 INJECTION, EMULSION INTRAVENOUS at 14:26

## 2021-10-05 RX ADMIN — PROPOFOL 50 MG: 10 INJECTION, EMULSION INTRAVENOUS at 14:08

## 2021-10-05 RX ADMIN — PROPOFOL 50 MG: 10 INJECTION, EMULSION INTRAVENOUS at 13:59

## 2021-10-05 RX ADMIN — Medication 40 MG: at 13:54

## 2021-10-05 RX ADMIN — PROPOFOL 50 MG: 10 INJECTION, EMULSION INTRAVENOUS at 14:14

## 2021-10-05 RX ADMIN — PROPOFOL 50 MG: 10 INJECTION, EMULSION INTRAVENOUS at 14:06

## 2021-10-05 RX ADMIN — PROPOFOL 50 MG: 10 INJECTION, EMULSION INTRAVENOUS at 14:32

## 2021-10-05 RX ADMIN — LIDOCAINE HYDROCHLORIDE 50 MG: 10 INJECTION, SOLUTION EPIDURAL; INFILTRATION; INTRACAUDAL at 13:53

## 2021-10-05 RX ADMIN — PROPOFOL 120 MG: 10 INJECTION, EMULSION INTRAVENOUS at 13:53

## 2021-10-05 RX ADMIN — PROPOFOL 50 MG: 10 INJECTION, EMULSION INTRAVENOUS at 14:02

## 2021-10-05 RX ADMIN — PROPOFOL 50 MG: 10 INJECTION, EMULSION INTRAVENOUS at 14:22

## 2021-10-05 RX ADMIN — PROPOFOL 50 MG: 10 INJECTION, EMULSION INTRAVENOUS at 14:29

## 2021-10-05 RX ADMIN — SODIUM CHLORIDE, SODIUM LACTATE, POTASSIUM CHLORIDE, AND CALCIUM CHLORIDE: .6; .31; .03; .02 INJECTION, SOLUTION INTRAVENOUS at 14:39

## 2021-10-05 RX ADMIN — PROPOFOL 50 MG: 10 INJECTION, EMULSION INTRAVENOUS at 14:17

## 2021-10-05 RX ADMIN — PROPOFOL 50 MG: 10 INJECTION, EMULSION INTRAVENOUS at 14:10

## 2021-10-05 RX ADMIN — PROPOFOL 40 MG: 10 INJECTION, EMULSION INTRAVENOUS at 13:55

## 2021-10-05 RX ADMIN — PROPOFOL 50 MG: 10 INJECTION, EMULSION INTRAVENOUS at 14:12

## 2021-10-05 RX ADMIN — PROPOFOL 50 MG: 10 INJECTION, EMULSION INTRAVENOUS at 14:16

## 2021-10-05 RX ADMIN — PROPOFOL 50 MG: 10 INJECTION, EMULSION INTRAVENOUS at 14:04

## 2021-10-05 RX ADMIN — PROPOFOL 50 MG: 10 INJECTION, EMULSION INTRAVENOUS at 14:19

## 2021-10-05 RX ADMIN — PROPOFOL 40 MG: 10 INJECTION, EMULSION INTRAVENOUS at 13:54

## 2021-10-05 RX ADMIN — PROPOFOL 50 MG: 10 INJECTION, EMULSION INTRAVENOUS at 13:57

## 2021-10-05 RX ADMIN — PROPOFOL 50 MG: 10 INJECTION, EMULSION INTRAVENOUS at 14:00

## 2021-10-12 ENCOUNTER — OFFICE VISIT (OUTPATIENT)
Dept: OBGYN CLINIC | Facility: MEDICAL CENTER | Age: 46
End: 2021-10-12

## 2021-10-12 VITALS
HEIGHT: 69 IN | SYSTOLIC BLOOD PRESSURE: 152 MMHG | HEART RATE: 67 BPM | DIASTOLIC BLOOD PRESSURE: 101 MMHG | BODY MASS INDEX: 28.58 KG/M2 | WEIGHT: 193 LBS

## 2021-10-12 DIAGNOSIS — S73.191A TEAR OF RIGHT ACETABULAR LABRUM, INITIAL ENCOUNTER: Primary | ICD-10-CM

## 2021-10-12 PROCEDURE — 99024 POSTOP FOLLOW-UP VISIT: CPT | Performed by: ORTHOPAEDIC SURGERY

## 2021-11-02 ENCOUNTER — APPOINTMENT (OUTPATIENT)
Dept: LAB | Facility: HOSPITAL | Age: 46
End: 2021-11-02
Attending: INTERNAL MEDICINE
Payer: COMMERCIAL

## 2021-11-02 DIAGNOSIS — N18.2 BENIGN HYPERTENSION WITH CKD (CHRONIC KIDNEY DISEASE), STAGE II: ICD-10-CM

## 2021-11-02 DIAGNOSIS — N20.0 NEPHROLITHIASIS: ICD-10-CM

## 2021-11-02 DIAGNOSIS — E87.6 HYPOKALEMIA: ICD-10-CM

## 2021-11-02 DIAGNOSIS — I12.9 BENIGN HYPERTENSION WITH CKD (CHRONIC KIDNEY DISEASE), STAGE II: ICD-10-CM

## 2021-11-02 DIAGNOSIS — N18.2 CKD (CHRONIC KIDNEY DISEASE) STAGE 2, GFR 60-89 ML/MIN: ICD-10-CM

## 2021-11-02 LAB
25(OH)D3 SERPL-MCNC: 31.2 NG/ML (ref 30–100)
ANION GAP SERPL CALCULATED.3IONS-SCNC: 5 MMOL/L (ref 4–13)
BUN SERPL-MCNC: 26 MG/DL (ref 5–25)
CALCIUM SERPL-MCNC: 10 MG/DL (ref 8.3–10.1)
CHLORIDE SERPL-SCNC: 104 MMOL/L (ref 100–108)
CO2 SERPL-SCNC: 28 MMOL/L (ref 21–32)
CREAT SERPL-MCNC: 1.17 MG/DL (ref 0.6–1.3)
ERYTHROCYTE [DISTWIDTH] IN BLOOD BY AUTOMATED COUNT: 13.3 % (ref 11.6–15.1)
GFR SERPL CREATININE-BSD FRML MDRD: 74 ML/MIN/1.73SQ M
GLUCOSE P FAST SERPL-MCNC: 109 MG/DL (ref 65–99)
HCT VFR BLD AUTO: 46.5 % (ref 36.5–49.3)
HGB BLD-MCNC: 15.4 G/DL (ref 12–17)
MAGNESIUM SERPL-MCNC: 2.2 MG/DL (ref 1.6–2.6)
MCH RBC QN AUTO: 29.5 PG (ref 26.8–34.3)
MCHC RBC AUTO-ENTMCNC: 33.1 G/DL (ref 31.4–37.4)
MCV RBC AUTO: 89 FL (ref 82–98)
PHOSPHATE SERPL-MCNC: 3.3 MG/DL (ref 2.7–4.5)
PLATELET # BLD AUTO: 308 THOUSANDS/UL (ref 149–390)
PMV BLD AUTO: 8.9 FL (ref 8.9–12.7)
POTASSIUM SERPL-SCNC: 4.1 MMOL/L (ref 3.5–5.3)
PTH-INTACT SERPL-MCNC: 49.7 PG/ML (ref 18.4–80.1)
RBC # BLD AUTO: 5.22 MILLION/UL (ref 3.88–5.62)
SODIUM SERPL-SCNC: 137 MMOL/L (ref 136–145)
URATE SERPL-MCNC: 6.5 MG/DL (ref 4.2–8)
WBC # BLD AUTO: 7.63 THOUSAND/UL (ref 4.31–10.16)

## 2021-11-02 PROCEDURE — 84550 ASSAY OF BLOOD/URIC ACID: CPT

## 2021-11-02 PROCEDURE — 85027 COMPLETE CBC AUTOMATED: CPT

## 2021-11-02 PROCEDURE — 83735 ASSAY OF MAGNESIUM: CPT

## 2021-11-02 PROCEDURE — 83970 ASSAY OF PARATHORMONE: CPT

## 2021-11-02 PROCEDURE — 82306 VITAMIN D 25 HYDROXY: CPT

## 2021-11-02 PROCEDURE — 36415 COLL VENOUS BLD VENIPUNCTURE: CPT

## 2021-11-02 PROCEDURE — 80048 BASIC METABOLIC PNL TOTAL CA: CPT

## 2021-11-02 PROCEDURE — 84100 ASSAY OF PHOSPHORUS: CPT

## 2021-11-11 ENCOUNTER — APPOINTMENT (OUTPATIENT)
Dept: RADIOLOGY | Age: 46
End: 2021-11-11
Payer: COMMERCIAL

## 2021-11-11 ENCOUNTER — OFFICE VISIT (OUTPATIENT)
Dept: URGENT CARE | Age: 46
End: 2021-11-11
Payer: COMMERCIAL

## 2021-11-11 VITALS
SYSTOLIC BLOOD PRESSURE: 145 MMHG | TEMPERATURE: 98.1 F | BODY MASS INDEX: 27.7 KG/M2 | RESPIRATION RATE: 16 BRPM | DIASTOLIC BLOOD PRESSURE: 88 MMHG | HEIGHT: 69 IN | OXYGEN SATURATION: 98 % | WEIGHT: 187 LBS | HEART RATE: 63 BPM

## 2021-11-11 DIAGNOSIS — S89.91XA INJURY OF RIGHT KNEE, INITIAL ENCOUNTER: Primary | ICD-10-CM

## 2021-11-11 DIAGNOSIS — S89.91XA INJURY OF RIGHT KNEE, INITIAL ENCOUNTER: ICD-10-CM

## 2021-11-11 PROCEDURE — G0382 LEV 3 HOSP TYPE B ED VISIT: HCPCS | Performed by: NURSE PRACTITIONER

## 2021-11-11 PROCEDURE — 73564 X-RAY EXAM KNEE 4 OR MORE: CPT

## 2021-11-17 ENCOUNTER — APPOINTMENT (OUTPATIENT)
Dept: LAB | Facility: HOSPITAL | Age: 46
End: 2021-11-17
Attending: INTERNAL MEDICINE
Payer: COMMERCIAL

## 2021-11-17 DIAGNOSIS — N20.0 NEPHROLITHIASIS: ICD-10-CM

## 2021-11-17 DIAGNOSIS — N18.2 BENIGN HYPERTENSION WITH CKD (CHRONIC KIDNEY DISEASE), STAGE II: ICD-10-CM

## 2021-11-17 DIAGNOSIS — N18.2 CKD (CHRONIC KIDNEY DISEASE) STAGE 2, GFR 60-89 ML/MIN: ICD-10-CM

## 2021-11-17 DIAGNOSIS — I12.9 BENIGN HYPERTENSION WITH CKD (CHRONIC KIDNEY DISEASE), STAGE II: ICD-10-CM

## 2021-11-17 DIAGNOSIS — R82.991 HYPOCITRATURIA: ICD-10-CM

## 2021-11-17 DIAGNOSIS — E27.8 MASS OF ADRENAL GLAND (HCC): ICD-10-CM

## 2021-11-17 LAB
CREAT 24H UR-MRATE: 2 G/24HR (ref 0.8–1.8)
CREAT UR-MCNC: 115 MG/DL
MICROALBUMIN UR-MCNC: 10.3 MG/L (ref 0–20)
MICROALBUMIN/CREAT 24H UR: 9 MG/G CREATININE (ref 0–30)
SPECIMEN VOL UR: 1750 ML

## 2021-11-17 PROCEDURE — 83735 ASSAY OF MAGNESIUM: CPT

## 2021-11-17 PROCEDURE — 83935 ASSAY OF URINE OSMOLALITY: CPT

## 2021-11-17 PROCEDURE — 82570 ASSAY OF URINE CREATININE: CPT

## 2021-11-17 PROCEDURE — 84300 ASSAY OF URINE SODIUM: CPT

## 2021-11-17 PROCEDURE — 84105 ASSAY OF URINE PHOSPHORUS: CPT

## 2021-11-17 PROCEDURE — 82436 ASSAY OF URINE CHLORIDE: CPT

## 2021-11-17 PROCEDURE — 82140 ASSAY OF AMMONIA: CPT

## 2021-11-17 PROCEDURE — 84392 ASSAY OF URINE SULFATE: CPT

## 2021-11-17 PROCEDURE — 82507 ASSAY OF CITRATE: CPT

## 2021-11-17 PROCEDURE — 81003 URINALYSIS AUTO W/O SCOPE: CPT

## 2021-11-17 PROCEDURE — 82043 UR ALBUMIN QUANTITATIVE: CPT

## 2021-11-17 PROCEDURE — 83945 ASSAY OF OXALATE: CPT

## 2021-11-17 PROCEDURE — 82131 AMINO ACIDS SINGLE QUANT: CPT

## 2021-11-17 PROCEDURE — 82530 CORTISOL FREE: CPT

## 2021-11-17 PROCEDURE — 84560 ASSAY OF URINE/URIC ACID: CPT

## 2021-11-17 PROCEDURE — 84133 ASSAY OF URINE POTASSIUM: CPT

## 2021-11-17 PROCEDURE — 82340 ASSAY OF CALCIUM IN URINE: CPT

## 2021-11-23 ENCOUNTER — IMMUNIZATIONS (OUTPATIENT)
Dept: FAMILY MEDICINE CLINIC | Facility: HOSPITAL | Age: 46
End: 2021-11-23
Payer: COMMERCIAL

## 2021-11-23 ENCOUNTER — HOSPITAL ENCOUNTER (OUTPATIENT)
Dept: RADIOLOGY | Facility: IMAGING CENTER | Age: 46
Discharge: HOME | End: 2021-11-23
Payer: COMMERCIAL

## 2021-11-23 DIAGNOSIS — Z23 ENCOUNTER FOR IMMUNIZATION: Primary | ICD-10-CM

## 2021-11-23 DIAGNOSIS — M25.561 PAIN IN RIGHT KNEE: ICD-10-CM

## 2021-11-23 LAB
CORTIS F 24H UR-MRATE: 63 UG/24 HR (ref 5–64)
CORTIS F UR-MCNC: 36 UG/L

## 2021-11-23 PROCEDURE — 91300 COVID-19 PFIZER VACC 0.3 ML: CPT

## 2021-11-23 PROCEDURE — G1004 CDSM NDSC: HCPCS

## 2021-11-23 PROCEDURE — 0001A COVID-19 PFIZER VACC 0.3 ML: CPT

## 2021-11-23 PROCEDURE — 73721 MRI JNT OF LWR EXTRE W/O DYE: CPT

## 2021-11-24 ENCOUNTER — TELEPHONE (OUTPATIENT)
Dept: OBGYN CLINIC | Facility: HOSPITAL | Age: 46
End: 2021-11-24

## 2021-11-26 LAB
AMMONIA 24H UR-MRATE: 37 MEQ/24 HR
AMMONIA UR-SCNC: ABNORMAL UG/DL
CA H2 PHOS DIHYD CRY URNS QL MICRO: 4.88 RATIO (ref 0–3)
CALCIUM 24H UR-MCNC: 16.7 MG/DL
CALCIUM 24H UR-MRATE: 292.3 MG/24 HR (ref 0–320)
CHLORIDE 24H UR-SCNC: 132 MMOL/L
CHLORIDE 24H UR-SRATE: 231 MMOL/24 HR (ref 52–264)
CITRATE 24H UR-MCNC: 48 MG/L
CITRATE 24H UR-MRATE: 84 MG/24 HR (ref 320–1240)
COM CRY STONE QL IR: 5.47 RATIO (ref 0–6)
CREAT 24H UR-MCNC: 107.8 MG/DL
CREAT 24H UR-MRATE: 1886.5 MG/24 HR (ref 1000–2000)
CYSTINE 24H UR-MCNC: 6.28 MG/L
CYSTINE 24H UR-MRATE: 10.99 MG/24 HR (ref 2.1–58)
MAGNESIUM 24H UR-MRATE: 144 MG/24 HR (ref 12–293)
MAGNESIUM UR-MCNC: 8.2 MG/DL
NA URATE CRY STONE QL IR: 5.07 RATIO (ref 0–4)
OSMOLALITY UR: 673 MOSMOL/KG (ref 300–900)
OXALATE 24H UR-MRATE: 25 MG/24 HR (ref 7–44)
OXALATE UR-MCNC: 14 MG/L
PH 24H UR: 6.4 [PH] (ref 4.5–8)
PHOSPHATE 24H UR-MCNC: 65.4 MG/DL
PHOSPHATE 24H UR-MRATE: 1144.5 MG/24 HR (ref 390–1425)
PLEASE NOTE (STONE RISK): ABNORMAL
POTASSIUM 24H UR-SCNC: 107.3 MMOL/24 HR (ref 20–116)
POTASSIUM UR-SCNC: 61.3 MMOL/L
PRESERVED URINE: 1750 ML/24 HR (ref 800–1800)
SODIUM 24H UR-SCNC: 122 MMOL/L
SODIUM 24H UR-SRATE: 214 MMOL/24 HR (ref 58–337)
SPECIMEN VOL 24H UR: 1750 ML/24 HR (ref 800–1800)
SULFATE 24H UR-MCNC: 32 MEQ/24 HR (ref 0–30)
SULFATE UR-MCNC: 18 MEQ/L
TRI-PHOS CRY STONE MICRO: 0.17 RATIO (ref 0–1)
URATE 24H UR-MCNC: 36.4 MG/DL
URATE 24H UR-MRATE: 637 MG/24 HR (ref 197–1079)
URATE DIHYD CRY STONE QL IR: 0.66 RATIO (ref 0–1.2)

## 2021-11-30 ENCOUNTER — TELEPHONE (OUTPATIENT)
Dept: NEPHROLOGY | Facility: CLINIC | Age: 46
End: 2021-11-30

## 2021-11-30 DIAGNOSIS — N18.2 CKD (CHRONIC KIDNEY DISEASE) STAGE 2, GFR 60-89 ML/MIN: ICD-10-CM

## 2021-11-30 DIAGNOSIS — R82.991 HYPOCITRATURIA: Primary | ICD-10-CM

## 2021-11-30 DIAGNOSIS — I10 ESSENTIAL HYPERTENSION: ICD-10-CM

## 2021-11-30 DIAGNOSIS — N20.0 NEPHROLITHIASIS: ICD-10-CM

## 2021-11-30 DIAGNOSIS — E87.6 HYPOKALEMIA: ICD-10-CM

## 2021-11-30 RX ORDER — POTASSIUM CITRATE 15 MEQ/1
1 TABLET, EXTENDED RELEASE ORAL 3 TIMES DAILY
Qty: 90 TABLET | Refills: 5 | Status: SHIPPED | OUTPATIENT
Start: 2021-11-30 | End: 2022-01-18 | Stop reason: SDUPTHER

## 2021-12-01 ENCOUNTER — OFFICE VISIT (OUTPATIENT)
Dept: OBGYN CLINIC | Facility: OTHER | Age: 46
End: 2021-12-01
Payer: COMMERCIAL

## 2021-12-01 VITALS
SYSTOLIC BLOOD PRESSURE: 125 MMHG | BODY MASS INDEX: 27.99 KG/M2 | DIASTOLIC BLOOD PRESSURE: 84 MMHG | WEIGHT: 189 LBS | HEART RATE: 69 BPM | HEIGHT: 69 IN

## 2021-12-01 DIAGNOSIS — S83.242A OTHER TEAR OF MEDIAL MENISCUS OF LEFT KNEE AS CURRENT INJURY, INITIAL ENCOUNTER: Primary | ICD-10-CM

## 2021-12-01 DIAGNOSIS — Z98.890 STATUS POST HIP SURGERY: ICD-10-CM

## 2021-12-01 PROCEDURE — 99214 OFFICE O/P EST MOD 30 MIN: CPT | Performed by: ORTHOPAEDIC SURGERY

## 2021-12-01 RX ORDER — CHLORHEXIDINE GLUCONATE 0.12 MG/ML
15 RINSE ORAL ONCE
Status: CANCELLED | OUTPATIENT
Start: 2021-12-13 | End: 2021-12-01

## 2021-12-07 ENCOUNTER — ANESTHESIA EVENT (OUTPATIENT)
Dept: PERIOP | Facility: HOSPITAL | Age: 46
End: 2021-12-07
Payer: COMMERCIAL

## 2021-12-07 RX ORDER — OSILODROSTAT 1 MG/1
1 TABLET, COATED ORAL 2 TIMES DAILY
COMMUNITY
Start: 2021-11-22

## 2021-12-08 PROCEDURE — U0003 INFECTIOUS AGENT DETECTION BY NUCLEIC ACID (DNA OR RNA); SEVERE ACUTE RESPIRATORY SYNDROME CORONAVIRUS 2 (SARS-COV-2) (CORONAVIRUS DISEASE [COVID-19]), AMPLIFIED PROBE TECHNIQUE, MAKING USE OF HIGH THROUGHPUT TECHNOLOGIES AS DESCRIBED BY CMS-2020-01-R: HCPCS | Performed by: ORTHOPAEDIC SURGERY

## 2021-12-08 PROCEDURE — U0005 INFEC AGEN DETEC AMPLI PROBE: HCPCS | Performed by: ORTHOPAEDIC SURGERY

## 2021-12-09 ENCOUNTER — APPOINTMENT (OUTPATIENT)
Dept: LAB | Facility: HOSPITAL | Age: 46
End: 2021-12-09
Attending: ORTHOPAEDIC SURGERY
Payer: COMMERCIAL

## 2021-12-09 DIAGNOSIS — S83.242A ACUTE MEDIAL MENISCUS TEAR OF LEFT KNEE, INITIAL ENCOUNTER: ICD-10-CM

## 2021-12-09 DIAGNOSIS — Z01.812 PRE-OPERATIVE LABORATORY EXAMINATION: ICD-10-CM

## 2021-12-09 LAB
ALBUMIN SERPL BCP-MCNC: 3.9 G/DL (ref 3.5–5)
ALP SERPL-CCNC: 66 U/L (ref 46–116)
ALT SERPL W P-5'-P-CCNC: 38 U/L (ref 12–78)
ANION GAP SERPL CALCULATED.3IONS-SCNC: 8 MMOL/L (ref 4–13)
APTT PPP: 26 SECONDS (ref 23–37)
AST SERPL W P-5'-P-CCNC: 15 U/L (ref 5–45)
BILIRUB SERPL-MCNC: 0.53 MG/DL (ref 0.2–1)
BUN SERPL-MCNC: 31 MG/DL (ref 5–25)
CALCIUM SERPL-MCNC: 9.7 MG/DL (ref 8.3–10.1)
CHLORIDE SERPL-SCNC: 103 MMOL/L (ref 100–108)
CO2 SERPL-SCNC: 27 MMOL/L (ref 21–32)
CREAT SERPL-MCNC: 1.13 MG/DL (ref 0.6–1.3)
GFR SERPL CREATININE-BSD FRML MDRD: 77 ML/MIN/1.73SQ M
GLUCOSE P FAST SERPL-MCNC: 77 MG/DL (ref 65–99)
INR PPP: 0.92 (ref 0.84–1.19)
POTASSIUM SERPL-SCNC: 4 MMOL/L (ref 3.5–5.3)
PROT SERPL-MCNC: 7.8 G/DL (ref 6.4–8.2)
PROTHROMBIN TIME: 12 SECONDS (ref 11.6–14.5)
SODIUM SERPL-SCNC: 138 MMOL/L (ref 136–145)

## 2021-12-09 PROCEDURE — 85610 PROTHROMBIN TIME: CPT

## 2021-12-09 PROCEDURE — 80053 COMPREHEN METABOLIC PANEL: CPT

## 2021-12-09 PROCEDURE — 85730 THROMBOPLASTIN TIME PARTIAL: CPT

## 2021-12-09 PROCEDURE — 36415 COLL VENOUS BLD VENIPUNCTURE: CPT

## 2021-12-13 ENCOUNTER — HOSPITAL ENCOUNTER (OUTPATIENT)
Facility: HOSPITAL | Age: 46
Setting detail: OUTPATIENT SURGERY
Discharge: HOME | End: 2021-12-13
Attending: ORTHOPAEDIC SURGERY | Admitting: ORTHOPAEDIC SURGERY
Payer: COMMERCIAL

## 2021-12-13 ENCOUNTER — ANESTHESIA (OUTPATIENT)
Dept: PERIOP | Facility: HOSPITAL | Age: 46
End: 2021-12-13
Payer: COMMERCIAL

## 2021-12-13 VITALS
HEART RATE: 60 BPM | WEIGHT: 192.24 LBS | OXYGEN SATURATION: 98 % | TEMPERATURE: 96.9 F | DIASTOLIC BLOOD PRESSURE: 88 MMHG | BODY MASS INDEX: 28.47 KG/M2 | SYSTOLIC BLOOD PRESSURE: 159 MMHG | HEIGHT: 69 IN | RESPIRATION RATE: 20 BRPM

## 2021-12-13 DIAGNOSIS — Z11.59 SCREENING FOR VIRAL DISEASE: ICD-10-CM

## 2021-12-13 DIAGNOSIS — S83.241A ACUTE MEDIAL MENISCUS TEAR OF RIGHT KNEE, INITIAL ENCOUNTER: Primary | ICD-10-CM

## 2021-12-13 PROCEDURE — 29881 ARTHRS KNE SRG MNISECTMY M/L: CPT | Performed by: ORTHOPAEDIC SURGERY

## 2021-12-13 RX ORDER — LIDOCAINE HYDROCHLORIDE 10 MG/ML
INJECTION, SOLUTION EPIDURAL; INFILTRATION; INTRACAUDAL; PERINEURAL AS NEEDED
Status: DISCONTINUED | OUTPATIENT
Start: 2021-12-13 | End: 2021-12-13

## 2021-12-13 RX ORDER — DEXAMETHASONE SODIUM PHOSPHATE 10 MG/ML
INJECTION, SOLUTION INTRAMUSCULAR; INTRAVENOUS AS NEEDED
Status: DISCONTINUED | OUTPATIENT
Start: 2021-12-13 | End: 2021-12-13

## 2021-12-13 RX ORDER — FENTANYL CITRATE/PF 50 MCG/ML
25 SYRINGE (ML) INJECTION
Status: DISCONTINUED | OUTPATIENT
Start: 2021-12-13 | End: 2021-12-13 | Stop reason: HOSPADM

## 2021-12-13 RX ORDER — MIDAZOLAM HYDROCHLORIDE 2 MG/2ML
INJECTION, SOLUTION INTRAMUSCULAR; INTRAVENOUS AS NEEDED
Status: DISCONTINUED | OUTPATIENT
Start: 2021-12-13 | End: 2021-12-13

## 2021-12-13 RX ORDER — ROPIVACAINE HYDROCHLORIDE 5 MG/ML
INJECTION, SOLUTION EPIDURAL; INFILTRATION; PERINEURAL AS NEEDED
Status: DISCONTINUED | OUTPATIENT
Start: 2021-12-13 | End: 2021-12-13

## 2021-12-13 RX ORDER — ONDANSETRON 4 MG/1
4 TABLET, ORALLY DISINTEGRATING ORAL EVERY 8 HOURS PRN
Qty: 15 TABLET | Refills: 0 | Status: SHIPPED | OUTPATIENT
Start: 2021-12-13

## 2021-12-13 RX ORDER — ONDANSETRON 2 MG/ML
INJECTION INTRAMUSCULAR; INTRAVENOUS AS NEEDED
Status: DISCONTINUED | OUTPATIENT
Start: 2021-12-13 | End: 2021-12-13

## 2021-12-13 RX ORDER — OXYCODONE HYDROCHLORIDE 5 MG/1
5 TABLET ORAL EVERY 4 HOURS PRN
Qty: 45 TABLET | Refills: 0 | Status: SHIPPED | OUTPATIENT
Start: 2021-12-13 | End: 2021-12-23

## 2021-12-13 RX ORDER — OXYCODONE HYDROCHLORIDE 5 MG/1
5 TABLET ORAL EVERY 4 HOURS PRN
Status: DISCONTINUED | OUTPATIENT
Start: 2021-12-13 | End: 2021-12-13 | Stop reason: HOSPADM

## 2021-12-13 RX ORDER — SODIUM CHLORIDE 9 MG/ML
125 INJECTION, SOLUTION INTRAVENOUS CONTINUOUS
Status: DISCONTINUED | OUTPATIENT
Start: 2021-12-13 | End: 2021-12-13 | Stop reason: HOSPADM

## 2021-12-13 RX ORDER — CHLORHEXIDINE GLUCONATE 0.12 MG/ML
15 RINSE ORAL ONCE
Status: COMPLETED | OUTPATIENT
Start: 2021-12-13 | End: 2021-12-13

## 2021-12-13 RX ORDER — PROPOFOL 10 MG/ML
INJECTION, EMULSION INTRAVENOUS AS NEEDED
Status: DISCONTINUED | OUTPATIENT
Start: 2021-12-13 | End: 2021-12-13

## 2021-12-13 RX ORDER — ONDANSETRON 2 MG/ML
4 INJECTION INTRAMUSCULAR; INTRAVENOUS ONCE AS NEEDED
Status: DISCONTINUED | OUTPATIENT
Start: 2021-12-13 | End: 2021-12-13 | Stop reason: HOSPADM

## 2021-12-13 RX ORDER — ASPIRIN 325 MG
325 TABLET, DELAYED RELEASE (ENTERIC COATED) ORAL 2 TIMES DAILY
Qty: 28 TABLET | Refills: 0 | Status: SHIPPED | OUTPATIENT
Start: 2021-12-13 | End: 2021-12-27

## 2021-12-13 RX ORDER — LIDOCAINE HYDROCHLORIDE AND EPINEPHRINE 20; 5 MG/ML; UG/ML
INJECTION, SOLUTION EPIDURAL; INFILTRATION; INTRACAUDAL; PERINEURAL AS NEEDED
Status: DISCONTINUED | OUTPATIENT
Start: 2021-12-13 | End: 2021-12-13

## 2021-12-13 RX ORDER — LIDOCAINE HYDROCHLORIDE 20 MG/ML
INJECTION, SOLUTION EPIDURAL; INFILTRATION; INTRACAUDAL; PERINEURAL AS NEEDED
Status: DISCONTINUED | OUTPATIENT
Start: 2021-12-13 | End: 2021-12-13

## 2021-12-13 RX ORDER — CEFAZOLIN SODIUM 2 G/50ML
2000 SOLUTION INTRAVENOUS ONCE
Status: COMPLETED | OUTPATIENT
Start: 2021-12-13 | End: 2021-12-13

## 2021-12-13 RX ORDER — MORPHINE SULFATE 10 MG/ML
2 INJECTION, SOLUTION INTRAMUSCULAR; INTRAVENOUS EVERY 4 HOURS PRN
Status: DISCONTINUED | OUTPATIENT
Start: 2021-12-13 | End: 2021-12-13 | Stop reason: HOSPADM

## 2021-12-13 RX ORDER — OXYCODONE HYDROCHLORIDE 5 MG/1
10 TABLET ORAL EVERY 4 HOURS PRN
Status: DISCONTINUED | OUTPATIENT
Start: 2021-12-13 | End: 2021-12-13 | Stop reason: HOSPADM

## 2021-12-13 RX ORDER — FENTANYL CITRATE 50 UG/ML
INJECTION, SOLUTION INTRAMUSCULAR; INTRAVENOUS AS NEEDED
Status: DISCONTINUED | OUTPATIENT
Start: 2021-12-13 | End: 2021-12-13

## 2021-12-13 RX ADMIN — CEFAZOLIN SODIUM 2000 MG: 2 SOLUTION INTRAVENOUS at 07:21

## 2021-12-13 RX ADMIN — PROPOFOL 200 MG: 10 INJECTION, EMULSION INTRAVENOUS at 07:28

## 2021-12-13 RX ADMIN — ROPIVACAINE HYDROCHLORIDE 30 ML: 5 INJECTION, SOLUTION EPIDURAL; INFILTRATION; PERINEURAL at 07:05

## 2021-12-13 RX ADMIN — MIDAZOLAM 4 MG: 1 INJECTION INTRAMUSCULAR; INTRAVENOUS at 06:58

## 2021-12-13 RX ADMIN — LIDOCAINE HYDROCHLORIDE 5 ML: 20 INJECTION, SOLUTION EPIDURAL; INFILTRATION; INTRACAUDAL; PERINEURAL at 07:04

## 2021-12-13 RX ADMIN — CHLORHEXIDINE GLUCONATE 0.12% ORAL RINSE 15 ML: 1.2 LIQUID ORAL at 05:50

## 2021-12-13 RX ADMIN — FENTANYL CITRATE 100 MCG: 50 INJECTION INTRAMUSCULAR; INTRAVENOUS at 06:58

## 2021-12-13 RX ADMIN — LIDOCAINE HYDROCHLORIDE,EPINEPHRINE BITARTRATE 5 ML: 20; .005 INJECTION, SOLUTION EPIDURAL; INFILTRATION; INTRACAUDAL; PERINEURAL at 07:04

## 2021-12-13 RX ADMIN — DEXAMETHASONE SODIUM PHOSPHATE 8 MG: 10 INJECTION INTRAMUSCULAR; INTRAVENOUS at 07:34

## 2021-12-13 RX ADMIN — SODIUM CHLORIDE: 0.9 INJECTION, SOLUTION INTRAVENOUS at 08:04

## 2021-12-13 RX ADMIN — ONDANSETRON 4 MG: 2 INJECTION INTRAMUSCULAR; INTRAVENOUS at 07:34

## 2021-12-13 RX ADMIN — SODIUM CHLORIDE 125 ML/HR: 0.9 INJECTION, SOLUTION INTRAVENOUS at 06:01

## 2021-12-13 RX ADMIN — LIDOCAINE HYDROCHLORIDE,EPINEPHRINE BITARTRATE 20 ML: 20; .005 INJECTION, SOLUTION EPIDURAL; INFILTRATION; INTRACAUDAL; PERINEURAL at 07:05

## 2021-12-13 RX ADMIN — LIDOCAINE HYDROCHLORIDE 100 MG: 10 INJECTION, SOLUTION EPIDURAL; INFILTRATION; INTRACAUDAL; PERINEURAL at 07:28

## 2021-12-22 ENCOUNTER — OFFICE VISIT (OUTPATIENT)
Dept: OBGYN CLINIC | Facility: OTHER | Age: 46
End: 2021-12-22

## 2021-12-22 VITALS
SYSTOLIC BLOOD PRESSURE: 113 MMHG | HEIGHT: 69 IN | WEIGHT: 192 LBS | HEART RATE: 64 BPM | DIASTOLIC BLOOD PRESSURE: 74 MMHG | BODY MASS INDEX: 28.44 KG/M2

## 2021-12-22 DIAGNOSIS — Z98.890 S/P ARTHROSCOPIC PARTIAL MEDIAL MENISCECTOMY: Primary | ICD-10-CM

## 2021-12-22 PROCEDURE — 99024 POSTOP FOLLOW-UP VISIT: CPT | Performed by: ORTHOPAEDIC SURGERY

## 2021-12-29 ENCOUNTER — NURSE TRIAGE (OUTPATIENT)
Dept: OTHER | Facility: OTHER | Age: 46
End: 2021-12-29

## 2021-12-29 DIAGNOSIS — Z20.828 SARS-ASSOCIATED CORONAVIRUS EXPOSURE: Primary | ICD-10-CM

## 2022-01-04 ENCOUNTER — TELEPHONE (OUTPATIENT)
Dept: OBGYN CLINIC | Facility: HOSPITAL | Age: 47
End: 2022-01-04

## 2022-01-04 NOTE — TELEPHONE ENCOUNTER
Patient's wife is checking on patient's leave paperwork     Faxed in the beginning of December  's HR dept needs this paperwork by 1/10/22

## 2022-01-05 NOTE — TELEPHONE ENCOUNTER
Human Resources is telling the patient that paperwork was never received  HR also needs the letter dated 12/22 releasing him back to work as well  Didn't have the fax #  Advised the paperwork was sent 12/8        # [phone]

## 2022-01-10 ENCOUNTER — APPOINTMENT (OUTPATIENT)
Dept: LAB | Facility: HOSPITAL | Age: 47
End: 2022-01-10
Payer: COMMERCIAL

## 2022-01-10 DIAGNOSIS — I10 ESSENTIAL HYPERTENSION, MALIGNANT: ICD-10-CM

## 2022-01-10 DIAGNOSIS — Z86.39 H/O SECONDARY HYPOGONADISM: ICD-10-CM

## 2022-01-10 DIAGNOSIS — E87.6 HYPOKALEMIA: ICD-10-CM

## 2022-01-10 DIAGNOSIS — I10 ESSENTIAL HYPERTENSION: ICD-10-CM

## 2022-01-10 DIAGNOSIS — N18.2 CKD (CHRONIC KIDNEY DISEASE) STAGE 2, GFR 60-89 ML/MIN: ICD-10-CM

## 2022-01-10 DIAGNOSIS — N20.0 NEPHROLITHIASIS: ICD-10-CM

## 2022-01-10 DIAGNOSIS — E27.8 ADRENAL MASS (HCC): ICD-10-CM

## 2022-01-10 DIAGNOSIS — R82.991 HYPOCITRATURIA: ICD-10-CM

## 2022-01-10 LAB
ALBUMIN SERPL BCP-MCNC: 4 G/DL (ref 3.5–5)
ALP SERPL-CCNC: 58 U/L (ref 46–116)
ALT SERPL W P-5'-P-CCNC: 37 U/L (ref 12–78)
ANION GAP SERPL CALCULATED.3IONS-SCNC: 5 MMOL/L (ref 4–13)
AST SERPL W P-5'-P-CCNC: 18 U/L (ref 5–45)
BASOPHILS # BLD AUTO: 0.07 THOUSANDS/ΜL (ref 0–0.1)
BASOPHILS NFR BLD AUTO: 1 % (ref 0–1)
BILIRUB SERPL-MCNC: 0.52 MG/DL (ref 0.2–1)
BUN SERPL-MCNC: 31 MG/DL (ref 5–25)
CALCIUM SERPL-MCNC: 9.8 MG/DL (ref 8.3–10.1)
CHLORIDE SERPL-SCNC: 104 MMOL/L (ref 100–108)
CO2 SERPL-SCNC: 27 MMOL/L (ref 21–32)
CORTIS SERPL-MCNC: 18.1 UG/DL
CREAT 24H UR-MRATE: 2.1 G/24HR (ref 0.8–1.8)
CREAT SERPL-MCNC: 1.41 MG/DL (ref 0.6–1.3)
EOSINOPHIL # BLD AUTO: 0.16 THOUSAND/ΜL (ref 0–0.61)
EOSINOPHIL NFR BLD AUTO: 2 % (ref 0–6)
ERYTHROCYTE [DISTWIDTH] IN BLOOD BY AUTOMATED COUNT: 13 % (ref 11.6–15.1)
GFR SERPL CREATININE-BSD FRML MDRD: 58 ML/MIN/1.73SQ M
GLUCOSE P FAST SERPL-MCNC: 115 MG/DL (ref 65–99)
HCT VFR BLD AUTO: 48.8 % (ref 36.5–49.3)
HGB BLD-MCNC: 15.8 G/DL (ref 12–17)
IMM GRANULOCYTES # BLD AUTO: 0.12 THOUSAND/UL (ref 0–0.2)
IMM GRANULOCYTES NFR BLD AUTO: 1 % (ref 0–2)
LYMPHOCYTES # BLD AUTO: 1.73 THOUSANDS/ΜL (ref 0.6–4.47)
LYMPHOCYTES NFR BLD AUTO: 20 % (ref 14–44)
MCH RBC QN AUTO: 28.8 PG (ref 26.8–34.3)
MCHC RBC AUTO-ENTMCNC: 32.4 G/DL (ref 31.4–37.4)
MCV RBC AUTO: 89 FL (ref 82–98)
MONOCYTES # BLD AUTO: 0.9 THOUSAND/ΜL (ref 0.17–1.22)
MONOCYTES NFR BLD AUTO: 11 % (ref 4–12)
NEUTROPHILS # BLD AUTO: 5.58 THOUSANDS/ΜL (ref 1.85–7.62)
NEUTS SEG NFR BLD AUTO: 65 % (ref 43–75)
NRBC BLD AUTO-RTO: 0 /100 WBCS
PLATELET # BLD AUTO: 258 THOUSANDS/UL (ref 149–390)
PMV BLD AUTO: 8.4 FL (ref 8.9–12.7)
POTASSIUM SERPL-SCNC: 3.8 MMOL/L (ref 3.5–5.3)
PROT SERPL-MCNC: 7.9 G/DL (ref 6.4–8.2)
RBC # BLD AUTO: 5.49 MILLION/UL (ref 3.88–5.62)
SODIUM SERPL-SCNC: 136 MMOL/L (ref 136–145)
SPECIMEN VOL UR: 1000 ML
WBC # BLD AUTO: 8.56 THOUSAND/UL (ref 4.31–10.16)

## 2022-01-10 PROCEDURE — 84133 ASSAY OF URINE POTASSIUM: CPT

## 2022-01-10 PROCEDURE — 36415 COLL VENOUS BLD VENIPUNCTURE: CPT

## 2022-01-10 PROCEDURE — 82533 TOTAL CORTISOL: CPT

## 2022-01-10 PROCEDURE — 84392 ASSAY OF URINE SULFATE: CPT

## 2022-01-10 PROCEDURE — 83945 ASSAY OF OXALATE: CPT

## 2022-01-10 PROCEDURE — 84560 ASSAY OF URINE/URIC ACID: CPT

## 2022-01-10 PROCEDURE — 82436 ASSAY OF URINE CHLORIDE: CPT

## 2022-01-10 PROCEDURE — 84403 ASSAY OF TOTAL TESTOSTERONE: CPT

## 2022-01-10 PROCEDURE — 84402 ASSAY OF FREE TESTOSTERONE: CPT

## 2022-01-10 PROCEDURE — 84300 ASSAY OF URINE SODIUM: CPT

## 2022-01-10 PROCEDURE — 82530 CORTISOL FREE: CPT

## 2022-01-10 PROCEDURE — 82131 AMINO ACIDS SINGLE QUANT: CPT

## 2022-01-10 PROCEDURE — 82507 ASSAY OF CITRATE: CPT

## 2022-01-10 PROCEDURE — 83935 ASSAY OF URINE OSMOLALITY: CPT

## 2022-01-10 PROCEDURE — 82570 ASSAY OF URINE CREATININE: CPT

## 2022-01-10 PROCEDURE — 81003 URINALYSIS AUTO W/O SCOPE: CPT

## 2022-01-10 PROCEDURE — 82140 ASSAY OF AMMONIA: CPT

## 2022-01-10 PROCEDURE — 84105 ASSAY OF URINE PHOSPHORUS: CPT

## 2022-01-10 PROCEDURE — 80053 COMPREHEN METABOLIC PANEL: CPT

## 2022-01-10 PROCEDURE — 82340 ASSAY OF CALCIUM IN URINE: CPT

## 2022-01-10 PROCEDURE — 85025 COMPLETE CBC W/AUTO DIFF WBC: CPT

## 2022-01-10 PROCEDURE — 83735 ASSAY OF MAGNESIUM: CPT

## 2022-01-11 LAB
TESTOST FREE SERPL-MCNC: 27.9 PG/ML (ref 6.8–21.5)
TESTOST SERPL-MCNC: 451 NG/DL (ref 264–916)

## 2022-01-18 ENCOUNTER — TELEPHONE (OUTPATIENT)
Dept: INTERNAL MEDICINE CLINIC | Facility: CLINIC | Age: 47
End: 2022-01-18

## 2022-01-18 DIAGNOSIS — E87.6 HYPOKALEMIA: ICD-10-CM

## 2022-01-18 DIAGNOSIS — R82.991 HYPOCITRATURIA: ICD-10-CM

## 2022-01-18 DIAGNOSIS — N20.0 NEPHROLITHIASIS: ICD-10-CM

## 2022-01-18 DIAGNOSIS — Z11.52 ENCOUNTER FOR SCREENING FOR COVID-19: Primary | ICD-10-CM

## 2022-01-18 PROCEDURE — U0005 INFEC AGEN DETEC AMPLI PROBE: HCPCS | Performed by: INTERNAL MEDICINE

## 2022-01-18 PROCEDURE — U0003 INFECTIOUS AGENT DETECTION BY NUCLEIC ACID (DNA OR RNA); SEVERE ACUTE RESPIRATORY SYNDROME CORONAVIRUS 2 (SARS-COV-2) (CORONAVIRUS DISEASE [COVID-19]), AMPLIFIED PROBE TECHNIQUE, MAKING USE OF HIGH THROUGHPUT TECHNOLOGIES AS DESCRIBED BY CMS-2020-01-R: HCPCS | Performed by: INTERNAL MEDICINE

## 2022-01-18 RX ORDER — POTASSIUM CITRATE 15 MEQ/1
1 TABLET, EXTENDED RELEASE ORAL 2 TIMES DAILY
Qty: 180 TABLET | Refills: 3 | Status: SHIPPED | OUTPATIENT
Start: 2022-01-18 | End: 2022-02-01 | Stop reason: SDUPTHER

## 2022-01-18 RX ORDER — POTASSIUM CHLORIDE 20 MEQ/1
20 TABLET, EXTENDED RELEASE ORAL 2 TIMES DAILY
Qty: 180 TABLET | Refills: 3 | Status: SHIPPED | OUTPATIENT
Start: 2022-01-18 | End: 2022-02-01 | Stop reason: SDUPTHER

## 2022-01-18 NOTE — TELEPHONE ENCOUNTER
----- Message from Nuria Valdez sent at 1/18/2022  9:10 AM EST -----  Patient called requesting refill on potassium chloride 20 meq twice a day and potassium citrate 1 tablet twice a day sent to [facility] in [facility]

## 2022-01-18 NOTE — TELEPHONE ENCOUNTER
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

## 2022-01-19 LAB
AMMONIA 24H UR-MRATE: 31 MEQ/24 HR
AMMONIA UR-SCNC: ABNORMAL UG/DL
CA H2 PHOS DIHYD CRY URNS QL MICRO: 0.78 RATIO (ref 0–3)
CALCIUM 24H UR-MCNC: 3.4 MG/DL
CALCIUM 24H UR-MRATE: 34 MG/24 HR (ref 0–320)
CHLORIDE 24H UR-SCNC: 120 MMOL/L
CHLORIDE 24H UR-SRATE: 120 MMOL/24 HR (ref 52–264)
CITRATE 24H UR-MCNC: 49 MG/L
CITRATE 24H UR-MRATE: 49 MG/24 HR (ref 320–1240)
COM CRY STONE QL IR: 1.85 RATIO (ref 0–6)
CREAT 24H UR-MCNC: 193.2 MG/DL
CREAT 24H UR-MRATE: 1932 MG/24 HR (ref 1000–2000)
CYSTINE 24H UR-MCNC: 9.89 MG/L
CYSTINE 24H UR-MRATE: 9.89 MG/24 HR (ref 2.1–58)
MAGNESIUM 24H UR-MRATE: 38 MG/24 HR (ref 12–293)
MAGNESIUM UR-MCNC: 3.8 MG/DL
NA URATE CRY STONE QL IR: 6.06 RATIO (ref 0–4)
OSMOLALITY UR: 834 MOSMOL/KG (ref 300–900)
OXALATE 24H UR-MRATE: 21 MG/24 HR (ref 7–44)
OXALATE UR-MCNC: 21 MG/L
PH 24H UR: 6.3 [PH] (ref 4.5–8)
PHOSPHATE 24H UR-MCNC: 72.8 MG/DL
PHOSPHATE 24H UR-MRATE: 728 MG/24 HR (ref 390–1425)
PLEASE NOTE (STONE RISK): ABNORMAL
POTASSIUM 24H UR-SCNC: >100 MMOL/24 HR (ref 20–116)
POTASSIUM UR-SCNC: >100 MMOL/L
PRESERVED URINE: 1000 ML/24 HR (ref 800–1800)
SODIUM 24H UR-SCNC: 102 MMOL/L
SODIUM 24H UR-SRATE: 102 MMOL/24 HR (ref 58–337)
SPECIMEN VOL 24H UR: 1000 ML/24 HR (ref 800–1800)
SULFATE 24H UR-MCNC: 44 MEQ/24 HR (ref 0–30)
SULFATE UR-MCNC: 44 MEQ/L
TRI-PHOS CRY STONE MICRO: 0.07 RATIO (ref 0–1)
URATE 24H UR-MCNC: 54.9 MG/DL
URATE 24H UR-MRATE: 549 MG/24 HR (ref 197–1079)
URATE DIHYD CRY STONE QL IR: 1.23 RATIO (ref 0–1.2)

## 2022-01-20 LAB
CORTIS F 24H UR-MRATE: 32 UG/24 HR (ref 5–64)
CORTIS F UR-MCNC: 32 UG/L

## 2022-01-26 ENCOUNTER — OFFICE VISIT (OUTPATIENT)
Dept: OBGYN CLINIC | Facility: OTHER | Age: 47
End: 2022-01-26

## 2022-01-26 VITALS
DIASTOLIC BLOOD PRESSURE: 77 MMHG | HEIGHT: 69 IN | WEIGHT: 192 LBS | BODY MASS INDEX: 28.44 KG/M2 | SYSTOLIC BLOOD PRESSURE: 117 MMHG | HEART RATE: 75 BPM

## 2022-01-26 DIAGNOSIS — Z98.890 S/P ARTHROSCOPIC PARTIAL MEDIAL MENISCECTOMY: Primary | ICD-10-CM

## 2022-01-26 PROCEDURE — 99024 POSTOP FOLLOW-UP VISIT: CPT | Performed by: ORTHOPAEDIC SURGERY

## 2022-01-26 NOTE — PROGRESS NOTES
Orthopaedic Surgery - Office Note  Vani Butler (49 y o  male)   : 1975   MRN: [id number]  Encounter Date: 2022    Chief Complaint   Patient presents with    Right Knee - Follow-up       Assessment / Plan  S/p right knee arthroscopy with partial medial meniscectomy, with appropriate progression    · Gradual increased return to activity   · Home exercise program reviewed  · Anti-inflammatories or Tylenol prn pain  Return in about 6 weeks (around 3/9/2022) for Recheck  History of Present Illness  Vain Butler is a 49 y o  male who presents follow-up evaluation s/p right knee arthroscopy with partial medial meniscectomy performed on 2021  He states he is doing well  He states that his pain is since resolved  He denies any pain or tenderness and has been able to return to most with activities daily living with no issues or complications  He does mention that he feels occasional bumps in the front of his knee right where his portals are  He denies any new injury or trauma to his knee  He denies any distal paresthesias  He is overall happy with the surgical outcomes  Review of Systems  Pertinent items are noted in HPI  All other systems were reviewed and are negative  Physical Exam  /77   Pulse 75   Ht 5' 9" (1 753 m)   Wt 87 1 kg (192 lb)   BMI 28 35 kg/m²   Cons: Appears well  No apparent distress  Psych: Alert  Oriented x3  Mood and affect normal   Eyes: PERRLA, EOMI  Resp: Normal effort  No audible wheezing or stridor  CV: Palpable pulse  No discernable arrhythmia  No LE edema  Lymph:  No palpable cervical, axillary, or inguinal lymphadenopathy  Skin: Warm  No palpable masses  No visible lesions  Neuro: Normal muscle tone  Normal and symmetric DTR's  Right Knee Exam  Alignment:  Normal knee alignment  Inspection:  No swelling  No edema  No erythema  Incision healed  Palpation:  No tenderness  No effusion  No clicking, catching, or snapping    ROM:  Knee Extension 0  Knee Flexion 120  Strength:  Able to actively extend knee against gravity  Stability:  No objective knee instability  Stable Varus / Valgus stress, Lachman, and Posterior drawer  Tests:  (-) Xavi  Patella:  Normal patellar mobility  Neurovascular:  Sensation intact in DP/SP/[place]/Sa/T nerve distributions  2+ DP & PT pulses  Gait:  Normal     Studies Reviewed  No studies to review    Procedures  No procedures today  Medical, Surgical, Family, and Social History  The patient's medical history, family history, and social history, were reviewed and updated as appropriate      Past Medical History:   Diagnosis Date    Adrenal mass ([address] )     Chest pain     "long ago stress related to family issue seen ER and cleared"    Chronic pain disorder     right hip    Claustrophobia     CPAP (continuous positive airway pressure) dependence     Dental crowns present     Disease of thyroid gland     nodule sees Dr Maldonado/endocrinologist and Dr Uri Rossi([facility])    Exercise involving weight training     1-2x/week    GERD (gastroesophageal reflux disease)     Hyperlipidemia     Hypertension     Kidney stone     Kidney stones     Low testosterone     Motion sickness     Right hip pain     Sleep apnea     not currently using his CPAP       Past Surgical History:   Procedure Laterality Date    COLONOSCOPY      CYSTOSCOPY      EXTRACORPOREAL SHOCK WAVE LITHOTRIPSY Left     FL INJECTION RIGHT HIP (ARTHROGRAM)  3/31/2021    pt cant recall this    FL RETROGRADE PYELOGRAM  8/21/2018    KIDNEY STONE SURGERY      ND ARTHROSCOPY HIP W/LABRAL REPAIR Right 7/19/2021    Procedure: ARTHROSCOPY HIP with labral debridement: femoroplasty;  Surgeon: Oren Alpers, MD;  [facility];  Service: Orthopedics    ND CYSTO/URETERO W/LITHOTRIPSY &INDWELL STENT INSRT Left 8/21/2018    Procedure: CYSTOSCOPY URETEROSCOPY, RETROGRADE PYELOGRAM AND INSERTION STENT URETERAL;  Surgeon: Ara Albrecht MD; [facility];  Service: Urology    CA ESOPHAGOGASTRODUODENOSCOPY TRANSORAL DIAGNOSTIC N/A 2018    Procedure: ESOPHAGOGASTRODUODENOSCOPY (EGD); Surgeon: Rekha Zamora MD;  [facility];   Service: Gastroenterology    CA KNEE SCOPE,MED/LAT MENISECTOMY Right 2021    Procedure: ARTHROSCOPY KNEE, partial medial meniscectomy;  Surgeon: Ariel Maldonado MD;  [facility];  Service: Orthopedics    WISDOM TOOTH EXTRACTION         Family History   Problem Relation Age of Onset    Asthma Mother     Diabetes Mother     Other Father         Endocarditis    Hypertension Father     Gout Father        Social History     Occupational History    Not on file   Tobacco Use    Smoking status: Former Smoker     Packs/day: 1 00     Years: 17 00     Pack years: 17 00     Quit date:      Years since quittin     Smokeless tobacco: Never Used   Vaping Use    Vaping Use: Never used   Substance and Sexual Activity    Alcohol use: Not Currently     Comment: Rare     Drug use: No    Sexual activity: Not on file     Comment: defer       No Known Allergies      Current Outpatient Medications:     carvedilol (COREG) 25 mg tablet, Take 1 tablet (25 mg total) by mouth 2 (two) times a day with meals, Disp: 180 tablet, Rfl: 3    chlorthalidone 25 mg tablet, Take 1 tablet (25 mg total) by mouth daily (Patient taking differently: Take 25 mg by mouth daily in the early morning  ), Disp: 90 tablet, Rfl: 3    Cholecalciferol (Vitamin D) 50 MCG (2000 UT) CAPS, Take 2 capsules (4,000 Units total) by mouth daily, Disp: 60 capsule, Rfl: 3    Epiduo Forte 0 3-2 5 % GEL, Apply topically daily  , Disp: , Rfl:     eplerenone (INSPRA) 50 MG tablet, Take 1 tablet (50 mg total) by mouth 2 (two) times a day, Disp: 180 tablet, Rfl: 3    felodipine (PLENDIL) 10 MG 24 hr tablet, Take 1 tablet (10 mg total) by mouth daily (Patient taking differently: Take 10 mg by mouth every evening ), Disp: 90 tablet, Rfl: 3    omeprazole (PriLOSEC) 40 MG capsule, Take 1 capsule (40 mg total) by mouth daily (Patient taking differently: Take 40 mg by mouth daily in the early morning  ), Disp: 180 capsule, Rfl: 1    ondansetron (ZOFRAN-ODT) 4 mg disintegrating tablet, Take 1 tablet (4 mg total) by mouth every 8 (eight) hours as needed for nausea or vomiting, Disp: 15 tablet, Rfl: 0    Osilodrostat Phosphate (Isturisa) 1 MG TABS, Take 1 capsule by mouth 2 (two) times a day, Disp: , Rfl:     potassium chloride (K-DUR,KLOR-CON) 20 mEq tablet, Take 1 tablet (20 mEq total) by mouth 2 (two) times a day, Disp: 180 tablet, Rfl: 3    Potassium Citrate ER 15 MEQ (1620 MG) TBCR, Take 1 tablet by mouth 2 (two) times a day, Disp: 180 tablet, Rfl: 3    rosuvastatin (CRESTOR) 10 MG tablet, Take 10 mg by mouth every evening , Disp: , Rfl:     SYRINGE-NEEDLE, DISP, 3 ML 21G X 1-1/2" 3 ML MISC, For testerone injection, Disp: , Rfl:     SYRINGE-NEEDLE, DISP, 3 ML 21G X 1-1/2" 3 ML MISC, For testerone injection, Disp: , Rfl:     testosterone cypionate (DEPO-TESTOSTERONE) 200 mg/mL SOLN, Inject 200 mg into a muscle every 14 (fourteen) days Due 12/5/21-Out needles - will take 12/8/21 , Disp: , Rfl:     torsemide (DEMADEX) 10 mg tablet, Take by mouth as needed Has Not Used in 8 months , Disp: , Rfl:     aspirin (ECOTRIN) 325 mg EC tablet, Take 1 tablet (325 mg total) by mouth 2 (two) times a day for 14 days, Disp: 28 tablet, Rfl: 0      [employer]    I,:  Rajendra Morrison am acting as a scribe while in the presence of the attending physician :       I,:  Rebekah Mcgee MD personally performed the services described in this documentation    as scribed in my presence :

## 2022-02-01 ENCOUNTER — OFFICE VISIT (OUTPATIENT)
Dept: NEPHROLOGY | Facility: CLINIC | Age: 47
End: 2022-02-01
Payer: COMMERCIAL

## 2022-02-01 VITALS
WEIGHT: 189 LBS | HEIGHT: 69 IN | HEART RATE: 73 BPM | DIASTOLIC BLOOD PRESSURE: 80 MMHG | BODY MASS INDEX: 27.99 KG/M2 | SYSTOLIC BLOOD PRESSURE: 128 MMHG

## 2022-02-01 DIAGNOSIS — R82.991 HYPOCITRATURIA: ICD-10-CM

## 2022-02-01 DIAGNOSIS — E55.9 VITAMIN D DEFICIENCY: ICD-10-CM

## 2022-02-01 DIAGNOSIS — E87.6 HYPOKALEMIA: ICD-10-CM

## 2022-02-01 DIAGNOSIS — N18.2 BENIGN HYPERTENSION WITH CKD (CHRONIC KIDNEY DISEASE), STAGE II: Primary | ICD-10-CM

## 2022-02-01 DIAGNOSIS — N18.2 CKD (CHRONIC KIDNEY DISEASE) STAGE 2, GFR 60-89 ML/MIN: ICD-10-CM

## 2022-02-01 DIAGNOSIS — N20.0 NEPHROLITHIASIS: ICD-10-CM

## 2022-02-01 DIAGNOSIS — I12.9 BENIGN HYPERTENSION WITH CKD (CHRONIC KIDNEY DISEASE), STAGE II: Primary | ICD-10-CM

## 2022-02-01 PROCEDURE — 99214 OFFICE O/P EST MOD 30 MIN: CPT | Performed by: INTERNAL MEDICINE

## 2022-02-01 RX ORDER — POTASSIUM CHLORIDE 20 MEQ/1
20 TABLET, EXTENDED RELEASE ORAL DAILY
Qty: 90 TABLET | Refills: 3 | Status: SHIPPED | OUTPATIENT
Start: 2022-02-01 | End: 2022-05-24 | Stop reason: SDUPTHER

## 2022-02-01 RX ORDER — POTASSIUM CITRATE 15 MEQ/1
1 TABLET, EXTENDED RELEASE ORAL 3 TIMES DAILY
Qty: 270 TABLET | Refills: 3 | Status: SHIPPED | OUTPATIENT
Start: 2022-02-01 | End: 2022-05-24 | Stop reason: SDUPTHER

## 2022-02-01 NOTE — PROGRESS NOTES
NEPHROLOGY OUTPATIENT PROGRESS NOTE   Agustin Daniels 49 y o  male MRN: [id number]  DATE: 2/1/2022  Reason for visit:   Chief Complaint   Patient presents with    Follow-up     Benign hypertension with CKD (chronic kidney disease), stage II     ASSESSMENT and PLAN:  Hypertension  -Likely thought to be essential in origin although another differential remains secondary hypertension due to ?primary hyperaldosteronism given his bilateral adrenal nodules vs hypercortisolism?  -Currently patient is on Coreg, eplerenone, felodipine and chlorthalidone  -BP acceptable in the office today  His home BP readings are generally 120/80s  -recommended to continue to monitor BP and call back if BP remains persistently greater than 130/85    -his home wrist BP cuff was compared with our office readings in the past which were identical   -plasma metanephrine and catecholamines are nonsignificant in December 2018  Repeat plasma normetanephrine slightly elevated 156 in May 2021  Unable to check serum aldosterone/PRA while being on eplerenone     -he is being followed by endocrine in [address] [place] [address] regarding adrenal nodule   -Patient was found to have high aldosterone level along with high aldosterone/renin ratio although patient was also taking eplerenone at that time and makes results unreliable  - Salt restricted diet     Recurrent nephrolithiasis  -patient had an episode of nephrolithiasis requiring ureteral stent placement with eventual removal in past   Patient says his stone composition was calcium stone in the past although I do not have documentation   -advised to drink plenty of free water with goal urine output greater than 2 L per day   -last 24 hour urine stone risk profile in January 2022 shows significant lower urine volume 1 L, significantly lower urine citrate 49, urine oxalate 21, urine pH 6 3, sodium 102, calcium 34    -he admits not to be drinking enough liquid    I have again recommended to drink plenty of free water with goal urine output greater than 2 L per day    -advised to follow low salt diet   -continue chlorthalidone 25 mg daily  -increase potassium citrate 15 mEq p o  t i d   This was recommended on telephone call in November 2021 although patient has not done this    -CT scan in June 2021 shows left kidney calculi, no stones on the right side  No hydronephrosis      Hypocitraturia  -management as above     Hypokalemia, serum potassium overall improved and stable 3 8  Increasing potassium citrate as above, reduce potassium chloride from 20 mEq b i d  to daily        CKD stage II- IIIa, baseline Cr 1 2-1 3  -last creatinine  slightly increased 1 4 in January 2022       -He could have a component of CKD given his long-term hypertension history    -UA in May 2021 bland without hematuria or proteinuria   urine microalbumin/creatinine ratio nonsignificant in November 2021        Leg edema  -seems to be much improved   Currently remains on chlorthalidone      Vitamin-D insufficiency, last vitamin-D level 31 in November 2021  Continue 4000 units daily  Continue same  Bilateral adrenal nodules, overall unchanged comparing to prior studies based on most recent CT scan in June 2021   -closely follows with Endocrine at [facility]  -currently remains on [id number] [facility] since 11/2021  -ongoing discussion regarding eventual surgical adrenalectomy noted based on endocrine note  Diagnoses and all orders for this visit:    Benign hypertension with CKD (chronic kidney disease), stage II  -     Basic metabolic panel; Future  -     CBC; Future  -     Microalbumin / creatinine urine ratio; Future  -     UA (URINE) with reflex to Scope; Future  -     Phosphorus; Future  -     PTH, intact; Future  -     Magnesium; Future    CKD (chronic kidney disease) stage 2, GFR 60-89 ml/min  -     Stone risk profile; Future  -     Basic metabolic panel; Future  -     CBC; Future  -     Microalbumin / creatinine urine ratio;  Future  -     UA (URINE) with reflex to Scope; Future  -     Phosphorus; Future  -     PTH, intact; Future  -     Magnesium; Future    Vitamin D deficiency    Nephrolithiasis  -     Potassium Citrate ER 15 MEQ (1620 MG) TBCR; Take 1 tablet by mouth 3 (three) times a day  -     Stone risk profile; Future    Hypocitraturia  -     Potassium Citrate ER 15 MEQ (1620 MG) TBCR; Take 1 tablet by mouth 3 (three) times a day  -     Stone risk profile; Future    Hypokalemia  -     potassium chloride (K-DUR,KLOR-CON) 20 mEq tablet; Take 1 tablet (20 mEq total) by mouth daily        SUBJECTIVE / HPI:  Patient is 78-year-old male with significant medical issues of hypertension diagnosed early in the age, bilateral adrenal nodule, history of nephrolithiasis with history of requiring ureteral stent placement with eventual removal, history of lithotripsy, sleep apnea, comes for regular follow-up of hypertension and nephrolithiasis  Baseline serum creatinine 1 2 to 1 3    closely follows with Endocrine at [facility]  Was recently started on osilodrostat  Blood pressure seems to be better controlled at home  He denies any hematuria, no dysuria  No flank or abdominal pain  No fever or chills  Claims to be compliant with taking his potassium supplements  No recent kidney stone flare-up issues  He admits to be not drinking enough water     No recent NSAID exposure  He tries to be compliant with salt restricted diet  Also recently had COVID infection although overall feels better currently  REVIEW OF SYSTEMS:  More than 10 point review of systems were obtained and discussed in length with the patient  Complete review of systems were negative / unremarkable except mentioned above  PHYSICAL EXAM:  Vitals:    02/01/22 0900 02/01/22 0930   BP: 124/84 128/80   BP Location: Left arm    Patient Position: Sitting    Cuff Size: Large    Pulse: 73    Weight: 85 7 kg (189 lb)    Height: 5' 9" (1 753 m)      Body mass index is 27 91 kg/m²      Physical Exam  Vitals reviewed  Constitutional:       Appearance: He is well-developed  HENT:      Head: Normocephalic and atraumatic  Right Ear: External ear normal       Left Ear: External ear normal    Eyes:      General: No scleral icterus  Conjunctiva/sclera: Conjunctivae normal    Cardiovascular:      Comments: S1, S2 present  Pulmonary:      Effort: Pulmonary effort is normal  No respiratory distress  Breath sounds: Normal breath sounds  No wheezing or rales  Abdominal:      General: Bowel sounds are normal  There is no distension  Palpations: Abdomen is soft  Tenderness: There is no abdominal tenderness  Musculoskeletal:         General: No deformity  Right lower leg: No edema  Left lower leg: No edema  Lymphadenopathy:      Cervical: No cervical adenopathy  Skin:     Findings: No rash  Neurological:      Mental Status: He is alert and oriented to person, place, and time     Psychiatric:         Behavior: Behavior normal          PAST MEDICAL HISTORY:  Past Medical History:   Diagnosis Date    Adrenal mass ([address] )     Chest pain     "long ago stress related to family issue seen ER and cleared"    Chronic pain disorder     right hip    Claustrophobia     CPAP (continuous positive airway pressure) dependence     Dental crowns present     Disease of thyroid gland     nodule sees Dr Maldonado/endocrinologist and Dr Bruner Bounds)    Exercise involving weight training     1-2x/week    GERD (gastroesophageal reflux disease)     Hyperlipidemia     Hypertension     Kidney stone     Kidney stones     Low testosterone     Motion sickness     Right hip pain     Sleep apnea     not currently using his CPAP       PAST SURGICAL HISTORY:  Past Surgical History:   Procedure Laterality Date    COLONOSCOPY      CYSTOSCOPY      EXTRACORPOREAL SHOCK WAVE LITHOTRIPSY Left     FL INJECTION RIGHT HIP (ARTHROGRAM)  3/31/2021    pt cant recall this    FL RETROGRADE PYELOGRAM  8/21/2018    KIDNEY STONE SURGERY      NV ARTHROSCOPY HIP W/LABRAL REPAIR Right 2021    Procedure: ARTHROSCOPY HIP with labral debridement: femoroplasty;  Surgeon: Ariel Maldonado MD;  [facility];  Service: Orthopedics    NV CYSTO/URETERO W/LITHOTRIPSY &INDWELL STENT INSRT Left 2018    Procedure: CYSTOSCOPY URETEROSCOPY, RETROGRADE PYELOGRAM AND INSERTION STENT URETERAL;  Surgeon: Carlos Enrique Johnston MD;  [facility];  Service: Urology    NV ESOPHAGOGASTRODUODENOSCOPY TRANSORAL DIAGNOSTIC N/A 2018    Procedure: ESOPHAGOGASTRODUODENOSCOPY (EGD); Surgeon: Rekha Zamora MD;  [facility];   Service: Gastroenterology    NV KNEE SCOPE,MED/LAT MENISECTOMY Right 2021    Procedure: ARTHROSCOPY KNEE, partial medial meniscectomy;  Surgeon: Ariel Maldonado MD;  [facility];  Service: Orthopedics    WISDOM TOOTH EXTRACTION         SOCIAL HISTORY:  Social History     Substance and Sexual Activity   Alcohol Use Not Currently    Comment: Rare      Social History     Substance and Sexual Activity   Drug Use No     Social History     Tobacco Use   Smoking Status Former Smoker    Packs/day: 1 00    Years: 17 00    Pack years: 17 00    Quit date:     Years since quittin 0   Smokeless Tobacco Never Used       FAMILY HISTORY:  Family History   Problem Relation Age of Onset    Asthma Mother     Diabetes Mother     Other Father         Endocarditis    Hypertension Father     Gout Father        MEDICATIONS:    Current Outpatient Medications:     carvedilol (COREG) 25 mg tablet, Take 1 tablet (25 mg total) by mouth 2 (two) times a day with meals, Disp: 180 tablet, Rfl: 3    chlorthalidone 25 mg tablet, Take 1 tablet (25 mg total) by mouth daily, Disp: 90 tablet, Rfl: 3    Cholecalciferol (Vitamin D) 50 MCG ( UT) CAPS, Take 2 capsules (4,000 Units total) by mouth daily, Disp: 60 capsule, Rfl: 3    Epiduo Forte 0 3-2 5 % GEL, Apply topically daily  , Disp: , Rfl:     eplerenone (INSPRA) 50 MG tablet, Take 1 tablet (50 mg total) by mouth 2 (two) times a day, Disp: 180 tablet, Rfl: 3    felodipine (PLENDIL) 10 MG 24 hr tablet, Take 1 tablet (10 mg total) by mouth daily, Disp: 90 tablet, Rfl: 3    omeprazole (PriLOSEC) 40 MG capsule, Take 1 capsule (40 mg total) by mouth daily (Patient taking differently: Take 40 mg by mouth daily in the early morning  ), Disp: 180 capsule, Rfl: 1    ondansetron (ZOFRAN-ODT) 4 mg disintegrating tablet, Take 1 tablet (4 mg total) by mouth every 8 (eight) hours as needed for nausea or vomiting, Disp: 15 tablet, Rfl: 0    Osilodrostat Phosphate (Isturisa) 1 MG TABS, Take 1 capsule by mouth 2 (two) times a day, Disp: , Rfl:     potassium chloride (K-DUR,KLOR-CON) 20 mEq tablet, Take 1 tablet (20 mEq total) by mouth daily, Disp: 90 tablet, Rfl: 3    Potassium Citrate ER 15 MEQ (1620 MG) TBCR, Take 1 tablet by mouth 3 (three) times a day, Disp: 270 tablet, Rfl: 3    rosuvastatin (CRESTOR) 10 MG tablet, Take 10 mg by mouth every evening , Disp: , Rfl:     SYRINGE-NEEDLE, DISP, 3 ML 21G X 1-1/2" 3 ML MISC, For testerone injection, Disp: , Rfl:     testosterone cypionate (DEPO-TESTOSTERONE) 200 mg/mL SOLN, Inject 200 mg into a muscle every 14 (fourteen) days Due 12/5/21-Out needles - will take 12/8/21 , Disp: , Rfl:     torsemide (DEMADEX) 10 mg tablet, Take by mouth as needed Has Not Used in 8 months , Disp: , Rfl:     aspirin (ECOTRIN) 325 mg EC tablet, Take 1 tablet (325 mg total) by mouth 2 (two) times a day for 14 days (Patient not taking: Reported on 2/1/2022 ), Disp: 28 tablet, Rfl: 0    SYRINGE-NEEDLE, DISP, 3 ML 21G X 1-1/2" 3 ML MISC, For testerone injection (Patient not taking: Reported on 2/1/2022), Disp: , Rfl:     Lab Results:   Creatinine 1 4

## 2022-02-08 ENCOUNTER — TELEPHONE (OUTPATIENT)
Dept: GASTROENTEROLOGY | Facility: AMBULARY SURGERY CENTER | Age: 47
End: 2022-02-08

## 2022-03-08 ENCOUNTER — OFFICE VISIT (OUTPATIENT)
Dept: OBGYN CLINIC | Facility: MEDICAL CENTER | Age: 47
End: 2022-03-08
Payer: COMMERCIAL

## 2022-03-08 ENCOUNTER — APPOINTMENT (OUTPATIENT)
Dept: RADIOLOGY | Facility: MEDICAL CENTER | Age: 47
End: 2022-03-08
Payer: COMMERCIAL

## 2022-03-08 VITALS
DIASTOLIC BLOOD PRESSURE: 66 MMHG | SYSTOLIC BLOOD PRESSURE: 103 MMHG | TEMPERATURE: 97.6 F | BODY MASS INDEX: 27.99 KG/M2 | HEART RATE: 80 BPM | HEIGHT: 69 IN | WEIGHT: 189 LBS

## 2022-03-08 DIAGNOSIS — M25.551 RIGHT HIP PAIN: ICD-10-CM

## 2022-03-08 DIAGNOSIS — M25.551 RIGHT HIP PAIN: Primary | ICD-10-CM

## 2022-03-08 PROCEDURE — 73502 X-RAY EXAM HIP UNI 2-3 VIEWS: CPT

## 2022-03-08 PROCEDURE — 99213 OFFICE O/P EST LOW 20 MIN: CPT | Performed by: PHYSICIAN ASSISTANT

## 2022-03-08 NOTE — PROGRESS NOTES
Orthopaedic Surgery - Office Note  Luis Collado (49 y o  male)   : 1975   MRN: [id number]  Encounter Date: 3/8/2022    Chief Complaint   Patient presents with    Right Knee - Follow-up       Assessment / Plan  Status post right knee arthroscopy with partial medial meniscectomy on 2021  Status post right hip arthroscopy with labral debridement on 2021  · The patient has improved greatly from his right knee scope and currently has no issues  The symptoms he is having currently around his right hip is more in the area of the ASIS consistent with tendinitis versus bursitis in that area  · Did encourage him to rehab visit his hip exercises at this time and do symptomatic treatment for the discomfort he is having  · Ice and analgesics as needed for pain  · Continue to progress activity with his right knee as tolerated  Return if symptoms worsen or fail to improve  History of Present Illness  Luis Collado is a 49 y o  male who presents follow-up evaluation s/p right knee arthroscopy with partial medial meniscectomy performed on 2021 and status post right hip arthroscopy with labral debridement on 2021  Overall with his right knee he has improved greatly  He denies any pain or tenderness and has been able to return to most with activities daily living with no issues or complications  He states that over the last 2 weeks he has developed discomfort over his ASIS  He is denying any pain that goes into his right groin at this time  He states that the pain comes and goes and some days it bothers him more than others  He points directly to the ASIS when asked where the pain is and states that it seems to bother with daily activities pay cannot pinpoint which things bother it the most   He has not done any treatment for this up to this point but was concerned about his hip since he had recent arthroscopy  Review of Systems  Pertinent items are noted in HPI    All other systems were reviewed and are negative  Physical Exam  /66   Pulse 80   Temp 97 6 °F (36 4 °C)   Ht 5' 9" (1 753 m)   Wt 85 7 kg (189 lb)   BMI 27 91 kg/m²   Cons: Appears well  No apparent distress  Psych: Alert  Oriented x3  Mood and affect normal   Eyes: PERRLA, EOMI  Resp: Normal effort  No audible wheezing or stridor  CV: Palpable pulse  No discernable arrhythmia  No LE edema  Lymph:  No palpable cervical, axillary, or inguinal lymphadenopathy  Skin: Warm  No palpable masses  No visible lesions  Neuro: Normal muscle tone  Normal and symmetric DTR's  Right Knee Exam  Alignment:  Normal knee alignment  Inspection:  No swelling  No edema  No erythema  Incision healed  Palpation:  No tenderness  No effusion  No clicking, catching, or snapping  ROM:  Knee Extension 0  Knee Flexion 120  Strength:  Able to actively extend knee against gravity  Stability:  No objective knee instability  Stable Varus / Valgus stress, Lachman, and Posterior drawer  Tests:  (-) Xavi  Patella:  Normal patellar mobility  Neurovascular:  Sensation intact in DP/SP/[place]/Sa/T nerve distributions  2+ DP & PT pulses  Gait:  Normal       Right Hip Exam  Alignment / Posture:  Normal resting hip posture  Inspection:  No swelling  No erythema  No ecchymosis  Palpation:  Moderate tenderness at The ASIS with no radiation  ROM:  Normal hip ROM  Hip Flexion 120°  Hip ER 60°  Hip IR 45°  Strength:  5/5 hip flexors and abductors  Stability:  No objective hip instability  Tests:  (-) Becka  (-) CORBIN  (-) KEENA  Neurovascular:  Sensation intact in DP/SP/[place]/Sa/T nerve distributions  Sensation intact in all digital nerve distributions  Toes warm and perfused  Gait:  Normal     Studies Reviewed  I have personally reviewed pertinent films in PACS  XR of right hip - From 03/08/2022 shows no fracture dislocation with mild calcific formation in the capsule consistent with postoperative trauma    No notable arthritic changes in the joint  Procedures  No procedures today  Medical, Surgical, Family, and Social History  The patient's medical history, family history, and social history, were reviewed and updated as appropriate  Past Medical History:   Diagnosis Date    Adrenal mass ([address] )     Chest pain     "long ago stress related to family issue seen ER and cleared"    Chronic pain disorder     right hip    Claustrophobia     CPAP (continuous positive airway pressure) dependence     Dental crowns present     Disease of thyroid gland     nodule sees Dr Maldonado/endocrinologist and Dr Denise Rossi([facility])    Exercise involving weight training     1-2x/week    GERD (gastroesophageal reflux disease)     Hyperlipidemia     Hypertension     Kidney stone     Kidney stones     Low testosterone     Motion sickness     Right hip pain     Sleep apnea     not currently using his CPAP       Past Surgical History:   Procedure Laterality Date    COLONOSCOPY      CYSTOSCOPY      EXTRACORPOREAL SHOCK WAVE LITHOTRIPSY Left     FL INJECTION RIGHT HIP (ARTHROGRAM)  3/31/2021    pt cant recall this    FL RETROGRADE PYELOGRAM  8/21/2018    KIDNEY STONE SURGERY      ID ARTHROSCOPY HIP W/LABRAL REPAIR Right 7/19/2021    Procedure: ARTHROSCOPY HIP with labral debridement: femoroplasty;  Surgeon: Hernandez Stein MD;  [facility];  Service: Orthopedics    ID CYSTO/URETERO W/LITHOTRIPSY &INDWELL STENT INSRT Left 8/21/2018    Procedure: CYSTOSCOPY URETEROSCOPY, RETROGRADE PYELOGRAM AND INSERTION STENT URETERAL;  Surgeon: Neda Corral MD;  [facility];  Service: Urology    ID ESOPHAGOGASTRODUODENOSCOPY TRANSORAL DIAGNOSTIC N/A 6/21/2018    Procedure: ESOPHAGOGASTRODUODENOSCOPY (EGD); Surgeon: Sabiha Li MD;  [facility];   Service: Gastroenterology    ID KNEE SCOPE,MED/LAT MENISECTOMY Right 12/13/2021    Procedure: ARTHROSCOPY KNEE, partial medial meniscectomy;  Surgeon: Pauline Quispe Trina Campbell MD;  Location: [facility];  Service: Orthopedics    WISDOM TOOTH EXTRACTION         Family History   Problem Relation Age of Onset    Asthma Mother     Diabetes Mother     Other Father         Endocarditis    Hypertension Father     Gout Father        Social History     Occupational History    Not on file   Tobacco Use    Smoking status: Former Smoker     Packs/day: 1 00     Years: 17 00     Pack years: 17 00     Quit date:      Years since quittin     Smokeless tobacco: Never Used   Vaping Use    Vaping Use: Never used   Substance and Sexual Activity    Alcohol use: Not Currently     Comment: Rare     Drug use: No    Sexual activity: Not on file     Comment: defer       No Known Allergies      Current Outpatient Medications:     carvedilol (COREG) 25 mg tablet, Take 1 tablet (25 mg total) by mouth 2 (two) times a day with meals, Disp: 180 tablet, Rfl: 3    chlorthalidone 25 mg tablet, Take 1 tablet (25 mg total) by mouth daily, Disp: 90 tablet, Rfl: 3    Cholecalciferol (Vitamin D) 50 MCG (2000 UT) CAPS, Take 2 capsules (4,000 Units total) by mouth daily, Disp: 60 capsule, Rfl: 3    Epiduo Forte 0 3-2 5 % GEL, Apply topically daily  , Disp: , Rfl:     eplerenone (INSPRA) 50 MG tablet, Take 1 tablet (50 mg total) by mouth 2 (two) times a day, Disp: 180 tablet, Rfl: 3    felodipine (PLENDIL) 10 MG 24 hr tablet, Take 1 tablet (10 mg total) by mouth daily, Disp: 90 tablet, Rfl: 3    omeprazole (PriLOSEC) 40 MG capsule, Take 1 capsule (40 mg total) by mouth daily (Patient taking differently: Take 40 mg by mouth daily in the early morning  ), Disp: 180 capsule, Rfl: 1    Osilodrostat Phosphate (Isturisa) 1 MG TABS, Take 1 capsule by mouth 2 (two) times a day, Disp: , Rfl:     potassium chloride (K-DUR,KLOR-CON) 20 mEq tablet, Take 1 tablet (20 mEq total) by mouth daily, Disp: 90 tablet, Rfl: 3    Potassium Citrate ER 15 MEQ (1620 MG) TBCR, Take 1 tablet by mouth 3 (three) times a day, Disp: 270 tablet, Rfl: 3    rosuvastatin (CRESTOR) 10 MG tablet, Take 10 mg by mouth every evening , Disp: , Rfl:     SYRINGE-NEEDLE, DISP, 3 ML 21G X 1-1/2" 3 ML MISC, For testerone injection, Disp: , Rfl:     testosterone cypionate (DEPO-TESTOSTERONE) 200 mg/mL SOLN, Inject 200 mg into a muscle every 14 (fourteen) days Due 12/5/21-Out needles - will take 12/8/21 , Disp: , Rfl:     torsemide (DEMADEX) 10 mg tablet, Take by mouth as needed Has Not Used in 8 months , Disp: , Rfl:     aspirin (ECOTRIN) 325 mg EC tablet, Take 1 tablet (325 mg total) by mouth 2 (two) times a day for 14 days (Patient not taking: Reported on 2/1/2022 ), Disp: 28 tablet, Rfl: 0    ondansetron (ZOFRAN-ODT) 4 mg disintegrating tablet, Take 1 tablet (4 mg total) by mouth every 8 (eight) hours as needed for nausea or vomiting, Disp: 15 tablet, Rfl: 0    SYRINGE-NEEDLE, DISP, 3 ML 21G X 1-1/2" 3 ML MISC, For testerone injection, Disp: , Rfl:       Sawyer Arora PA-C    Scribe Attestation    I,:   am acting as a scribe while in the presence of the attending physician :       I,:   personally performed the services described in this documentation    as scribed in my presence :

## 2022-03-09 ENCOUNTER — TELEPHONE (OUTPATIENT)
Dept: GASTROENTEROLOGY | Facility: CLINIC | Age: 47
End: 2022-03-09

## 2022-03-09 NOTE — TELEPHONE ENCOUNTER
Patients GI provider:  Dr Mundo Timmons    Number to return call: [phone]    Reason for call: Pt calling to schedule repeat colonoscopy      Scheduled procedure/appointment date if applicable: N/A

## 2022-03-11 NOTE — TELEPHONE ENCOUNTER
Patient is scheduled for colonoscopy on April 21 , 2022 at [facility]  with Judi Delarosa MD  Patient is aware of pre-procedure prep of Miralax/ Magnesium Citrate and they will be called the day prior between 2 and 6 pm for time to report for procedure  Pre-procedure prep has been given to the patient  via E-mail on March 11 , 2022   PT CONFIRMED HE RECEIVED EMAILED BOWEL PREP INSTRUCTIONS

## 2022-04-21 ENCOUNTER — ANESTHESIA EVENT (OUTPATIENT)
Dept: GASTROENTEROLOGY | Facility: AMBULARY SURGERY CENTER | Age: 47
End: 2022-04-21

## 2022-04-21 ENCOUNTER — HOSPITAL ENCOUNTER (OUTPATIENT)
Dept: GASTROENTEROLOGY | Facility: AMBULARY SURGERY CENTER | Age: 47
Setting detail: OUTPATIENT SURGERY
Discharge: HOME | End: 2022-04-21
Attending: INTERNAL MEDICINE | Admitting: INTERNAL MEDICINE
Payer: COMMERCIAL

## 2022-04-21 ENCOUNTER — ANESTHESIA (OUTPATIENT)
Dept: GASTROENTEROLOGY | Facility: AMBULARY SURGERY CENTER | Age: 47
End: 2022-04-21

## 2022-04-21 VITALS
BODY MASS INDEX: 28.29 KG/M2 | DIASTOLIC BLOOD PRESSURE: 84 MMHG | HEART RATE: 53 BPM | HEIGHT: 69 IN | OXYGEN SATURATION: 98 % | SYSTOLIC BLOOD PRESSURE: 155 MMHG | WEIGHT: 191 LBS | RESPIRATION RATE: 18 BRPM | TEMPERATURE: 97.4 F

## 2022-04-21 DIAGNOSIS — Z86.010 HX OF COLONIC POLYPS: ICD-10-CM

## 2022-04-21 PROCEDURE — 45385 COLONOSCOPY W/LESION REMOVAL: CPT | Performed by: INTERNAL MEDICINE

## 2022-04-21 PROCEDURE — 88305 TISSUE EXAM BY PATHOLOGIST: CPT | Performed by: PATHOLOGY

## 2022-04-21 RX ORDER — ONDANSETRON 2 MG/ML
4 INJECTION INTRAMUSCULAR; INTRAVENOUS ONCE AS NEEDED
Status: CANCELLED | OUTPATIENT
Start: 2022-04-21

## 2022-04-21 RX ORDER — SODIUM CHLORIDE, SODIUM LACTATE, POTASSIUM CHLORIDE, CALCIUM CHLORIDE 600; 310; 30; 20 MG/100ML; MG/100ML; MG/100ML; MG/100ML
20 INJECTION, SOLUTION INTRAVENOUS CONTINUOUS
Status: CANCELLED | OUTPATIENT
Start: 2022-04-21

## 2022-04-21 RX ORDER — LIDOCAINE HYDROCHLORIDE 20 MG/ML
INJECTION, SOLUTION EPIDURAL; INFILTRATION; INTRACAUDAL; PERINEURAL AS NEEDED
Status: DISCONTINUED | OUTPATIENT
Start: 2022-04-21 | End: 2022-04-21

## 2022-04-21 RX ORDER — PROPOFOL 10 MG/ML
INJECTION, EMULSION INTRAVENOUS AS NEEDED
Status: DISCONTINUED | OUTPATIENT
Start: 2022-04-21 | End: 2022-04-21

## 2022-04-21 RX ORDER — SODIUM CHLORIDE, SODIUM LACTATE, POTASSIUM CHLORIDE, CALCIUM CHLORIDE 600; 310; 30; 20 MG/100ML; MG/100ML; MG/100ML; MG/100ML
125 INJECTION, SOLUTION INTRAVENOUS CONTINUOUS
Status: DISCONTINUED | OUTPATIENT
Start: 2022-04-21 | End: 2022-04-25 | Stop reason: HOSPADM

## 2022-04-21 RX ADMIN — PROPOFOL 40 MG: 10 INJECTION, EMULSION INTRAVENOUS at 09:56

## 2022-04-21 RX ADMIN — PROPOFOL 30 MG: 10 INJECTION, EMULSION INTRAVENOUS at 10:07

## 2022-04-21 RX ADMIN — LIDOCAINE HYDROCHLORIDE 100 MG: 20 INJECTION, SOLUTION EPIDURAL; INFILTRATION; INTRACAUDAL at 09:50

## 2022-04-21 RX ADMIN — PROPOFOL 50 MG: 10 INJECTION, EMULSION INTRAVENOUS at 10:05

## 2022-04-21 RX ADMIN — PROPOFOL 80 MG: 10 INJECTION, EMULSION INTRAVENOUS at 09:50

## 2022-04-21 RX ADMIN — PROPOFOL 20 MG: 10 INJECTION, EMULSION INTRAVENOUS at 09:53

## 2022-04-21 RX ADMIN — SODIUM CHLORIDE, SODIUM LACTATE, POTASSIUM CHLORIDE, AND CALCIUM CHLORIDE: .6; .31; .03; .02 INJECTION, SOLUTION INTRAVENOUS at 09:39

## 2022-04-21 RX ADMIN — PROPOFOL 30 MG: 10 INJECTION, EMULSION INTRAVENOUS at 09:57

## 2022-04-21 RX ADMIN — PROPOFOL 30 MG: 10 INJECTION, EMULSION INTRAVENOUS at 09:59

## 2022-04-21 RX ADMIN — PROPOFOL 50 MG: 10 INJECTION, EMULSION INTRAVENOUS at 10:02

## 2022-04-21 RX ADMIN — PROPOFOL 50 MG: 10 INJECTION, EMULSION INTRAVENOUS at 10:00

## 2022-04-21 NOTE — ANESTHESIA PREPROCEDURE EVALUATION
Procedure:  COLONOSCOPY    Relevant Problems   CARDIO   (+) Hyperlipidemia      GI/HEPATIC   (+) GERD (gastroesophageal reflux disease)      /RENAL   (+) Benign hypertension with CKD (chronic kidney disease), stage II   (+) CKD (chronic kidney disease) stage 2, GFR 60-89 ml/min   (+) Nephrolithiasis      PULMONARY   (+) Obstructive sleep apnea      Other   (+) Adrenal adenoma, left   (+) Florian's esophagus determined by endoscopy        Physical Exam    Airway    Mallampati score: III  TM Distance: >3 FB  Neck ROM: full     Dental       Cardiovascular      Pulmonary      Other Findings        Anesthesia Plan  ASA Score- 3     Anesthesia Type- IV sedation with anesthesia with ASA Monitors  Additional Monitors:   Airway Plan:           Plan Factors-    Chart reviewed  Existing labs reviewed  Patient summary reviewed  Induction- intravenous  Postoperative Plan-     Informed Consent- Anesthetic plan and risks discussed with patient  I personally reviewed this patient with the CRNA  Discussed and agreed on the Anesthesia Plan with the CRNA  Valeria Ceja

## 2022-04-21 NOTE — H&P
History and Physical - [facility]  Mery Gutierrez 49 y o  male MRN: [id number]        HPI:  69-year-old male had multiple colon polyps removed during the recent colonoscopy  Regular bowel movements  Historical Information   Past Medical History:   Diagnosis Date    Adrenal mass ([address] )     Chest pain     "long ago stress related to family issue seen ER and cleared"    Chronic pain disorder     right hip    Claustrophobia     Colon polyp     CPAP (continuous positive airway pressure) dependence     Dental crowns present     Disease of thyroid gland     nodule sees Dr Maldonado/endocrinologist and Dr Dell Rossi([facility])    Exercise involving weight training     1-2x/week    GERD (gastroesophageal reflux disease)     Hyperlipidemia     Hypertension     Kidney stone     Kidney stones     Low testosterone     Motion sickness     Right hip pain     Sleep apnea     not currently using his CPAP     Past Surgical History:   Procedure Laterality Date    COLONOSCOPY      CYSTOSCOPY      EXTRACORPOREAL SHOCK WAVE LITHOTRIPSY Left     FL INJECTION RIGHT HIP (ARTHROGRAM)  3/31/2021    pt cant recall this    FL RETROGRADE PYELOGRAM  8/21/2018    KIDNEY STONE SURGERY      GA ARTHROSCOPY HIP W/LABRAL REPAIR Right 7/19/2021    Procedure: ARTHROSCOPY HIP with labral debridement: femoroplasty;  Surgeon: Chayito Wong MD;  [facility];  Service: Orthopedics    GA CYSTO/URETERO W/LITHOTRIPSY &INDWELL STENT INSRT Left 8/21/2018    Procedure: CYSTOSCOPY URETEROSCOPY, RETROGRADE PYELOGRAM AND INSERTION STENT URETERAL;  Surgeon: Robert Clark MD;  [facility];  Service: Urology    GA ESOPHAGOGASTRODUODENOSCOPY TRANSORAL DIAGNOSTIC N/A 6/21/2018    Procedure: ESOPHAGOGASTRODUODENOSCOPY (EGD); Surgeon: Mellissa Thomas MD;  [facility];   Service: Gastroenterology    GA KNEE SCOPE,MED/LAT MENISECTOMY Right 12/13/2021    Procedure: ARTHROSCOPY KNEE, partial medial meniscectomy;  Surgeon: Xavier Rosales MD;  [facility];  Service: Orthopedics    WISDOM TOOTH EXTRACTION       Social History   Social History     Substance and Sexual Activity   Alcohol Use Not Currently    Comment: Rare      Social History     Substance and Sexual Activity   Drug Use No     Social History     Tobacco Use   Smoking Status Former Smoker    Packs/day: 1 00    Years: 17 00    Pack years: 17 00    Quit date:     Years since quittin 3   Smokeless Tobacco Never Used     Family History   Problem Relation Age of Onset    Asthma Mother     Diabetes Mother     Other Father         Endocarditis    Hypertension Father     Gout Father        Meds/Allergies     (Not in a hospital admission)      No Known Allergies    Objective     Blood pressure 127/85, pulse 62, temperature (!) 97 2 °F (36 2 °C), temperature source Temporal, resp  rate 18, height 5' 9" (1 753 m), weight 86 6 kg (191 lb), SpO2 98 %      PHYSICAL EXAM:    Gen: NAD  CV: S1 & S2 normal, RRR  CHEST: Clear to auscultate  ABD: soft, NT/ND, good bowel sounds  EXT: no edema    ASSESSMENT:     History of colon polyps    PLAN:    Colonoscopy

## 2022-04-21 NOTE — ANESTHESIA POSTPROCEDURE EVALUATION
Post-Op Assessment Note    CV Status:  Stable  Pain Score: 0    Pain management: adequate     Mental Status:  Sleepy and arousable   Hydration Status:  Euvolemic   PONV Controlled:  Controlled   Airway Patency:  Patent      Post Op Vitals Reviewed: Yes      Staff: Anesthesiologist, CRNA         No complications documented      BP   128/77   Temp  97 4   Pulse 63   Resp 12   SpO2 98

## 2022-05-24 DIAGNOSIS — I10 ESSENTIAL HYPERTENSION: ICD-10-CM

## 2022-05-24 DIAGNOSIS — N20.0 NEPHROLITHIASIS: ICD-10-CM

## 2022-05-24 DIAGNOSIS — R82.991 HYPOCITRATURIA: ICD-10-CM

## 2022-05-24 DIAGNOSIS — N18.2 CKD (CHRONIC KIDNEY DISEASE), STAGE II: ICD-10-CM

## 2022-05-24 DIAGNOSIS — K22.70 BARRETT'S ESOPHAGUS DETERMINED BY ENDOSCOPY: ICD-10-CM

## 2022-05-24 DIAGNOSIS — K21.00 GASTROESOPHAGEAL REFLUX DISEASE WITH ESOPHAGITIS: ICD-10-CM

## 2022-05-24 DIAGNOSIS — I10 BENIGN ESSENTIAL HYPERTENSION: ICD-10-CM

## 2022-05-24 DIAGNOSIS — E87.6 HYPOKALEMIA: ICD-10-CM

## 2022-05-24 RX ORDER — OMEPRAZOLE 40 MG/1
40 CAPSULE, DELAYED RELEASE ORAL DAILY
Qty: 180 CAPSULE | Refills: 1 | Status: SHIPPED | OUTPATIENT
Start: 2022-05-24

## 2022-05-25 RX ORDER — POTASSIUM CITRATE 15 MEQ/1
1 TABLET, EXTENDED RELEASE ORAL 3 TIMES DAILY
Qty: 270 TABLET | Refills: 3 | Status: SHIPPED | OUTPATIENT
Start: 2022-05-25 | End: 2022-07-14

## 2022-05-25 RX ORDER — EPLERENONE 50 MG/1
50 TABLET, FILM COATED ORAL 2 TIMES DAILY
Qty: 180 TABLET | Refills: 3 | Status: SHIPPED | OUTPATIENT
Start: 2022-05-25

## 2022-05-25 RX ORDER — POTASSIUM CHLORIDE 20 MEQ/1
20 TABLET, EXTENDED RELEASE ORAL DAILY
Qty: 90 TABLET | Refills: 3 | Status: SHIPPED | OUTPATIENT
Start: 2022-05-25 | End: 2022-07-14

## 2022-05-25 RX ORDER — CARVEDILOL 25 MG/1
25 TABLET ORAL 2 TIMES DAILY WITH MEALS
Qty: 180 TABLET | Refills: 3 | Status: SHIPPED | OUTPATIENT
Start: 2022-05-25

## 2022-05-25 RX ORDER — CHLORTHALIDONE 25 MG/1
25 TABLET ORAL DAILY
Qty: 90 TABLET | Refills: 3 | Status: SHIPPED | OUTPATIENT
Start: 2022-05-25

## 2022-05-25 RX ORDER — FELODIPINE 10 MG/1
10 TABLET, EXTENDED RELEASE ORAL DAILY
Qty: 90 TABLET | Refills: 3 | Status: SHIPPED | OUTPATIENT
Start: 2022-05-25 | End: 2022-07-14

## 2022-05-28 ENCOUNTER — APPOINTMENT (OUTPATIENT)
Dept: LAB | Facility: HOSPITAL | Age: 47
End: 2022-05-28
Attending: INTERNAL MEDICINE
Payer: COMMERCIAL

## 2022-05-28 DIAGNOSIS — E24.9 CUSHING'S SYNDROME (HCC): ICD-10-CM

## 2022-05-28 DIAGNOSIS — R82.991 HYPOCITRATURIA: ICD-10-CM

## 2022-05-28 DIAGNOSIS — N20.0 NEPHROLITHIASIS: ICD-10-CM

## 2022-05-28 DIAGNOSIS — N18.2 CKD (CHRONIC KIDNEY DISEASE) STAGE 2, GFR 60-89 ML/MIN: ICD-10-CM

## 2022-05-28 PROCEDURE — 84560 ASSAY OF URINE/URIC ACID: CPT

## 2022-05-28 PROCEDURE — 82131 AMINO ACIDS SINGLE QUANT: CPT

## 2022-05-28 PROCEDURE — 82340 ASSAY OF CALCIUM IN URINE: CPT

## 2022-05-28 PROCEDURE — 84105 ASSAY OF URINE PHOSPHORUS: CPT

## 2022-05-28 PROCEDURE — 84300 ASSAY OF URINE SODIUM: CPT

## 2022-05-28 PROCEDURE — 82436 ASSAY OF URINE CHLORIDE: CPT

## 2022-05-28 PROCEDURE — 83945 ASSAY OF OXALATE: CPT

## 2022-05-28 PROCEDURE — 82570 ASSAY OF URINE CREATININE: CPT

## 2022-05-28 PROCEDURE — 81003 URINALYSIS AUTO W/O SCOPE: CPT

## 2022-05-28 PROCEDURE — 82507 ASSAY OF CITRATE: CPT

## 2022-05-28 PROCEDURE — 84392 ASSAY OF URINE SULFATE: CPT

## 2022-05-28 PROCEDURE — 82530 CORTISOL FREE: CPT

## 2022-05-28 PROCEDURE — 83735 ASSAY OF MAGNESIUM: CPT

## 2022-05-28 PROCEDURE — 84133 ASSAY OF URINE POTASSIUM: CPT

## 2022-05-28 PROCEDURE — 83935 ASSAY OF URINE OSMOLALITY: CPT

## 2022-05-28 PROCEDURE — 82140 ASSAY OF AMMONIA: CPT

## 2022-06-04 LAB
CORTIS F 24H UR-MRATE: 24 UG/24 HR (ref 5–64)
CORTIS F UR-MCNC: 22 UG/L

## 2022-06-08 LAB
AMMONIA 24H UR-MRATE: 41 MEQ/24 HR
AMMONIA UR-SCNC: ABNORMAL UG/DL
CA H2 PHOS DIHYD CRY URNS QL MICRO: 0.6 RATIO (ref 0–3)
CALCIUM 24H UR-MCNC: 7.4 MG/DL
CALCIUM 24H UR-MRATE: 81.4 MG/24 HR (ref 0–320)
CHLORIDE 24H UR-SCNC: 116 MMOL/L
CHLORIDE 24H UR-SRATE: 128 MMOL/24 HR (ref 52–264)
CITRATE 24H UR-MCNC: 46 MG/L
CITRATE 24H UR-MRATE: 51 MG/24 HR (ref 320–1240)
COM CRY STONE QL IR: 4.25 RATIO (ref 0–6)
CREAT 24H UR-MCNC: 169.4 MG/DL
CREAT 24H UR-MRATE: 1863.4 MG/24 HR (ref 1000–2000)
CYSTINE 24H UR-MCNC: 12.1 MG/L
CYSTINE 24H UR-MRATE: 13.31 MG/24 HR (ref 2.1–58)
MAGNESIUM 24H UR-MRATE: 66 MG/24 HR (ref 12–293)
MAGNESIUM UR-MCNC: 6 MG/DL
NA URATE CRY STONE QL IR: 3.8 RATIO (ref 0–4)
OSMOLALITY UR: 734 MOSMOL/KG (ref 300–900)
OXALATE 24H UR-MRATE: 22 MG/24 HR (ref 7–44)
OXALATE UR-MCNC: 20 MG/L
PH 24H UR: 5.6 [PH] (ref 4.5–8)
PHOSPHATE 24H UR-MCNC: 69.5 MG/DL
PHOSPHATE 24H UR-MRATE: 764.5 MG/24 HR (ref 390–1425)
PLEASE NOTE (STONE RISK): ABNORMAL
POTASSIUM 24H UR-SCNC: 74.9 MMOL/24 HR (ref 20–116)
POTASSIUM UR-SCNC: 68.1 MMOL/L
PRESERVED URINE: 1100 ML/24 HR (ref 800–1800)
SODIUM 24H UR-SCNC: 94 MMOL/L
SODIUM 24H UR-SRATE: 103 MMOL/24 HR (ref 58–337)
SPECIMEN VOL 24H UR: 1100 ML/24 HR (ref 800–1800)
SULFATE 24H UR-MCNC: 28 MEQ/24 HR (ref 0–30)
SULFATE UR-MCNC: 25 MEQ/L
TRI-PHOS CRY STONE MICRO: 0.01 RATIO (ref 0–1)
URATE 24H UR-MCNC: 50.3 MG/DL
URATE 24H UR-MRATE: 553 MG/24 HR (ref 197–1079)
URATE DIHYD CRY STONE QL IR: 4.06 RATIO (ref 0–1.2)

## 2022-06-12 ENCOUNTER — TELEPHONE (OUTPATIENT)
Dept: NEPHROLOGY | Facility: CLINIC | Age: 47
End: 2022-06-12

## 2022-06-12 NOTE — TELEPHONE ENCOUNTER
Please let him know that 24 hour urine stone risk profile still shows persistent low urine volume 1 1 L  He needs to drink plenty of free water with good urine output than 2 L per day  Urine calcium, sodium, oxalate are acceptable  He rates it still remains lower  Please confirm that he is taking potassium citrate 15 mEq one tablet 3 times a day  If he is compliant with taking potassium citrate as prescribed, please let me know in which case may have to consider increasing this further

## 2022-06-13 NOTE — TELEPHONE ENCOUNTER
Spoke with patient and advised: Please let him know that 24 hour urine stone risk profile still shows persistent low urine volume 1 1 L  He needs to drink plenty of free water with good urine output than 2 L per day  Urine calcium, sodium, oxalate are acceptable  He rates it still remains lower  Please confirm that he is taking potassium citrate 15 mEq one tablet 3 times a day  If he is compliant with taking potassium citrate as prescribed, please let me know in which case may have to consider increasing this further  Patient stated he noticed he did not urinate the day of the test as he usually does   States he is taking the potassium citrate 15 mEq one tablet 3 times a day but has missed 2-3 days of taking it prior to the test

## 2022-06-13 NOTE — TELEPHONE ENCOUNTER
Please have him do repeat 24 hour urine stone risk profile in one month before to his office visit  Will make final recommendations of adjusting potassium citrate based on repeat results

## 2022-06-14 NOTE — TELEPHONE ENCOUNTER
Spoke with patient and advised: Please have him do repeat 24 hour urine stone risk profile in one month before to his office visit  Will make final recommendations of adjusting potassium citrate based on repeat results  No further questions at this time

## 2022-07-06 ENCOUNTER — TELEPHONE (OUTPATIENT)
Dept: NEPHROLOGY | Facility: CLINIC | Age: 47
End: 2022-07-06

## 2022-07-06 NOTE — TELEPHONE ENCOUNTER
Called and spoke with Patient to complete their bloodwork prior to their appointment on 07/14/22 with Dr Debi Holter at the [facility]

## 2022-07-07 ENCOUNTER — TELEPHONE (OUTPATIENT)
Dept: NEPHROLOGY | Facility: CLINIC | Age: 47
End: 2022-07-07

## 2022-07-07 ENCOUNTER — APPOINTMENT (OUTPATIENT)
Dept: LAB | Facility: HOSPITAL | Age: 47
End: 2022-07-07
Attending: INTERNAL MEDICINE
Payer: COMMERCIAL

## 2022-07-07 DIAGNOSIS — N18.2 BENIGN HYPERTENSION WITH CKD (CHRONIC KIDNEY DISEASE), STAGE II: ICD-10-CM

## 2022-07-07 DIAGNOSIS — I12.9 BENIGN HYPERTENSION WITH CKD (CHRONIC KIDNEY DISEASE), STAGE II: ICD-10-CM

## 2022-07-07 DIAGNOSIS — N18.2 CKD (CHRONIC KIDNEY DISEASE) STAGE 2, GFR 60-89 ML/MIN: ICD-10-CM

## 2022-07-07 LAB
ANION GAP SERPL CALCULATED.3IONS-SCNC: 6 MMOL/L (ref 4–13)
BACTERIA UR QL AUTO: ABNORMAL /HPF
BILIRUB UR QL STRIP: NEGATIVE
BUN SERPL-MCNC: 27 MG/DL (ref 5–25)
CALCIUM SERPL-MCNC: 9.3 MG/DL (ref 8.3–10.1)
CHLORIDE SERPL-SCNC: 104 MMOL/L (ref 100–108)
CLARITY UR: CLEAR
CO2 SERPL-SCNC: 27 MMOL/L (ref 21–32)
COLOR UR: YELLOW
CREAT SERPL-MCNC: 1.43 MG/DL (ref 0.6–1.3)
CREAT UR-MCNC: 402 MG/DL
ERYTHROCYTE [DISTWIDTH] IN BLOOD BY AUTOMATED COUNT: 12.9 % (ref 11.6–15.1)
GFR SERPL CREATININE-BSD FRML MDRD: 57 ML/MIN/1.73SQ M
GLUCOSE SERPL-MCNC: 143 MG/DL (ref 65–140)
GLUCOSE UR STRIP-MCNC: NEGATIVE MG/DL
HCT VFR BLD AUTO: 52.9 % (ref 36.5–49.3)
HGB BLD-MCNC: 17.6 G/DL (ref 12–17)
HGB UR QL STRIP.AUTO: NEGATIVE
HYALINE CASTS #/AREA URNS LPF: ABNORMAL /LPF
KETONES UR STRIP-MCNC: ABNORMAL MG/DL
LEUKOCYTE ESTERASE UR QL STRIP: ABNORMAL
MAGNESIUM SERPL-MCNC: 2.1 MG/DL (ref 1.6–2.6)
MCH RBC QN AUTO: 29.3 PG (ref 26.8–34.3)
MCHC RBC AUTO-ENTMCNC: 33.3 G/DL (ref 31.4–37.4)
MCV RBC AUTO: 88 FL (ref 82–98)
MICROALBUMIN UR-MCNC: 118 MG/L (ref 0–20)
MICROALBUMIN/CREAT 24H UR: 29 MG/G CREATININE (ref 0–30)
MUCOUS THREADS UR QL AUTO: ABNORMAL
NITRITE UR QL STRIP: NEGATIVE
NON-SQ EPI CELLS URNS QL MICRO: ABNORMAL /HPF
PH UR STRIP.AUTO: 6 [PH]
PHOSPHATE SERPL-MCNC: 2.5 MG/DL (ref 2.7–4.5)
PLATELET # BLD AUTO: 290 THOUSANDS/UL (ref 149–390)
PMV BLD AUTO: 8.9 FL (ref 8.9–12.7)
POTASSIUM SERPL-SCNC: 3.9 MMOL/L (ref 3.5–5.3)
PROT UR STRIP-MCNC: ABNORMAL MG/DL
PTH-INTACT SERPL-MCNC: 43.4 PG/ML (ref 18.4–80.1)
RBC # BLD AUTO: 6 MILLION/UL (ref 3.88–5.62)
RBC #/AREA URNS AUTO: ABNORMAL /HPF
SODIUM SERPL-SCNC: 137 MMOL/L (ref 136–145)
SP GR UR STRIP.AUTO: 1.03 (ref 1–1.03)
UROBILINOGEN UR STRIP-ACNC: 3 MG/DL
WBC # BLD AUTO: 10 THOUSAND/UL (ref 4.31–10.16)
WBC #/AREA URNS AUTO: ABNORMAL /HPF

## 2022-07-07 PROCEDURE — 83970 ASSAY OF PARATHORMONE: CPT

## 2022-07-07 PROCEDURE — 84100 ASSAY OF PHOSPHORUS: CPT

## 2022-07-07 PROCEDURE — 80048 BASIC METABOLIC PNL TOTAL CA: CPT

## 2022-07-07 PROCEDURE — 82570 ASSAY OF URINE CREATININE: CPT

## 2022-07-07 PROCEDURE — 83735 ASSAY OF MAGNESIUM: CPT

## 2022-07-07 PROCEDURE — 36415 COLL VENOUS BLD VENIPUNCTURE: CPT

## 2022-07-07 PROCEDURE — 81001 URINALYSIS AUTO W/SCOPE: CPT

## 2022-07-07 PROCEDURE — 82043 UR ALBUMIN QUANTITATIVE: CPT

## 2022-07-07 PROCEDURE — 85027 COMPLETE CBC AUTOMATED: CPT

## 2022-07-07 NOTE — TELEPHONE ENCOUNTER
Pt called the office wanting to verify if he needs to complete a 24 hr urine  I informed him the only thing that needed to be completed was blood work and a UA  Pt verbalized understanding

## 2022-07-14 ENCOUNTER — OFFICE VISIT (OUTPATIENT)
Dept: NEPHROLOGY | Facility: CLINIC | Age: 47
End: 2022-07-14
Payer: COMMERCIAL

## 2022-07-14 VITALS
DIASTOLIC BLOOD PRESSURE: 84 MMHG | WEIGHT: 189.2 LBS | HEART RATE: 63 BPM | SYSTOLIC BLOOD PRESSURE: 122 MMHG | BODY MASS INDEX: 27.94 KG/M2 | RESPIRATION RATE: 16 BRPM | OXYGEN SATURATION: 99 %

## 2022-07-14 DIAGNOSIS — R82.991 HYPOCITRATURIA: ICD-10-CM

## 2022-07-14 DIAGNOSIS — N18.31 STAGE 3A CHRONIC KIDNEY DISEASE (HCC): ICD-10-CM

## 2022-07-14 DIAGNOSIS — I12.9 BENIGN HYPERTENSION WITH CKD (CHRONIC KIDNEY DISEASE) STAGE III (HCC): Primary | ICD-10-CM

## 2022-07-14 DIAGNOSIS — E55.9 VITAMIN D DEFICIENCY: ICD-10-CM

## 2022-07-14 DIAGNOSIS — N18.30 BENIGN HYPERTENSION WITH CKD (CHRONIC KIDNEY DISEASE) STAGE III (HCC): Primary | ICD-10-CM

## 2022-07-14 DIAGNOSIS — N20.0 NEPHROLITHIASIS: ICD-10-CM

## 2022-07-14 PROCEDURE — 99214 OFFICE O/P EST MOD 30 MIN: CPT | Performed by: INTERNAL MEDICINE

## 2022-07-14 RX ORDER — POTASSIUM CITRATE 15 MEQ/1
2 TABLET, EXTENDED RELEASE ORAL 2 TIMES DAILY
Qty: 360 TABLET | Refills: 3 | Status: SHIPPED | OUTPATIENT
Start: 2022-07-14 | End: 2022-09-01 | Stop reason: SDUPTHER

## 2022-07-14 RX ORDER — FELODIPINE 5 MG/1
5 TABLET, EXTENDED RELEASE ORAL DAILY
Qty: 90 TABLET | Refills: 3 | Status: SHIPPED | OUTPATIENT
Start: 2022-07-14 | End: 2022-08-24 | Stop reason: SDUPTHER

## 2022-07-14 NOTE — PROGRESS NOTES
NEPHROLOGY OUTPATIENT PROGRESS NOTE   Pancho Boles 49 y o  male MRN: [id number]  DATE: 7/14/2022  Reason for visit:   Chief Complaint   Patient presents with    Chronic Kidney Disease    Follow-up     ASSESSMENT and PLAN:  Hypertension  -Likely thought to be essential in origin, another differential remains hypercortisolism versus  ?primary hyperaldosteronism  -Currently patient is on Coreg, eplerenone, felodipine and chlorthalidone   -home BP readings are generally 120s/80s  BP well controlled in the office today  -now that patient remains on Osilodrostat for hypercortisolism by Endocrine, and well controlled blood pressure, we will reduce felodipine from 10 mg to 5 mg daily  -we will do 24 hour ABPM in two weeks after reducing felodipine       -his home wrist BP cuff was compared with our office readings in the past which were identical   -plasma metanephrine and catecholamines are nonsignificant in December 2018   Repeat plasma normetanephrine slightly elevated 156 in May 2021  Unable to check serum aldosterone/PRA while being on eplerenone     -he is being followed by endocrine in [place] regarding adrenal nodule    -Patient was found to have high aldosterone level along with high aldosterone/renin ratio although patient was also taking eplerenone at that time and makes results unreliable  - Salt restricted diet     Recurrent nephrolithiasis  -patient had an episode of nephrolithiasis requiring ureteral stent placement with eventual removal in past   Patient says his stone composition was calcium stone although I do not have documentation   -advised to drink plenty of free water with goal urine output greater than 2 L per day   -last 24 hour urine stone risk profile in May 2022 shows persistently lower urine volume 1 1 L, urine calcium, urine sodium, oxalate acceptable  Urine citrate 51 remains lower, urine pH 5 6    -he admits not to be drinking enough liquid    I have again recommended to drink plenty of free water with goal urine output greater than 2 L per day    -advised to follow low salt diet   -continue chlorthalidone 25 mg daily  -increase potassium citrate to 30 mEq p o  b i d  Anjana Almendarez -CT scan in June 2021 shows left kidney calculi, no stones on the right side  No hydronephrosis  -denies any recent kidney stone flare-up issues  Denies any flank pain or urinary complaint      Hypocitraturia  -management as above     Hypokalemia, resolved, last serum potassium 3 9  Will discontinue potassium chloride and increase potassium citrate as above       CKD stage IIIa, baseline Cr 1 1 to 1 4  -last creatinine 1 4 in July 2022 relatively stable over the last six months       -He could have a component of CKD given his long-term hypertension history    -UA in July 2022 shows 1+ proteinuria, no significant RBCs, 4 to 10 WBCs, multiple hyaline casts   urine microalbumin/creatinine ratio nonsignificant in July 2022         Leg edema  -seems to be much improved   Currently remains on chlorthalidone  Hypophosphatemia, serum phosphorus borderline lower 2 5, recommended to increase phosphorus dietary intake  Follow up repeat before next visit      Vitamin-D insufficiency, last vitamin-D level 31 in November 2021  Continue 4000 units daily   Continue same      Bilateral adrenal nodules, overall unchanged comparing to prior studies based on most recent CT scan in June 2021   -closely follows with Endocrine at [facility]  -currently 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  -prior discussion noted for eventual surgical adrenalectomy  Diagnoses and all orders for this visit:    Benign hypertension with CKD (chronic kidney disease) stage III (HCC)  -     felodipine (PLENDIL) 5 mg 24 hr tablet; Take 1 tablet (5 mg total) by mouth daily  -     24 hour blood pressure monitoring; Future  -     Stone risk profile; Future  -     Basic metabolic panel; Future  -     Magnesium;  Future  -     PTH, intact; Future  -     Phosphorus; Future  -     Microalbumin / creatinine urine ratio; Future  -     Vitamin D 25 hydroxy; Future    Stage 3a chronic kidney disease ([address] )  -     Stone risk profile; Future  -     Basic metabolic panel; Future  -     Magnesium; Future  -     PTH, intact; Future  -     Phosphorus; Future  -     Microalbumin / creatinine urine ratio; Future  -     Vitamin D 25 hydroxy; Future    Hypocitraturia  -     Potassium Citrate ER 15 MEQ (1620 MG) TBCR; Take 2 tablets by mouth 2 (two) times a day  -     Stone risk profile; Future    Nephrolithiasis  -     Potassium Citrate ER 15 MEQ (1620 MG) TBCR; Take 2 tablets by mouth 2 (two) times a day  -     Stone risk profile; Future    Vitamin D deficiency  -     Vitamin D 25 hydroxy; Future    Essential hypertension          SUBJECTIVE / HPI:  Patient is 74-year-old male with significant medical issues of hypertension diagnosed early in the age, bilateral adrenal nodule, history of nephrolithiasis with history of requiring ureteral stent placement with eventual removal, history of lithotripsy, sleep apnea, comes for regular follow-up of hypertension and nephrolithiasis  Baseline serum creatinine 1 1 to 1  4    closely follows with Endocrine at [facility]  Continue to remain on osilodrostat  BP overall better controlled at home and again in the office today  Zilphia Holiday denies any hematuria, no dysuria  No flank or abdominal pain  No fever or chills  No recent kidney stone flare-up issues  He admits to be not drinking enough water     No recent NSAID exposure  He tries to be compliant with salt restricted diet  REVIEW OF SYSTEMS:  More than 10 point review of systems were obtained and discussed in length with the patient  Complete review of systems were negative / unremarkable except mentioned above       PHYSICAL EXAM:  Vitals:    07/14/22 1251   BP: 122/84   BP Location: Left arm   Patient Position: Sitting   Cuff Size: Large   Pulse: 63   Resp: 16   SpO2: 99% Weight: 85 8 kg (189 lb 3 2 oz)     Body mass index is 27 94 kg/m²  Physical Exam  Vitals reviewed  Constitutional:       Appearance: He is well-developed  HENT:      Head: Normocephalic and atraumatic  Right Ear: External ear normal       Left Ear: External ear normal    Eyes:      General: No scleral icterus  Conjunctiva/sclera: Conjunctivae normal    Cardiovascular:      Comments: S1, S2 present  Pulmonary:      Effort: Pulmonary effort is normal  No respiratory distress  Breath sounds: Normal breath sounds  No wheezing or rales  Abdominal:      General: Bowel sounds are normal  There is no distension  Palpations: Abdomen is soft  Tenderness: There is no abdominal tenderness  Musculoskeletal:         General: No tenderness or deformity  Right lower leg: No edema  Left lower leg: No edema  Lymphadenopathy:      Cervical: No cervical adenopathy  Skin:     Findings: No rash  Neurological:      Mental Status: He is alert and oriented to person, place, and time     Psychiatric:         Behavior: Behavior normal          PAST MEDICAL HISTORY:  Past Medical History:   Diagnosis Date    Adrenal mass ([address] )     Chest pain     "long ago stress related to family issue seen ER and cleared"    Chronic kidney disease     Chronic pain disorder     right hip    Claustrophobia     Colon polyp     CPAP (continuous positive airway pressure) dependence     Dental crowns present     Disease of thyroid gland     nodule sees Dr Maldonado/endocrinologist and Dr Naomie Hayes)    Exercise involving weight training     1-2x/week    GERD (gastroesophageal reflux disease)     Hyperlipidemia     Hypertension     Kidney stone     Kidney stones     Low testosterone     Motion sickness     Right hip pain     Sleep apnea     not currently using his CPAP       PAST SURGICAL HISTORY:  Past Surgical History:   Procedure Laterality Date    COLONOSCOPY      CYSTOSCOPY      EXTRACORPOREAL SHOCK WAVE LITHOTRIPSY Left     FL INJECTION RIGHT HIP (ARTHROGRAM)  3/31/2021    pt cant recall this    FL RETROGRADE PYELOGRAM  2018    KIDNEY STONE SURGERY      WA ARTHROSCOPY HIP W/LABRAL REPAIR Right 2021    Procedure: ARTHROSCOPY HIP with labral debridement: femoroplasty;  Surgeon: Landy Salinas MD;  [facility];  Service: Orthopedics    WA CYSTO/URETERO W/LITHOTRIPSY &INDWELL STENT INSRT Left 2018    Procedure: CYSTOSCOPY URETEROSCOPY, RETROGRADE PYELOGRAM AND INSERTION STENT URETERAL;  Surgeon: Chaitanya Whitaker MD;  [facility];  Service: Urology    WA ESOPHAGOGASTRODUODENOSCOPY TRANSORAL DIAGNOSTIC N/A 2018    Procedure: ESOPHAGOGASTRODUODENOSCOPY (EGD); Surgeon: Yasmine Leigh MD;  [facility];   Service: Gastroenterology    WA KNEE SCOPE,MED/LAT MENISECTOMY Right 2021    Procedure: ARTHROSCOPY KNEE, partial medial meniscectomy;  Surgeon: Landy Salinas MD;  [facility];  Service: Orthopedics    WISDOM TOOTH EXTRACTION         SOCIAL HISTORY:  Social History     Substance and Sexual Activity   Alcohol Use Not Currently    Comment: Rare      Social History     Substance and Sexual Activity   Drug Use No     Social History     Tobacco Use   Smoking Status Former Smoker    Packs/day: 1 00    Years: 17 00    Pack years: 17 00    Quit date:     Years since quittin 5   Smokeless Tobacco Never Used       FAMILY HISTORY:  Family History   Problem Relation Age of Onset    Asthma Mother     Diabetes Mother     Other Father         Endocarditis    Hypertension Father     Gout Father        MEDICATIONS:    Current Outpatient Medications:     carvedilol (COREG) 25 mg tablet, Take 1 tablet (25 mg total) by mouth 2 (two) times a day with meals, Disp: 180 tablet, Rfl: 3    chlorthalidone 25 mg tablet, Take 1 tablet (25 mg total) by mouth daily, Disp: 90 tablet, Rfl: 3    Cholecalciferol (Vitamin D) 48 MCG (2000 UT) CAPS, Take 2 capsules (4,000 Units total) by mouth daily, Disp: 60 capsule, Rfl: 3    eplerenone (INSPRA) 50 MG tablet, Take 1 tablet (50 mg total) by mouth 2 (two) times a day, Disp: 180 tablet, Rfl: 3    felodipine (PLENDIL) 5 mg 24 hr tablet, Take 1 tablet (5 mg total) by mouth daily, Disp: 90 tablet, Rfl: 3    omeprazole (PriLOSEC) 40 MG capsule, Take 1 capsule (40 mg total) by mouth in the morning , Disp: 180 capsule, Rfl: 1    Osilodrostat Phosphate (Isturisa) 1 MG TABS, Take 1 capsule by mouth 2 (two) times a day, Disp: , Rfl:     Potassium Citrate ER 15 MEQ (1620 MG) TBCR, Take 2 tablets by mouth 2 (two) times a day, Disp: 360 tablet, Rfl: 3    rosuvastatin (CRESTOR) 10 MG tablet, Take 10 mg by mouth every evening , Disp: , Rfl:     SYRINGE-NEEDLE, DISP, 3 ML 21G X 1-1/2" 3 ML MISC, For testerone injection, Disp: , Rfl:     testosterone cypionate (DEPO-TESTOSTERONE) 200 mg/mL SOLN, Inject 200 mg into a muscle every 14 (fourteen) days Due 12/5/21-Out needles - will take 12/8/21 , Disp: , Rfl:     torsemide (DEMADEX) 10 mg tablet, Take by mouth as needed Has Not Used in 8 months , Disp: , Rfl:     aspirin (ECOTRIN) 325 mg EC tablet, Take 1 tablet (325 mg total) by mouth 2 (two) times a day for 14 days (Patient not taking: Reported on 2/1/2022 ), Disp: 28 tablet, Rfl: 0    Epiduo Forte 0 3-2 5 % GEL, Apply topically daily   (Patient not taking: Reported on 7/14/2022), Disp: , Rfl:     ondansetron (ZOFRAN-ODT) 4 mg disintegrating tablet, Take 1 tablet (4 mg total) by mouth every 8 (eight) hours as needed for nausea or vomiting, Disp: 15 tablet, Rfl: 0    SYRINGE-NEEDLE, DISP, 3 ML 21G X 1-1/2" 3 ML MISC, For testerone injection, Disp: , Rfl:     Lab Results:   Results for orders placed or performed in visit on 66/[birth year]/75   Basic metabolic panel   Result Value Ref Range    Sodium 137 136 - 145 mmol/L    Potassium 3 9 3 5 - 5 3 mmol/L    Chloride 104 100 - 108 mmol/L    CO2 27 21 - 32 mmol/L    ANION GAP 6 4 - 13 mmol/L    BUN 27 (H) 5 - 25 mg/dL    Creatinine 1 43 (H) 0 60 - 1 30 mg/dL    Glucose 143 (H) 65 - 140 mg/dL    Calcium 9 3 8 3 - 10 1 mg/dL    eGFR 57 ml/min/1 73sq m   CBC   Result Value Ref Range    WBC 10 00 4 31 - 10 16 Thousand/uL    RBC 6 00 (H) 3 88 - 5 62 Million/uL    Hemoglobin 17 6 (H) 12 0 - 17 0 g/dL    Hematocrit 52 9 (H) 36 5 - 49 3 %    MCV 88 82 - 98 fL    MCH 29 3 26 8 - 34 3 pg    MCHC 33 3 31 4 - 37 4 g/dL    RDW 12 9 11 6 - 15 1 %    Platelets 255 776 - 114 Thousands/uL    MPV 8 9 8 9 - 12 7 fL   Microalbumin / creatinine urine ratio   Result Value Ref Range    Creatinine, Ur 402 0 mg/dL    Microalbum  ,U,Random 118 0 (H) 0 0 - 20 0 mg/L    Microalb Creat Ratio 29 0 - 30 mg/g creatinine   UA (URINE) with reflex to Scope   Result Value Ref Range    Color, UA Yellow     Clarity, UA Clear     Specific [place], UA 1 031 (H) 1 003 - 1 030    pH, UA 6 0 4 5, 5 0, 5 5, 6 0, 6 5, 7 0, 7 5, 8 0    Leukocytes, UA Trace (A) Negative    Nitrite, UA Negative Negative    Protein, UA 50 (1+) (A) Negative mg/dl    Glucose, UA Negative Negative mg/dl    Ketones, UA 10 (1+) (A) Negative mg/dl    Urobilinogen, UA 3 0 (A) <2 0 mg/dl mg/dl    Bilirubin, UA Negative Negative    Occult Blood, UA Negative Negative   Phosphorus   Result Value Ref Range    Phosphorus 2 5 (L) 2 7 - 4 5 mg/dL   PTH, intact   Result Value Ref Range    PTH 43 4 18 4 - 80 1 pg/mL   Magnesium   Result Value Ref Range    Magnesium 2 1 1 6 - 2 6 mg/dL   Urine Microscopic   Result Value Ref Range    RBC, UA 1-2 None Seen, 1-2 /hpf    WBC, UA 4-10 (A) None Seen, 1-2 /hpf    Epithelial Cells Occasional None Seen, Occasional /hpf    Bacteria, UA None Seen None Seen, Occasional /hpf    MUCUS THREADS Innumerable (A) None Seen    Hyaline Casts, UA 5-10 (A) None Seen /lpf

## 2022-07-28 PROCEDURE — 88302 TISSUE EXAM BY PATHOLOGIST: CPT | Performed by: SPECIALIST

## 2022-07-29 ENCOUNTER — LAB REQUISITION (OUTPATIENT)
Dept: LAB | Facility: HOSPITAL | Age: 47
End: 2022-07-29
Payer: COMMERCIAL

## 2022-07-29 DIAGNOSIS — L90.5 SCAR CONDITIONS AND FIBROSIS OF SKIN: ICD-10-CM

## 2022-08-01 ENCOUNTER — TELEPHONE (OUTPATIENT)
Dept: NEPHROLOGY | Facility: CLINIC | Age: 47
End: 2022-08-01

## 2022-08-09 ENCOUNTER — TELEPHONE (OUTPATIENT)
Dept: UROLOGY | Facility: MEDICAL CENTER | Age: 47
End: 2022-08-09

## 2022-08-09 NOTE — TELEPHONE ENCOUNTER
Pt's wife called the office stated that Leda Bustillo has a difficulty urinating for a week  Pt has discomfort       Pt last seen with Dr Declan Martinez 11/2018    Judy Hirsch can be reached at [phone]

## 2022-08-09 NOTE — TELEPHONE ENCOUNTER
Returned call to patients wife  Symptoms as verbalized by patient  Increase in symptoms about six week ago  Worsening over the last 4 days  Decrease intermittent stream  Recent visit with nephrologist , 24 hour urine volume was reported as decreased  Office seen: [facility] /   History of renal stones   Pain: intermittent pelvic pressure  / no burning  Nausea/Vomiting: none  Fever/Chills: none  Bowel Movements:Yes   Urine color: unable to report  Frequency/Urgencey: frequency + / no urgency   Stream: decreased , interupted    Patient has not been seen in office for evaluation since 2019  Offered an appt for today patient declined   Per patients request appt was given on Thursday  Ed precautions reviewed for decreased or if not able to urinated  Health Call after hours protocol reviewed  Ed precautions reviewed   Patient verbalized understanding/ Agreement

## 2022-08-11 ENCOUNTER — OFFICE VISIT (OUTPATIENT)
Dept: UROLOGY | Facility: CLINIC | Age: 47
End: 2022-08-11
Payer: COMMERCIAL

## 2022-08-11 VITALS
HEART RATE: 72 BPM | OXYGEN SATURATION: 98 % | WEIGHT: 185 LBS | SYSTOLIC BLOOD PRESSURE: 138 MMHG | DIASTOLIC BLOOD PRESSURE: 84 MMHG | HEIGHT: 69 IN | BODY MASS INDEX: 27.4 KG/M2

## 2022-08-11 DIAGNOSIS — N40.1 BPH WITH OBSTRUCTION/LOWER URINARY TRACT SYMPTOMS: Primary | ICD-10-CM

## 2022-08-11 DIAGNOSIS — N20.0 KIDNEY STONES: ICD-10-CM

## 2022-08-11 DIAGNOSIS — N13.8 BPH WITH OBSTRUCTION/LOWER URINARY TRACT SYMPTOMS: Primary | ICD-10-CM

## 2022-08-11 LAB — POST-VOID RESIDUAL VOLUME, ML POC: 56 ML

## 2022-08-11 PROCEDURE — 99203 OFFICE O/P NEW LOW 30 MIN: CPT | Performed by: PHYSICIAN ASSISTANT

## 2022-08-11 PROCEDURE — 51798 US URINE CAPACITY MEASURE: CPT | Performed by: PHYSICIAN ASSISTANT

## 2022-08-11 NOTE — PROGRESS NOTES
UROLOGY CONSULTATION NOTE     Patient Identifiers: Kelly Reilyl (MRN: [id number])  Service Requesting Consultation: Niall Figueroa MD  Service Providing Consultation:  Urology, Shobha Felix PA-C  Consults  Date of Service: 8/11/2022    Reason for Consultation:  Lower urinary tract symptoms    History of Present Illness:     Kelly Reilly is a 49 y o  male with extensive history kidney stones  He has not been seen since 2018  He does follow with Nephrology  He comes in complaining decreased urinary flow with nocturia  His father had an  history of BPH  He has no burning dribbling or hematuria  He has not been on any medications  Postvoid residual 56 mL      Past Medical, Past Surgical History:     Past Medical History:   Diagnosis Date    Adrenal mass ([address] )     Chest pain     "long ago stress related to family issue seen ER and cleared"    Chronic kidney disease     Chronic pain disorder     right hip    Claustrophobia     Colon polyp     CPAP (continuous positive airway pressure) dependence     Dental crowns present     Disease of thyroid gland     nodule sees Dr Maldonado/endocrinologist and Dr Juan Rossi([facility])    Exercise involving weight training     1-2x/week    GERD (gastroesophageal reflux disease)     Hyperlipidemia     Hypertension     Kidney stone     Kidney stones     Low testosterone     Motion sickness     Right hip pain     Sleep apnea     not currently using his CPAP   :    Past Surgical History:   Procedure Laterality Date    COLONOSCOPY      CYSTOSCOPY      EXTRACORPOREAL SHOCK WAVE LITHOTRIPSY Left     FL INJECTION RIGHT HIP (ARTHROGRAM)  3/31/2021    pt cant recall this    FL RETROGRADE PYELOGRAM  8/21/2018    KIDNEY STONE SURGERY      CA ARTHROSCOPY HIP W/LABRAL REPAIR Right 7/19/2021    Procedure: ARTHROSCOPY HIP with labral debridement: femoroplasty;  Surgeon: Juan Monroe MD;  [facility];  Service: Orthopedics    CA CYSTO/URETERO [address] [place] [address] &INDWELL STENT INSRT Left 8/21/2018    Procedure: CYSTOSCOPY URETEROSCOPY, RETROGRADE PYELOGRAM AND INSERTION STENT URETERAL;  Surgeon: Tabitha Frias MD;  [facility];  Service: Urology    WA ESOPHAGOGASTRODUODENOSCOPY TRANSORAL DIAGNOSTIC N/A 6/21/2018    Procedure: ESOPHAGOGASTRODUODENOSCOPY (EGD); Surgeon: Artem Loving MD;  [facility];   Service: Gastroenterology    WA KNEE SCOPE,MED/LAT MENISECTOMY Right 12/13/2021    Procedure: ARTHROSCOPY KNEE, partial medial meniscectomy;  Surgeon: Francis Castellanos MD;  [facility];  Service: Orthopedics    WISDOM TOOTH EXTRACTION     :    Medications, Allergies:     Current Outpatient Medications:     carvedilol (COREG) 25 mg tablet, Take 1 tablet (25 mg total) by mouth 2 (two) times a day with meals, Disp: 180 tablet, Rfl: 3    chlorthalidone 25 mg tablet, Take 1 tablet (25 mg total) by mouth daily, Disp: 90 tablet, Rfl: 3    Cholecalciferol (Vitamin D) 50 MCG (2000 UT) CAPS, Take 2 capsules (4,000 Units total) by mouth daily, Disp: 60 capsule, Rfl: 3    eplerenone (INSPRA) 50 MG tablet, Take 1 tablet (50 mg total) by mouth 2 (two) times a day, Disp: 180 tablet, Rfl: 3    felodipine (PLENDIL) 5 mg 24 hr tablet, Take 1 tablet (5 mg total) by mouth daily, Disp: 90 tablet, Rfl: 3    omeprazole (PriLOSEC) 40 MG capsule, Take 1 capsule (40 mg total) by mouth in the morning , Disp: 180 capsule, Rfl: 1    Osilodrostat Phosphate (Isturisa) 1 MG TABS, Take 1 capsule by mouth 2 (two) times a day, Disp: , Rfl:     Potassium Citrate ER 15 MEQ (1620 MG) TBCR, Take 2 tablets by mouth 2 (two) times a day, Disp: 360 tablet, Rfl: 3    rosuvastatin (CRESTOR) 10 MG tablet, Take 10 mg by mouth every evening , Disp: , Rfl:     SYRINGE-NEEDLE, DISP, 3 ML 21G X 1-1/2" 3 ML MISC, For testerone injection, Disp: , Rfl:     testosterone cypionate (DEPO-TESTOSTERONE) 200 mg/mL SOLN, Inject 200 mg into a muscle every 14 (fourteen) days Due 21-Out needles - will take 21 , Disp: , Rfl:     torsemide (DEMADEX) 10 mg tablet, Take by mouth as needed Has Not Used in 8 months , Disp: , Rfl:     Epiduo Forte 0 3-2 5 % GEL, Apply topically daily   (Patient not taking: No sig reported), Disp: , Rfl:     Allergies:  No Known Allergies:    Social and Family History:   Social History:   Social History     Tobacco Use    Smoking status: Former Smoker     Packs/day:      Years:      Pack years:      Quit date:      Years since quittin     Smokeless tobacco: Never Used   Vaping Use    Vaping Use: Never used   Substance Use Topics    Alcohol use: Not Currently     Comment: Rare     Drug use: No        Social History     Tobacco Use   Smoking Status Former Smoker    Packs/day:     Years:     Pack years:     Quit date:     Years since quittin 6   Smokeless Tobacco Never Used       Family History:  Family History   Problem Relation Age of Onset    Asthma Mother     Diabetes Mother     Other Father         Endocarditis    Hypertension Father     Gout Father    :     Review of Systems:     General: Fever, chills, or night sweats: negative  Cardiac: Negative for chest pain  Pulmonary: Negative for shortness of breath  Gastrointestinal: Abdominal pain negative  Nausea, vomiting, or diarrhea negative,  Genitourinary: See HPI above  Patient does not have hematuria  All other systems queried were negative  Physical Exam:   General: Patient is pleasant and in NAD  Awake and alert  /84   Pulse 72   Ht 5' 9" (1 753 m)   Wt 83 9 kg (185 lb)   SpO2 98%   BMI 27 32 kg/m²   HEENT:  Conjunctiva are clear  Constitutional:  pleasant and cooperative     no apparent distress  Cardiac: Peripheral edema: negative  Pulmonary: Non-labored breathing  Abdomen: Soft, non-tender, non-distended  No surgical scars  No masses, tenderness, hernias noted      Genitourinary: Negative CVA tenderness, negative suprapubic tenderness  Extremities:  Moves all extremities  Neurological:CNII-XII intact  No numbness or tingling  Essentially non focal neurologic exam  Psychiatric:mood affect and behavior normal    (Male): Penis uncircumcised, phallus normal, meatus patent  Testicles descended into scrotum bilaterally without masses nor tenderness  No inguinal hernias bilaterally  TIERRA: Prostate is enlarged at 45 grams  The prostate is not boggy  The prostate is not tender  No nodules noted  Labs:     Lab Results   Component Value Date    HGB 17 6 (H) 07/07/2022    HCT 52 9 (H) 07/07/2022    WBC 10 00 07/07/2022     07/07/2022   ]    Lab Results   Component Value Date     11/05/2014    K 3 9 07/07/2022     07/07/2022    CO2 27 07/07/2022    BUN 27 (H) 07/07/2022    CREATININE 1 43 (H) 07/07/2022    CALCIUM 9 3 07/07/2022    GLUCOSE 106 11/05/2014   ]    Imaging:   I personally reviewed the images and report of the following studies, and reviewed them with the patient:  None      ASSESSMENT:     1  BPH  2  Nephrolithiasis       PLAN:   -I reviewed the options of management with the patient  -I recommended trial of an alpha-blocker  -will follow-up in 3 months with kidney and bladder ultrasound and PSA prior to visit  -we discussed cystoscopy transrectal ultrasound and uroflow necessary      Thank you for allowing me to participate in this patients care  Please do not hesitate to call with any additional questions    Lesley Hernandez PA-C

## 2022-08-17 NOTE — TELEPHONE ENCOUNTER
Per visit from [place] on 8/11     ASSESSMENT:      1  BPH  2   Nephrolithiasis        PLAN:   -I reviewed the options of management with the patient  -I recommended trial of an alpha-blocker  -will follow-up in 3 months with kidney and bladder ultrasound and PSA prior to visit  -we discussed cystoscopy transrectal ultrasound and uroflow necessary

## 2022-08-17 NOTE — TELEPHONE ENCOUNTER
Pt wife Lori Octaviojoyce called and stated pt did not receive medication from recent visit    Pt wife stated that pt is having difficulty urinating and has been waiting almost a week for script for medication   Pt wife stated it can be sent to [facility] or [facility] [phone]      Pt call back-[phone]

## 2022-08-18 DIAGNOSIS — N13.8 BPH WITH OBSTRUCTION/LOWER URINARY TRACT SYMPTOMS: Primary | ICD-10-CM

## 2022-08-18 DIAGNOSIS — N40.1 BPH WITH OBSTRUCTION/LOWER URINARY TRACT SYMPTOMS: Primary | ICD-10-CM

## 2022-08-18 RX ORDER — TAMSULOSIN HYDROCHLORIDE 0.4 MG/1
0.4 CAPSULE ORAL
Qty: 30 CAPSULE | Refills: 4 | Status: SHIPPED | OUTPATIENT
Start: 2022-08-18 | End: 2022-08-24

## 2022-08-22 ENCOUNTER — CLINICAL SUPPORT (OUTPATIENT)
Dept: NEPHROLOGY | Facility: CLINIC | Age: 47
End: 2022-08-22

## 2022-08-22 VITALS
DIASTOLIC BLOOD PRESSURE: 92 MMHG | WEIGHT: 186 LBS | SYSTOLIC BLOOD PRESSURE: 140 MMHG | HEART RATE: 70 BPM | BODY MASS INDEX: 27.55 KG/M2 | HEIGHT: 69 IN

## 2022-08-22 DIAGNOSIS — I10 WHITE COAT SYNDROME WITH HYPERTENSION: Primary | ICD-10-CM

## 2022-08-22 PROCEDURE — PBNCHG PB NO CHARGE PLACEHOLDER

## 2022-08-22 NOTE — PATIENT INSTRUCTIONS
Patient/parent/guardian briefed on responsibility form for safe keeping ABPM machine and equipment   Patient /parent/guardian signed responsibility form  Patient/parent/guardian briefed on instructions for ABPM use and log use and documentation  Patient/parent/guardian verbally acknowledged understanding all instructions

## 2022-08-22 NOTE — TELEPHONE ENCOUNTER
Pt's wife called the office stated that pt saw Dana Lomeli recently and was told that to stop taking Flomax  Silva Rodrigez stated that Dana Lomeli was going to send a new med to the pharmacy but he never did  KP sent Flomax  to the pharmacy but per Silva Rodrigez pt was supposed to stop Flomax  Please advise       Silva Rodrigez can be reached at [phone]

## 2022-08-23 ENCOUNTER — CLINICAL SUPPORT (OUTPATIENT)
Dept: NEPHROLOGY | Facility: CLINIC | Age: 47
End: 2022-08-23
Payer: COMMERCIAL

## 2022-08-23 DIAGNOSIS — I10 WHITE COAT SYNDROME WITH HYPERTENSION: Primary | ICD-10-CM

## 2022-08-23 PROCEDURE — 93784 AMBL BP MNTR W/SOFTWARE: CPT | Performed by: INTERNAL MEDICINE

## 2022-08-23 NOTE — PATIENT INSTRUCTIONS
24 HR ABPM returned in working condition will all equipment  Patient had no additional questions or concerns

## 2022-08-24 ENCOUNTER — TELEPHONE (OUTPATIENT)
Dept: UROLOGY | Facility: MEDICAL CENTER | Age: 47
End: 2022-08-24

## 2022-08-24 ENCOUNTER — TELEPHONE (OUTPATIENT)
Dept: NEPHROLOGY | Facility: CLINIC | Age: 47
End: 2022-08-24

## 2022-08-24 DIAGNOSIS — I12.9 BENIGN HYPERTENSION WITH CKD (CHRONIC KIDNEY DISEASE) STAGE III (HCC): ICD-10-CM

## 2022-08-24 DIAGNOSIS — N40.1 BPH WITH OBSTRUCTION/LOWER URINARY TRACT SYMPTOMS: Primary | ICD-10-CM

## 2022-08-24 DIAGNOSIS — N13.8 BPH WITH OBSTRUCTION/LOWER URINARY TRACT SYMPTOMS: Primary | ICD-10-CM

## 2022-08-24 DIAGNOSIS — N18.30 BENIGN HYPERTENSION WITH CKD (CHRONIC KIDNEY DISEASE) STAGE III (HCC): ICD-10-CM

## 2022-08-24 RX ORDER — ALFUZOSIN HYDROCHLORIDE 10 MG/1
10 TABLET, EXTENDED RELEASE ORAL DAILY
Qty: 90 TABLET | Refills: 3 | Status: SHIPPED | OUTPATIENT
Start: 2022-08-24

## 2022-08-24 RX ORDER — FELODIPINE 10 MG/1
10 TABLET, EXTENDED RELEASE ORAL DAILY
Qty: 30 TABLET | Refills: 5 | Status: SHIPPED | OUTPATIENT
Start: 2022-08-24

## 2022-08-24 NOTE — PROGRESS NOTES
24 hour ABPM shows:  24 hour average BP reading 136/89  Daytime average BP reading 137/96  Nighttime average /80  Nighttime MAP dipping 13%  Overall 92% successful readings

## 2022-08-24 NOTE — TELEPHONE ENCOUNTER
Patient seen by Chapito Ruffin at [facility]    Patient's wife called stating patient was seen two weeks ago and Chapito Ruffin was to sent medication to the pharmacy  She stated a medication was sent but she stated it was the wrong one  She would like to know how to proceed  He now has a 90 days supply of this medication  (tamsulosin)  She would like to have Chapito Ruffin check into it and send the medication he discussed with her   He has been waiting to start it for two weeks        She can be reached at [phone]

## 2022-08-24 NOTE — TELEPHONE ENCOUNTER
Per office visit notes on 8/11/22:  PLAN:   -I reviewed the options of management with the patient  -I recommended trial of an alpha-blocker  -will follow-up in 3 months with kidney and bladder ultrasound and PSA prior to visit  -we discussed cystoscopy transrectal ultrasound and uroflow necessary    Please review and advise

## 2022-08-25 NOTE — TELEPHONE ENCOUNTER
Left message with patient advising: Please let him know the 24 hour average BP is 133/89  Diastolic BP seems to be higher and above goal      Would like him to increase felodipine again from 5 mg to 10 mg daily

## 2022-09-01 DIAGNOSIS — R82.991 HYPOCITRATURIA: ICD-10-CM

## 2022-09-01 DIAGNOSIS — N20.0 NEPHROLITHIASIS: ICD-10-CM

## 2022-09-01 RX ORDER — POTASSIUM CITRATE 15 MEQ/1
2 TABLET, EXTENDED RELEASE ORAL 2 TIMES DAILY
Qty: 360 TABLET | Refills: 3 | Status: SHIPPED | OUTPATIENT
Start: 2022-09-01 | End: 2023-09-01

## 2022-09-12 ENCOUNTER — APPOINTMENT (OUTPATIENT)
Dept: LAB | Facility: CLINIC | Age: 47
End: 2022-09-12
Payer: COMMERCIAL

## 2022-09-12 DIAGNOSIS — N40.1 BPH WITH OBSTRUCTION/LOWER URINARY TRACT SYMPTOMS: ICD-10-CM

## 2022-09-12 DIAGNOSIS — Z00.8 HEALTH EXAMINATION IN POPULATION SURVEYS: ICD-10-CM

## 2022-09-12 DIAGNOSIS — N13.8 BPH WITH OBSTRUCTION/LOWER URINARY TRACT SYMPTOMS: ICD-10-CM

## 2022-09-12 LAB
CHOLEST SERPL-MCNC: 181 MG/DL
EST. AVERAGE GLUCOSE BLD GHB EST-MCNC: 126 MG/DL
HBA1C MFR BLD: 6 %
HDLC SERPL-MCNC: 64 MG/DL
LDLC SERPL CALC-MCNC: 86 MG/DL (ref 0–100)
NONHDLC SERPL-MCNC: 117 MG/DL
PSA SERPL-MCNC: 1.6 NG/ML (ref 0–4)
TRIGL SERPL-MCNC: 153 MG/DL

## 2022-09-12 PROCEDURE — 36415 COLL VENOUS BLD VENIPUNCTURE: CPT

## 2022-09-12 PROCEDURE — 83036 HEMOGLOBIN GLYCOSYLATED A1C: CPT

## 2022-09-12 PROCEDURE — 84153 ASSAY OF PSA TOTAL: CPT

## 2022-09-12 PROCEDURE — 80061 LIPID PANEL: CPT

## 2022-09-20 ENCOUNTER — APPOINTMENT (OUTPATIENT)
Dept: LAB | Facility: CLINIC | Age: 47
End: 2022-09-20
Payer: COMMERCIAL

## 2022-09-20 DIAGNOSIS — E24.9 CUSHING'S SYNDROME (HCC): ICD-10-CM

## 2022-09-20 DIAGNOSIS — Z00.8 HEALTH EXAMINATION IN POPULATION SURVEYS: ICD-10-CM

## 2022-09-20 PROCEDURE — 82530 CORTISOL FREE: CPT

## 2022-09-28 LAB
CORTIS F 24H UR-MRATE: 41 UG/24 HR (ref 5–64)
CORTIS F UR-MCNC: 34 UG/L

## 2022-10-12 PROBLEM — Z12.11 COLON CANCER SCREENING: Status: RESOLVED | Noted: 2018-11-06 | Resolved: 2022-10-12

## 2022-10-17 ENCOUNTER — HOSPITAL ENCOUNTER (OUTPATIENT)
Dept: RADIOLOGY | Age: 47
Discharge: HOME | End: 2022-10-17
Payer: COMMERCIAL

## 2022-10-17 DIAGNOSIS — N20.0 KIDNEY STONES: ICD-10-CM

## 2022-10-17 PROCEDURE — 76775 US EXAM ABDO BACK WALL LIM: CPT

## 2022-10-21 ENCOUNTER — TELEPHONE (OUTPATIENT)
Dept: UROLOGY | Facility: CLINIC | Age: 47
End: 2022-10-21

## 2022-10-31 DIAGNOSIS — K22.70 BARRETT'S ESOPHAGUS DETERMINED BY ENDOSCOPY: ICD-10-CM

## 2022-10-31 DIAGNOSIS — K21.00 GASTROESOPHAGEAL REFLUX DISEASE WITH ESOPHAGITIS: ICD-10-CM

## 2022-10-31 RX ORDER — OMEPRAZOLE 40 MG/1
40 CAPSULE, DELAYED RELEASE ORAL DAILY
Qty: 180 CAPSULE | Refills: 0 | Status: SHIPPED | OUTPATIENT
Start: 2022-10-31

## 2022-11-01 ENCOUNTER — OFFICE VISIT (OUTPATIENT)
Dept: UROLOGY | Facility: CLINIC | Age: 47
End: 2022-11-01

## 2022-11-01 VITALS
BODY MASS INDEX: 30.51 KG/M2 | DIASTOLIC BLOOD PRESSURE: 82 MMHG | SYSTOLIC BLOOD PRESSURE: 124 MMHG | HEIGHT: 69 IN | WEIGHT: 206 LBS

## 2022-11-01 DIAGNOSIS — N28.1 KIDNEY CYST, ACQUIRED: Primary | ICD-10-CM

## 2022-11-01 NOTE — PROGRESS NOTES
UROLOGY PROGRESS NOTE   Patient Identifiers: Carlyle Wahl (MRN [id number])  Date of Service: 11/1/2022    Subjective:    79-year-old man with history kidney stones not seen since 2018  I saw him in August because he was complaining of decreased urinary flow and nocturia  I put him on alfuzosin  Ultrasound showing questionable exophytic right lower pole exophytic nodular focus versus artifact  Small stable bilateral renal calculi  Voiding pattern is unchanged on alfuzosin  Reason for visit:  BPH and kidney stone follow-up      Objective:     VITALS:    There were no vitals filed for this visit    AUA SYMPTOM SCORE    Flowsheet Row Most Recent Value   AUA SYMPTOM SCORE    How often have you had a sensation of not emptying your bladder completely after you finished urinating? 2 (P)     How often have you had to urinate again less than two hours after you finished urinating? 2 (P)     How often have you found you stopped and started again several times when you urinate? 2 (P)     How often have you found it difficult to postpone urination? 2 (P)     How often have you had a weak urinary stream? 2 (P)     How often have you had to push or strain to begin urination? 2 (P)     How many times did you most typically get up to urinate from the time you went to bed at night until the time you got up in the morning? 2 (P)     Quality of Life: If you were to spend the rest of your life with your urinary condition just the way it is now, how would you feel about that? 3 (P)     AUA SYMPTOM SCORE 14 (P)             LABS:  Lab Results   Component Value Date    HGB 17 6 (H) 07/07/2022    HCT 52 9 (H) 07/07/2022    WBC 10 00 07/07/2022     07/07/2022   ]    Lab Results   Component Value Date     11/05/2014    K 3 9 07/07/2022     07/07/2022    CO2 27 07/07/2022    BUN 27 (H) 07/07/2022    CREATININE 1 43 (H) 07/07/2022    CALCIUM 9 3 07/07/2022    GLUCOSE 106 11/05/2014   ]        INPATIENT MEDS:    Current Outpatient Medications:   •  alfuzosin (UROXATRAL) 10 mg 24 hr tablet, Take 1 tablet (10 mg total) by mouth daily, Disp: 90 tablet, Rfl: 3  •  carvedilol (COREG) 25 mg tablet, Take 1 tablet (25 mg total) by mouth 2 (two) times a day with meals, Disp: 180 tablet, Rfl: 3  •  chlorthalidone 25 mg tablet, Take 1 tablet (25 mg total) by mouth daily, Disp: 90 tablet, Rfl: 3  •  Cholecalciferol (Vitamin D) 50 MCG (2000 UT) CAPS, Take 2 capsules (4,000 Units total) by mouth daily, Disp: 60 capsule, Rfl: 3  •  Epiduo Forte 0 3-2 5 % GEL, Apply topically daily   (Patient not taking: No sig reported), Disp: , Rfl:   •  eplerenone (INSPRA) 50 MG tablet, Take 1 tablet (50 mg total) by mouth 2 (two) times a day, Disp: 180 tablet, Rfl: 3  •  felodipine (PLENDIL) 10 MG 24 hr tablet, Take 1 tablet (10 mg total) by mouth daily, Disp: 30 tablet, Rfl: 5  •  omeprazole (PriLOSEC) 40 MG capsule, Take 1 capsule (40 mg total) by mouth daily, Disp: 180 capsule, Rfl: 0  •  Osilodrostat Phosphate (Isturisa) 1 MG TABS, Take 1 capsule by mouth 2 (two) times a day, Disp: , Rfl:   •  Potassium Citrate ER 15 MEQ (1620 MG) TBCR, Take 2 tablets by mouth 2 (two) times a day, Disp: 360 tablet, Rfl: 3  •  rosuvastatin (CRESTOR) 10 MG tablet, Take 10 mg by mouth every evening , Disp: , Rfl:   •  SYRINGE-NEEDLE, DISP, 3 ML 21G X 1-1/2" 3 ML MISC, For testerone injection, Disp: , Rfl:   •  testosterone cypionate (DEPO-TESTOSTERONE) 200 mg/mL SOLN, Inject 200 mg into a muscle every 14 (fourteen) days Due 12/5/21-Out needles - will take 12/8/21 , Disp: , Rfl:   •  torsemide (DEMADEX) 10 mg tablet, Take by mouth as needed Has Not Used in 8 months , Disp: , Rfl:       Physical Exam:   There were no vitals taken for this visit  GEN: no acute distress    RESP: breathing comfortably with no accessory muscle use    ABD: soft, non-tender, non-distended   INCISION:    EXT: no significant peripheral edema       RADIOLOGY:   RENAL ULTRASOUND   IMPRESSION:     1  Questionable exophytic right lower renal pole nodule versus artifact  Recommend contrast CT evaluation  2   Small nonobstructing renal calculi bilaterally  No hydronephrosis      Assessment:   1  BPH  2   Nephrolithiasis     Plan:   -his ultrasound does show the possibility of a renal nodule  -I have recommended a contrasted CT scan for better definition and I will call him with the results  -  -

## 2022-11-15 ENCOUNTER — APPOINTMENT (OUTPATIENT)
Dept: LAB | Facility: CLINIC | Age: 47
End: 2022-11-15

## 2022-11-15 DIAGNOSIS — I12.9 BENIGN HYPERTENSION WITH CKD (CHRONIC KIDNEY DISEASE) STAGE III (HCC): ICD-10-CM

## 2022-11-15 DIAGNOSIS — N28.1 KIDNEY CYST, ACQUIRED: ICD-10-CM

## 2022-11-15 DIAGNOSIS — N18.30 BENIGN HYPERTENSION WITH CKD (CHRONIC KIDNEY DISEASE) STAGE III (HCC): ICD-10-CM

## 2022-11-15 DIAGNOSIS — E55.9 VITAMIN D DEFICIENCY: ICD-10-CM

## 2022-11-15 DIAGNOSIS — N18.31 STAGE 3A CHRONIC KIDNEY DISEASE (HCC): ICD-10-CM

## 2022-11-15 LAB
25(OH)D3 SERPL-MCNC: 25.4 NG/ML (ref 30–100)
ANION GAP SERPL CALCULATED.3IONS-SCNC: 6 MMOL/L (ref 4–13)
BUN SERPL-MCNC: 27 MG/DL (ref 5–25)
CALCIUM SERPL-MCNC: 9.4 MG/DL (ref 8.3–10.1)
CHLORIDE SERPL-SCNC: 101 MMOL/L (ref 96–108)
CO2 SERPL-SCNC: 27 MMOL/L (ref 21–32)
CREAT SERPL-MCNC: 1.47 MG/DL (ref 0.6–1.3)
CREAT UR-MCNC: 193 MG/DL
GFR SERPL CREATININE-BSD FRML MDRD: 55 ML/MIN/1.73SQ M
GLUCOSE SERPL-MCNC: 120 MG/DL (ref 65–140)
MAGNESIUM SERPL-MCNC: 2 MG/DL (ref 1.6–2.6)
MICROALBUMIN UR-MCNC: 51.3 MG/L (ref 0–20)
MICROALBUMIN/CREAT 24H UR: 27 MG/G CREATININE (ref 0–30)
PHOSPHATE SERPL-MCNC: 2.5 MG/DL (ref 2.7–4.5)
POTASSIUM SERPL-SCNC: 4 MMOL/L (ref 3.5–5.3)
PTH-INTACT SERPL-MCNC: 32.4 PG/ML (ref 18.4–80.1)
SODIUM SERPL-SCNC: 134 MMOL/L (ref 135–147)

## 2022-11-18 ENCOUNTER — APPOINTMENT (OUTPATIENT)
Dept: LAB | Facility: CLINIC | Age: 47
End: 2022-11-18

## 2022-11-18 DIAGNOSIS — I12.9 BENIGN HYPERTENSION WITH CKD (CHRONIC KIDNEY DISEASE) STAGE III (HCC): ICD-10-CM

## 2022-11-18 DIAGNOSIS — N18.30 BENIGN HYPERTENSION WITH CKD (CHRONIC KIDNEY DISEASE) STAGE III (HCC): ICD-10-CM

## 2022-11-18 DIAGNOSIS — R82.991 HYPOCITRATURIA: ICD-10-CM

## 2022-11-18 DIAGNOSIS — N18.31 STAGE 3A CHRONIC KIDNEY DISEASE (HCC): ICD-10-CM

## 2022-11-18 DIAGNOSIS — N20.0 NEPHROLITHIASIS: ICD-10-CM

## 2022-11-22 ENCOUNTER — OFFICE VISIT (OUTPATIENT)
Dept: NEPHROLOGY | Facility: CLINIC | Age: 47
End: 2022-11-22

## 2022-11-22 VITALS
HEIGHT: 69 IN | SYSTOLIC BLOOD PRESSURE: 116 MMHG | BODY MASS INDEX: 30.21 KG/M2 | WEIGHT: 204 LBS | DIASTOLIC BLOOD PRESSURE: 78 MMHG

## 2022-11-22 DIAGNOSIS — I12.9 BENIGN HYPERTENSION WITH CKD (CHRONIC KIDNEY DISEASE) STAGE III (HCC): Primary | ICD-10-CM

## 2022-11-22 DIAGNOSIS — E83.39 HYPOPHOSPHATEMIA: ICD-10-CM

## 2022-11-22 DIAGNOSIS — N18.31 STAGE 3A CHRONIC KIDNEY DISEASE (HCC): ICD-10-CM

## 2022-11-22 DIAGNOSIS — N20.0 NEPHROLITHIASIS: ICD-10-CM

## 2022-11-22 DIAGNOSIS — N18.30 BENIGN HYPERTENSION WITH CKD (CHRONIC KIDNEY DISEASE) STAGE III (HCC): Primary | ICD-10-CM

## 2022-11-22 DIAGNOSIS — R82.991 HYPOCITRATURIA: ICD-10-CM

## 2022-11-22 DIAGNOSIS — E55.9 VITAMIN D DEFICIENCY: ICD-10-CM

## 2022-11-22 RX ORDER — ERGOCALCIFEROL 1.25 MG/1
50000 CAPSULE ORAL WEEKLY
Qty: 8 CAPSULE | Refills: 0 | Status: SHIPPED | OUTPATIENT
Start: 2022-11-22

## 2022-11-22 NOTE — PROGRESS NOTES
NEPHROLOGY OUTPATIENT PROGRESS NOTE   Jasbir Santiago 49 y o  male MRN: [id number]  DATE: 11/22/2022  Reason for visit:   Chief Complaint   Patient presents with   • Follow-up   • Chronic Kidney Disease     ASSESSMENT and PLAN:  Hypertension  -Likely thought to be essential in origin, another differential remains hypercortisolism vs  ?primary hyperaldosteronism  -Currently patient is on Coreg, eplerenone, felodipine and chlorthalidone   -home BP readings are generally 120s/80s  BP well controlled in the office today  24 hour ABPM shows:  24 hour average BP reading 136/89  Daytime average BP reading 137/96  Nighttime average /80  Nighttime MAP dipping 13%  Overall 92% successful readings  -he confirms that he was not taking eplerenone for couple weeks prior to doing ABPM   Now he is back on eplerenone    -current regimen includes Coreg 25 mg b i d , alfuzosin 10 mg daily, felodipine 10 mg daily, chlorthalidone 25 mg daily, eplerenone 50 mg b i d  -his home wrist BP cuff was compared with our office readings in the past which were identical   -plasma metanephrine and catecholamines are nonsignificant in December 2018   Repeat plasma normetanephrine slightly elevated 156 in May 2021  Unable to check serum aldosterone/PRA while being on eplerenone     -he is being followed by endocrine in [place] regarding adrenal nodule    -Patient was found to have high aldosterone level along with high aldosterone/renin ratio although patient was also taking eplerenone at that time and makes results unreliable    - Salt restricted diet     Recurrent nephrolithiasis  -patient had an episode of nephrolithiasis requiring ureteral stent placement with eventual removal in past   Patient says his stone composition was calcium stone although I do not have documentation   -advised to drink plenty of free water with goal urine output greater than 2 L per day   -last 24 hour urine stone risk profile in May 2022 shows persistently lower urine volume 1 1 L, urine calcium, urine sodium, oxalate acceptable  Urine citrate 51 remains lower, urine pH 5 6    -repeat stone profile currently pending   -advised to follow low salt diet   -continue chlorthalidone 25 mg daily  -continue potassium citrate to 30 mEq p o  b i d     -renal ultrasound in October 2022 shows bilateral small nonobstructing renal calculi  No hydro   -denies any recent kidney stone flare-up issues  Denies any flank pain or urinary complaint      Hypocitraturia  -management as above    Right lower renal pole nodule versus artifact, follows with Urology, will be getting CT scan in the future      Hypokalemia, resolved currently on potassium citrate as above       CKD stage IIIa, baseline Cr around 1 4 since early 2022, prior 1 1 to 1 4  -last creatinine  stable 1 4 in November 2022  -CKD suspect secondary to underlying hypertension  Some progression noted  -UA in July 2022 shows 1+ proteinuria, no significant RBCs, 4 to 10 WBCs, multiple hyaline casts   urine microalbumin/creatinine ratio nonsignificant in November 2022         Leg edema  -seems to be much improved   Currently remains on chlorthalidone      Hypophosphatemia, serum phosphorus borderline lower 2 5, recommended to increase phosphorus dietary intake  Will do phosphorus supplements b i d  for next seven days  Follow up repeat before next visit      Vitamin-D insufficiency, last vitamin-D level 25 in 11/2022  Will start vitamin-D 31660 units weekly for next two months and then continue maintenance dose 4000 units daily afterwards  Repeat level before next visit     Bilateral adrenal nodules, overall unchanged comparing to prior studies based on most recent CT scan in June 2021   -closely follows with Endocrine at [facility]  -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  -prior discussion noted for eventual surgical adrenalectomy      Diagnoses and all orders for this visit:    Vitamin D deficiency  -     ergocalciferol (VITAMIN D2) 50,000 units; Take 1 capsule (50,000 Units total) by mouth once a week  -     Vitamin D 25 hydroxy; Future    Hypophosphatemia  -     potassium-sodium phosphates (PHOS-NAK) 280 mg (P)-160 mg (Na)-250 mg (K) packet; Take 1 packet by mouth 2 (two) times a day with meals  -     Phosphorus; Future    Stage 3a chronic kidney disease (HCC)  -     Basic metabolic panel; Future  -     CBC; Future  -     Phosphorus; Future  -     PTH, intact; Future  -     Magnesium; Future  -     Microalbumin / creatinine urine ratio; Future  -     Vitamin D 25 hydroxy; Future    Benign hypertension with CKD (chronic kidney disease) stage III ([facility])  -     Basic metabolic panel; Future  -     CBC; Future  -     Phosphorus; Future  -     PTH, intact; Future  -     Magnesium; Future  -     Microalbumin / creatinine urine ratio; Future  -     Vitamin D 25 hydroxy; Future    Hypocitraturia    Nephrolithiasis  -     Basic metabolic panel; Future  -     CBC; Future  -     Phosphorus; Future  -     PTH, intact; Future  -     Vitamin D 25 hydroxy; Future          SUBJECTIVE / HPI:  Patient is 58-year-old male with significant medical issues of hypertension diagnosed early in the age, bilateral adrenal nodule, history of nephrolithiasis with history of requiring ureteral stent placement with eventual removal, history of lithotripsy, sleep apnea, comes for regular follow-up of hypertension and nephrolithiasis  Baseline serum creatinine around 1 4 since early 2022    closely follows with Endocrine at [facility]  Continue to remain on osilodrostat  He was not taking eplerenone for couple weeks prior to doing ABPM   Overall blood pressure better controlled at home  He feels well  He denies any hematuria, no dysuria  No flank or abdominal pain  No fever or chills  No recent kidney stone flare-up issues  No recent NSAID exposure   He tries to be compliant with salt restricted diet      REVIEW OF SYSTEMS:  More than 10 point review of systems were obtained and discussed in length with the patient  Complete review of systems were negative / unremarkable except mentioned above  PHYSICAL EXAM:  Vitals:    11/22/22 1111 11/22/22 1135   BP: 110/78 116/78   BP Location: Left arm    Patient Position: Sitting    Cuff Size: Large    Weight: 92 5 kg (204 lb)    Height: 5' 9" (1 753 m)      Body mass index is 30 13 kg/m²  Physical Exam  Vitals reviewed  Constitutional:       Appearance: He is well-developed and well-nourished  HENT:      Head: Normocephalic and atraumatic  Right Ear: External ear normal       Left Ear: External ear normal    Eyes:      General: No scleral icterus  Extraocular Movements: EOM normal       Conjunctiva/sclera: Conjunctivae normal    Cardiovascular:      Comments: S1, S2 present  Pulmonary:      Effort: Pulmonary effort is normal  No respiratory distress  Breath sounds: Normal breath sounds  No wheezing or rales  Abdominal:      General: Bowel sounds are normal  There is no distension  Palpations: Abdomen is soft  Tenderness: There is no abdominal tenderness  Musculoskeletal:         General: No deformity or edema  Lymphadenopathy:      Cervical: No cervical adenopathy  Skin:     Findings: No rash  Neurological:      Mental Status: He is alert and oriented to person, place, and time     Psychiatric:         Mood and Affect: Mood and affect normal          Behavior: Behavior normal          PAST MEDICAL HISTORY:  Past Medical History:   Diagnosis Date   • Adrenal mass ([address] )    • Chest pain     "long ago stress related to family issue seen ER and cleared"   • Chronic kidney disease    • Chronic pain disorder     right hip   • Claustrophobia    • Colon polyp    • CPAP (continuous positive airway pressure) dependence    • Dental crowns present    • Disease of thyroid gland     nodule sees Dr Maldonado/endocrinologist and Dr Mohini Rossi([facility])   • Exercise involving weight training     1-2x/week   • GERD (gastroesophageal reflux disease)    • Hyperlipidemia    • Hypertension    • Kidney stone    • Kidney stones    • Low testosterone    • Motion sickness    • Right hip pain    • Sleep apnea     not currently using his CPAP       PAST SURGICAL HISTORY:  Past Surgical History:   Procedure Laterality Date   • COLONOSCOPY     • CYSTOSCOPY     • EXTRACORPOREAL SHOCK WAVE LITHOTRIPSY Left    • FL INJECTION RIGHT HIP (ARTHROGRAM)  3/31/2021    pt cant recall this   • FL RETROGRADE PYELOGRAM  2018   • KIDNEY STONE SURGERY     • OK ARTHROSCOPY HIP W/LABRAL REPAIR Right 2021    Procedure: ARTHROSCOPY HIP with labral debridement: femoroplasty;  Surgeon: Andres Nevarez MD;  Location: [facility];  Service: Orthopedics   • OK CYSTO/URETERO W/LITHOTRIPSY &INDWELL STENT INSRT Left 2018    Procedure: CYSTOSCOPY URETEROSCOPY, RETROGRADE PYELOGRAM AND INSERTION STENT URETERAL;  Surgeon: Radha Gonzalez MD;  [facility];  Service: Urology   • OK ESOPHAGOGASTRODUODENOSCOPY TRANSORAL DIAGNOSTIC N/A 2018    Procedure: ESOPHAGOGASTRODUODENOSCOPY (EGD); Surgeon: Darren Leigh MD;  [facility];   Service: Gastroenterology   • OK KNEE SCOPE,MED/LAT MENISECTOMY Right 2021    Procedure: ARTHROSCOPY KNEE, partial medial meniscectomy;  Surgeon: Andres Nevarez MD;  [facility];  Service: Orthopedics   • WISDOM TOOTH EXTRACTION         SOCIAL HISTORY:  Social History     Substance and Sexual Activity   Alcohol Use Not Currently    Comment: Rare      Social History     Substance and Sexual Activity   Drug Use No     Social History     Tobacco Use   Smoking Status Former   • Packs/day: 1 00   • Years: 17 00   • Pack years: 17 00   • Types: Cigarettes   • Quit date:    • Years since quittin 9   Smokeless Tobacco Never       FAMILY HISTORY:  Family History   Problem Relation Age of Onset   • Asthma Mother    • Diabetes Mother    • Other Father Endocarditis   • Hypertension Father    • Gout Father        MEDICATIONS:    Current Outpatient Medications:   •  alfuzosin (UROXATRAL) 10 mg 24 hr tablet, Take 1 tablet (10 mg total) by mouth daily, Disp: 90 tablet, Rfl: 3  •  carvedilol (COREG) 25 mg tablet, Take 1 tablet (25 mg total) by mouth 2 (two) times a day with meals, Disp: 180 tablet, Rfl: 3  •  chlorthalidone 25 mg tablet, Take 1 tablet (25 mg total) by mouth daily, Disp: 90 tablet, Rfl: 3  •  eplerenone (INSPRA) 50 MG tablet, Take 1 tablet (50 mg total) by mouth 2 (two) times a day, Disp: 180 tablet, Rfl: 3  •  ergocalciferol (VITAMIN D2) 50,000 units, Take 1 capsule (50,000 Units total) by mouth once a week, Disp: 8 capsule, Rfl: 0  •  felodipine (PLENDIL) 10 MG 24 hr tablet, Take 1 tablet (10 mg total) by mouth daily, Disp: 30 tablet, Rfl: 5  •  omeprazole (PriLOSEC) 40 MG capsule, Take 1 capsule (40 mg total) by mouth daily, Disp: 180 capsule, Rfl: 0  •  Osilodrostat Phosphate (Isturisa) 1 MG TABS, Take 1 capsule by mouth 2 (two) times a day, Disp: , Rfl:   •  Potassium Citrate ER 15 MEQ (1620 MG) TBCR, Take 2 tablets by mouth 2 (two) times a day, Disp: 360 tablet, Rfl: 3  •  potassium-sodium phosphates (PHOS-NAK) 280 mg (P)-160 mg (Na)-250 mg (K) packet, Take 1 packet by mouth 2 (two) times a day with meals, Disp: 14 packet, Rfl: 0  •  rosuvastatin (CRESTOR) 10 MG tablet, Take 10 mg by mouth every evening , Disp: , Rfl:   •  SYRINGE-NEEDLE, DISP, 3 ML 21G X 1-1/2" 3 ML MISC, For testerone injection, Disp: , Rfl:   •  testosterone cypionate (DEPO-TESTOSTERONE) 200 mg/mL SOLN, Inject 200 mg into a muscle every 14 (fourteen) days Due 12/5/21-Out needles - will take 12/8/21 , Disp: , Rfl:   •  torsemide (DEMADEX) 10 mg tablet, Take by mouth as needed Has Not Used in 8 months , Disp: , Rfl:   •  Epiduo Forte 0 3-2 5 % GEL, Apply topically daily   (Patient not taking: Reported on 7/14/2022), Disp: , Rfl:     Lab Results:   Results for orders placed or performed in visit on 69/22/87   Basic metabolic panel   Result Value Ref Range    Sodium 134 (L) 135 - 147 mmol/L    Potassium 4 0 3 5 - 5 3 mmol/L    Chloride 101 96 - 108 mmol/L    CO2 27 21 - 32 mmol/L    ANION GAP 6 4 - 13 mmol/L    BUN 27 (H) 5 - 25 mg/dL    Creatinine 1 47 (H) 0 60 - 1 30 mg/dL    Glucose 120 65 - 140 mg/dL    Calcium 9 4 8 3 - 10 1 mg/dL    eGFR 55 ml/min/1 73sq m   Magnesium   Result Value Ref Range    Magnesium 2 0 1 6 - 2 6 mg/dL   PTH, intact   Result Value Ref Range    PTH 32 4 18 4 - 80 1 pg/mL   Phosphorus   Result Value Ref Range    Phosphorus 2 5 (L) 2 7 - 4 5 mg/dL   Microalbumin / creatinine urine ratio   Result Value Ref Range    Creatinine, Ur 193 0 mg/dL    Microalbum  ,U,Random 51 3 (H) 0 0 - 20 0 mg/L    Microalb Creat Ratio 27 0 - 30 mg/g creatinine   Vitamin D 25 hydroxy   Result Value Ref Range    Vit D, 25-Hydroxy 25 4 (L) 30 0 - 100 0 ng/mL

## 2022-11-23 ENCOUNTER — DOCUMENTATION (OUTPATIENT)
Dept: NEPHROLOGY | Facility: CLINIC | Age: 47
End: 2022-11-23

## 2022-12-02 ENCOUNTER — TELEPHONE (OUTPATIENT)
Dept: OTHER | Facility: HOSPITAL | Age: 47
End: 2022-12-02

## 2022-12-02 LAB
AMMONIA 24H UR-MRATE: 15 MEQ/24 HR
AMMONIA UR-SCNC: ABNORMAL UG/DL
CA H2 PHOS DIHYD CRY URNS QL MICRO: 4.11 RATIO (ref 0–3)
CALCIUM 24H UR-MCNC: 10.2 MG/DL
CALCIUM 24H UR-MRATE: 183.6 MG/24 HR (ref 0–320)
CHLORIDE 24H UR-SCNC: 121 MMOL/L
CHLORIDE 24H UR-SRATE: 218 MMOL/24 HR (ref 52–264)
CITRATE 24H UR-MCNC: 97 MG/L
CITRATE 24H UR-MRATE: 175 MG/24 HR (ref 320–1240)
COM CRY STONE QL IR: 2.14 RATIO (ref 0–6)
CREAT 24H UR-MCNC: 91.4 MG/DL
CREAT 24H UR-MRATE: 1645.2 MG/24 HR (ref 1000–2000)
CYSTINE 24H UR-MCNC: 6.52 MG/L
CYSTINE 24H UR-MRATE: 11.74 MG/24 HR (ref 2.1–58)
MAGNESIUM 24H UR-MRATE: 139 MG/24 HR (ref 12–293)
MAGNESIUM UR-MCNC: 7.7 MG/DL
NA URATE CRY STONE QL IR: 7.5 RATIO (ref 0–4)
OSMOLALITY UR: 647 MOSMOL/KG (ref 300–900)
OXALATE 24H UR-MRATE: 16 MG/24 HR (ref 7–44)
OXALATE UR-MCNC: 9 MG/L
PH 24H UR: 6.9 [PH] (ref 4.5–8)
PHOSPHATE 24H UR-MCNC: 63.2 MG/DL
PHOSPHATE 24H UR-MRATE: 1137.6 MG/24 HR (ref 390–1425)
PLEASE NOTE (STONE RISK): ABNORMAL
POTASSIUM 24H UR-SCNC: 93.6 MMOL/24 HR (ref 20–116)
POTASSIUM UR-SCNC: 52 MMOL/L
PRESERVED URINE: 1800 ML/24 HR (ref 800–1800)
SODIUM 24H UR-SCNC: 146 MMOL/L
SODIUM 24H UR-SRATE: 263 MMOL/24 HR (ref 58–337)
SPECIMEN VOL 24H UR: 1800 ML/24 HR (ref 800–1800)
SULFATE 24H UR-MCNC: 38 MEQ/24 HR (ref 0–30)
SULFATE UR-MCNC: 21 MEQ/L
TRI-PHOS CRY STONE MICRO: 0.26 RATIO (ref 0–1)
URATE 24H UR-MCNC: 43.4 MG/DL
URATE 24H UR-MRATE: 781 MG/24 HR (ref 197–1079)
URATE DIHYD CRY STONE QL IR: 0.26 RATIO (ref 0–1.2)

## 2022-12-02 NOTE — TELEPHONE ENCOUNTER
24 hour stone risk profile shows improving urine volume 1 8 L but still remains below goal   He needs to still drink plenty of free water with goal urine output greater than 2 L per day  Urine calcium, oxalate acceptable  Urine pH improving 6 9  Urine sodium 263 and urine sulfate both are elevated    Continue current potassium citrate as urine citrate is slowly improving although still below goal     He needs to follow strict low-salt diet less than 2 g per day and also needs to follow restriction on animal protein intake

## 2022-12-08 ENCOUNTER — TELEPHONE (OUTPATIENT)
Dept: NEPHROLOGY | Facility: CLINIC | Age: 47
End: 2022-12-08

## 2023-02-06 ENCOUNTER — HOSPITAL ENCOUNTER (OUTPATIENT)
Dept: RADIOLOGY | Age: 48
Discharge: HOME | End: 2023-02-06

## 2023-02-06 DIAGNOSIS — N28.1 KIDNEY CYST, ACQUIRED: ICD-10-CM

## 2023-02-06 RX ADMIN — IOHEXOL 100 ML: 350 INJECTION, SOLUTION INTRAVENOUS at 10:34

## 2023-02-23 ENCOUNTER — ESTABLISHED COMPREHENSIVE EXAM (OUTPATIENT)
Dept: URBAN - METROPOLITAN AREA CLINIC 6 | Facility: CLINIC | Age: 48
End: 2023-02-23

## 2023-02-23 DIAGNOSIS — H25.013: ICD-10-CM

## 2023-02-23 PROCEDURE — 92015 DETERMINE REFRACTIVE STATE: CPT

## 2023-02-23 PROCEDURE — 92014 COMPRE OPH EXAM EST PT 1/>: CPT

## 2023-02-23 ASSESSMENT — VISUAL ACUITY
OS_PH: 20/25
OD_CC: 20/60
OU_CC: J1
OD_PH: 20/30-1
OS_CC: 20/40

## 2023-02-23 ASSESSMENT — TONOMETRY
OS_IOP_MMHG: 9
OD_IOP_MMHG: 9

## 2023-04-06 ENCOUNTER — APPOINTMENT (OUTPATIENT)
Dept: LAB | Facility: CLINIC | Age: 48
End: 2023-04-06

## 2023-04-06 DIAGNOSIS — E27.8 ABNORMALITY OF CORTISOL-BINDING GLOBULIN (HCC): ICD-10-CM

## 2023-04-06 DIAGNOSIS — R68.82 DECREASED LIBIDO: ICD-10-CM

## 2023-04-06 LAB
BASOPHILS # BLD AUTO: 0.1 THOUSANDS/ÂΜL (ref 0–0.1)
BASOPHILS NFR BLD AUTO: 1 % (ref 0–1)
CORTIS SERPL-MCNC: 19.1 UG/DL
EOSINOPHIL # BLD AUTO: 0.1 THOUSAND/ÂΜL (ref 0–0.61)
EOSINOPHIL NFR BLD AUTO: 1 % (ref 0–6)
ERYTHROCYTE [DISTWIDTH] IN BLOOD BY AUTOMATED COUNT: 13.5 % (ref 11.6–15.1)
HCT VFR BLD AUTO: 46.2 % (ref 36.5–49.3)
HGB BLD-MCNC: 15.3 G/DL (ref 12–17)
IMM GRANULOCYTES # BLD AUTO: 0.17 THOUSAND/UL (ref 0–0.2)
IMM GRANULOCYTES NFR BLD AUTO: 2 % (ref 0–2)
LYMPHOCYTES # BLD AUTO: 1.58 THOUSANDS/ÂΜL (ref 0.6–4.47)
LYMPHOCYTES NFR BLD AUTO: 15 % (ref 14–44)
MCH RBC QN AUTO: 29.7 PG (ref 26.8–34.3)
MCHC RBC AUTO-ENTMCNC: 33.1 G/DL (ref 31.4–37.4)
MCV RBC AUTO: 90 FL (ref 82–98)
MONOCYTES # BLD AUTO: 1.1 THOUSAND/ÂΜL (ref 0.17–1.22)
MONOCYTES NFR BLD AUTO: 11 % (ref 4–12)
NEUTROPHILS # BLD AUTO: 7.21 THOUSANDS/ÂΜL (ref 1.85–7.62)
NEUTS SEG NFR BLD AUTO: 70 % (ref 43–75)
NRBC BLD AUTO-RTO: 0 /100 WBCS
PLATELET # BLD AUTO: 412 THOUSANDS/UL (ref 149–390)
PMV BLD AUTO: 8.8 FL (ref 8.9–12.7)
RBC # BLD AUTO: 5.16 MILLION/UL (ref 3.88–5.62)
WBC # BLD AUTO: 10.26 THOUSAND/UL (ref 4.31–10.16)

## 2023-04-07 LAB
TESTOST FREE SERPL-MCNC: 21.6 PG/ML (ref 6.8–21.5)
TESTOST SERPL-MCNC: 569 NG/DL (ref 264–916)

## 2023-04-14 NOTE — CONSULTS
Chief Complaint  Chief Complaint Free Text Note Form: Patient is here today for MWM Consultation  Ted Carlotajennifer Jeff: 6/8  History of Present Illness  Free Text HPI: Obesity-  Severity: Mild  Onset: Many years  Modifiers: Very poor eating habits and inconsistent exercise  Associated Symptoms: Multiple comorbid conditions  Past Medical History  Active Problems And Past Medical History Reviewed: The active problems and past medical history were reviewed and updated today  Assessment    1  Weight gain (783 1) (R63 5)   2  CKD (chronic kidney disease), stage III (585 3) (N18 3)   3  Benign essential hypertension (401 1) (I10)   4  Nephrolithiasis (592 0) (N20 0)   5  Obstructive sleep apnea (327 23) (G47 33)   6  GERD (gastroesophageal reflux disease) (530 81) (K21 9)   7  Hyperlipidemia (272 4) (E78 5)   8  Obesity (BMI 30 0-34 9) (278 00) (E66 9)   9  Low testosterone (257 2) (E29 1)   10  Thyroid disease (246 9) (E07 9)   11  Impaired fasting glucose (790 21) (R73 01)    Discussion/Summary  Discussion Summary:   35 yo male w/ hypertension, CKD II/IIIa, hyperlipidemia, GERD, MED, impaired fasting glucose, nephrolithiasis, low T bilateral adrenal nodules and obesity here to pursue medical weight management to improve weight and health  Obesity class 1:  -discussed options of conservative vs HELLEN program +/- meal replacement vs VLCD  -initial focus of 5-10% weight loss over 3-6 mos for improved health  -screening labs-BMP completed 1/2017 and within acceptable limits    IFG:  -a1c 6 3%  -may consider metformin    b/l adrenal nodules/abnormal ft4:  -follows w/ endocrinology    HTN:  -on coreg, eplerenone, felodipine, and torsemide  -hold torsemide, pt to do a VLCD/ketogenic diet which will promote diuresis   pt to monitor BP at home    GERD:  -on PPI    HPL:  -on statin    low T:  -on androgel    History of kidney stones/CKD II/IIIa:  -encourage hydration, no protein restriction per guidelines    MED:  -pt does not use CPAP  -Advised on risks of untreated sleep apnea and how it may relate to difficulty with losing weight  -Since patient will start on aggressive weight loss regimen will defer sleep medicine evaluation for now    Pt interested in starting VLCD and then transitioning into LCD, ND program would be difficult due to his schedule  Goals and Barriers: The patient has the current Goals: Stop torsemide while on VLCD, measure BP daily  if >140/90 persistently call office or call nephrology office for further guidance on BP management  Patient's Capacity to Self-Care: Patient is able to Self-Care  Bariatric Medicine Diagnostic Constellation:   Patient Discussion: 45 minute visit, greater than half of the time was spent on counseling  Counseling spent on the importance and medical significance of 5-10% weight loss  Counseled patient on nonsurgical interventions for weight loss including new direction program-intensive lifestyle intervention program, very low calorie diet program and conservative program  Discussed the role of weight loss medications  Discussed the physiology of a ketogenic/very low calorie diet and the need for appropriate BP monitoring   Understands and agrees with treatment plan: The treatment plan was reviewed with the patient/guardian  The patient/guardian understands and agrees with the treatment plan   Self Referrals:   Self Referrals: Yes [employer] Employee      Active Problems    1  Abnormal EKG (794 31) (R94 31)   2  Atypical chest pain (786 59) (R07 89)   3  Benign essential hypertension (401 1) (I10)   4  CKD (chronic kidney disease), stage III (585 3) (N18 3)   5  Nephrolithiasis (592 0) (N20 0)    Surgical History  Surgical History Reviewed: The surgical history was reviewed and updated today  Family History  Family History Reviewed: The family history was reviewed and updated today         Social History    · Being A Social Drinker   · Daily caffeine consumption, 4-5 servings a day   · Former smoker (C13 48) (T53 444)   · Denied: History of Never Used Drugs  Social History Reviewed: The social history was reviewed and updated today  Current Meds   1  AndroGel Pump 20 25 MG/ACT (1 62%) Transdermal Gel; APPLY ONE PUMP PRESS   ONE TIME DAILY AS DIRECTED; Therapy: (Recorded:39Zpi5253) to Recorded   2  Atorvastatin Calcium 20 MG Oral Tablet; Take 1 tablet daily; Therapy: 84YAX7776 to Recorded   3  Carvedilol 12 5 MG Oral Tablet; TAKE ONE TABLET BY MOUTH EVERY DAY IN THE   MORNING AND TAKE 2 TABLETS IN THE EVENING; Therapy: 13JJH2233 to  Requested for: 02TKW5153 Recorded   4  Eplerenone 50 MG Oral Tablet; TAKE ONE TABLET BY MOUTH ONCE DAILY; Therapy: 20NWZ7227 to (Evaluate:96Cfu0001)  Requested for: 33Axt9692; Last   Rx:[id number] Ordered   5  Felodipine ER 10 MG Oral Tablet Extended Release 24 Hour; take 1 tablet by mouth one   time daily; Therapy: 99DYI7201 to (Evaluate:56Hpt2871)  Requested for: 34Mfy3136; Last   Rx:[id number] Ordered   6  Omeprazole 20 MG Oral Capsule Delayed Release; TAKE 1 CAPSULE TWICE DAILY; Therapy: (Joanna Williamson) to Recorded   7  Torsemide 10 MG Oral Tablet; take 1 tablet by mouth one time daily; Therapy: 79XWH3458 to (Sonia Kaur)  Requested for: 60PVK3192; Last   Rx:[id number] Ordered   8  Vitamin D 2000 UNIT Oral Tablet; Take 1 tablet daily Recorded  Medication List Reviewed: The medication list was reviewed and updated today  Allergies    1  No Known Drug Allergies    Vitals  Vital Signs    Recorded: 42AML6507 09:43AM   Temperature 97 6 F, Tympanic    Heart Rate 72, L Radial    Pulse Quality Norm    Systolic 734, LUE, Sitting    Diastolic 80, LUE, Sitting    Height 5 ft 8 5 in    Weight 217 lb 11 2 oz    BMI Calculated 32 62    BSA Calculated 2 12    Waist Circumference  43 5 in   Neck Circumference  18 in     Physical Exam    Constitutional   General appearance: Abnormal   well developed and obese     Eyes No conjunctival pallor  Ears, Nose, Mouth, and Throat Moist oral mucosa  Pulmonary   Respiratory effort: No increased work of breathing or signs of respiratory distress  Auscultation of lungs: Clear to auscultation, equal breath sounds bilaterally, no wheezes, no rales, no rhonci  Cardiovascular   Auscultation of heart: Normal rate and rhythm, normal S1 and S2, without murmurs  Abdomen   Abdomen: Abnormal   The abdomen was obese  The abdomen was soft and nontender  Musculoskeletal   Gait and station: Normal     Psychiatric   Orientation to person, place and time: Normal     Mood and affect: Normal          Results/Data  STOP BANG Questionnaire [id number] 09:55AM User, Ahs     Test Name Result Flag Reference   STOP BANG Questionnaire Risk of MED High Risk     Snoring: Yes  Tired: Yes  Observed: Yes  Blood Pressure: Yes  BMI: No  Age: No  Neck Circumference: Yes  Gender: Yes   STOP BANG Questionnaire MED Total Score 6     Snoring: Yes  Tired: Yes  Observed: Yes  Blood Pressure: Yes  BMI: No  Age: No  Neck Circumference: Yes  Gender: Yes       Future Appointments    Date/Time Provider Specialty Site   03/08/2017 08:00 AM Jhon Keller  [facility]   02/22/2017 08:30 AM Cheryl Brown [address] [facility]   03/22/2017 09:15 AM JONNA Suarez   Bariatric Medicine [facility]     Signatures   Electronically signed by : JONNA Longoria ; Feb 8 2017  2:25PM EST                       (Author)    Electronically signed by : JONNA Longoria ; Feb 8 2017  2:32PM EST                       (Author) Admission

## 2023-05-04 ENCOUNTER — APPOINTMENT (OUTPATIENT)
Dept: LAB | Facility: CLINIC | Age: 48
End: 2023-05-04

## 2023-05-04 DIAGNOSIS — E55.9 VITAMIN D DEFICIENCY: ICD-10-CM

## 2023-05-04 DIAGNOSIS — N18.31 STAGE 3A CHRONIC KIDNEY DISEASE (HCC): ICD-10-CM

## 2023-05-04 DIAGNOSIS — N20.0 NEPHROLITHIASIS: ICD-10-CM

## 2023-05-04 DIAGNOSIS — N18.30 BENIGN HYPERTENSION WITH CKD (CHRONIC KIDNEY DISEASE) STAGE III (HCC): ICD-10-CM

## 2023-05-04 DIAGNOSIS — I12.9 BENIGN HYPERTENSION WITH CKD (CHRONIC KIDNEY DISEASE) STAGE III (HCC): ICD-10-CM

## 2023-05-04 DIAGNOSIS — E83.39 HYPOPHOSPHATEMIA: ICD-10-CM

## 2023-05-04 LAB
25(OH)D3 SERPL-MCNC: 28.2 NG/ML (ref 30–100)
ANION GAP SERPL CALCULATED.3IONS-SCNC: 4 MMOL/L (ref 4–13)
BUN SERPL-MCNC: 33 MG/DL (ref 5–25)
CALCIUM SERPL-MCNC: 10 MG/DL (ref 8.3–10.1)
CHLORIDE SERPL-SCNC: 104 MMOL/L (ref 96–108)
CO2 SERPL-SCNC: 26 MMOL/L (ref 21–32)
CREAT SERPL-MCNC: 1.31 MG/DL (ref 0.6–1.3)
CREAT UR-MCNC: 144 MG/DL
ERYTHROCYTE [DISTWIDTH] IN BLOOD BY AUTOMATED COUNT: 12.6 % (ref 11.6–15.1)
GFR SERPL CREATININE-BSD FRML MDRD: 63 ML/MIN/1.73SQ M
GLUCOSE SERPL-MCNC: 128 MG/DL (ref 65–140)
HCT VFR BLD AUTO: 51.5 % (ref 36.5–49.3)
HGB BLD-MCNC: 16.6 G/DL (ref 12–17)
MAGNESIUM SERPL-MCNC: 2.3 MG/DL (ref 1.6–2.6)
MCH RBC QN AUTO: 29.8 PG (ref 26.8–34.3)
MCHC RBC AUTO-ENTMCNC: 32.2 G/DL (ref 31.4–37.4)
MCV RBC AUTO: 93 FL (ref 82–98)
MICROALBUMIN UR-MCNC: 73.5 MG/L (ref 0–20)
MICROALBUMIN/CREAT 24H UR: 51 MG/G CREATININE (ref 0–30)
PHOSPHATE SERPL-MCNC: 2.7 MG/DL (ref 2.7–4.5)
PLATELET # BLD AUTO: 292 THOUSANDS/UL (ref 149–390)
PMV BLD AUTO: 8.7 FL (ref 8.9–12.7)
POTASSIUM SERPL-SCNC: 4.5 MMOL/L (ref 3.5–5.3)
PTH-INTACT SERPL-MCNC: 45 PG/ML (ref 18.4–80.1)
RBC # BLD AUTO: 5.57 MILLION/UL (ref 3.88–5.62)
SODIUM SERPL-SCNC: 134 MMOL/L (ref 135–147)
WBC # BLD AUTO: 10.41 THOUSAND/UL (ref 4.31–10.16)

## 2023-05-11 ENCOUNTER — OFFICE VISIT (OUTPATIENT)
Dept: NEPHROLOGY | Facility: CLINIC | Age: 48
End: 2023-05-11

## 2023-05-11 VITALS
BODY MASS INDEX: 28.14 KG/M2 | DIASTOLIC BLOOD PRESSURE: 72 MMHG | SYSTOLIC BLOOD PRESSURE: 112 MMHG | WEIGHT: 190 LBS | HEART RATE: 75 BPM | HEIGHT: 69 IN | OXYGEN SATURATION: 98 %

## 2023-05-11 DIAGNOSIS — I12.9 BENIGN HYPERTENSION WITH CKD (CHRONIC KIDNEY DISEASE) STAGE III (HCC): Primary | ICD-10-CM

## 2023-05-11 DIAGNOSIS — R82.991 HYPOCITRATURIA: ICD-10-CM

## 2023-05-11 DIAGNOSIS — I10 BENIGN ESSENTIAL HYPERTENSION: ICD-10-CM

## 2023-05-11 DIAGNOSIS — N20.0 NEPHROLITHIASIS: ICD-10-CM

## 2023-05-11 DIAGNOSIS — N18.31 STAGE 3A CHRONIC KIDNEY DISEASE (HCC): ICD-10-CM

## 2023-05-11 DIAGNOSIS — E55.9 VITAMIN D DEFICIENCY: ICD-10-CM

## 2023-05-11 DIAGNOSIS — N18.30 BENIGN HYPERTENSION WITH CKD (CHRONIC KIDNEY DISEASE) STAGE III (HCC): Primary | ICD-10-CM

## 2023-05-11 RX ORDER — CHLORTHALIDONE 25 MG/1
12.5 TABLET ORAL DAILY
Qty: 90 TABLET | Refills: 3 | Status: SHIPPED | OUTPATIENT
Start: 2023-05-11 | End: 2023-05-18 | Stop reason: SDUPTHER

## 2023-05-11 NOTE — PROGRESS NOTES
NEPHROLOGY OUTPATIENT PROGRESS NOTE   Allen Us 51 y o  male MRN: [id number]  DATE: 5/11/2023  Reason for visit:   Chief Complaint   Patient presents with   • Follow-up   • Chronic Kidney Disease     ASSESSMENT and PLAN:  Hypertension  -Likely thought to be essential in origin, another differential remains hypercortisolism vs ?primary hyperaldosteronism  -home BP readings are generally 120s/80s   BP well controlled in the office today  24 hour ABPM shows:  24 hour average BP reading 136/89  Daytime average BP reading 137/96  Nighttime average /80  Nighttime MAP dipping 13%  Overall 92% successful readings   -He has been having off-and-on lightheadedness episodes in the last few months  Orthostatic vitals negative when personally checked in the office today  -current regimen includes Coreg 25 mg b i d , alfuzosin 10 mg daily, felodipine 10 mg daily, eplerenone 50 mg b i d ; due to his symptoms of feeling lightheaded, will decrease chlorthalidone from 25 mg to 12 5 mg daily  -his home wrist BP cuff was compared with our office readings in the past which were identical   -plasma metanephrine and catecholamines are nonsignificant in December 2018   Repeat plasma normetanephrine slightly elevated 156 in May 2021  Unable to check serum aldosterone/PRA while being on eplerenone     -he is being followed by endocrine in [place] regarding adrenal nodule    -Patient was found to have high aldosterone level along with high aldosterone/renin ratio although patient was also taking eplerenone at that time and makes results unreliable  - Salt restricted diet     Recurrent nephrolithiasis  -patient had an episode of nephrolithiasis requiring ureteral stent placement with eventual removal in past    -Denies any recent kidney stone flareup  Denies any flank or back pain issues  No gross materia  Stone analysis: Patient says his stone composition was calcium stone although I do not have documentation    Imaging: CT scan in February 2023 shows no stone  Stone risk profile in November 2022 shows low urine volume 1 8 L, urine oxalate, calcium acceptable  Urine pH 6 9, urine sodium elevated 263, high urine sulfate  Low urine citrate    -Repeat stone risk profile now  Work up/labs:   Treatment :  -Reducing chlorthalidone as above   -continue potassium citrate to 30 mEq p o  b i d     -advised to drink plenty of free water with goal urine output greater than 2 L per day   -advised to follow low salt diet      Hypocitraturia  -management as above     Right lower renal pole nodule versus artifact, follows with Urology  -Recent CT scan does not report any abnormalities     CKD stage IIIa, baseline Cr around 1 4 since early 2022, prior 1 1 to 1 4  -last creatinine 1 3 in May 2023   -CKD suspect secondary to underlying hypertension  Some progression noted  -UA in April 2023 shows trace proteinuria, no significant hematuria   urine microalbumin/creatinine ratio slightly high at 51 mg in April 2023          Hypophosphatemia, improving, serum phosphorus 2 7 in May 2023  Currently not on any supplements      Vitamin-D insufficiency, last vitamin-D level 28 in May 2023  Increase vitamin D supplement from 4000 to 5000 units daily       Bilateral adrenal nodules  -closely follows with Endocrine at [place] [address]  -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       Diagnoses and all orders for this visit:    Benign hypertension with CKD (chronic kidney disease) stage III ([facility] [address] )  -     Basic metabolic panel; Future  -     CBC; Future  -     Phosphorus; Future  -     PTH, intact; Future  -     Albumin / creatinine urine ratio  -     Vitamin D 25 hydroxy; Future  -     Magnesium; Future    Stage 3a chronic kidney disease (HCC)  -     Basic metabolic panel; Future  -     CBC; Future  -     Phosphorus; Future  -     PTH, intact; Future  -     Albumin / creatinine urine ratio  -     Vitamin D 25 hydroxy;  Future  -     Magnesium; "Future    Vitamin D deficiency  -     Vitamin D 25 hydroxy; Future    Benign essential hypertension  -     chlorthalidone 25 mg tablet; Take 0 5 tablets (12 5 mg total) by mouth daily    Hypocitraturia  -     Stone risk profile    Nephrolithiasis  -     Stone risk profile    Other orders  -     Cholecalciferol (Vitamin D-3) 125 MCG (5000 UT) TABS; Take 1 tablet by mouth in the morning          SUBJECTIVE / HPI:  Patient is 69-year-old male with significant medical issues of hypertension diagnosed early in the age, bilateral adrenal nodule, history of nephrolithiasis with history of requiring ureteral stent placement with eventual removal, history of lithotripsy, sleep apnea, comes for regular follow-up of hypertension and nephrolithiasis  Baseline serum creatinine around 1 4 since early 2022    [facility]   Continue to remain on osilodrostat which was recently increased  Since last visit overall feels well  His home BP readings are well controlled  He has been having off-and-on lightheadedness for last few months  He denies any hematuria, no dysuria  No flank or abdominal pain  No fever or chills  No recent kidney stone flare-up issues  No recent NSAID exposure  He tries to be compliant with salt restricted diet      REVIEW OF SYSTEMS:  More than 10 point review of systems were obtained and discussed in length with the patient  Complete review of systems were negative / unremarkable except mentioned above  PHYSICAL EXAM:  Vitals:    05/11/23 1032 05/11/23 1104 05/11/23 1105   BP: 120/78 126/70 112/72   BP Location: Left arm     Patient Position: Sitting Sitting Standing   Cuff Size: Adult     Pulse: 75     SpO2: 98%     Weight: 86 2 kg (190 lb)     Height: 5' 9\" (1 753 m)       Body mass index is 28 06 kg/m²  Physical Exam  Vitals reviewed  Constitutional:       Appearance: He is well-developed  HENT:      Head: Normocephalic and atraumatic        Right Ear: External " "ear normal       Left Ear: External ear normal    Eyes:      General: No scleral icterus  Conjunctiva/sclera: Conjunctivae normal    Cardiovascular:      Comments: No significant edema in legs  Pulmonary:      Effort: Pulmonary effort is normal  No respiratory distress  Breath sounds: Normal breath sounds  No wheezing or rales  Abdominal:      General: Bowel sounds are normal  There is no distension  Palpations: Abdomen is soft  Tenderness: There is no abdominal tenderness  Musculoskeletal:         General: No deformity  Lymphadenopathy:      Cervical: No cervical adenopathy  Skin:     Findings: No rash  Neurological:      Mental Status: He is alert and oriented to person, place, and time     Psychiatric:         Behavior: Behavior normal          PAST MEDICAL HISTORY:  Past Medical History:   Diagnosis Date   • Adrenal mass ([address] )    • Chest pain     \"long ago stress related to family issue seen ER and cleared\"   • Chronic kidney disease    • Chronic pain disorder     right hip   • Claustrophobia    • Colon polyp    • CPAP (continuous positive airway pressure) dependence    • Dental crowns present    • Disease of thyroid gland     nodule sees Dr Maldonado/endocrinologist and Dr Gabby Rossi([facility])   • Exercise involving weight training     1-2x/week   • GERD (gastroesophageal reflux disease)    • Hyperlipidemia    • Hypertension    • Kidney stone    • Kidney stones    • Low testosterone    • Motion sickness    • Right hip pain    • Sleep apnea     not currently using his CPAP       PAST SURGICAL HISTORY:  Past Surgical History:   Procedure Laterality Date   • COLONOSCOPY     • CYSTOSCOPY     • EXTRACORPOREAL SHOCK WAVE LITHOTRIPSY Left    • FL INJECTION RIGHT HIP (ARTHROGRAM)  3/31/2021    pt cant recall this   • FL RETROGRADE PYELOGRAM  8/21/2018   • KIDNEY STONE SURGERY     • ID ARTHROSCOPY HIP W/LABRAL REPAIR Right 7/19/2021    Procedure: ARTHROSCOPY HIP with labral debridement: femoroplasty; " Surgeon: Isela Covarrubias MD;  Location: [facility];  Service: Orthopedics   • OR ARTHRS KNE SURG W/MENISCECTOMY MED/LAT W/SHVG Right 2021    Procedure: ARTHROSCOPY KNEE, partial medial meniscectomy;  Surgeon: Isela Covarrubias MD;  [facility];  Service: Orthopedics   • OR CYSTO/URETERO W/LITHOTRIPSY &INDWELL STENT INSRT Left 2018    Procedure: CYSTOSCOPY URETEROSCOPY, RETROGRADE PYELOGRAM AND INSERTION STENT URETERAL;  Surgeon: Danii Kern MD;  [facility];  Service: Urology   • OR ESOPHAGOGASTRODUODENOSCOPY TRANSORAL DIAGNOSTIC N/A 2018    Procedure: ESOPHAGOGASTRODUODENOSCOPY (EGD); Surgeon: Warren Morgan MD;  Location: [facility];   Service: Gastroenterology   • WISDOM TOOTH EXTRACTION         SOCIAL HISTORY:  Social History     Substance and Sexual Activity   Alcohol Use Not Currently    Comment: Rare      Social History     Substance and Sexual Activity   Drug Use No     Social History     Tobacco Use   Smoking Status Former   • Packs/day: 1 00   • Years: 17 00   • Pack years: 17 00   • Types: Cigarettes   • Quit date:    • Years since quittin 3   Smokeless Tobacco Never       FAMILY HISTORY:  Family History   Problem Relation Age of Onset   • Asthma Mother    • Diabetes Mother    • Other Father         Endocarditis   • Hypertension Father    • Gout Father        MEDICATIONS:    Current Outpatient Medications:   •  alfuzosin (UROXATRAL) 10 mg 24 hr tablet, Take 1 tablet (10 mg total) by mouth daily, Disp: 90 tablet, Rfl: 3  •  carvedilol (COREG) 25 mg tablet, Take 1 tablet (25 mg total) by mouth 2 (two) times a day with meals, Disp: 180 tablet, Rfl: 3  •  chlorthalidone 25 mg tablet, Take 0 5 tablets (12 5 mg total) by mouth daily, Disp: 90 tablet, Rfl: 3  •  Cholecalciferol (Vitamin D-3) 125 MCG (5000 UT) TABS, Take 1 tablet by mouth in the morning, Disp: , Rfl:   •  Epiduo Forte 0 3-2 5 % GEL, Apply topically daily, Disp: , Rfl:   •  eplerenone "(INSPRA) 50 MG tablet, Take 1 tablet (50 mg total) by mouth 2 (two) times a day, Disp: 180 tablet, Rfl: 3  •  felodipine (PLENDIL) 10 MG 24 hr tablet, Take 1 tablet (10 mg total) by mouth daily, Disp: 30 tablet, Rfl: 5  •  omeprazole (PriLOSEC) 40 MG capsule, Take 1 capsule (40 mg total) by mouth daily, Disp: 180 capsule, Rfl: 0  •  Osilodrostat Phosphate (Isturisa) 1 MG TABS, Take 1 capsule by mouth 3 (three) times a day, Disp: , Rfl:   •  Potassium Citrate ER 15 MEQ (1620 MG) TBCR, Take 2 tablets by mouth 2 (two) times a day, Disp: 360 tablet, Rfl: 3  •  rosuvastatin (CRESTOR) 10 MG tablet, Take 10 mg by mouth every evening , Disp: , Rfl:   •  SYRINGE-NEEDLE, DISP, 3 ML 21G X 1-1/2\" 3 ML MISC, For testerone injection, Disp: , Rfl:   •  testosterone cypionate (DEPO-TESTOSTERONE) 200 mg/mL SOLN, Inject 200 mg into a muscle every 14 (fourteen) days Due 12/5/21-Out needles - will take 12/8/21 , Disp: , Rfl:   •  Vuity 1 25 % SOLN, , Disp: , Rfl:     Lab Results:   Results for orders placed or performed in visit on 57/53/47   Basic metabolic panel   Result Value Ref Range    Sodium 134 (L) 135 - 147 mmol/L    Potassium 4 5 3 5 - 5 3 mmol/L    Chloride 104 96 - 108 mmol/L    CO2 26 21 - 32 mmol/L    ANION GAP 4 4 - 13 mmol/L    BUN 33 (H) 5 - 25 mg/dL    Creatinine 1 31 (H) 0 60 - 1 30 mg/dL    Glucose 128 65 - 140 mg/dL    Calcium 10 0 8 3 - 10 1 mg/dL    eGFR 63 ml/min/1 73sq m   CBC   Result Value Ref Range    WBC 10 41 (H) 4 31 - 10 16 Thousand/uL    RBC 5 57 3 88 - 5 62 Million/uL    Hemoglobin 16 6 12 0 - 17 0 g/dL    Hematocrit 51 5 (H) 36 5 - 49 3 %    MCV 93 82 - 98 fL    MCH 29 8 26 8 - 34 3 pg    MCHC 32 2 31 4 - 37 4 g/dL    RDW 12 6 11 6 - 15 1 %    Platelets 780 601 - 960 Thousands/uL    MPV 8 7 (L) 8 9 - 12 7 fL   Phosphorus   Result Value Ref Range    Phosphorus 2 7 2 7 - 4 5 mg/dL   PTH, intact   Result Value Ref Range    PTH 45 0 18 4 - 80 1 pg/mL   Magnesium   Result Value Ref Range    Magnesium 2 3 " 1 6 - 2 6 mg/dL   Microalbumin / creatinine urine ratio   Result Value Ref Range    Creatinine, Ur 144 0 mg/dL    Albumin,U,Random 73 5 (H) 0 0 - 20 0 mg/L    Albumin Creat Ratio 51 (H) 0 - 30 mg/g creatinine   Vitamin D 25 hydroxy   Result Value Ref Range    Vit D, 25-Hydroxy 28 2 (L) 30 0 - 100 0 ng/mL

## 2023-05-18 ENCOUNTER — TELEPHONE (OUTPATIENT)
Dept: NEPHROLOGY | Facility: CLINIC | Age: 48
End: 2023-05-18

## 2023-05-18 ENCOUNTER — APPOINTMENT (OUTPATIENT)
Dept: LAB | Facility: CLINIC | Age: 48
End: 2023-05-18

## 2023-05-18 DIAGNOSIS — I10 BENIGN ESSENTIAL HYPERTENSION: ICD-10-CM

## 2023-05-18 NOTE — TELEPHONE ENCOUNTER
Received a call from pt wife concerning pt, Mason Paniagua (wife) very concerned and very frustrated regarding medication change  pt wife stated pt has been dizzy on a daily basis and pt has not been well since the change  Pt wife stated that this needs to urgently get taken care of and corrected that the pt went to the pharmacy and they need a script with the correct medication dosage  Pt was working in [address] [place] [address] but now pt is at [facility] if correct [place] can [place] [address] in [facility], pt wife stated its been 2 weeks and this is still not corrected! The medication pt wife is referring to is hydrochlorothiazide was 25 mg and it was changed to 12 5 mg  Pt is expecting a call back to have this corrected urgently and call the pharmacy to get this situated    [phone]([id number])

## 2023-05-18 NOTE — TELEPHONE ENCOUNTER
Patient's wife was upset that patient had to cut chlorthalidone into half tablet daily  Spoke to patient and discussed that we do not have option of 12 5 mg tablet  Only lowest dose available is 15 mg tablet  He is okay with sending chlorthalidone 15 mg tablet to [address] [place] [address] [facility] which I have sent

## 2023-05-23 ENCOUNTER — TELEPHONE (OUTPATIENT)
Dept: GASTROENTEROLOGY | Facility: CLINIC | Age: 48
End: 2023-05-23

## 2023-05-23 DIAGNOSIS — K21.00 GASTROESOPHAGEAL REFLUX DISEASE WITH ESOPHAGITIS: ICD-10-CM

## 2023-05-23 DIAGNOSIS — K22.70 BARRETT'S ESOPHAGUS DETERMINED BY ENDOSCOPY: ICD-10-CM

## 2023-05-23 RX ORDER — OMEPRAZOLE 40 MG/1
40 CAPSULE, DELAYED RELEASE ORAL DAILY
Qty: 180 CAPSULE | Refills: 0 | Status: SHIPPED | OUTPATIENT
Start: 2023-05-23

## 2023-05-23 NOTE — TELEPHONE ENCOUNTER
Patients GI provider:  MIHAELA Sanderson    Number to return call: [phone]     Reason for call: Pt called in to schedule apt to have RX omeprazole filled  Due to availability issues, pt has been placed on wait list  Please fill prescription       Scheduled procedure/appointment date if applicable: N/A

## 2023-05-23 NOTE — TELEPHONE ENCOUNTER
I send a 90-day refill of omeprazole no further renewals will be done until patient seen in office he has not been seen in over a year  Please call and set up follow-up appointment    Thank you

## 2023-06-05 ENCOUNTER — APPOINTMENT (OUTPATIENT)
Dept: LAB | Facility: CLINIC | Age: 48
End: 2023-06-05

## 2023-06-05 ENCOUNTER — APPOINTMENT (OUTPATIENT)
Dept: LAB | Facility: CLINIC | Age: 48
End: 2023-06-05
Payer: COMMERCIAL

## 2023-06-05 DIAGNOSIS — I12.9 BENIGN HYPERTENSION WITH CKD (CHRONIC KIDNEY DISEASE) STAGE III (HCC): ICD-10-CM

## 2023-06-05 DIAGNOSIS — Z00.8 ENCOUNTER FOR OTHER GENERAL EXAMINATION: ICD-10-CM

## 2023-06-05 DIAGNOSIS — Z00.8 ENCOUNTER FOR OTHER GENERAL EXAMINATION: Primary | ICD-10-CM

## 2023-06-05 DIAGNOSIS — E55.9 VITAMIN D DEFICIENCY: ICD-10-CM

## 2023-06-05 DIAGNOSIS — N18.30 BENIGN HYPERTENSION WITH CKD (CHRONIC KIDNEY DISEASE) STAGE III (HCC): ICD-10-CM

## 2023-06-05 DIAGNOSIS — N18.31 STAGE 3A CHRONIC KIDNEY DISEASE (HCC): ICD-10-CM

## 2023-06-05 LAB
25(OH)D3 SERPL-MCNC: 38.2 NG/ML (ref 30–100)
ANION GAP SERPL CALCULATED.3IONS-SCNC: 4 MMOL/L (ref 4–13)
BUN SERPL-MCNC: 33 MG/DL (ref 5–25)
CALCIUM SERPL-MCNC: 9.9 MG/DL (ref 8.3–10.1)
CHLORIDE SERPL-SCNC: 104 MMOL/L (ref 96–108)
CHOLEST SERPL-MCNC: 192 MG/DL
CO2 SERPL-SCNC: 26 MMOL/L (ref 21–32)
CREAT SERPL-MCNC: 1.34 MG/DL (ref 0.6–1.3)
CREAT UR-MCNC: 131 MG/DL
ERYTHROCYTE [DISTWIDTH] IN BLOOD BY AUTOMATED COUNT: 12.8 % (ref 11.6–15.1)
EST. AVERAGE GLUCOSE BLD GHB EST-MCNC: 123 MG/DL
GFR SERPL CREATININE-BSD FRML MDRD: 61 ML/MIN/1.73SQ M
GLUCOSE SERPL-MCNC: 94 MG/DL (ref 65–140)
HBA1C MFR BLD: 5.9 %
HCT VFR BLD AUTO: 49.6 % (ref 36.5–49.3)
HDLC SERPL-MCNC: 64 MG/DL
HGB BLD-MCNC: 16.1 G/DL (ref 12–17)
LDLC SERPL CALC-MCNC: 90 MG/DL (ref 0–100)
MAGNESIUM SERPL-MCNC: 2.1 MG/DL (ref 1.6–2.6)
MCH RBC QN AUTO: 29 PG (ref 26.8–34.3)
MCHC RBC AUTO-ENTMCNC: 32.5 G/DL (ref 31.4–37.4)
MCV RBC AUTO: 89 FL (ref 82–98)
MICROALBUMIN UR-MCNC: 49 MG/L (ref 0–20)
MICROALBUMIN/CREAT 24H UR: 37 MG/G CREATININE (ref 0–30)
NONHDLC SERPL-MCNC: 128 MG/DL
PHOSPHATE SERPL-MCNC: 2.8 MG/DL (ref 2.7–4.5)
PLATELET # BLD AUTO: 262 THOUSANDS/UL (ref 149–390)
PMV BLD AUTO: 8.9 FL (ref 8.9–12.7)
POTASSIUM SERPL-SCNC: 4.4 MMOL/L (ref 3.5–5.3)
PTH-INTACT SERPL-MCNC: 51.7 PG/ML (ref 12–88)
RBC # BLD AUTO: 5.55 MILLION/UL (ref 3.88–5.62)
SODIUM SERPL-SCNC: 134 MMOL/L (ref 135–147)
TRIGL SERPL-MCNC: 189 MG/DL
WBC # BLD AUTO: 10.87 THOUSAND/UL (ref 4.31–10.16)

## 2023-06-05 PROCEDURE — 83036 HEMOGLOBIN GLYCOSYLATED A1C: CPT

## 2023-06-05 PROCEDURE — 82306 VITAMIN D 25 HYDROXY: CPT

## 2023-06-05 PROCEDURE — 84100 ASSAY OF PHOSPHORUS: CPT

## 2023-06-05 PROCEDURE — 83970 ASSAY OF PARATHORMONE: CPT

## 2023-06-05 PROCEDURE — 36415 COLL VENOUS BLD VENIPUNCTURE: CPT

## 2023-06-05 PROCEDURE — 85027 COMPLETE CBC AUTOMATED: CPT

## 2023-06-05 PROCEDURE — 83735 ASSAY OF MAGNESIUM: CPT

## 2023-06-05 PROCEDURE — 80061 LIPID PANEL: CPT

## 2023-06-05 PROCEDURE — 80048 BASIC METABOLIC PNL TOTAL CA: CPT

## 2023-06-06 ENCOUNTER — TELEPHONE (OUTPATIENT)
Dept: OTHER | Facility: HOSPITAL | Age: 48
End: 2023-06-06

## 2023-06-06 NOTE — TELEPHONE ENCOUNTER
Please let him know creatinine 1 3 stable  24-hour urine stone profile currently pending and when we get the results we will let him know if any changes needed

## 2023-06-06 NOTE — TELEPHONE ENCOUNTER
Called patient and left a voicemail with the following information: Please let him know creatinine 1 3 stable  24-hour urine stone profile currently pending and when we get the results we will let him know if any changes needed  Advised patient to please call the office to let us know he received the message and if have any questions or concerns

## 2023-06-15 LAB
AMMONIA 24H UR-MRATE: 16 MEQ/24 HR
AMMONIA UR-SCNC: ABNORMAL UG/DL
CA H2 PHOS DIHYD CRY URNS QL MICRO: 1.81 RATIO (ref 0–3)
CALCIUM 24H UR-MCNC: 8.9 MG/DL
CALCIUM 24H UR-MRATE: 149.1 MG/24 HR (ref 0–320)
CHLORIDE 24H UR-SCNC: 92 MMOL/L
CHLORIDE 24H UR-SRATE: 154 MMOL/24 HR (ref 52–264)
CITRATE 24H UR-MCNC: 63 MG/L
CITRATE 24H UR-MRATE: 106 MG/24 HR (ref 320–1240)
COM CRY STONE QL IR: 1.74 RATIO (ref 0–6)
CREAT 24H UR-MCNC: 86.4 MG/DL
CREAT 24H UR-MRATE: 1447.2 MG/24 HR (ref 1000–2000)
CYSTINE 24H UR-MCNC: 7.08 MG/L
CYSTINE 24H UR-MRATE: 11.86 MG/24 HR (ref 2.1–58)
MAGNESIUM 24H UR-MRATE: 79 MG/24 HR (ref 12–293)
MAGNESIUM UR-MCNC: 4.7 MG/DL
NA URATE CRY STONE QL IR: 3.8 RATIO (ref 0–4)
OSMOLALITY UR: 530 MOSMOL/KG (ref 300–900)
OXALATE 24H UR-MRATE: 10 MG/24 HR (ref 7–44)
OXALATE UR-MCNC: 6 MG/L
PH 24H UR: 6.3 [PH] (ref 4.5–8)
PHOSPHATE 24H UR-MCNC: 46.2 MG/DL
PHOSPHATE 24H UR-MRATE: 773.9 MG/24 HR (ref 390–1425)
PLEASE NOTE (STONE RISK): ABNORMAL
POTASSIUM 24H UR-SCNC: 78.6 MMOL/24 HR (ref 20–116)
POTASSIUM UR-SCNC: 46.9 MMOL/L
PRESERVED URINE: 1675 ML/24 HR (ref 800–1800)
SODIUM 24H UR-SCNC: 99 MMOL/L
SODIUM 24H UR-SRATE: 166 MMOL/24 HR (ref 58–337)
SPECIMEN VOL 24H UR: 1675 ML/24 HR (ref 800–1800)
SULFATE 24H UR-MCNC: 28 MEQ/24 HR (ref 0–30)
SULFATE UR-MCNC: 17 MEQ/L
TRI-PHOS CRY STONE MICRO: 0.03 RATIO (ref 0–1)
URATE 24H UR-MCNC: 34.2 MG/DL
URATE 24H UR-MRATE: 573 MG/24 HR (ref 197–1079)
URATE DIHYD CRY STONE QL IR: 0.77 RATIO (ref 0–1.2)

## 2023-06-25 ENCOUNTER — TELEPHONE (OUTPATIENT)
Dept: NEPHROLOGY | Facility: CLINIC | Age: 48
End: 2023-06-25

## 2023-06-26 NOTE — TELEPHONE ENCOUNTER
Left a very detailed message for [facility] regarding his 24 HR urine collection   Stone profile shows slight undercollection of urine sample  Urine volume lower  As discussed prior, he needs to drink plenty of water to augment urine output >2L per day  Urine citrate lower but since this is slight under-collection and he has not had any recent stone issues, will continue same potassium citrate  Please ensure he is compliant with taking it  Other parameters are acceptable

## 2023-06-26 NOTE — TELEPHONE ENCOUNTER
Stone profile shows slight undercollection of urine sample  Urine volume lower  As discussed prior, he needs to drink plenty of water to augment urine output >2L per day  Urine citrate lower but since this is slight under-collection and he has not had any recent stone issues, will continue same potassium citrate  Please ensure he is compliant with taking it  Other parameters are acceptable

## 2023-06-27 NOTE — TELEPHONE ENCOUNTER
Lm x2 regarding results  I asked he  call back and make sure he understood Dr Posey's recommendations

## 2023-07-14 ENCOUNTER — OFFICE VISIT (OUTPATIENT)
Dept: GASTROENTEROLOGY | Facility: AMBULARY SURGERY CENTER | Age: 48
End: 2023-07-14
Payer: COMMERCIAL

## 2023-07-14 ENCOUNTER — TELEPHONE (OUTPATIENT)
Dept: UROLOGY | Facility: CLINIC | Age: 48
End: 2023-07-14

## 2023-07-14 VITALS
HEART RATE: 89 BPM | WEIGHT: 201 LBS | BODY MASS INDEX: 29.77 KG/M2 | OXYGEN SATURATION: 97 % | DIASTOLIC BLOOD PRESSURE: 70 MMHG | SYSTOLIC BLOOD PRESSURE: 132 MMHG | HEIGHT: 69 IN

## 2023-07-14 DIAGNOSIS — Z86.010 HISTORY OF COLON POLYPS: ICD-10-CM

## 2023-07-14 DIAGNOSIS — K22.70 BARRETT'S ESOPHAGUS DETERMINED BY ENDOSCOPY: ICD-10-CM

## 2023-07-14 DIAGNOSIS — K21.9 GASTROESOPHAGEAL REFLUX DISEASE WITHOUT ESOPHAGITIS: Primary | ICD-10-CM

## 2023-07-14 DIAGNOSIS — K21.00 GASTROESOPHAGEAL REFLUX DISEASE WITH ESOPHAGITIS: ICD-10-CM

## 2023-07-14 PROBLEM — Z86.0100 HISTORY OF COLON POLYPS: Status: ACTIVE | Noted: 2023-07-14

## 2023-07-14 PROCEDURE — 99214 OFFICE O/P EST MOD 30 MIN: CPT | Performed by: INTERNAL MEDICINE

## 2023-07-14 RX ORDER — OMEPRAZOLE 40 MG/1
40 CAPSULE, DELAYED RELEASE ORAL DAILY
Qty: 90 CAPSULE | Refills: 3 | Status: SHIPPED | OUTPATIENT
Start: 2023-07-14

## 2023-07-14 RX ORDER — FAMOTIDINE 20 MG/1
20 TABLET, FILM COATED ORAL 2 TIMES DAILY
Qty: 60 TABLET | Refills: 11 | Status: SHIPPED | OUTPATIENT
Start: 2023-07-14

## 2023-07-14 NOTE — TELEPHONE ENCOUNTER
Called and spoke to patient. Informed patient 8/8 with Dr. Leonila Barba has to be rescheduled. Informed patient we can do 7/17 or 7/19. Patient works on 7/17 and leaves for vacation on 7/18-7-27. Reviewed with patient we will continue to look for a new date and call back once we have one. Patient verbalized understanding.

## 2023-07-14 NOTE — PROGRESS NOTES
Follow-up Note -  [facility]  Marci Elizalde 1975 male         Reason: GERD follow-up    HPI:  Mr. Izabela Saleem has history of GERD for which he takes omeprazole 40 mg regularly. Denies any heartburn or acid reflux. He came in for medication refill. I did upper endoscopy on him in October 2021 which showed small columnar mucosal extension but the biopsies were negative for Florian's. He has history of colon polyps and the recent colonoscopy was in April 2022. Good appetite, no recent weight loss. Regular bowel movements and denies any blood or mucus in the stool. Chaperon: Ms. Jim Nur: Review of Systems   Constitutional: Negative for activity change, appetite change, chills, diaphoresis, fatigue, fever and unexpected weight change. HENT: Negative for ear discharge, ear pain, facial swelling, hearing loss, nosebleeds, sore throat, tinnitus and voice change. Eyes: Negative for pain, discharge, redness, itching and visual disturbance. Respiratory: Negative for apnea, cough, chest tightness, shortness of breath and wheezing. Cardiovascular: Negative for chest pain and palpitations. Gastrointestinal:        As noted in HPI   Endocrine: Negative for cold intolerance, heat intolerance and polyuria. Genitourinary: Negative for difficulty urinating, dysuria, flank pain, hematuria and urgency. Musculoskeletal: Negative for arthralgias, back pain, gait problem, joint swelling and myalgias. Skin: Negative for rash and wound. Neurological: Negative for dizziness, tremors, seizures, speech difficulty, light-headedness, numbness and headaches. Hematological: Negative for adenopathy. Does not bruise/bleed easily. Psychiatric/Behavioral: Negative for agitation, behavioral problems and confusion. The patient is not nervous/anxious.          Past Medical History:   Diagnosis Date   • Adrenal mass [facility])    • Chest pain     "long ago stress related to family issue seen ER and cleared"   • Chronic kidney disease    • Chronic pain disorder     right hip   • Claustrophobia    • Colon polyp    • CPAP (continuous positive airway pressure) dependence    • Dental crowns present    • Disease of thyroid gland     nodule sees Dr Maldonado/endocrinologist and Dr Kerri Rossi([facility])   • Exercise involving weight training     1-2x/week   • GERD (gastroesophageal reflux disease)    • Hyperlipidemia    • Hypertension    • Kidney stone    • Kidney stones    • Low testosterone    • Motion sickness    • Right hip pain    • Sleep apnea     not currently using his CPAP      Past Surgical History:   Procedure Laterality Date   • COLONOSCOPY     • CYSTOSCOPY     • EXTRACORPOREAL SHOCK WAVE LITHOTRIPSY Left    • FL INJECTION RIGHT HIP (ARTHROGRAM)  3/31/2021    pt cant recall this   • FL RETROGRADE PYELOGRAM  8/21/2018   • KIDNEY STONE SURGERY     • WV ARTHROSCOPY HIP W/LABRAL REPAIR Right 7/19/2021    Procedure: ARTHROSCOPY HIP with labral debridement: femoroplasty;  Surgeon: Kaye Cordero MD;  [facility];  Service: Orthopedics   • WV ARTHRS KNE SURG W/MENISCECTOMY MED/LAT W/SHVG Right 12/13/2021    Procedure: ARTHROSCOPY KNEE, partial medial meniscectomy;  Surgeon: Kaye Cordero MD;  [facility];  Service: Orthopedics   • W[place] CYSTO/URETERO W/LITHOTRIPSY &INDWELL STENT INSRT Left 8/21/2018    Procedure: CYSTOSCOPY URETEROSCOPY, RETROGRADE PYELOGRAM AND INSERTION STENT URETERAL;  Surgeon: Latoya Houser MD;  [facility];  Service: Urology   • WV ESOPHAGOGASTRODUODENOSCOPY TRANSORAL DIAGNOSTIC N/A 6/21/2018    Procedure: ESOPHAGOGASTRODUODENOSCOPY (EGD); Surgeon: Shane Miller MD;  [facility];   Service: Gastroenterology   • WISDOM TOOTH EXTRACTION       Social History     Socioeconomic History   • Marital status: /Civil Union     Spouse name: Not on file   • Number of children: Not on file   • Years of education: Not on file   • Highest education level: Not on file   Occupational History   • Not on file   Tobacco Use   • Smoking status: Former     Packs/day: 1.00     Years: 17.00     Total pack years: 17.00     Types: Cigarettes     Quit date:      Years since quittin.5   • Smokeless tobacco: Never   Vaping Use   • Vaping Use: Never used   Substance and Sexual Activity   • Alcohol use: Not Currently     Comment: Rare    • Drug use: No   • Sexual activity: Not on file     Comment: defer   Other Topics Concern   • Not on file   Social History Narrative   • Not on file     Social Determinants of Health     Financial Resource Strain: Not on file   Food Insecurity: Not on file   Transportation Needs: Not on file   Physical Activity: Not on file   Stress: Not on file   Social Connections: Not on file   Intimate Partner Violence: Not on file   Housing Stability: Not on file     Family History   Problem Relation Age of Onset   • Asthma Mother    • Diabetes Mother    • Other Father         Endocarditis   • Hypertension Father    • Gout Father      Patient has no known allergies.   Current Outpatient Medications   Medication Sig Dispense Refill   • alfuzosin (UROXATRAL) 10 mg 24 hr tablet Take 1 tablet (10 mg total) by mouth daily 90 tablet 3   • carvedilol (COREG) 25 mg tablet Take 1 tablet (25 mg total) by mouth 2 (two) times a day with meals 180 tablet 3   • chlorthalidone (HYGROTEN) 15 MG tablet Take 1 tablet (15 mg total) by mouth daily 30 tablet 3   • Cholecalciferol (Vitamin D-3) 125 MCG (5000 UT) TABS Take 1 tablet by mouth in the morning     • Epiduo Forte 0.3-2.5 % GEL Apply topically daily     • eplerenone (INSPRA) 50 MG tablet Take 1 tablet (50 mg total) by mouth 2 (two) times a day 180 tablet 3   • famotidine (PEPCID) 20 mg tablet Take 1 tablet (20 mg total) by mouth 2 (two) times a day 60 tablet 11   • felodipine (PLENDIL) 10 MG 24 hr tablet Take 1 tablet (10 mg total) by mouth daily 30 tablet 5   • omeprazole (PriLOSEC) 40 MG capsule Take 1 capsule (40 mg total) by mouth daily 90 capsule 3   • Osilodrostat Phosphate (Isturisa) 1 MG TABS Take 1 capsule by mouth 3 (three) times a day     • Potassium Citrate ER 15 MEQ (1620 MG) TBCR Take 2 tablets by mouth 2 (two) times a day 360 tablet 3   • rosuvastatin (CRESTOR) 10 MG tablet Take 10 mg by mouth every evening      • SYRINGE-NEEDLE, DISP, 3 ML 21G X 1-1/2" 3 ML MISC For testerone injection     • testosterone cypionate (DEPO-TESTOSTERONE) 200 mg/mL SOLN Inject 200 mg into a muscle every 14 (fourteen) days Due 12/5/21-Out needles - will take 12/8/21      • Vuity 1.25 % SOLN        No current facility-administered medications for this visit. Blood pressure 132/70, pulse 89, height 5' 9" (1.753 m), weight 91.2 kg (201 lb), SpO2 97 %. PHYSICAL EXAM: Physical Exam  Constitutional:       Appearance: He is well-developed. HENT:      Head: Normocephalic and atraumatic. Eyes:      General: No scleral icterus. Right eye: No discharge. Left eye: No discharge. Conjunctiva/sclera: Conjunctivae normal.      Pupils: Pupils are equal, round, and reactive to light. Neck:      Thyroid: No thyromegaly. Vascular: No JVD. Trachea: No tracheal deviation. Cardiovascular:      Rate and Rhythm: Normal rate and regular rhythm. Heart sounds: Normal heart sounds. No murmur heard. No friction rub. No gallop. Pulmonary:      Effort: Pulmonary effort is normal. No respiratory distress. Breath sounds: Normal breath sounds. No wheezing or rales. Chest:      Chest wall: No tenderness. Abdominal:      General: Bowel sounds are normal. There is no distension. Palpations: Abdomen is soft. There is no mass. Tenderness: There is no abdominal tenderness. There is no guarding or rebound. Hernia: No hernia is present. Musculoskeletal:      Cervical back: Neck supple. Lymphadenopathy:      Cervical: No cervical adenopathy.    Skin:     General: Skin is warm and dry. Findings: No erythema or rash. Neurological:      Mental Status: He is alert and oriented to person, place, and time. Psychiatric:         Behavior: Behavior normal.         Thought Content: Thought content normal.          Lab Results   Component Value Date    WBC 10.87 (H) 06/05/2023    HGB 16.1 06/05/2023    HCT 49.6 (H) 06/05/2023    MCV 89 06/05/2023     06/05/2023     Lab Results   Component Value Date    GLUCOSE 106 11/05/2014    CALCIUM 9.9 06/05/2023     11/05/2014    K 4.4 06/05/2023    CO2 26 06/05/2023     06/05/2023    BUN 33 (H) 06/05/2023    CREATININE 1.34 (H) 06/05/2023     Lab Results   Component Value Date    ALT 37 01/10/2022    AST 18 01/10/2022    ALKPHOS 58 01/10/2022    BILITOT 0.14 (L) 11/05/2014     Lab Results   Component Value Date    INR 0.92 12/09/2021    PROTIME 12.0 12/09/2021       CT renal protocol    Result Date: 2/11/2023  Impression: Previously queried right lower renal pole has no CT correlate. Otherwise normal study. Workstation performed: [id number]       ASSESSMENT & PLAN:    GERD (gastroesophageal reflux disease)  Patient with history of GERD for which he takes omeprazole regularly.    -Discussed about long-term side effects from omeprazole and advised him to try Pepcid instead. Prescription was sent to the pharmacy.    -He is due for repeat upper endoscopy in October    -Patient was explained about the lifestyle and dietary modifications. Advised to avoid fatty foods, chocolates, caffeine, alcohol and any other triggering foods. Avoid eating for at least 3 hours before going to bed. History of colon polyps  Personal history of colon polyps- patient is at increased risk for colon cancer screening. Rule out colorectal lesions including polyps or malignancy.     -Next colonoscopy in couple of years

## 2023-07-14 NOTE — ASSESSMENT & PLAN NOTE
Patient with history of GERD for which he takes omeprazole regularly.    -Discussed about long-term side effects from omeprazole and advised him to try Pepcid instead. Prescription was sent to the pharmacy.    -He is due for repeat upper endoscopy in October    -Patient was explained about the lifestyle and dietary modifications. Advised to avoid fatty foods, chocolates, caffeine, alcohol and any other triggering foods. Avoid eating for at least 3 hours before going to bed.

## 2023-07-14 NOTE — ASSESSMENT & PLAN NOTE
Personal history of colon polyps- patient is at increased risk for colon cancer screening. Rule out colorectal lesions including polyps or malignancy.     -Next colonoscopy in couple of years

## 2023-08-07 DIAGNOSIS — I10 ESSENTIAL HYPERTENSION: ICD-10-CM

## 2023-08-07 DIAGNOSIS — N18.2 CKD (CHRONIC KIDNEY DISEASE), STAGE II: ICD-10-CM

## 2023-08-07 DIAGNOSIS — I10 BENIGN ESSENTIAL HYPERTENSION: ICD-10-CM

## 2023-08-07 DIAGNOSIS — N18.30 BENIGN HYPERTENSION WITH CKD (CHRONIC KIDNEY DISEASE) STAGE III (HCC): ICD-10-CM

## 2023-08-07 DIAGNOSIS — I12.9 BENIGN HYPERTENSION WITH CKD (CHRONIC KIDNEY DISEASE) STAGE III (HCC): ICD-10-CM

## 2023-08-07 RX ORDER — FELODIPINE 10 MG/1
10 TABLET, EXTENDED RELEASE ORAL DAILY
Qty: 90 TABLET | Refills: 3 | Status: SHIPPED | OUTPATIENT
Start: 2023-08-07

## 2023-08-07 RX ORDER — EPLERENONE 50 MG/1
50 TABLET, FILM COATED ORAL 2 TIMES DAILY
Qty: 180 TABLET | Refills: 3 | Status: SHIPPED | OUTPATIENT
Start: 2023-08-07

## 2023-09-11 DIAGNOSIS — I10 BENIGN ESSENTIAL HYPERTENSION: ICD-10-CM

## 2023-09-11 DIAGNOSIS — I10 ESSENTIAL HYPERTENSION: ICD-10-CM

## 2023-09-11 DIAGNOSIS — N18.2 CKD (CHRONIC KIDNEY DISEASE), STAGE II: ICD-10-CM

## 2023-09-11 RX ORDER — CARVEDILOL 25 MG/1
25 TABLET ORAL 2 TIMES DAILY WITH MEALS
Qty: 180 TABLET | Refills: 3 | Status: SHIPPED | OUTPATIENT
Start: 2023-09-11

## 2023-09-15 ENCOUNTER — OFFICE VISIT (OUTPATIENT)
Dept: URGENT CARE | Age: 48
End: 2023-09-15
Payer: COMMERCIAL

## 2023-09-15 VITALS
TEMPERATURE: 97.5 F | OXYGEN SATURATION: 98 % | HEART RATE: 70 BPM | BODY MASS INDEX: 28.73 KG/M2 | HEIGHT: 69 IN | RESPIRATION RATE: 16 BRPM | WEIGHT: 194 LBS

## 2023-09-15 DIAGNOSIS — L03.116 CELLULITIS OF LEFT FOOT: ICD-10-CM

## 2023-09-15 DIAGNOSIS — S91.332A PUNCTURE WOUND OF LEFT FOOT, INITIAL ENCOUNTER: Primary | ICD-10-CM

## 2023-09-15 PROCEDURE — 90471 IMMUNIZATION ADMIN: CPT | Performed by: PHYSICIAN ASSISTANT

## 2023-09-15 PROCEDURE — 90715 TDAP VACCINE 7 YRS/> IM: CPT

## 2023-09-15 PROCEDURE — 99213 OFFICE O/P EST LOW 20 MIN: CPT | Performed by: PHYSICIAN ASSISTANT

## 2023-09-15 RX ORDER — CLINDAMYCIN HYDROCHLORIDE 300 MG/1
300 CAPSULE ORAL 3 TIMES DAILY
Qty: 30 CAPSULE | Refills: 0 | Status: SHIPPED | OUTPATIENT
Start: 2023-09-15 | End: 2023-09-25

## 2023-09-15 NOTE — PATIENT INSTRUCTIONS
1.  Over-the-counter acetaminophen 975 mg every 6-8 hours as needed for pain or fever. 2.  Warm compresses or soaks to the affected area 4 times daily for 15 to 20 minutes until resolved. 3.  Go to the ER immediately for any worsening symptoms. 4.  Follow-up with primary care in 5 to 7 days for any unimproving symptoms.

## 2023-09-15 NOTE — PROGRESS NOTES
[facility]        NAME: Algie Bosworth is a 51 y.o. male  : 1975    MRN: [id number]  DATE: September 15, 2023  TIME: 9:26 AM    Assessment and Plan   Puncture wound of left foot, initial encounter [S91.332A]  1. Puncture wound of left foot, initial encounter  Tdap Vaccine greater than or equal to 8yo      2. Cellulitis of left foot  clindamycin (CLEOCIN) 300 MG capsule            Patient Instructions     1. Over-the-counter acetaminophen 975 mg every 6-8 hours as needed for pain or fever. 2.  Warm compresses or soaks to the affected area 4 times daily for 15 to 20 minutes until resolved. 3.  Go to the ER immediately for any worsening symptoms. 4.  Follow-up with primary care in 5 to 7 days for any unimproving symptoms. Chief Complaint     Chief Complaint   Patient presents with   • Puncture Wound     Stepped on measuring tape and punctures left foot 2 days ago. .. thinks site is in fecteed. Needs tdap         History of Present Illness       39-year-old male patient with a 2-day history of worsening left plantar foot pain, redness, swelling which began gradually after stepping on a metal tape measure in bare feet. Patient states there was a superficial puncture resulting from the injury. Patient denies any fever or chills. Denies feeling unwell. Patient states his tetanus is not up-to-date. Patient states that he is able to weight-bear albeit with pain. Review of Systems   Review of Systems   Constitutional: Negative for chills and fever. HENT: Negative for ear pain and sore throat. Eyes: Negative for pain and visual disturbance. Respiratory: Negative for cough and shortness of breath. Cardiovascular: Negative for chest pain and palpitations. Gastrointestinal: Negative for abdominal pain and vomiting. Genitourinary: Negative for dysuria and hematuria. Musculoskeletal: Negative for arthralgias and back pain. Skin: Positive for color change and wound.  Negative for rash.   Neurological: Negative for seizures and syncope. All other systems reviewed and are negative.         Current Medications       Current Outpatient Medications:   •  alfuzosin (UROXATRAL) 10 mg 24 hr tablet, Take 1 tablet (10 mg total) by mouth daily, Disp: 90 tablet, Rfl: 3  •  carvedilol (COREG) 25 mg tablet, Take 1 tablet (25 mg total) by mouth 2 (two) times a day with meals, Disp: 180 tablet, Rfl: 3  •  chlorthalidone (HYGROTEN) 15 MG tablet, Take 1 tablet (15 mg total) by mouth daily, Disp: 30 tablet, Rfl: 3  •  Cholecalciferol (Vitamin D-3) 125 MCG (5000 UT) TABS, Take 1 tablet by mouth in the morning, Disp: , Rfl:   •  clindamycin (CLEOCIN) 300 MG capsule, Take 1 capsule (300 mg total) by mouth 3 (three) times a day for 10 days, Disp: 30 capsule, Rfl: 0  •  Epiduo Forte 0.3-2.5 % GEL, Apply topically daily, Disp: , Rfl:   •  eplerenone (INSPRA) 50 MG tablet, Take 1 tablet (50 mg total) by mouth 2 (two) times a day, Disp: 180 tablet, Rfl: 3  •  famotidine (PEPCID) 20 mg tablet, Take 1 tablet (20 mg total) by mouth 2 (two) times a day, Disp: 60 tablet, Rfl: 11  •  felodipine (PLENDIL) 10 MG 24 hr tablet, Take 1 tablet (10 mg total) by mouth daily, Disp: 90 tablet, Rfl: 3  •  omeprazole (PriLOSEC) 40 MG capsule, Take 1 capsule (40 mg total) by mouth daily, Disp: 90 capsule, Rfl: 3  •  Osilodrostat Phosphate (Isturisa) 1 MG TABS, Take 1 capsule by mouth 3 (three) times a day, Disp: , Rfl:   •  rosuvastatin (CRESTOR) 10 MG tablet, Take 10 mg by mouth every evening , Disp: , Rfl:   •  SYRINGE-NEEDLE, DISP, 3 ML 21G X 1-1/2" 3 ML MISC, For testerone injection, Disp: , Rfl:   •  testosterone cypionate (DEPO-TESTOSTERONE) 200 mg/mL SOLN, Inject 200 mg into a muscle every 14 (fourteen) days Due 12/5/21-Out needles - will take 12/8/21 , Disp: , Rfl:   •  Vuity 1.25 % SOLN, , Disp: , Rfl:   •  Potassium Citrate ER 15 MEQ (1620 MG) TBCR, Take 2 tablets by mouth 2 (two) times a day, Disp: 360 tablet, Rfl: 3    Current Allergies     Allergies as of 09/15/2023   • (No Known Allergies)            The following portions of the patient's history were reviewed and updated as appropriate: allergies, current medications, past family history, past medical history, past social history, past surgical history and problem list.     Past Medical History:   Diagnosis Date   • Adrenal mass ([address])    • Chest pain     "long ago stress related to family issue seen ER and cleared"   • Chronic kidney disease    • Chronic pain disorder     right hip   • Claustrophobia    • Colon polyp    • CPAP (continuous positive airway pressure) dependence    • Dental crowns present    • Disease of thyroid gland     nodule sees Dr Maldonado/endocrinologist and Dr Anant Rossi([facility])   • Exercise involving weight training     1-2x/week   • GERD (gastroesophageal reflux disease)    • Hyperlipidemia    • Hypertension    • Kidney stone    • Kidney stones    • Low testosterone    • Motion sickness    • Right hip pain    • Sleep apnea     not currently using his CPAP       Past Surgical History:   Procedure Laterality Date   • COLONOSCOPY     • CYSTOSCOPY     • EXTRACORPOREAL SHOCK WAVE LITHOTRIPSY Left    • FL INJECTION RIGHT HIP (ARTHROGRAM)  3/31/2021    pt cant recall this   • FL RETROGRADE PYELOGRAM  8/21/2018   • KIDNEY STONE SURGERY     • [place] ARTHROSCOPY HIP W/LABRAL REPAIR Right 7/19/2021    Procedure: ARTHROSCOPY HIP with labral debridement: femoroplasty;  Surgeon: Filippo Bhatia MD;  [facility];  Service: Orthopedics   • IL ARTHRS KNE SURG W/MENISCECTOMY MED/LAT W/SHVG Right 12/13/2021    Procedure: ARTHROSCOPY KNEE, partial medial meniscectomy;  Surgeon: Filippo Bhatia MD;  [facility];  Service: Orthopedics   • [place] CYSTO/URETERO W/LITHOTRIPSY &INDWELL STENT INSRT Left 8/21/2018    Procedure: CYSTOSCOPY URETEROSCOPY, RETROGRADE PYELOGRAM AND INSERTION STENT URETERAL;  Surgeon: Gokul Arredondo MD;  [facility]; Service: Urology   • FL ESOPHAGOGASTRODUODENOSCOPY TRANSORAL DIAGNOSTIC N/A 6/21/2018    Procedure: ESOPHAGOGASTRODUODENOSCOPY (EGD); Surgeon: Tosin Plasencia MD;  [facility]; Service: Gastroenterology   • WISDOM TOOTH EXTRACTION         Family History   Problem Relation Age of Onset   • Asthma Mother    • Diabetes Mother    • Other Father         Endocarditis   • Hypertension Father    • Gout Father          Medications have been verified. Objective   Pulse 70   Temp 97.5 °F (36.4 °C)   Resp 16   Ht 5' 9" (1.753 m)   Wt 88 kg (194 lb)   SpO2 98%   BMI 28.65 kg/m²        Physical Exam     Physical Exam  Vitals and nursing note reviewed. Constitutional:       General: He is not in acute distress. Appearance: Normal appearance. He is not ill-appearing. HENT:      Head: Normocephalic. Nose: Nose normal.      Mouth/Throat:      Mouth: Mucous membranes are moist.      Pharynx: Oropharynx is clear. Eyes:      Conjunctiva/sclera: Conjunctivae normal.      Pupils: Pupils are equal, round, and reactive to light. Cardiovascular:      Rate and Rhythm: Normal rate and regular rhythm. Pulses: Normal pulses. Pulmonary:      Effort: Pulmonary effort is normal.      Breath sounds: Normal breath sounds. Musculoskeletal:         General: Normal range of motion. Cervical back: Normal range of motion and neck supple. Skin:     General: Skin is warm and dry. Capillary Refill: Capillary refill takes less than 2 seconds. Comments: Single puncture wound noted to the medial/plantar aspect of the left heel. Small, superficial associated pustule with the puncture wound. No obvious foreign body. Entire heel is red, warm. No significant swelling. No red streaking. No discharge or drainage noted. Slight generalized tenderness to the heel. Neurological:      Mental Status: He is alert and oriented to person, place, and time.    Psychiatric:         Mood and Affect: Mood normal.         Behavior: Behavior normal.

## 2023-10-18 ENCOUNTER — APPOINTMENT (OUTPATIENT)
Dept: RADIOLOGY | Facility: OTHER | Age: 48
End: 2023-10-18
Payer: COMMERCIAL

## 2023-10-18 ENCOUNTER — OFFICE VISIT (OUTPATIENT)
Dept: OBGYN CLINIC | Facility: OTHER | Age: 48
End: 2023-10-18
Payer: COMMERCIAL

## 2023-10-18 VITALS
SYSTOLIC BLOOD PRESSURE: 126 MMHG | DIASTOLIC BLOOD PRESSURE: 87 MMHG | HEIGHT: 69 IN | BODY MASS INDEX: 29.41 KG/M2 | HEART RATE: 64 BPM | WEIGHT: 198.6 LBS

## 2023-10-18 DIAGNOSIS — Z01.89 ENCOUNTER FOR LOWER EXTREMITY COMPARISON IMAGING STUDY: ICD-10-CM

## 2023-10-18 DIAGNOSIS — M25.561 RIGHT KNEE PAIN, UNSPECIFIED CHRONICITY: ICD-10-CM

## 2023-10-18 DIAGNOSIS — Z98.890 S/P RIGHT KNEE ARTHROSCOPY: Primary | ICD-10-CM

## 2023-10-18 PROCEDURE — 99214 OFFICE O/P EST MOD 30 MIN: CPT | Performed by: ORTHOPAEDIC SURGERY

## 2023-10-18 PROCEDURE — 73562 X-RAY EXAM OF KNEE 3: CPT

## 2023-10-18 PROCEDURE — 73564 X-RAY EXAM KNEE 4 OR MORE: CPT

## 2023-10-18 NOTE — PROGRESS NOTES
Orthopaedic Surgery - Office Note  Tere Garcia (53 y.o. male)   : 1975   MRN: [id number]  Encounter Date: 10/18/2023    Chief Complaint   Patient presents with    Right Knee - Follow-up       Assessment / Plan  Status post right knee arthroscopy with partial medial meniscectomy on 2021     Discussed with the patient that the feeling of instability can be secondary to weakness. The patient was instructed to work on physician directed HEP for the next 6-8 weeks to work on strengthening and to focus on quadriceps, hip abductors and core strengthening. On exam, the patient's knee is very stable and Xavi is negative. The patient was fitted and provided with a hinged knee brace he can wear as needed. The patient was instructed to contact the office in 6-8 weeks after physician directed HEP and if his symptoms are not improved we can considering order a MRI of his right knee. Return if symptoms worsen or fail to improve. History of Present Illness  Tere Garcia is a 51 y.o. male who presents to the office today for his right knee. The patient has a history of right knee arthroscopy with partial medial meniscectomy on 2021. The patient states for the past 2-3 months he has developed right knee instability. He notes instability with walking and turning corners. He denies any pain associated with this. The patient states he had one episode of a pop. He also notes cracking with steps. He denies any recent injury or trauma. Review of Systems  Pertinent items are noted in HPI. All other systems were reviewed and are negative. Physical Exam  /87   Pulse 64   Ht 5' 9" (1.753 m)   Wt 90.1 kg (198 lb 9.6 oz)   BMI 29.33 kg/m²   Cons: Appears well. No apparent disess. Psych: Alert. Oriented x3. Mood and affect normal.  Eyes: PERRLA, EOMI  Resp: Normal effort. No audible wheezing or stridor. CV: Palpable pulse. No discernable arrhythmia. No LE edema.   Lymph:  No palpable cervical, axillary, or inguinal lymphadenopathy. Skin: Warm. No palpable masses. No visible lesions. Neuro: Normal muscle tone. Normal and symmetric DTR's. Right Knee Exam  Alignment:  Normal knee alignment. Inspection:  trace effusion  Palpation:  No tenderness. ROM:  Normal knee ROM. Strength:  5/5 hip flexors and abductors. Stability:  No objective knee instability. Stable Varus / Valgus stress, Lachman, and Posterior drawer. Tests:  (-) Xavi. Patella:  Patella tracks centrally without crepitus. Neurovascular:  Sensation intact in DP/SP/Bahena/Sa/T nerve distributions. 2+ radial pulse. Gait:  Normal.      Studies Reviewed  XR of right knee - No osseus abnormalities. No degenerative change. Procedures  No procedures today. Medical, Surgical, Family, and Social History  The patient's medical history, family history, and social history, were reviewed and updated as appropriate.     Past Medical History:   Diagnosis Date    Adrenal mass ([address])     Chest pain     "long ago stress related to family issue seen ER and cleared"    Chronic kidney disease     Chronic pain disorder     right hip    Claustrophobia     Colon polyp     CPAP (continuous positive airway pressure) dependence     Dental crowns present     Disease of thyroid gland     nodule sees Dr Maldonado/endocrinologist and Dr Liya Rossi([facility])    Exercise involving weight training     1-2x/week    GERD (gastroesophageal reflux disease)     Hyperlipidemia     Hypertension     Kidney stone     Kidney stones     Low testosterone     Motion sickness     Right hip pain     Sleep apnea     not currently using his CPAP       Past Surgical History:   Procedure Laterality Date    COLONOSCOPY      CYSTOSCOPY      EXTRACORPOREAL SHOCK WAVE LITHOTRIPSY Left     FL INJECTION RIGHT HIP (ARTHROGRAM)  3/31/2021    pt cant recall this    FL RETROGRADE PYELOGRAM  8/21/2018    KIDNEY STONE SURGERY      CO ARTHROSCOPY HIP W/LABRAL REPAIR Right 7/19/2021 Procedure: ARTHROSCOPY HIP with labral debridement: femoroplasty;  Surgeon: Dontrell Lion MD;  [facility];  Service: Orthopedics    MT ARTHRS KNE SURG W/MENISCECTOMY MED/LAT W/SHVG Right 2021    Procedure: ARTHROSCOPY KNEE, partial medial meniscectomy;  Surgeon: Dontrell Lion MD;  [facility];  Service: Orthopedics    MT CYSTO/URETERO W/LITHOTRIPSY &INDWELL STENT INSRT Left 2018    Procedure: CYSTOSCOPY URETEROSCOPY, RETROGRADE PYELOGRAM AND INSERTION STENT URETERAL;  Surgeon: Juany Adrian MD;  [facility];  Service: Urology    MT ESOPHAGOGASTRODUODENOSCOPY TRANSORAL DIAGNOSTIC N/A 2018    Procedure: ESOPHAGOGASTRODUODENOSCOPY (EGD); Surgeon: Zachary Coyle MD;  [facility];   Service: Gastroenterology    WISDOM TOOTH EXTRACTION         Family History   Problem Relation Age of Onset    Asthma Mother     Diabetes Mother     Other Father         Endocarditis    Hypertension Father     Gout Father        Social History     Occupational History    Not on file   Tobacco Use    Smoking status: Former     Packs/day: 1.00     Years: 17.00     Total pack years: 17.00     Types: Cigarettes     Quit date:      Years since quittin.8    Smokeless tobacco: Never   Vaping Use    Vaping Use: Never used   Substance and Sexual Activity    Alcohol use: Not Currently     Comment: Rare     Drug use: No    Sexual activity: Not on file     Comment: defer       No Known Allergies      Current Outpatient Medications:     alfuzosin (UROXATRAL) 10 mg 24 hr tablet, Take 1 tablet (10 mg total) by mouth daily, Disp: 90 tablet, Rfl: 3    carvedilol (COREG) 25 mg tablet, Take 1 tablet (25 mg total) by mouth 2 (two) times a day with meals, Disp: 180 tablet, Rfl: 3    chlorthalidone (HYGROTEN) 15 MG tablet, Take 1 tablet (15 mg total) by mouth daily, Disp: 30 tablet, Rfl: 3    Cholecalciferol (Vitamin D-3) 125 MCG (5000 UT) TABS, Take 1 tablet by mouth in the morning, Disp: , Rfl:     Epiduo Forte 0.3-2.5 % GEL, Apply topically daily, Disp: , Rfl:     eplerenone (INSPRA) 50 MG tablet, Take 1 tablet (50 mg total) by mouth 2 (two) times a day, Disp: 180 tablet, Rfl: 3    famotidine (PEPCID) 20 mg tablet, Take 1 tablet (20 mg total) by mouth 2 (two) times a day, Disp: 60 tablet, Rfl: 11    felodipine (PLENDIL) 10 MG 24 hr tablet, Take 1 tablet (10 mg total) by mouth daily, Disp: 90 tablet, Rfl: 3    omeprazole (PriLOSEC) 40 MG capsule, Take 1 capsule (40 mg total) by mouth daily, Disp: 90 capsule, Rfl: 3    Osilodrostat Phosphate (Isturisa) 1 MG TABS, Take 1 capsule by mouth 3 (three) times a day, Disp: , Rfl:     rosuvastatin (CRESTOR) 10 MG tablet, Take 10 mg by mouth every evening , Disp: , Rfl:     SYRINGE-NEEDLE, DISP, 3 ML 21G X 1-1/2" 3 ML MISC, For testerone injection, Disp: , Rfl:     testosterone cypionate (DEPO-TESTOSTERONE) 200 mg/mL SOLN, Inject 200 mg into a muscle every 14 (fourteen) days Due 12/5/21-Out needles - will take 12/8/21 , Disp: , Rfl:     Vuity 1.25 % SOLN, , Disp: , Rfl:     Potassium Citrate ER 15 MEQ (1620 MG) TBCR, Take 2 tablets by mouth 2 (two) times a day, Disp: 360 tablet, Rfl: 3          Scribe Attestation      I,:  Gale Sibley MA am acting as a scribe while in the presence of the attending physician.:       I,:  Polly Massey MD personally performed the services described in this documentation    as scribed in my presence.:

## 2023-10-19 DIAGNOSIS — E55.9 VITAMIN D DEFICIENCY: ICD-10-CM

## 2023-10-19 DIAGNOSIS — N18.30 BENIGN HYPERTENSION WITH CKD (CHRONIC KIDNEY DISEASE) STAGE III (HCC): Primary | ICD-10-CM

## 2023-10-19 DIAGNOSIS — I12.9 BENIGN HYPERTENSION WITH CKD (CHRONIC KIDNEY DISEASE) STAGE III (HCC): Primary | ICD-10-CM

## 2023-10-24 ENCOUNTER — APPOINTMENT (OUTPATIENT)
Dept: LAB | Facility: CLINIC | Age: 48
End: 2023-10-24
Payer: COMMERCIAL

## 2023-10-24 ENCOUNTER — TRANSCRIBE ORDERS (OUTPATIENT)
Dept: LAB | Facility: CLINIC | Age: 48
End: 2023-10-24

## 2023-10-24 DIAGNOSIS — E27.8 ADRENAL MASS (HCC): Primary | ICD-10-CM

## 2023-10-24 DIAGNOSIS — E27.8 ADRENAL MASS (HCC): ICD-10-CM

## 2023-10-24 LAB
25(OH)D3 SERPL-MCNC: 35.2 NG/ML (ref 30–100)
ANION GAP SERPL CALCULATED.3IONS-SCNC: 7 MMOL/L
BUN SERPL-MCNC: 28 MG/DL (ref 5–25)
CALCIUM SERPL-MCNC: 9.2 MG/DL (ref 8.4–10.2)
CHLORIDE SERPL-SCNC: 103 MMOL/L (ref 96–108)
CO2 SERPL-SCNC: 26 MMOL/L (ref 21–32)
CORTIS SERPL-MCNC: 14 UG/DL
CREAT SERPL-MCNC: 1.15 MG/DL (ref 0.6–1.3)
GFR SERPL CREATININE-BSD FRML MDRD: 74 ML/MIN/1.73SQ M
GLUCOSE P FAST SERPL-MCNC: 117 MG/DL (ref 65–99)
PHOSPHATE SERPL-MCNC: 2.8 MG/DL (ref 2.7–4.5)
POTASSIUM SERPL-SCNC: 4.3 MMOL/L (ref 3.5–5.3)
PTH-INTACT SERPL-MCNC: 59.1 PG/ML (ref 12–88)
SODIUM SERPL-SCNC: 136 MMOL/L (ref 135–147)
TESTOST SERPL-MSCNC: 65 NG/DL

## 2023-10-24 PROCEDURE — 84403 ASSAY OF TOTAL TESTOSTERONE: CPT

## 2023-10-24 PROCEDURE — 82530 CORTISOL FREE: CPT

## 2023-10-24 PROCEDURE — 82533 TOTAL CORTISOL: CPT

## 2023-10-25 ENCOUNTER — OFFICE VISIT (OUTPATIENT)
Dept: NEPHROLOGY | Facility: CLINIC | Age: 48
End: 2023-10-25
Payer: COMMERCIAL

## 2023-10-25 VITALS
BODY MASS INDEX: 29.33 KG/M2 | SYSTOLIC BLOOD PRESSURE: 122 MMHG | DIASTOLIC BLOOD PRESSURE: 82 MMHG | HEART RATE: 88 BPM | WEIGHT: 198 LBS | HEIGHT: 69 IN

## 2023-10-25 DIAGNOSIS — E55.9 VITAMIN D DEFICIENCY: ICD-10-CM

## 2023-10-25 DIAGNOSIS — I12.9 BENIGN HYPERTENSION WITH CKD (CHRONIC KIDNEY DISEASE) STAGE III (HCC): Primary | ICD-10-CM

## 2023-10-25 DIAGNOSIS — N18.30 BENIGN HYPERTENSION WITH CKD (CHRONIC KIDNEY DISEASE) STAGE III (HCC): Primary | ICD-10-CM

## 2023-10-25 DIAGNOSIS — R82.991 HYPOCITRATURIA: ICD-10-CM

## 2023-10-25 DIAGNOSIS — N20.0 NEPHROLITHIASIS: ICD-10-CM

## 2023-10-25 DIAGNOSIS — N18.31 STAGE 3A CHRONIC KIDNEY DISEASE (HCC): ICD-10-CM

## 2023-10-25 PROCEDURE — 99214 OFFICE O/P EST MOD 30 MIN: CPT | Performed by: INTERNAL MEDICINE

## 2023-10-25 NOTE — PROGRESS NOTES
NEPHROLOGY OUTPATIENT PROGRESS NOTE   Kendall Perez 51 y.o. male MRN: [id number]  DATE: 10/25/2023  Reason for visit:   Chief Complaint   Patient presents with    Follow-up    Chronic Kidney Disease    Nephrolithiasis     ASSESSMENT and PLAN:  Hypertension  -Likely thought to be essential in origin, another differential remains hypercortisolism vs ?primary hyperaldosteronism  -home BP readings are generally 120s-130s/80s. BP well controlled in the office today  Prior 24 hour ABPM shows:  24 hour average BP reading 136/89  Daytime average BP reading 137/96  Nighttime average /80  Nighttime MAP dipping 13%  Overall 92% successful readings. -current regimen includes Coreg 25 mg b.i.d., alfuzosin 10 mg daily, felodipine 10 mg daily, eplerenone 50 mg b.i.d.; due to his symptoms of feeling lightheaded, chlorthalidone 15 mg daily. -his home wrist BP cuff was compared with our office readings in the past which were identical.  -plasma metanephrine and catecholamines are nonsignificant in December 2018. Repeat plasma normetanephrine slightly elevated 156 in May 2021. Unable to check serum aldosterone/PRA while being on eplerenone.    -he is being followed by endocrine in [place] regarding adrenal nodule.   -Patient was found to have high aldosterone level along with high aldosterone/renin ratio although patient was also taking eplerenone at that time and makes results unreliable. - Salt restricted diet     Recurrent nephrolithiasis  -patient had an episode of nephrolithiasis requiring ureteral stent placement with eventual removal in past.   -Denies any recent kidney stone flareup. Denies any flank or back pain issues. No gross hematuria. Stone analysis: Patient says his stone composition was calcium stone although I do not have documentation. Imaging: CT scan in February 2023 shows no stone. Stone risk profile in June 2023 undercollection, urine volume 1.6 L, urine calcium, sodium, oxalate acceptable. Urine citrate significantly lower 106, pH 6.3. Work up/labs:   Treatment :  -Continue chlorthalidone as above.  -continue potassium citrate to 30 mEq p.o. b.i.d.. .  -advised to drink plenty of free water with goal urine output greater than 2 L per day.  -advised to follow low salt diet. Hypocitraturia  -Since last 24-hour urine sample was undercollection, urine citrate not reliable. Continue current potassium citrate for now.  -management as above     Right lower renal pole nodule versus artifact, follows with Urology  -Recent CT scan does not report any abnormalities     CKD stage IIIa, baseline Cr around 1.4 since early 2022, prior 1.1 to 1.4  -last creatinine 1.1 in October 2023.  -CKD suspect secondary to underlying hypertension. Some progression noted. -UA in April 2023 shows trace proteinuria, no significant hematuria.  urine microalbumin/creatinine ratio 37 mg in June 2023. Hypophosphatemia, improving, serum phosphorus 2.8 in 10/2023. Currently not on any supplements. Vitamin-D insufficiency, last vitamin-D level improving 35 in 10/2023. Continue vitamin D 5000 units daily. 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 .    Diagnoses and all orders for this visit:    Benign hypertension with CKD (chronic kidney disease) stage III ([address])  -     Basic metabolic panel; Future  -     CBC; Future  -     Phosphorus; Future  -     PTH, intact; Future  -     Albumin / creatinine urine ratio; Future  -     Vitamin D 25 hydroxy; Future    Stage 3a chronic kidney disease (HCC)  -     Basic metabolic panel; Future  -     CBC; Future  -     Phosphorus; Future  -     PTH, intact; Future  -     Albumin / creatinine urine ratio; Future  -     Vitamin D 25 hydroxy; Future    Vitamin D deficiency  -     PTH, intact; Future  -     Vitamin D 25 hydroxy;  Future    Hypocitraturia  -     PTH, intact; Future    Nephrolithiasis  -     Phosphorus; Future  -     PTH, intact; Future  -     Albumin / creatinine urine ratio; Future  -     Vitamin D 25 hydroxy; Future          SUBJECTIVE / HPI:  Patient is 50-year-old male with significant medical issues of hypertension diagnosed early in the age, bilateral adrenal nodule, history of nephrolithiasis with history of requiring ureteral stent placement with eventual removal, history of lithotripsy, sleep apnea, comes for regular follow-up of hypertension and nephrolithiasis. Baseline serum creatinine around 1.4 since early 2022. closely follows with Endocrine at [facility]. Continue to remain on osilodrostat which was recently increased. Since last visit overall feels well. His home BP readings are well controlled. denies any lightheadedness. BP controlled in the office today. He denies any hematuria, no dysuria. No flank or abdominal pain. No fever or chills. No recent kidney stone flare-up issues. No recent NSAID exposure. He tries to be compliant with salt restricted diet. REVIEW OF SYSTEMS:  More than 10 point review of systems were obtained and discussed in length with the patient. Complete review of systems were negative / unremarkable except mentioned above. PHYSICAL EXAM:  Vitals:    10/25/23 1548 10/25/23 1606   BP: 128/84 122/82   BP Location: Left arm    Patient Position: Sitting    Cuff Size: Standard    Pulse: 88    Weight: 89.8 kg (198 lb)    Height: 5' 9" (1.753 m)      Body mass index is 29.24 kg/m². Physical Exam  Vitals reviewed. Constitutional:       Appearance: He is well-developed. HENT:      Head: Normocephalic and atraumatic. Right Ear: External ear normal.      Left Ear: External ear normal.   Eyes:      General: No scleral icterus. Conjunctiva/sclera: Conjunctivae normal.   Cardiovascular:      Comments: No significant edema in legs  Pulmonary:      Effort: Pulmonary effort is normal. No respiratory distress. Breath sounds: Normal breath sounds. No wheezing or rales. Abdominal:      General: Bowel sounds are normal. There is no distension. Palpations: Abdomen is soft. Tenderness: There is no abdominal tenderness. Musculoskeletal:         General: No deformity. Lymphadenopathy:      Cervical: No cervical adenopathy. Skin:     Findings: No rash. Neurological:      Mental Status: He is alert and oriented to person, place, and time.    Psychiatric:         Behavior: Behavior normal.         PAST MEDICAL HISTORY:  Past Medical History:   Diagnosis Date    Adrenal mass ([address])     Chest pain     "long ago stress related to family issue seen ER and cleared"    Chronic kidney disease     Chronic pain disorder     right hip    Claustrophobia     Colon polyp     CPAP (continuous positive airway pressure) dependence     Dental crowns present     Disease of thyroid gland     nodule sees Dr Maldonado/endocrinologist and Dr Martha Rossi([facility])    Exercise involving weight training     1-2x/week    GERD (gastroesophageal reflux disease)     Hyperlipidemia     Hypertension     Kidney stone     Kidney stones     Low testosterone     Motion sickness     Right hip pain     Sleep apnea     not currently using his CPAP       PAST SURGICAL HISTORY:  Past Surgical History:   Procedure Laterality Date    COLONOSCOPY      CYSTOSCOPY      EXTRACORPOREAL SHOCK WAVE LITHOTRIPSY Left     FL INJECTION RIGHT HIP (ARTHROGRAM)  3/31/2021    pt cant recall this    FL RETROGRADE PYELOGRAM  8/21/2018    KIDNEY STONE SURGERY      IN ARTHROSCOPY HIP W/LABRAL REPAIR Right 7/19/2021    Procedure: ARTHROSCOPY HIP with labral debridement: femoroplasty;  Surgeon: Nataly Rodriguez MD;  [facility];  Service: Orthopedics    IN ARTHRS KNE SURG W/MENISCECTOMY MED/LAT W/SHVG Right 12/13/2021    Procedure: ARTHROSCOPY KNEE, partial medial meniscectomy;  Surgeon: Nataly Rodriguez MD;  [facility];  Service: Orthopedics    IN CYSTO/URETERO W/LITHOTRIPSY &INDWELL STENT INSRT Left 2018    Procedure: CYSTOSCOPY URETEROSCOPY, RETROGRADE PYELOGRAM AND INSERTION STENT URETERAL;  Surgeon: Chayito More MD;  [facility];  Service: Urology    NH ESOPHAGOGASTRODUODENOSCOPY TRANSORAL DIAGNOSTIC N/A 2018    Procedure: ESOPHAGOGASTRODUODENOSCOPY (EGD); Surgeon: Ashley Irby MD;  [facility];   Service: Gastroenterology    WISDOM TOOTH EXTRACTION         SOCIAL HISTORY:  Social History     Substance and Sexual Activity   Alcohol Use Not Currently    Alcohol/week: 2.0 standard drinks of alcohol    Types: 2 Glasses of wine per week    Comment: Rare      Social History     Substance and Sexual Activity   Drug Use No     Social History     Tobacco Use   Smoking Status Former    Packs/day: 1.00    Years: 17.00    Total pack years: 17.00    Types: Cigarettes    Quit date: 2005    Years since quittin.8   Smokeless Tobacco Never       FAMILY HISTORY:  Family History   Problem Relation Age of Onset    Asthma Mother     Diabetes Mother     Other Father         Endocarditis    Hypertension Father     Gout Father        MEDICATIONS:    Current Outpatient Medications:     alfuzosin (UROXATRAL) 10 mg 24 hr tablet, Take 1 tablet (10 mg total) by mouth daily, Disp: 90 tablet, Rfl: 3    carvedilol (COREG) 25 mg tablet, Take 1 tablet (25 mg total) by mouth 2 (two) times a day with meals, Disp: 180 tablet, Rfl: 3    chlorthalidone (HYGROTEN) 15 MG tablet, Take 1 tablet (15 mg total) by mouth daily, Disp: 30 tablet, Rfl: 3    Cholecalciferol (Vitamin D-3) 125 MCG (5000 UT) TABS, Take 1 tablet by mouth in the morning, Disp: , Rfl:     Epiduo Forte 0.3-2.5 % GEL, Apply topically daily, Disp: , Rfl:     eplerenone (INSPRA) 50 MG tablet, Take 1 tablet (50 mg total) by mouth 2 (two) times a day, Disp: 180 tablet, Rfl: 3    famotidine (PEPCID) 20 mg tablet, Take 1 tablet (20 mg total) by mouth 2 (two) times a day, Disp: 60 tablet, Rfl: 11    felodipine (PLENDIL) 10 MG 24 hr tablet, Take 1 tablet (10 mg total) by mouth daily, Disp: 90 tablet, Rfl: 3    omeprazole (PriLOSEC) 40 MG capsule, Take 1 capsule (40 mg total) by mouth daily, Disp: 90 capsule, Rfl: 3    Osilodrostat Phosphate (Isturisa) 1 MG TABS, Take 1 capsule by mouth 3 (three) times a day, Disp: , Rfl:     Potassium Citrate ER 15 MEQ (1620 MG) TBCR, Take 2 tablets by mouth 2 (two) times a day, Disp: 360 tablet, Rfl: 3    rosuvastatin (CRESTOR) 10 MG tablet, Take 10 mg by mouth every evening , Disp: , Rfl:     SYRINGE-NEEDLE, DISP, 3 ML 21G X 1-1/2" 3 ML MISC, For testerone injection, Disp: , Rfl:     testosterone cypionate (DEPO-TESTOSTERONE) 200 mg/mL SOLN, Inject 200 mg into a muscle every 14 (fourteen) days Due 12/5/21-Out needles - will take 12/8/21 , Disp: , Rfl:     Vuity 1.25 % SOLN, , Disp: , Rfl:     Lab Results:   Cr 1.15

## 2023-10-30 LAB
CORTIS F 24H UR-MRATE: 100 UG/24 HR (ref 5–64)
CORTIS F UR-MCNC: 54 UG/L

## 2023-12-13 ENCOUNTER — TELEPHONE (OUTPATIENT)
Age: 48
End: 2023-12-13

## 2023-12-13 NOTE — TELEPHONE ENCOUNTER
Patient calling in to reschedule his apt for cysto on 1/2/24 with  due to his job not approving his day off to get this done. Patient is now scheduled for next available with  in March. Patient is wondering if there is anything sooner as he does have off from 1/7/24 from 1/20/24. Please review and call patient if there are any earlier openings during this time or after.        CB: [phone]

## 2023-12-13 NOTE — TELEPHONE ENCOUNTER
CALLED PT AND ADVISED HIM HE WILL BE ON A WAIT LIST. HE ONLY WANTED TO BE PUT ON THE WAIT LIST BETWEEN THE DATES BELOW.

## 2023-12-19 ENCOUNTER — TELEPHONE (OUTPATIENT)
Dept: NEPHROLOGY | Facility: CLINIC | Age: 48
End: 2023-12-19

## 2023-12-19 DIAGNOSIS — I10 BENIGN ESSENTIAL HYPERTENSION: ICD-10-CM

## 2023-12-19 DIAGNOSIS — N13.8 BPH WITH OBSTRUCTION/LOWER URINARY TRACT SYMPTOMS: ICD-10-CM

## 2023-12-19 DIAGNOSIS — R82.991 HYPOCITRATURIA: ICD-10-CM

## 2023-12-19 DIAGNOSIS — N40.1 BPH WITH OBSTRUCTION/LOWER URINARY TRACT SYMPTOMS: ICD-10-CM

## 2023-12-19 DIAGNOSIS — N20.0 NEPHROLITHIASIS: ICD-10-CM

## 2023-12-19 RX ORDER — ALFUZOSIN HYDROCHLORIDE 10 MG/1
10 TABLET, EXTENDED RELEASE ORAL DAILY
Qty: 90 TABLET | Refills: 0 | Status: SHIPPED | OUTPATIENT
Start: 2023-12-19 | End: 2023-12-21 | Stop reason: SDUPTHER

## 2023-12-19 RX ORDER — HYDROCHLOROTHIAZIDE 25 MG/1
12.5 TABLET ORAL DAILY
Qty: 45 TABLET | Refills: 1 | Status: SHIPPED | OUTPATIENT
Start: 2023-12-19

## 2023-12-19 RX ORDER — POTASSIUM CITRATE 15 MEQ/1
2 TABLET, EXTENDED RELEASE ORAL 2 TIMES DAILY
Qty: 360 TABLET | Refills: 3 | Status: SHIPPED | OUTPATIENT
Start: 2023-12-19 | End: 2024-12-18

## 2023-12-19 NOTE — TELEPHONE ENCOUNTER
Reason for call:   [x] Refill   [] Prior Auth  [] Other:     Office:   [] PCP/Provider -   [x] Specialty/Provider - [place]     Medication: alfuzosin (UROXATRAL) 10 mg 24 hr tablet     Dose/Frequency:  Take 1 tablet (10 mg total) by mouth daily     Quantity: 90    Pharmacy: [facility]     Does the patient have enough for 3 days?   [x] Yes   [] No - Send as HP to POD

## 2023-12-19 NOTE — TELEPHONE ENCOUNTER
Patient is requesting call back from office in regards to another medication that has been on back order- please call patient.     Reason for call:   [x] Refill   [] Prior Auth  [] Other:     Office:   [] PCP/Provider -   [x] Specialty/Provider - [place]pesh    Medication: Potassium Citrate ER 15 MEQ (1620 MG) TBCR     Dose/Frequency: Take 2 tablets by mouth 2 (two) times a day     Quantity: 360    Pharmacy: [facility]    Does the patient have enough for 3 days?   [x] Yes   [] No - Send as HP to POD

## 2023-12-19 NOTE — TELEPHONE ENCOUNTER
Received call from patient stating he has not taken his chlorthalidone 15 mg in about 6 weeks. Patient states [facility] does not carry the 15 mg and [facility] has it on back order. Please advise patient on any alternatives he may take.

## 2023-12-21 DIAGNOSIS — N40.1 BPH WITH OBSTRUCTION/LOWER URINARY TRACT SYMPTOMS: ICD-10-CM

## 2023-12-21 DIAGNOSIS — N13.8 BPH WITH OBSTRUCTION/LOWER URINARY TRACT SYMPTOMS: ICD-10-CM

## 2023-12-21 RX ORDER — ALFUZOSIN HYDROCHLORIDE 10 MG/1
10 TABLET, EXTENDED RELEASE ORAL DAILY
Qty: 90 TABLET | Refills: 1 | Status: SHIPPED | OUTPATIENT
Start: 2023-12-21

## 2024-02-16 ENCOUNTER — OCCMED (OUTPATIENT)
Dept: URGENT CARE | Facility: MEDICAL CENTER | Age: 49
End: 2024-02-16

## 2024-02-16 ENCOUNTER — APPOINTMENT (OUTPATIENT)
Dept: PHYSICAL THERAPY | Facility: MEDICAL CENTER | Age: 49
End: 2024-02-16

## 2024-02-16 DIAGNOSIS — Z02.89 ENCOUNTER FOR PHYSICAL EXAMINATION RELATED TO EMPLOYMENT: Primary | ICD-10-CM

## 2024-02-16 PROCEDURE — 97530 THERAPEUTIC ACTIVITIES: CPT

## 2024-02-16 NOTE — PROGRESS NOTES
[facility]  EEG    Name:  Rudolph Foster  MR#:  [id number]  :  1974  ACCOUNT #:  [de-identified]  [id number] This encounter was created for [facility] orders only .

## 2024-03-08 ENCOUNTER — TELEPHONE (OUTPATIENT)
Dept: NEPHROLOGY | Facility: CLINIC | Age: 49
End: 2024-03-08

## 2024-03-08 DIAGNOSIS — R60.0 BILATERAL LEG EDEMA: Primary | ICD-10-CM

## 2024-03-08 DIAGNOSIS — I10 ESSENTIAL HYPERTENSION: ICD-10-CM

## 2024-03-08 DIAGNOSIS — N18.30 BENIGN HYPERTENSION WITH CKD (CHRONIC KIDNEY DISEASE) STAGE III (HCC): ICD-10-CM

## 2024-03-08 DIAGNOSIS — I12.9 BENIGN HYPERTENSION WITH CKD (CHRONIC KIDNEY DISEASE) STAGE III (HCC): ICD-10-CM

## 2024-03-08 RX ORDER — FELODIPINE 5 MG/1
5 TABLET, EXTENDED RELEASE ORAL DAILY
Start: 2024-03-08

## 2024-03-08 RX ORDER — FUROSEMIDE 20 MG/1
20 TABLET ORAL DAILY
Qty: 30 TABLET | Refills: 2 | Status: SHIPPED | OUTPATIENT
Start: 2024-03-08

## 2024-03-08 NOTE — TELEPHONE ENCOUNTER
Pt's wife called office stating that pt has +2 pitting edema. He has a weight gain of about 6+ lbs in the last week and abdominal bloating. Pt will be out of town the weekend so she will like a call asap with any recommendations. No other symptoms. Confirmed med list with wife, meds up to date.

## 2024-03-08 NOTE — TELEPHONE ENCOUNTER
Discussed with patient and his wife regarding his concern for worsening leg edema in both legs over last few days, also has weight gain of 5 to 6 pounds.    His home BP readings has been lower SBP 100s.  He denies any shortness of breath.    Will hold hydrochlorothiazide 12.5 mg daily for time being.  Decrease felodipine from 10 mg to 5 mg daily and start Lasix 40 mg daily for next 3 days and then continue lower dose 20 mg daily afterwards.    Advised him to call back mid next week to discuss improvement in his symptoms.  If no further improvement, will do further evaluation including UACR to evaluate for proteinuria

## 2024-03-15 ENCOUNTER — TELEPHONE (OUTPATIENT)
Dept: OTHER | Facility: HOSPITAL | Age: 49
End: 2024-03-15

## 2024-03-15 ENCOUNTER — NURSE TRIAGE (OUTPATIENT)
Age: 49
End: 2024-03-15

## 2024-03-15 ENCOUNTER — TELEPHONE (OUTPATIENT)
Age: 49
End: 2024-03-15

## 2024-03-15 DIAGNOSIS — N18.30 BENIGN HYPERTENSION WITH CKD (CHRONIC KIDNEY DISEASE) STAGE III (HCC): Primary | ICD-10-CM

## 2024-03-15 DIAGNOSIS — I12.9 BENIGN HYPERTENSION WITH CKD (CHRONIC KIDNEY DISEASE) STAGE III (HCC): Primary | ICD-10-CM

## 2024-03-15 DIAGNOSIS — N18.31 STAGE 3A CHRONIC KIDNEY DISEASE (HCC): ICD-10-CM

## 2024-03-15 DIAGNOSIS — R60.0 BILATERAL LEG EDEMA: ICD-10-CM

## 2024-03-15 NOTE — TELEPHONE ENCOUNTER
"Patients wife called in regards to Marcio. She stated she called earlier today without a call back. Her  has +2 pitting edema, his belly is distended. Stated the Triage recommended to go to ER, but She stated she didn't want to go to the ER related to insurance. I then recommended her to urgent care. She is unsure if her  will go to urgent care. On call doctor got paged through tiger text for patient.             Reason for Disposition   Swelling of face, arm or hands (Exception: slight puffiness of fingers during hot weather)    Answer Assessment - Initial Assessment Questions  1. ONSET: \"When did the swelling start?\" (e.g., minutes, hours, days)      7 days ago   2. LOCATION: \"What part of the leg is swollen?\"  \"Are both legs swollen or just one leg?\"      Both legs up to the knee  3. SEVERITY: \"How bad is the swelling?\" (e.g., localized; mild, moderate, severe)   - Localized - small area of swelling localized to one leg   - MILD pedal edema - swelling limited to foot and ankle, pitting edema < 1/4 inch (6 mm) deep, rest and elevation eliminate most or all swelling   - MODERATE edema - swelling of lower leg to knee, pitting edema > 1/4 inch (6 mm) deep, rest and elevation only partially reduce swelling   - SEVERE edema - swelling extends above knee, facial or hand swelling present       moderate  4. REDNESS: \"Does the swelling look red or infected?\"      denies  5. PAIN: \"Is the swelling painful to touch?\" If Yes, ask: \"How painful is it?\"   (Scale 1-10; mild, moderate or severe)      Yes 3/10  6. FEVER: \"Do you have a fever?\" If Yes, ask: \"What is it, how was it measured, and when did it start?\"       denies  7. CAUSE: \"What do you think is causing the leg swelling?\"      Dr believes its medication  8. MEDICAL HISTORY: \"Do you have a history of heart failure, kidney disease, liver failure, or cancer?\"      Kidney disease   9. RECURRENT SYMPTOM: \"Have you had leg swelling before?\" If Yes, ask: \"When " "was the last time?\" \"What happened that time?\"      No, not this bad  10. OTHER SYMPTOMS: \"Do you have any other symptoms?\" (e.g., chest pain, difficulty breathing)        Can't take a full deep breath    Protocols used: Leg Swelling and Edema-ADULT-[place]    "

## 2024-03-15 NOTE — TELEPHONE ENCOUNTER
I was alerted by [facility] center that patient is calling to discuss his symptoms.  Patient complains of ongoing weight gain.  He complains of leg swelling with pitting.  He also complains of tightness in his abdomen.  He does not complain of dyspnea at this time.  He is currently taking furosemide 40 mg daily.  No previous history of cardiac disease.  Patient was advised to increase furosemide to 40 mg twice daily for the weekend and then wait for the call from his primary nephrologist next week.  He was advised to go to emergency room but he does not want to go to emergency room right now as he thinks that his symptoms are not that concerning.  He was advised to present to emergency room in case of worsening of his symptoms.  He verbalized understanding

## 2024-03-18 NOTE — TELEPHONE ENCOUNTER
Spoke to patient who has both leg edema, abdominal distension.   Does not check daily weight.     His weight was 198 lbs during office visit in 10/2023 which reduced to 187 lbs in next couple months after office visit as per patient. More recently has gained weight and last weight 204 lbs on 3/15/24.     He denies any dyspnea. He has been taking LAsix 80 mg daily for last 3 days on his own which has helped him somewhat.   Advised to take 80 mg daily for next 2-3 days and then reduce to 40 mg daily afterwards.     Meanwhile strongly recommended to get UA with micro, UACR, UPCR, and CMP ASAP. He does not have insurance currently and will be getting it on 4/1/24. He is hesitant to get any work up done as of now until he gets insurance. I have advised to get all labs done as above ASAP and he is agreeable.     Also advised will need LE venous doppler, prob Echo to further evaluate etiology for recent edema and weight gain issues. He certainly wants to defer to further work up until he gets his insurance back. Advised him to call back later this week to discuss response to lasix and to discuss lab results.

## 2024-03-19 ENCOUNTER — NURSE TRIAGE (OUTPATIENT)
Age: 49
End: 2024-03-19

## 2024-03-19 ENCOUNTER — TELEPHONE (OUTPATIENT)
Dept: NEPHROLOGY | Facility: CLINIC | Age: 49
End: 2024-03-19

## 2024-03-19 DIAGNOSIS — I10 ESSENTIAL HYPERTENSION: ICD-10-CM

## 2024-03-19 DIAGNOSIS — R60.0 BILATERAL LEG EDEMA: ICD-10-CM

## 2024-03-19 RX ORDER — FUROSEMIDE 20 MG/1
20 TABLET ORAL DAILY
Qty: 30 TABLET | Refills: 5 | Status: SHIPPED | OUTPATIENT
Start: 2024-03-19 | End: 2024-03-19 | Stop reason: SDUPTHER

## 2024-03-19 RX ORDER — FUROSEMIDE 20 MG/1
40 TABLET ORAL DAILY
Qty: 60 TABLET | Refills: 5 | Status: SHIPPED | OUTPATIENT
Start: 2024-03-19

## 2024-03-19 NOTE — TELEPHONE ENCOUNTER
Left voicemail for patient since I received a message that patient was running out of Lasix and needed the refill.  As per my recent discussion with patient advised to continue Lasix 40 mg twice daily for 2 to 3 days and then continue 40 mg daily afterwards.  Patient is to go for labs to ensure renal function and electrolytes are stable in order for us to continue higher dose of Lasix.  Advised him to call back if any questions.  I have sent refill to [facility].

## 2024-03-19 NOTE — TELEPHONE ENCOUNTER
Patient called stating provider increased Lasix to 40mg bid on 3/15/2024. Per note from Dr. Braswell he was advised to increase it for the weekend and speak to primary nephrologist if he should continue this dose.   Patient is out of Lasix and will need [facility].

## 2024-03-19 NOTE — TELEPHONE ENCOUNTER
"Reason for Disposition  • Information only question and nurse able to answer    Answer Assessment - Initial Assessment Questions  1. REASON FOR CALL or QUESTION: \"What is your reason for calling today?\" or \"How can I best help you?\" or \"What question do you have that I can help answer?\"      Patient calling for refill of Lasix    Protocols used: Information Only Call - No Triage-ADULT-[place]    "

## 2024-03-19 NOTE — TELEPHONE ENCOUNTER
Regarding: Out of Lasix  ----- Message from Cristina Medina sent at 3/19/2024  1:07 PM EDT -----  Pt's spouse called in regarding a medication he currently on. He is out of his Lasix medication and has been put on 80 mg of it. Please give pt a call back as soon as possible regarding this matter

## 2024-03-20 ENCOUNTER — APPOINTMENT (OUTPATIENT)
Dept: LAB | Facility: CLINIC | Age: 49
End: 2024-03-20

## 2024-03-20 DIAGNOSIS — R60.0 BILATERAL LEG EDEMA: ICD-10-CM

## 2024-03-20 DIAGNOSIS — N18.30 BENIGN HYPERTENSION WITH CKD (CHRONIC KIDNEY DISEASE) STAGE III (HCC): ICD-10-CM

## 2024-03-20 DIAGNOSIS — N18.31 STAGE 3A CHRONIC KIDNEY DISEASE (HCC): ICD-10-CM

## 2024-03-20 DIAGNOSIS — I12.9 BENIGN HYPERTENSION WITH CKD (CHRONIC KIDNEY DISEASE) STAGE III (HCC): ICD-10-CM

## 2024-03-20 LAB
ALBUMIN SERPL BCP-MCNC: 4.5 G/DL (ref 3.5–5)
ALP SERPL-CCNC: 51 U/L (ref 34–104)
ALT SERPL W P-5'-P-CCNC: 31 U/L (ref 7–52)
ANION GAP SERPL CALCULATED.3IONS-SCNC: 6 MMOL/L (ref 4–13)
AST SERPL W P-5'-P-CCNC: 23 U/L (ref 13–39)
BILIRUB SERPL-MCNC: 0.48 MG/DL (ref 0.2–1)
BILIRUB UR QL STRIP: NEGATIVE
BUN SERPL-MCNC: 36 MG/DL (ref 5–25)
CALCIUM SERPL-MCNC: 10.2 MG/DL (ref 8.4–10.2)
CHLORIDE SERPL-SCNC: 98 MMOL/L (ref 96–108)
CLARITY UR: CLEAR
CO2 SERPL-SCNC: 33 MMOL/L (ref 21–32)
COLOR UR: COLORLESS
CREAT SERPL-MCNC: 1.46 MG/DL (ref 0.6–1.3)
CREAT UR-MCNC: 28.3 MG/DL
CREAT UR-MCNC: 28.3 MG/DL
GFR SERPL CREATININE-BSD FRML MDRD: 55 ML/MIN/1.73SQ M
GLUCOSE P FAST SERPL-MCNC: 83 MG/DL (ref 65–99)
GLUCOSE UR STRIP-MCNC: NEGATIVE MG/DL
HGB UR QL STRIP.AUTO: NEGATIVE
KETONES UR STRIP-MCNC: NEGATIVE MG/DL
LEUKOCYTE ESTERASE UR QL STRIP: NEGATIVE
MICROALBUMIN UR-MCNC: 7.3 MG/L
MICROALBUMIN/CREAT 24H UR: 26 MG/G CREATININE (ref 0–30)
NITRITE UR QL STRIP: NEGATIVE
PH UR STRIP.AUTO: 6.5 [PH]
POTASSIUM SERPL-SCNC: 5.1 MMOL/L (ref 3.5–5.3)
PROT SERPL-MCNC: 7.5 G/DL (ref 6.4–8.4)
PROT UR STRIP-MCNC: NEGATIVE MG/DL
PROT UR-MCNC: <4 MG/DL
SODIUM SERPL-SCNC: 137 MMOL/L (ref 135–147)
SP GR UR STRIP.AUTO: 1.01 (ref 1–1.03)
UROBILINOGEN UR STRIP-ACNC: <2 MG/DL

## 2024-03-20 PROCEDURE — 82043 UR ALBUMIN QUANTITATIVE: CPT

## 2024-03-20 PROCEDURE — 81003 URINALYSIS AUTO W/O SCOPE: CPT

## 2024-03-20 PROCEDURE — 36415 COLL VENOUS BLD VENIPUNCTURE: CPT

## 2024-03-20 PROCEDURE — 82570 ASSAY OF URINE CREATININE: CPT

## 2024-03-20 PROCEDURE — 84156 ASSAY OF PROTEIN URINE: CPT

## 2024-03-20 PROCEDURE — 80053 COMPREHEN METABOLIC PANEL: CPT

## 2024-03-21 ENCOUNTER — TELEPHONE (OUTPATIENT)
Dept: NEPHROLOGY | Facility: CLINIC | Age: 49
End: 2024-03-21

## 2024-03-21 DIAGNOSIS — R60.0 BILATERAL LEG EDEMA: Primary | ICD-10-CM

## 2024-03-21 NOTE — TELEPHONE ENCOUNTER
Spoke to patient's wife as per patient's request regarding her concern of patient's weight gain, leg edema, abdominal distention and not significant responsive despite increasing Lasix.    As per wife, patient's weight has increased up to 209 pounds and with increasing Lasix to 80 mg daily his weight had reduced to 205 pounds.  Patient still continued to have abdominal distention, leg edema issues.    Explained that creatinine seems to be relatively stable, no significant proteinuria which can explain recent change in his weight gain, worsening edema.    I have\ordered venous Doppler to evaluate for DVT.  Also strongly recommended to get echocardiogram.  Patient remains off felodipine which can cause leg edema.  Also explained that osilodrostat can also cause leg edema.  She will reach out to Dr. Rossi at [facility] tomorrow to discuss if this can be held off.     Given significant concern, I have strongly encouraged to go to the ER for further evaluation.  She is agreeable and will discuss with patient regarding going to the hospital for inpatient evaluation.

## 2024-03-21 NOTE — TELEPHONE ENCOUNTER
Patient called back regarding his lab results, however his wife handles his care and is very upset that we haven't reached out to her, only him.   I told the patient that she can call us, with his permission, to go over his lab results.    He gave verbal permission for her to accept the information on his labs and medical treatments.

## 2024-03-21 NOTE — TELEPHONE ENCOUNTER
Patient's wife called very upset stating she would like to speak to Dr. Kalpesh panchal regarding 's lab results.

## 2024-03-21 NOTE — TELEPHONE ENCOUNTER
Please let patient know that his creatinine 1.4 relatively stable and closer to baseline.  Potassium 5.1 borderline higher.  Should follow low potassium diet.    Urine studies does not show any significant proteinuria and not contributing to his recent fluid retention.    Given both leg swelling, would recommend venous Doppler of both legs to evaluate for any blood clots.  I have placed this order.  He should also reach out to his PCP to see if they can evaluate with echocardiogram.

## 2024-04-01 ENCOUNTER — TELEPHONE (OUTPATIENT)
Age: 49
End: 2024-04-01

## 2024-04-01 ENCOUNTER — TELEPHONE (OUTPATIENT)
Dept: NEPHROLOGY | Facility: CLINIC | Age: 49
End: 2024-04-01

## 2024-04-01 NOTE — TELEPHONE ENCOUNTER
Pt wife called. Pt is due to have Cysto on Friday 4/5/24. PT has had changes made to medications.  Wife states Dr Grant called pt but he could not answer due to being at work.    Wife would like to talk to doctor about cysto since there have been changes in medication and insurance. Insurance was updated on chart.    Ying MCCALLUM [phone]

## 2024-04-01 NOTE — TELEPHONE ENCOUNTER
Called patient at 11:30 AM on 4/1/2024 as follow-up regarding his leg swelling issues, unable to reach him and left voicemail.  Advised him to call back when he gets the message if he still have ongoing leg edema issues or if he continues to have weight gain issues.    Noted patient has not done lower extremity venous Doppler which was ordered prior to evaluate for DVT.

## 2024-04-02 ENCOUNTER — TELEPHONE (OUTPATIENT)
Dept: NEPHROLOGY | Facility: CLINIC | Age: 49
End: 2024-04-02

## 2024-04-02 ENCOUNTER — TELEPHONE (OUTPATIENT)
Age: 49
End: 2024-04-02

## 2024-04-02 DIAGNOSIS — N18.31 STAGE 3A CHRONIC KIDNEY DISEASE (HCC): Primary | ICD-10-CM

## 2024-04-02 NOTE — TELEPHONE ENCOUNTER
Patient called returning Dr. Posey's message. Reached out to the office, Dr. Posey is with a patient at this time. He will call him back after.     The patient has another appt at 1:45pm, so he said the best time to reach him is any time after 2:15pm at [phone].

## 2024-04-02 NOTE — TELEPHONE ENCOUNTER
Pt returned call wanting to speak with Dr. Posey, Kalpesh was in with a patient at time of call so pt was made aware that once Dr. Posey was available he would contact the pt back, I also sent a TT to Dr. Posey aware of pt returned call.

## 2024-04-02 NOTE — TELEPHONE ENCOUNTER
Again attempted to return patient's call back at 4:24 PM on 4/2/2024 and left voicemail.  Advised him to call back and provide several different times where he is available to  the phone.

## 2024-04-02 NOTE — TELEPHONE ENCOUNTER
Attempted to return patient's call back at 12:50 PM on 4/2/2024 and left voicemail for him to call back as soon as he gets the message.

## 2024-04-02 NOTE — TELEPHONE ENCOUNTER
Scheduled date of EGD/colonoscopy (as of today): 05/22/2024  Physician performing EGD/colonoscopy: Dr. Hawk  Location of EGD/colonoscopy: [place]  EGD Instructions sent via [email or web address]  Clearances:  Level 3 Renal Disease, review and advise for ASC

## 2024-04-03 DIAGNOSIS — Z86.010 HISTORY OF COLON POLYPS: Primary | ICD-10-CM

## 2024-04-03 NOTE — TELEPHONE ENCOUNTER
Spoke to patient's wife as per patient's request to follow-up on his leg swelling, abdominal distention, weight gain.  As per wife, his weight was 209 pounds yesterday which has improved to 202 pounds today.  He is currently taking Lasix 40 mg daily.    He is scheduled for lower extremity venous Doppler in the near future.  She will contact PCP to get PCP appointment and also discussed with them for echocardiogram to further evaluate.    He still feels abdominal distention at times.  As per wife, he is not constipated.  Also as per her discussion with Dr. Rossi at [facility], plan is to continue osilodrostat for time being.  This can contribute to fluid retention, leg edema but to regroup again in the future depending on further investigations and the results as per her discussion with Dr. Rossi.    As patient is currently on Lasix 40 mg daily, recommended to have repeat BMP later this week.  Order is placed.

## 2024-04-03 NOTE — TELEPHONE ENCOUNTER
Called and advised patient that the Golytely was sent to his pharmacy. He verbalized understanding. Instructed patient to call with any further questions or concerns.

## 2024-04-05 ENCOUNTER — APPOINTMENT (OUTPATIENT)
Dept: LAB | Facility: CLINIC | Age: 49
End: 2024-04-05
Payer: COMMERCIAL

## 2024-04-05 LAB
ANION GAP SERPL CALCULATED.3IONS-SCNC: 14 MMOL/L (ref 4–13)
BUN SERPL-MCNC: 32 MG/DL (ref 5–25)
CALCIUM SERPL-MCNC: 9.6 MG/DL (ref 8.4–10.2)
CHLORIDE SERPL-SCNC: 97 MMOL/L (ref 96–108)
CO2 SERPL-SCNC: 26 MMOL/L (ref 21–32)
CREAT SERPL-MCNC: 1.56 MG/DL (ref 0.6–1.3)
GFR SERPL CREATININE-BSD FRML MDRD: 51 ML/MIN/1.73SQ M
GLUCOSE P FAST SERPL-MCNC: 123 MG/DL (ref 65–99)
POTASSIUM SERPL-SCNC: 4.8 MMOL/L (ref 3.5–5.3)
SODIUM SERPL-SCNC: 137 MMOL/L (ref 135–147)

## 2024-04-05 PROCEDURE — 80048 BASIC METABOLIC PNL TOTAL CA: CPT | Performed by: INTERNAL MEDICINE

## 2024-04-05 PROCEDURE — 36415 COLL VENOUS BLD VENIPUNCTURE: CPT | Performed by: INTERNAL MEDICINE

## 2024-04-08 ENCOUNTER — TELEPHONE (OUTPATIENT)
Dept: NEPHROLOGY | Facility: CLINIC | Age: 49
End: 2024-04-08

## 2024-04-08 DIAGNOSIS — I10 ESSENTIAL HYPERTENSION: ICD-10-CM

## 2024-04-08 DIAGNOSIS — N18.31 STAGE 3A CHRONIC KIDNEY DISEASE (HCC): Primary | ICD-10-CM

## 2024-04-08 NOTE — TELEPHONE ENCOUNTER
Please let patient know that his creatinine has slightly increased to 1.5 with recent increased dose of Lasix.  Okay to continue Lasix 40 mg daily for now if that is helping with leg swelling issues but would like him to have repeat BMP again in 1 week to ensure creatinine is not increasing further.

## 2024-04-10 ENCOUNTER — TELEPHONE (OUTPATIENT)
Dept: NEPHROLOGY | Facility: CLINIC | Age: 49
End: 2024-04-10

## 2024-04-10 ENCOUNTER — HOSPITAL ENCOUNTER (OUTPATIENT)
Dept: NON INVASIVE DIAGNOSTICS | Facility: CLINIC | Age: 49
Discharge: HOME | End: 2024-04-10
Payer: COMMERCIAL

## 2024-04-10 DIAGNOSIS — R60.0 BILATERAL LEG EDEMA: ICD-10-CM

## 2024-04-10 PROCEDURE — 93970 EXTREMITY STUDY: CPT | Performed by: SURGERY

## 2024-04-10 PROCEDURE — 93970 EXTREMITY STUDY: CPT

## 2024-04-10 NOTE — TELEPHONE ENCOUNTER
Please let patient know that lower extremity Doppler does not show any acute findings. unremarkable.

## 2024-04-11 NOTE — TELEPHONE ENCOUNTER
Called patient and left a voicemail going over the following information:    Patients lower extremity Doppler does not show any acute findings. unremarkable.    I asked the patient please call the office with further questions.

## 2024-04-16 DIAGNOSIS — N13.8 BPH WITH OBSTRUCTION/LOWER URINARY TRACT SYMPTOMS: ICD-10-CM

## 2024-04-16 DIAGNOSIS — N40.1 BPH WITH OBSTRUCTION/LOWER URINARY TRACT SYMPTOMS: ICD-10-CM

## 2024-04-16 RX ORDER — ALFUZOSIN HYDROCHLORIDE 10 MG/1
10 TABLET, EXTENDED RELEASE ORAL DAILY
Qty: 30 TABLET | Refills: 3 | Status: SHIPPED | OUTPATIENT
Start: 2024-04-16 | End: 2024-04-24 | Stop reason: SDUPTHER

## 2024-04-16 RX ORDER — ALFUZOSIN HYDROCHLORIDE 10 MG/1
10 TABLET, EXTENDED RELEASE ORAL DAILY
Qty: 30 TABLET | Refills: 0 | Status: SHIPPED | OUTPATIENT
Start: 2024-04-16 | End: 2024-04-16 | Stop reason: SDUPTHER

## 2024-04-16 NOTE — TELEPHONE ENCOUNTER
Reason for call:   [x] Refill   [] Prior Auth  [] Other:     Office:   [] PCP/Provider -   [x] Specialty/Provider - UROLOGY    Medication: ALFUZOSIN    Dose/Frequency: 10 MG    Quantity: 90    Pharmacy:[facility]  [place], [place]    Does the patient have enough for 3 days?   [] Yes   [x] No - Send as HP to POD

## 2024-04-16 NOTE — TELEPHONE ENCOUNTER
Patient has an appointment with Dr. Byers on 8/5 for cysto. Does he need another visit before that in order to get more refills if needed?

## 2024-04-17 ENCOUNTER — APPOINTMENT (OUTPATIENT)
Dept: LAB | Facility: CLINIC | Age: 49
End: 2024-04-17
Payer: COMMERCIAL

## 2024-04-17 DIAGNOSIS — I12.9 BENIGN HYPERTENSION WITH CKD (CHRONIC KIDNEY DISEASE) STAGE III (HCC): ICD-10-CM

## 2024-04-17 DIAGNOSIS — E27.8 ADRENAL MASS (HCC): ICD-10-CM

## 2024-04-17 DIAGNOSIS — R82.991 HYPOCITRATURIA: ICD-10-CM

## 2024-04-17 DIAGNOSIS — E55.9 VITAMIN D DEFICIENCY: ICD-10-CM

## 2024-04-17 DIAGNOSIS — D35.00 BENIGN NEOPLASM OF ADRENAL GLAND, UNSPECIFIED LATERALITY: ICD-10-CM

## 2024-04-17 DIAGNOSIS — I10 ESSENTIAL HYPERTENSION: ICD-10-CM

## 2024-04-17 DIAGNOSIS — N20.0 NEPHROLITHIASIS: ICD-10-CM

## 2024-04-17 DIAGNOSIS — E78.5 HYPERLIPIDEMIA, UNSPECIFIED HYPERLIPIDEMIA TYPE: ICD-10-CM

## 2024-04-17 DIAGNOSIS — I10 HYPERTENSION, UNSPECIFIED TYPE: ICD-10-CM

## 2024-04-17 DIAGNOSIS — N18.31 STAGE 3A CHRONIC KIDNEY DISEASE (HCC): ICD-10-CM

## 2024-04-17 DIAGNOSIS — N18.30 BENIGN HYPERTENSION WITH CKD (CHRONIC KIDNEY DISEASE) STAGE III (HCC): ICD-10-CM

## 2024-04-17 LAB
ALBUMIN SERPL BCP-MCNC: 4.2 G/DL (ref 3.5–5)
ALP SERPL-CCNC: 51 U/L (ref 34–104)
ALT SERPL W P-5'-P-CCNC: 31 U/L (ref 7–52)
ANION GAP SERPL CALCULATED.3IONS-SCNC: 9 MMOL/L (ref 4–13)
AST SERPL W P-5'-P-CCNC: 40 U/L (ref 13–39)
BACTERIA UR QL AUTO: ABNORMAL /HPF
BASOPHILS # BLD AUTO: 0.07 THOUSANDS/ÂΜL (ref 0–0.1)
BASOPHILS NFR BLD AUTO: 1 % (ref 0–1)
BILIRUB SERPL-MCNC: 0.31 MG/DL (ref 0.2–1)
BILIRUB UR QL STRIP: NEGATIVE
BUN SERPL-MCNC: 35 MG/DL (ref 5–25)
CALCIUM SERPL-MCNC: 9.3 MG/DL (ref 8.4–10.2)
CHLORIDE SERPL-SCNC: 103 MMOL/L (ref 96–108)
CHOLEST SERPL-MCNC: 210 MG/DL
CLARITY UR: CLEAR
CO2 SERPL-SCNC: 25 MMOL/L (ref 21–32)
COLOR UR: ABNORMAL
CREAT SERPL-MCNC: 1.39 MG/DL (ref 0.6–1.3)
EOSINOPHIL # BLD AUTO: 0.09 THOUSAND/ÂΜL (ref 0–0.61)
EOSINOPHIL NFR BLD AUTO: 1 % (ref 0–6)
ERYTHROCYTE [DISTWIDTH] IN BLOOD BY AUTOMATED COUNT: 12.5 % (ref 11.6–15.1)
EST. AVERAGE GLUCOSE BLD GHB EST-MCNC: 134 MG/DL
GFR SERPL CREATININE-BSD FRML MDRD: 58 ML/MIN/1.73SQ M
GLUCOSE P FAST SERPL-MCNC: 121 MG/DL (ref 65–99)
GLUCOSE UR STRIP-MCNC: NEGATIVE MG/DL
HBA1C MFR BLD: 6.3 %
HCT VFR BLD AUTO: 53 % (ref 36.5–49.3)
HDLC SERPL-MCNC: 57 MG/DL
HGB BLD-MCNC: 16.8 G/DL (ref 12–17)
HGB UR QL STRIP.AUTO: NEGATIVE
HYALINE CASTS #/AREA URNS LPF: ABNORMAL /LPF
IMM GRANULOCYTES # BLD AUTO: 0.08 THOUSAND/UL (ref 0–0.2)
IMM GRANULOCYTES NFR BLD AUTO: 1 % (ref 0–2)
KETONES UR STRIP-MCNC: NEGATIVE MG/DL
LDLC SERPL CALC-MCNC: 112 MG/DL (ref 0–100)
LEUKOCYTE ESTERASE UR QL STRIP: NEGATIVE
LYMPHOCYTES # BLD AUTO: 1.43 THOUSANDS/ÂΜL (ref 0.6–4.47)
LYMPHOCYTES NFR BLD AUTO: 16 % (ref 14–44)
MCH RBC QN AUTO: 28.4 PG (ref 26.8–34.3)
MCHC RBC AUTO-ENTMCNC: 31.7 G/DL (ref 31.4–37.4)
MCV RBC AUTO: 90 FL (ref 82–98)
MONOCYTES # BLD AUTO: 0.83 THOUSAND/ÂΜL (ref 0.17–1.22)
MONOCYTES NFR BLD AUTO: 9 % (ref 4–12)
NEUTROPHILS # BLD AUTO: 6.61 THOUSANDS/ÂΜL (ref 1.85–7.62)
NEUTS SEG NFR BLD AUTO: 72 % (ref 43–75)
NITRITE UR QL STRIP: NEGATIVE
NON-SQ EPI CELLS URNS QL MICRO: ABNORMAL /HPF
NONHDLC SERPL-MCNC: 153 MG/DL
NRBC BLD AUTO-RTO: 0 /100 WBCS
PH UR STRIP.AUTO: 7 [PH]
PLATELET # BLD AUTO: 260 THOUSANDS/UL (ref 149–390)
PMV BLD AUTO: 8.8 FL (ref 8.9–12.7)
POTASSIUM SERPL-SCNC: 6.9 MMOL/L (ref 3.5–5.3)
PROT SERPL-MCNC: 7.3 G/DL (ref 6.4–8.4)
PROT UR STRIP-MCNC: ABNORMAL MG/DL
PSA SERPL-MCNC: 1.97 NG/ML (ref 0–4)
RBC # BLD AUTO: 5.91 MILLION/UL (ref 3.88–5.62)
RBC #/AREA URNS AUTO: ABNORMAL /HPF
SODIUM SERPL-SCNC: 137 MMOL/L (ref 135–147)
SP GR UR STRIP.AUTO: 1.02 (ref 1–1.03)
T3 SERPL-MCNC: 1.2 NG/ML
T4 FREE SERPL-MCNC: 0.62 NG/DL (ref 0.61–1.12)
TRIGL SERPL-MCNC: 206 MG/DL
TSH SERPL DL<=0.05 MIU/L-ACNC: 2.2 UIU/ML (ref 0.45–4.5)
UROBILINOGEN UR STRIP-ACNC: <2 MG/DL
WBC # BLD AUTO: 9.11 THOUSAND/UL (ref 4.31–10.16)
WBC #/AREA URNS AUTO: ABNORMAL /HPF

## 2024-04-17 PROCEDURE — 36415 COLL VENOUS BLD VENIPUNCTURE: CPT

## 2024-04-17 PROCEDURE — 81001 URINALYSIS AUTO W/SCOPE: CPT

## 2024-04-17 PROCEDURE — 84153 ASSAY OF PSA TOTAL: CPT

## 2024-04-17 PROCEDURE — 84439 ASSAY OF FREE THYROXINE: CPT

## 2024-04-17 PROCEDURE — 84585 ASSAY OF URINE VMA: CPT

## 2024-04-17 PROCEDURE — 82384 ASSAY THREE CATECHOLAMINES: CPT

## 2024-04-17 PROCEDURE — 83036 HEMOGLOBIN GLYCOSYLATED A1C: CPT

## 2024-04-17 PROCEDURE — 85025 COMPLETE CBC W/AUTO DIFF WBC: CPT

## 2024-04-17 PROCEDURE — 80061 LIPID PANEL: CPT

## 2024-04-17 PROCEDURE — 80053 COMPREHEN METABOLIC PANEL: CPT

## 2024-04-17 PROCEDURE — 84443 ASSAY THYROID STIM HORMONE: CPT

## 2024-04-17 PROCEDURE — 84480 ASSAY TRIIODOTHYRONINE (T3): CPT

## 2024-04-17 PROCEDURE — 83586 ASSAY 17- KETOSTEROIDS: CPT

## 2024-04-18 ENCOUNTER — TELEPHONE (OUTPATIENT)
Dept: OTHER | Facility: HOSPITAL | Age: 49
End: 2024-04-18

## 2024-04-18 DIAGNOSIS — E87.5 HYPERKALEMIA: Primary | ICD-10-CM

## 2024-04-18 NOTE — TELEPHONE ENCOUNTER
Called and left voicemail on 2 different available contact numbers to discuss most recent lab results which shows significant elevated serum potassium 6.9 which is moderately hemolyzed specimen.  Would like him to have repeat potassium level as soon as possible.  Advised him to go for lab and potassium level order is already placed in the system.    Advised him to call back if any questions.

## 2024-04-19 ENCOUNTER — APPOINTMENT (OUTPATIENT)
Dept: LAB | Facility: CLINIC | Age: 49
End: 2024-04-19
Payer: COMMERCIAL

## 2024-04-19 ENCOUNTER — TELEPHONE (OUTPATIENT)
Dept: OTHER | Facility: HOSPITAL | Age: 49
End: 2024-04-19

## 2024-04-19 LAB — POTASSIUM SERPL-SCNC: 4.2 MMOL/L (ref 3.5–5.3)

## 2024-04-19 PROCEDURE — 84132 ASSAY OF SERUM POTASSIUM: CPT | Performed by: INTERNAL MEDICINE

## 2024-04-19 PROCEDURE — 36415 COLL VENOUS BLD VENIPUNCTURE: CPT | Performed by: INTERNAL MEDICINE

## 2024-04-20 DIAGNOSIS — K21.00 GASTROESOPHAGEAL REFLUX DISEASE WITH ESOPHAGITIS: ICD-10-CM

## 2024-04-20 DIAGNOSIS — K22.70 BARRETT'S ESOPHAGUS DETERMINED BY ENDOSCOPY: ICD-10-CM

## 2024-04-22 LAB
DOPAMINE 24H UR-MRATE: 191 UG/24 HR (ref 0–510)
DOPAMINE UR-MCNC: 123 UG/L
EPINEPH 24H UR-MRATE: <5 UG/24 HR (ref 0–20)
EPINEPH UR-MCNC: <3 UG/L
NOREPINEPH 24H UR-MRATE: 23 UG/24 HR (ref 0–135)
NOREPINEPH UR-MCNC: 15 UG/L
VMA 24H UR-MRATE: 3.1 MG/24 HR (ref 0–7.5)
VMA UR-MCNC: 2 MG/L

## 2024-04-22 RX ORDER — OMEPRAZOLE 40 MG/1
40 CAPSULE, DELAYED RELEASE ORAL DAILY
Qty: 90 CAPSULE | Refills: 0 | Status: SHIPPED | OUTPATIENT
Start: 2024-04-22

## 2024-04-22 NOTE — TELEPHONE ENCOUNTER
Called patient and left a voicemail going over the following information:    Patients repeat potassium level 4.2 within normal range.    I asked the patient please call the office with further questions.

## 2024-04-24 DIAGNOSIS — N40.1 BPH WITH OBSTRUCTION/LOWER URINARY TRACT SYMPTOMS: ICD-10-CM

## 2024-04-24 DIAGNOSIS — N13.8 BPH WITH OBSTRUCTION/LOWER URINARY TRACT SYMPTOMS: ICD-10-CM

## 2024-04-24 LAB
17-KETOSTEROIDS 24 HOUR VOLUME: 7.5 MG/L
17KS 24H UR-MRATE: 11.6 MG/D (ref 5.3–17.6)
COLLECT DURATION TIME SPEC: 24 HR
CREAT 24H UR-MRATE: 1674 MG/D (ref 1000–2500)
CREAT UR-MCNC: 108 MG/DL
SPECIMEN VOL ?TM UR: 1550 ML

## 2024-04-24 NOTE — TELEPHONE ENCOUNTER
Reason for call: Patient stated pharmacy said his refills were cancelled. Patient is completely out!  [x] Refill   [] Prior Auth  [] Other:     Office:   [] PCP/Provider -   [x] Specialty/Provider -     Medication: alfuzosin (UROXATRAL) 10 mg 24 hr tablet     Dose/Frequency: Take 1 tablet (10 mg total) by mouth daily     Quantity:  30 tablet     Pharmacy: [id number] - [place], [place] - [address] [place] [address] [facility] [phone]     Does the patient have enough for 3 days?   [] Yes   [x] No - Send as HP to POD

## 2024-04-25 RX ORDER — ALFUZOSIN HYDROCHLORIDE 10 MG/1
10 TABLET, EXTENDED RELEASE ORAL DAILY
Qty: 90 TABLET | Refills: 1 | Status: SHIPPED | OUTPATIENT
Start: 2024-04-25

## 2024-04-29 ENCOUNTER — HOSPITAL ENCOUNTER (OUTPATIENT)
Dept: MRI IMAGING | Facility: HOSPITAL | Age: 49
Discharge: HOME | End: 2024-04-29
Attending: INTERNAL MEDICINE
Payer: COMMERCIAL

## 2024-04-29 DIAGNOSIS — R14.0 ABDOMINAL DISTENTION: ICD-10-CM

## 2024-04-29 DIAGNOSIS — E27.8 ADRENAL MASS (HCC): ICD-10-CM

## 2024-04-29 PROCEDURE — 74183 MRI ABD W/O CNTR FLWD CNTR: CPT

## 2024-04-29 PROCEDURE — A9585 GADOBUTROL INJECTION: HCPCS | Performed by: INTERNAL MEDICINE

## 2024-04-29 PROCEDURE — 72197 MRI PELVIS W/O & W/DYE: CPT

## 2024-04-29 RX ORDER — GADOBUTROL 604.72 MG/ML
8 INJECTION INTRAVENOUS
Status: COMPLETED | OUTPATIENT
Start: 2024-04-29 | End: 2024-04-29

## 2024-04-29 RX ADMIN — GADOBUTROL 8 ML: 604.72 INJECTION INTRAVENOUS at 09:46

## 2024-05-06 ENCOUNTER — TELEPHONE (OUTPATIENT)
Dept: NEPHROLOGY | Facility: CLINIC | Age: 49
End: 2024-05-06

## 2024-05-06 NOTE — TELEPHONE ENCOUNTER
Called and spoke with pt's wife. Scheduled follow up on 5/23 at 11am in the [facility] with Dr Posey. (Recall list).

## 2024-05-08 ENCOUNTER — ANESTHESIA (OUTPATIENT)
Dept: ANESTHESIOLOGY | Facility: HOSPITAL | Age: 49
End: 2024-05-08

## 2024-05-08 ENCOUNTER — APPOINTMENT (OUTPATIENT)
Dept: LAB | Facility: CLINIC | Age: 49
End: 2024-05-08
Payer: COMMERCIAL

## 2024-05-08 ENCOUNTER — ANESTHESIA EVENT (OUTPATIENT)
Dept: ANESTHESIOLOGY | Facility: HOSPITAL | Age: 49
End: 2024-05-08

## 2024-05-08 DIAGNOSIS — N18.31 STAGE 3A CHRONIC KIDNEY DISEASE (HCC): ICD-10-CM

## 2024-05-08 DIAGNOSIS — D35.00 BENIGN NEOPLASM OF ADRENAL GLAND, UNSPECIFIED LATERALITY: ICD-10-CM

## 2024-05-08 LAB
25(OH)D3 SERPL-MCNC: 24.8 NG/ML (ref 30–100)
ANION GAP SERPL CALCULATED.3IONS-SCNC: 12 MMOL/L (ref 4–13)
BUN SERPL-MCNC: 28 MG/DL (ref 5–25)
CALCIUM SERPL-MCNC: 10 MG/DL (ref 8.4–10.2)
CHLORIDE SERPL-SCNC: 100 MMOL/L (ref 96–108)
CO2 SERPL-SCNC: 27 MMOL/L (ref 21–32)
CREAT SERPL-MCNC: 1.46 MG/DL (ref 0.6–1.3)
CREAT UR-MCNC: 182.9 MG/DL
ERYTHROCYTE [DISTWIDTH] IN BLOOD BY AUTOMATED COUNT: 12.7 % (ref 11.6–15.1)
GFR SERPL CREATININE-BSD FRML MDRD: 55 ML/MIN/1.73SQ M
GLUCOSE P FAST SERPL-MCNC: 95 MG/DL (ref 65–99)
HCT VFR BLD AUTO: 52.7 % (ref 36.5–49.3)
HGB BLD-MCNC: 17.5 G/DL (ref 12–17)
MCH RBC QN AUTO: 28.9 PG (ref 26.8–34.3)
MCHC RBC AUTO-ENTMCNC: 33.2 G/DL (ref 31.4–37.4)
MCV RBC AUTO: 87 FL (ref 82–98)
MICROALBUMIN UR-MCNC: 60.1 MG/L
MICROALBUMIN/CREAT 24H UR: 33 MG/G CREATININE (ref 0–30)
PHOSPHATE SERPL-MCNC: 3.9 MG/DL (ref 2.7–4.5)
PLATELET # BLD AUTO: 253 THOUSANDS/UL (ref 149–390)
PMV BLD AUTO: 9 FL (ref 8.9–12.7)
POTASSIUM SERPL-SCNC: 4.2 MMOL/L (ref 3.5–5.3)
PTH-INTACT SERPL-MCNC: 61.9 PG/ML (ref 12–88)
RBC # BLD AUTO: 6.05 MILLION/UL (ref 3.88–5.62)
SODIUM SERPL-SCNC: 139 MMOL/L (ref 135–147)
WBC # BLD AUTO: 7.69 THOUSAND/UL (ref 4.31–10.16)

## 2024-05-08 PROCEDURE — 82043 UR ALBUMIN QUANTITATIVE: CPT

## 2024-05-08 PROCEDURE — 82306 VITAMIN D 25 HYDROXY: CPT

## 2024-05-08 PROCEDURE — 84100 ASSAY OF PHOSPHORUS: CPT

## 2024-05-08 PROCEDURE — 83970 ASSAY OF PARATHORMONE: CPT

## 2024-05-08 PROCEDURE — 80048 BASIC METABOLIC PNL TOTAL CA: CPT

## 2024-05-08 PROCEDURE — 85027 COMPLETE CBC AUTOMATED: CPT

## 2024-05-08 PROCEDURE — 36415 COLL VENOUS BLD VENIPUNCTURE: CPT

## 2024-05-08 PROCEDURE — 82570 ASSAY OF URINE CREATININE: CPT

## 2024-05-09 ENCOUNTER — OFFICE VISIT (OUTPATIENT)
Dept: CARDIOLOGY CLINIC | Facility: CLINIC | Age: 49
End: 2024-05-09
Payer: COMMERCIAL

## 2024-05-09 VITALS
BODY MASS INDEX: 30.23 KG/M2 | HEIGHT: 69 IN | HEART RATE: 72 BPM | OXYGEN SATURATION: 95 % | SYSTOLIC BLOOD PRESSURE: 110 MMHG | WEIGHT: 204.1 LBS | DIASTOLIC BLOOD PRESSURE: 80 MMHG

## 2024-05-09 DIAGNOSIS — N18.30 BENIGN HYPERTENSION WITH CKD (CHRONIC KIDNEY DISEASE) STAGE III (HCC): Primary | ICD-10-CM

## 2024-05-09 DIAGNOSIS — I12.9 BENIGN HYPERTENSION WITH CKD (CHRONIC KIDNEY DISEASE) STAGE III (HCC): Primary | ICD-10-CM

## 2024-05-09 DIAGNOSIS — N18.31 STAGE 3A CHRONIC KIDNEY DISEASE (HCC): ICD-10-CM

## 2024-05-09 DIAGNOSIS — R06.02 SOB (SHORTNESS OF BREATH): ICD-10-CM

## 2024-05-09 DIAGNOSIS — E78.5 HYPERLIPIDEMIA, UNSPECIFIED HYPERLIPIDEMIA TYPE: ICD-10-CM

## 2024-05-09 PROCEDURE — 99205 OFFICE O/P NEW HI 60 MIN: CPT | Performed by: INTERNAL MEDICINE

## 2024-05-09 NOTE — LETTER
May 10, 2024     Livan Mayorga MD  [address] [address]    Patient: Marcio Monge   YOB: 1975   Date of Visit: 5/9/2024       Dear Dr. Mayorga:    Thank you for referring Marcio Monge to me for evaluation. Below are my notes for this consultation.    If you have questions, please do not hesitate to call me. I look forward to following your patient along with you.         Sincerely,        Juan Grant MD        CC: No Recipients    Juan Grant MD  5/10/2024 12:05 AM  Incomplete  Cardiology   Marcio Monge 49 y.o. male MRN: [id number]   Encounter: [id number]        Reason for Consult / Principal Problem: Chest tightness/shortness of breath/edema    Physician Requesting Consult: Livan Mayorga MD    PCP: Livan Mayorga MD        Assessment/Plan:    >> Shortness of breath on exertion  >> Bilateral pedal edema: Essentially resolved now   >> Chest tightness  >> Hypertension  >> History of adrenal adenomas with cortisol secretion  >> LVH on prior echo  >> Coronary calcium score of 62  >> CKD stage III        Plan:    -Continue with diuretics per nephrology  -Blood pressure appears well-controlled on current regimen  -Continue eplerenone, carvedilol, furosemide  -Continue Crestor 10 mg  -He is on a novel research medication called osilodrostat per research protocol from [facility].  -Check cardiac BNP  -Check echocardiogram to evaluate LVH and for cardiac causes of edema  -Check exercise stress test to evaluate exercise capacity  -Update lipid panel    CC: Edema/shortness of breath    HPI  49 y.o. male presents with edema and shortness of breath.  The patient follows with nephrology for CKD 3.  He was recently noted to have pedal edema and shortness of breath.  He was placed on 80 of Lasix by his nephrologist and his symptoms improved.  He is currently on 40 mg daily of Lasix.    He is here for evaluation of possible cardiac symptoms.  He does not have any PND or orthopnea.  " His pedal edema has significantly improved.  He has abdominal distention but he recently had an abdominal MRI to evaluate his adrenal nodules and this demonstrated no fluid in the abdomen, only fat.      The patient denies chest pain, shortness of breath, palpitations, orthopnea, PND, pedal edema, syncope, presyncope, diaphoresis, nausea/vomiting       The 10-year ASCVD risk score (Cecilia DK, et al., 2019) is: 2.9%    Values used to calculate the score:      Age: 49 years      Sex: Male      Is Non- : No      Diabetic: No      Tobacco smoker: No      Systolic Blood Pressure: 110 mmHg      Is BP treated: Yes      HDL Cholesterol: 57 mg/dL      Total Cholesterol: 210 mg/dL            Physical exam  Objective  Vitals: Blood pressure 110/80, pulse 72, height 5' 9\" (1.753 m), weight 92.6 kg (204 lb 1.6 oz), SpO2 95%.    General:  AO x3, no acute distress  Cardiac:  S1-S2 normal,  No murmurs. JVP: normal  Lungs:  Clear to auscultation bilaterally, no wheezing or crackles.  Abdomen:  Soft, nontender, distended  Extremities:  Warm, well perfused, pulses palpable, no ulcers or rashes. Edema:absent  Neuro: Grossly nonfocal        ======================================================  TREADMILL STRESS  Results for orders placed during the hospital encounter of 04/15/16    Stress test only, exercise    Narrative  [address] [address] [phone]    Stress Electrocardiography during Exercise    Name: JEFFREY KERNS  MR #: [id number]  Account #: [id number]  Study date: 04/15/2016  : 1975  Age: 41 years  Gender: Male  Height: 69 in  Weight: 216 lb  BSA: 2.14 m squared    Allergies: NO KNOWN ALLERGIES    Diagnosis: R07.89 - Other chest pain    RN:  Vanna Andrade RN  Primary Physician:  Livan Mayorga MD  Referring Physician:  Joshua Barber, DO  Group:  [facility]  Report Prepared By::  Vaishnavi Herrera  Technician:  " Vaishnavi Herrera  Interpreting Physician:  Jamie Tiwari MD    HISTORY: Pulse ox 98% at rest and 97% at peak exercise. The patient is a 41  year old  male. Chest pain status: chest pain. Coronary artery disease  risk factors: dyslipidemia, hypertension, and former smoker quit 6-9 years ago.  Cardiovascular history: none significant. Previous test results: abnormal ECG -  Pt has history of inverted T waves.    PHYSICAL EXAM: Baseline physical exam screening: normal and no wheezes audible.    REST ECG: Normal sinus rhythm. Nonspecific T wave abnormalities were present.    PROCEDURE: Treadmill exercise testing was performed, using the Lise protocol.  Stress electrocardiographic evaluation was performed.    LISE PROTOCOL:  HR bpm SBP mmHg DBP mmHg Symptoms  Baseline 66 132 88 none  Stage 1 101 152 88 none  Stage 2 114 164 88 none  Stage 3 142 182 90 fatigue  Immediate 169 182 86 none  Recovery 1 126 164 74 none  Recovery 2 104 142 64 none  No medications or fluids given.    STRESS SUMMARY: Duration of exercise was 10 min and 30 sec. The patient  exercised to protocol stage 4. Maximal work rate was 12.5 METs. Functional  capacity was normal. Maximal heart rate during stress was 169 bpm ( 95 % of  maximal predicted heart rate). The heart rate response to stress was normal.  There was normal resting blood pressure with an appropriate response to stress.  The rate-pressure product for the peak heart rate and blood pressure was 57174.  There was no chest pain during stress. The stress test was terminated due to  fatigue. The stress ECG was negative for ischemia. There were no stress  arrhythmias or conduction abnormalities.    SUMMARY:  -  Stress results: Duration of exercise was 10 min and 30 sec. Target heart  rate was achieved. There was no chest pain during stress.  -  ECG conclusions: The stress ECG was negative for ischemia.    IMPRESSIONS: Normal study after maximal exercise without reproduction  of  symptoms.    Prepared and signed by    Jamie Tiwari MD  Signed 04/15/2016 11:58:59     ----------------------------------------------------------------------------------------------  NUCLEAR STRESS TEST: No results found for this or any previous visit.    No results found for this or any previous visit.      --------------------------------------------------------------------------------  CATH:  No results found for this or any previous visit.    --------------------------------------------------------------------------------  ECHO:   Results for orders placed during the hospital encounter of 16    Echo complete with contrast if indicated    Narrative  [place] [id number] [phone]    Transthoracic Echocardiogram  2D, M-mode, Doppler, and Color Doppler    Study date:  2016    Patient: JEFFREY KERNS  MR number: [id number]  Account number: [id number]  : 10-Douglas-1975  Age: 41 years  Gender: Male  Status: Outpatient  Location: Echo lab  Height: 70 in  Weight: 215 lb  BP: 140/ 88 mmHg    Indications: Assess left ventricular function. Assess left ventricular  hypertrophy.    Diagnoses: R94.31 - Abnormal electrocardiogram [ECG] [EKG]    Sonographer:  PAMELA Umanzor  Primary Physician:  Livan Mayorga MD  Referring Physician:  Bunny Moulton MD  Group:  [facility]  Interpreting Physician:  Ruba Cabral MD    SUMMARY    LEFT VENTRICLE:  Systolic function was normal. Ejection fraction was estimated to be 60 %.  There were no regional wall motion abnormalities.  Wall thickness was increased.  There was moderate concentric hypertrophy.  Left ventricular diastolic function parameters were normal for the patient's  age.    AORTA:  The root exhibited upper limit of normal size at 3.9 cm.    HISTORY: Symptoms: chest pain. PRIOR HISTORY: Risk factors: hypertension.    PROCEDURE: The procedure was performed in the echo lab. This  was a routine  study. The transthoracic approach was used. The study included complete 2D  imaging, M-mode, complete spectral Doppler, and color Doppler. The heart rate  was 67 bpm, at the start of the study. Images were obtained from the  parasternal, apical, subcostal, and suprasternal notch acoustic windows.  Echocardiographic views were limited due to poor acoustic window availability,  decreased penetration, and lung interference. This was a technically difficult  study.    LEFT VENTRICLE: Size was normal. Systolic function was normal. Ejection  fraction was estimated to be 60 %. There were no regional wall motion  abnormalities. Wall thickness was increased. There was moderate concentric  hypertrophy. DOPPLER: There was an increased relative contribution of atrial  contraction to ventricular filling. Left ventricular diastolic function  parameters were normal for the patient's age.    RIGHT VENTRICLE: The size was normal. Systolic function was normal.    LEFT ATRIUM: Size was at the upper limits of normal.    RIGHT ATRIUM: Size was normal.    MITRAL VALVE: Valve structure was normal. There was normal leaflet separation.  DOPPLER: There was no evidence for stenosis. There was no regurgitation.    AORTIC VALVE: The valve was not well visualized. DOPPLER: There was no evidence  for significant stenosis. Peak velocity with Valsalva was 2.2 m/s (19 mm Hg).  There was no significant regurgitation.    TRICUSPID VALVE: The valve structure was normal. There was normal leaflet  separation. DOPPLER: There was no evidence for stenosis. There was no  regurgitation.    PULMONIC VALVE: Leaflets exhibited normal thickness, no calcification, and  normal cuspal separation. DOPPLER: The transpulmonic velocity was within the  normal range. There was no regurgitation.    PERICARDIUM: There was no pericardial effusion. The pericardium was normal in  appearance.    AORTA: The root exhibited upper limit of normal size at 3.9  cm.    SYSTEMIC VEINS: IVC: The inferior vena cava was normal in size and course.  Respirophasic changes were normal.    SYSTEM MEASUREMENT TABLES    2D  %FS: 46.19 %  Ao Diam: 3.49 cm  EDV(Teich): 89.24 ml  EF(Cube): 84.42 %  EF(Teich): 77.75 %  ESV(Cube): 13.57 ml  ESV(Teich): 19.86 ml  IVSd: 1.45 cm  LA Area: 19.5 cm2  LA Diam: 3.74 cm  LVEDV MOD A4C: 133.88 ml  LVEF MOD A4C: 67.82 %  LVESV MOD A4C: 43.08 ml  LVIDd: 4.43 cm  LVIDs: 2.39 cm  LVLd A4C: 9.35 cm  LVLs A4C: 6.61 cm  LVPWd: 1.46 cm  RA Area: 13.58 cm2  SV MOD A4C: 90.81 ml  SV(Cube): 73.54 ml  SV(Teich): 69.38 ml  rv diam: 4.1 cm    CW  AV Env.Ti: 243.99 ms  AV VTI: 32.34 cm  AV Vmax: 1.65 m/s  AV Vmean: 1.33 m/s  AV maxPG: 10.88 mmHg  AV meanP.35 mmHg    MM  TAPSE: 2.18 cm    PW  E': 0.06 m/s  E/E': 12.03  LVOT Env.Ti: 280.96 ms  LVOT VTI: 28.75 cm  LVOT Vmax: 1.52 m/s  LVOT Vmean: 1.02 m/s  LVOT maxP.21 mmHg  LVOT meanP.88 mmHg  MV A Karlos: 1.04 m/s  MV Dec White: 4.18 m/s2  MV DecT: 182.94 ms  MV E Karlos: 0.76 m/s  MV E/A Ratio: 0.74    [employer] Accredited Echocardiography Laboratory    Prepared and electronically signed by    Ruba Cabral MD  Signed 2016 12:17:53    No results found for this or any previous visit.    --------------------------------------------------------------------------------  HOLTER  Results for orders placed during the hospital encounter of 16    Holter monitor - 24 hour    Narrative  INDICATIONS: abnormal ekg    DESCRIPTION OF FINDINGS:  The patient was monitored for a total of 24 hours.  The patient was predominantly noted to be in NSR throughout the study.  The average heart rate was 77 beats per minute.  The heart rate ranged from a low of 58 beats per minute in AM at 3:55 to a maximum of 106 beats per minute in AM at 5:37.    Ventricular ectopic activity consisted of 0 (0.0% of total beats).  There was no sustained or nonsustained ventricular tachycardia.    Supraventricular ectopic  "activity consisted of 4 (0.0% of total beats). There was no supraventricular tachycardia identified.  There was no evidence of atrial fibrillation or atrial flutter.    There were no significant pauses. The longest R-R interval was 1.0 seconds.  There was no evidence of advanced degree heart block.    The accompanying patient diary notes no symptoms.    Impression  1. Predominantly NSR at varying rates.    2.   No PVC's.    3.   Low density of PAC's.    4.   No significant bradycardia.    5.   No symptoms reported.    --------------------------------------------------------------------------------  CAROTIDS  No results found for this or any previous visit.       Diagnoses and all orders for this visit:    Benign hypertension with CKD (chronic kidney disease) stage III (HCC)  -     POCT ECG    SOB (shortness of breath)  -     POCT ECG  -     B-Type Natriuretic Peptide(BNP); Future  -     Echo complete w/ contrast if indicated; Future  -     Stress test only, exercise; Future    Stage 3a chronic kidney disease (HCC)    Hyperlipidemia, unspecified hyperlipidemia type  -     Lipid Panel With Direct LDL; Future       ======================================================          Review of Systems  ROS as noted above, otherwise 12 point review of systems was performed and is negative.     Historical Information  Past Medical History:   Diagnosis Date   • Adrenal mass (HCC)    • Chest pain     \"long ago stress related to family issue seen ER and cleared\"   • Chronic kidney disease    • Chronic pain disorder     right hip   • Claustrophobia    • Colon polyp    • CPAP (continuous positive airway pressure) dependence    • Dental crowns present    • Disease of thyroid gland     nodule sees Dr Maldonado/endocrinologist and Dr TALA Rossi([facility])   • Exercise involving weight training     1-2x/week   • GERD (gastroesophageal reflux disease)    • Hyperlipidemia    • Hypertension    • Kidney stone    • Kidney stones    • Low " testosterone    • Motion sickness    • Right hip pain    • Sleep apnea     not currently using his CPAP     Past Surgical History:   Procedure Laterality Date   • COLONOSCOPY     • CYSTOSCOPY     • EXTRACORPOREAL SHOCK WAVE LITHOTRIPSY Left    • FL INJECTION RIGHT HIP (ARTHROGRAM)  3/31/2021    pt cant recall this   • FL RETROGRADE PYELOGRAM  2018   • KIDNEY STONE SURGERY     • AZ ARTHROSCOPY HIP W/LABRAL REPAIR Right 2021    Procedure: ARTHROSCOPY HIP with labral debridement: femoroplasty;  Surgeon: Panfilo Pierce MD;  Location: [facility];  Service: Orthopedics   • AZ ARTHRS KNE SURG W/MENISCECTOMY MED/LAT W/SHVG Right 2021    Procedure: ARTHROSCOPY KNEE, partial medial meniscectomy;  Surgeon: Panfilo Pierce MD;  [facility];  Service: Orthopedics   • AZ CYSTO/URETERO W/LITHOTRIPSY &INDWELL STENT INSRT Left 2018    Procedure: CYSTOSCOPY URETEROSCOPY, RETROGRADE PYELOGRAM AND INSERTION STENT URETERAL;  Surgeon: Panfilo Grant MD;  [facility];  Service: Urology   • AZ ESOPHAGOGASTRODUODENOSCOPY TRANSORAL DIAGNOSTIC N/A 2018    Procedure: ESOPHAGOGASTRODUODENOSCOPY (EGD);  Surgeon: Urszula Daniel MD;  Location: [facility];  Service: Gastroenterology   • WISDOM TOOTH EXTRACTION       Social History     Substance and Sexual Activity   Alcohol Use Not Currently   • Alcohol/week: 2.0 standard drinks of alcohol   • Types: 2 Glasses of wine per week    Comment: Rare      Social History     Substance and Sexual Activity   Drug Use No     Social History     Tobacco Use   Smoking Status Former   • Current packs/day: 0.00   • Average packs/day: 1 pack/day for 17.0 years (17.0 ttl pk-yrs)   • Types: Cigarettes   • Start date: 1988   • Quit date: 2005   • Years since quittin.3   Smokeless Tobacco Never     Family History   Problem Relation Age of Onset   • Asthma Mother    • Diabetes Mother    • Other Father         Endocarditis   • Hypertension Father   "  • Gout Father        Meds/Allergies  Hospital Medications:   No current facility-administered medications for this visit.     Home Medications: (Not in a hospital admission)      No Known Allergies      Portions of the record may have been created with voice recognition software.  Occasional wrong words or \"sound a like\" substitutions may have occurred due to the inherent limitations of voice recognition software.  Read the chart carefully and recognize, using context, where substitutions have occurred.        I have spent a total of 65 min in reviewing and/or ordering tests, medications, or procedures, performing an examination or evaluation, reviewing pertinent history, counseling and educating the patient, referring and/or communicating with other health care professionals, documenting in the EMR and general coordination of care of the patient today.               "

## 2024-05-10 PROCEDURE — 93000 ELECTROCARDIOGRAM COMPLETE: CPT | Performed by: INTERNAL MEDICINE

## 2024-05-10 NOTE — PROGRESS NOTES
Cardiology   Marcio Monge 49 y.o. male MRN: [id number]   Encounter: [id number]        Reason for Consult / Principal Problem: Chest tightness/shortness of breath/edema    Physician Requesting Consult: Livan Mayorga MD    PCP: Livan Mayorga MD        Assessment/Plan:    >> Shortness of breath on exertion  >> Bilateral pedal edema: Essentially resolved now   >> Chest tightness  >> Hypertension  >> History of adrenal adenomas with cortisol secretion  >> LVH on prior echo  >> Coronary calcium score of 62  >> CKD stage III        Plan:    -Continue with diuretics per nephrology  -Blood pressure appears well-controlled on current regimen  -Continue eplerenone, carvedilol, furosemide  -Continue Crestor 10 mg  -He is on a novel research medication called osilodrostat per research protocol from [facility].  -Check cardiac BNP  -Check echocardiogram to evaluate LVH and for cardiac causes of edema  -Check exercise stress test to evaluate exercise capacity  -Update lipid panel  -Return office in 4 weeks    CC: Edema/shortness of breath    HPI  49 y.o. male presents with edema and shortness of breath.  The patient follows with nephrology for CKD 3.  He was recently noted to have pedal edema and shortness of breath.  He was placed on 80 of Lasix by his nephrologist and his symptoms improved.  He is currently on 40 mg daily of Lasix.    He is here for evaluation of possible cardiac symptoms.  He does not have any PND or orthopnea.  His pedal edema has significantly improved.  He has abdominal distention but he recently had an abdominal MRI to evaluate his adrenal nodules and this demonstrated no fluid in the abdomen, only fat.      The patient denies chest pain, shortness of breath, palpitations, orthopnea, PND, pedal edema, syncope, presyncope, diaphoresis, nausea/vomiting       The 10-year ASCVD risk score (Cecilia DK, et al., 2019) is: 2.9%    Values used to calculate the score:      Age: 49 years      Sex:  "Male      Is Non- : No      Diabetic: No      Tobacco smoker: No      Systolic Blood Pressure: 110 mmHg      Is BP treated: Yes      HDL Cholesterol: 57 mg/dL      Total Cholesterol: 210 mg/dL            Physical exam  Objective   Vitals: Blood pressure 110/80, pulse 72, height 5' 9\" (1.753 m), weight 92.6 kg (204 lb 1.6 oz), SpO2 95%.    General:  AO x3, no acute distress  Cardiac:  S1-S2 normal,  No murmurs. JVP: normal  Lungs:  Clear to auscultation bilaterally, no wheezing or crackles.  Abdomen:  Soft, nontender, distended  Extremities:  Warm, well perfused, pulses palpable, no ulcers or rashes. Edema:absent  Neuro: Grossly nonfocal        ======================================================  TREADMILL STRESS  Results for orders placed during the hospital encounter of 04/15/16    Stress test only, [place] [address]  [phone]    Stress Electrocardiography during Exercise    Name: JEFFREY KERNS  MR #: [id number]  Account #: [id number]  Study date: 04/15/2016  : 1975  Age: 41 years  Gender: Male  Height: 69 in  Weight: 216 lb  BSA: 2.14 m squared    Allergies: NO KNOWN ALLERGIES    Diagnosis: R07.89 - Other chest pain    RN:  Vanna Andrade RN  Primary Physician:  Livan Mayorga MD  Referring Physician:  Joshua Barber DO  Group:  [facility]  Report Prepared By::  Vaishnavi Herrera  Technician:  Vaishnavi Herrera  Interpreting Physician:  Jamie Tiwari MD    HISTORY: Pulse ox 98% at rest and 97% at peak exercise. The patient is a 41  year old  male. Chest pain status: chest pain. Coronary artery disease  risk factors: dyslipidemia, hypertension, and former smoker quit 6-9 years ago.  Cardiovascular history: none significant. Previous test results: abnormal ECG -  Pt has history of inverted T waves.    PHYSICAL EXAM: Baseline physical exam screening: normal and no wheezes " audible.    REST ECG: Normal sinus rhythm. Nonspecific T wave abnormalities were present.    PROCEDURE: Treadmill exercise testing was performed, using the Lise protocol.  Stress electrocardiographic evaluation was performed.    LISE PROTOCOL:  HR bpm SBP mmHg DBP mmHg Symptoms  Baseline 66 132 88 none  Stage 1 101 152 88 none  Stage 2 114 164 88 none  Stage 3 142 182 90 fatigue  Immediate 169 182 86 none  Recovery 1 126 164 74 none  Recovery 2 104 142 64 none  No medications or fluids given.    STRESS SUMMARY: Duration of exercise was 10 min and 30 sec. The patient  exercised to protocol stage 4. Maximal work rate was 12.5 METs. Functional  capacity was normal. Maximal heart rate during stress was 169 bpm ( 95 % of  maximal predicted heart rate). The heart rate response to stress was normal.  There was normal resting blood pressure with an appropriate response to stress.  The rate-pressure product for the peak heart rate and blood pressure was 22856.  There was no chest pain during stress. The stress test was terminated due to  fatigue. The stress ECG was negative for ischemia. There were no stress  arrhythmias or conduction abnormalities.    SUMMARY:  -  Stress results: Duration of exercise was 10 min and 30 sec. Target heart  rate was achieved. There was no chest pain during stress.  -  ECG conclusions: The stress ECG was negative for ischemia.    IMPRESSIONS: Normal study after maximal exercise without reproduction of  symptoms.    Prepared and signed by    Jamie Tiwari MD  Signed 04/15/2016 11:58:59     ----------------------------------------------------------------------------------------------  NUCLEAR STRESS TEST: No results found for this or any previous visit.    No results found for this or any previous visit.      --------------------------------------------------------------------------------  CATH:  No results found for this or any previous  visit.    --------------------------------------------------------------------------------  ECHO:   Results for orders placed during the hospital encounter of 16    Echo complete with contrast if indicated    Narrative  [address] [id number] [phone]    Transthoracic Echocardiogram  2D, M-mode, Doppler, and Color Doppler    Study date:  2016    Patient: JEFFREY KERNS  MR number: [id number]  Account number: [id number]  : 10-Douglas-1975  Age: 41 years  Gender: Male  Status: Outpatient  Location: Echo lab  Height: 70 in  Weight: 215 lb  BP: 140/ 88 mmHg    Indications: Assess left ventricular function. Assess left ventricular  hypertrophy.    Diagnoses: R94.31 - Abnormal electrocardiogram [ECG] [EKG]    Sonographer:  PAMELA Umanzor  Primary Physician:  Livan Mayorga MD  Referring Physician:  Bunny Moulton MD  Group:  [facility]  Interpreting Physician:  Ruba Cabral MD    SUMMARY    LEFT VENTRICLE:  Systolic function was normal. Ejection fraction was estimated to be 60 %.  There were no regional wall motion abnormalities.  Wall thickness was increased.  There was moderate concentric hypertrophy.  Left ventricular diastolic function parameters were normal for the patient's  age.    AORTA:  The root exhibited upper limit of normal size at 3.9 cm.    HISTORY: Symptoms: chest pain. PRIOR HISTORY: Risk factors: hypertension.    PROCEDURE: The procedure was performed in the echo lab. This was a routine  study. The transthoracic approach was used. The study included complete 2D  imaging, M-mode, complete spectral Doppler, and color Doppler. The heart rate  was 67 bpm, at the start of the study. Images were obtained from the  parasternal, apical, subcostal, and suprasternal notch acoustic windows.  Echocardiographic views were limited due to poor acoustic window availability,  decreased penetration, and lung interference. This  was a technically difficult  study.    LEFT VENTRICLE: Size was normal. Systolic function was normal. Ejection  fraction was estimated to be 60 %. There were no regional wall motion  abnormalities. Wall thickness was increased. There was moderate concentric  hypertrophy. DOPPLER: There was an increased relative contribution of atrial  contraction to ventricular filling. Left ventricular diastolic function  parameters were normal for the patient's age.    RIGHT VENTRICLE: The size was normal. Systolic function was normal.    LEFT ATRIUM: Size was at the upper limits of normal.    RIGHT ATRIUM: Size was normal.    MITRAL VALVE: Valve structure was normal. There was normal leaflet separation.  DOPPLER: There was no evidence for stenosis. There was no regurgitation.    AORTIC VALVE: The valve was not well visualized. DOPPLER: There was no evidence  for significant stenosis. Peak velocity with Valsalva was 2.2 m/s (19 mm Hg).  There was no significant regurgitation.    TRICUSPID VALVE: The valve structure was normal. There was normal leaflet  separation. DOPPLER: There was no evidence for stenosis. There was no  regurgitation.    PULMONIC VALVE: Leaflets exhibited normal thickness, no calcification, and  normal cuspal separation. DOPPLER: The transpulmonic velocity was within the  normal range. There was no regurgitation.    PERICARDIUM: There was no pericardial effusion. The pericardium was normal in  appearance.    AORTA: The root exhibited upper limit of normal size at 3.9 cm.    SYSTEMIC VEINS: IVC: The inferior vena cava was normal in size and course.  Respirophasic changes were normal.    SYSTEM MEASUREMENT TABLES    2D  %FS: 46.19 %  Ao Diam: 3.49 cm  EDV(Teich): 89.24 ml  EF(Cube): 84.42 %  EF(Teich): 77.75 %  ESV(Cube): 13.57 ml  ESV(Teich): 19.86 ml  IVSd: 1.45 cm  LA Area: 19.5 cm2  LA Diam: 3.74 cm  LVEDV MOD A4C: 133.88 ml  LVEF MOD A4C: 67.82 %  LVESV MOD A4C: 43.08 ml  LVIDd: 4.43 cm  LVIDs: 2.39 cm  LVLd  A4C: 9.35 cm  LVLs A4C: 6.61 cm  LVPWd: 1.46 cm  RA Area: 13.58 cm2  SV MOD A4C: 90.81 ml  SV(Cube): 73.54 ml  SV(Teich): 69.38 ml  rv diam: 4.1 cm    CW  AV Env.Ti: 243.99 ms  AV VTI: 32.34 cm  AV Vmax: 1.65 m/s  AV Vmean: 1.33 m/s  AV maxPG: 10.88 mmHg  AV meanP.35 mmHg    MM  TAPSE: 2.18 cm    PW  E': 0.06 m/s  E/E': 12.03  LVOT Env.Ti: 280.96 ms  LVOT VTI: 28.75 cm  LVOT Vmax: 1.52 m/s  LVOT Vmean: 1.02 m/s  LVOT maxP.21 mmHg  LVOT meanP.88 mmHg  MV A Karlos: 1.04 m/s  MV Dec [place]: 4.18 m/s2  MV DecT: 182.94 ms  MV E Karlos: 0.76 m/s  MV E/A Ratio: 0.74    [facility]    Prepared and electronically signed by    Ruba Cabral MD  Signed 2016 12:17:53    No results found for this or any previous visit.    --------------------------------------------------------------------------------  HOLTER  Results for orders placed during the hospital encounter of 16    Holter monitor - 24 hour    Narrative  INDICATIONS: abnormal ekg    DESCRIPTION OF FINDINGS:  The patient was monitored for a total of 24 hours.  The patient was predominantly noted to be in NSR throughout the study.  The average heart rate was 77 beats per minute.  The heart rate ranged from a low of 58 beats per minute in AM at 3:55 to a maximum of 106 beats per minute in AM at 5:37.    Ventricular ectopic activity consisted of 0 (0.0% of total beats).  There was no sustained or nonsustained ventricular tachycardia.    Supraventricular ectopic activity consisted of 4 (0.0% of total beats). There was no supraventricular tachycardia identified.  There was no evidence of atrial fibrillation or atrial flutter.    There were no significant pauses. The longest R-R interval was 1.0 seconds.  There was no evidence of advanced degree heart block.    The accompanying patient diary notes no symptoms.    Impression  1. Predominantly NSR at varying rates.    2.   No PVC's.    3.   Low density of  "PAC's.    4.   No significant bradycardia.    5.   No symptoms reported.    --------------------------------------------------------------------------------  CAROTIDS  No results found for this or any previous visit.       Diagnoses and all orders for this visit:    Benign hypertension with CKD (chronic kidney disease) stage III (HCC)  -     POCT ECG    SOB (shortness of breath)  -     POCT ECG  -     B-Type Natriuretic Peptide(BNP); Future  -     Echo complete w/ contrast if indicated; Future  -     Stress test only, exercise; Future    Stage 3a chronic kidney disease (HCC)    Hyperlipidemia, unspecified hyperlipidemia type  -     Lipid Panel With Direct LDL; Future       ======================================================          Review of Systems  ROS as noted above, otherwise 12 point review of systems was performed and is negative.     Historical Information   Past Medical History:   Diagnosis Date    Adrenal mass (HCC)     Chest pain     \"long ago stress related to family issue seen ER and cleared\"    Chronic kidney disease     Chronic pain disorder     right hip    Claustrophobia     Colon polyp     CPAP (continuous positive airway pressure) dependence     Dental crowns present     Disease of thyroid gland     nodule sees Dr Maldonado/endocrinologist and Dr TALA Rossi([facility])    Exercise involving weight training     1-2x/week    GERD (gastroesophageal reflux disease)     Hyperlipidemia     Hypertension     Kidney stone     Kidney stones     Low testosterone     Motion sickness     Right hip pain     Sleep apnea     not currently using his CPAP     Past Surgical History:   Procedure Laterality Date    COLONOSCOPY      CYSTOSCOPY      EXTRACORPOREAL SHOCK WAVE LITHOTRIPSY Left     FL INJECTION RIGHT HIP (ARTHROGRAM)  3/31/2021    pt cant recall this    FL RETROGRADE PYELOGRAM  8/21/2018    KIDNEY STONE SURGERY      ND ARTHROSCOPY HIP W/LABRAL REPAIR Right 7/19/2021    Procedure: ARTHROSCOPY HIP with labral " "debridement: femoroplasty;  Surgeon: Panfilo Pierce MD;  [facility];  Service: Orthopedics    NM ARTHRS KNE SURG W/MENISCECTOMY MED/LAT W/SHVG Right 2021    Procedure: ARTHROSCOPY KNEE, partial medial meniscectomy;  Surgeon: Panfilo Pierce MD;  [facility];  Service: Orthopedics    NM CYSTO/URETERO W/LITHOTRIPSY &INDWELL STENT INSRT Left 2018    Procedure: CYSTOSCOPY URETEROSCOPY, RETROGRADE PYELOGRAM AND INSERTION STENT URETERAL;  Surgeon: Panfilo Grant MD;  [facility];  Service: Urology    NM ESOPHAGOGASTRODUODENOSCOPY TRANSORAL DIAGNOSTIC N/A 2018    Procedure: ESOPHAGOGASTRODUODENOSCOPY (EGD);  Surgeon: Urszula Daniel MD;  [facility];  Service: Gastroenterology    WISDOM TOOTH EXTRACTION       Social History     Substance and Sexual Activity   Alcohol Use Not Currently    Alcohol/week: 2.0 standard drinks of alcohol    Types: 2 Glasses of wine per week    Comment: Rare      Social History     Substance and Sexual Activity   Drug Use No     Social History     Tobacco Use   Smoking Status Former    Current packs/day: 0.00    Average packs/day: 1 pack/day for 17.0 years (17.0 ttl pk-yrs)    Types: Cigarettes    Start date: 1988    Quit date: 2005    Years since quittin.3   Smokeless Tobacco Never     Family History   Problem Relation Age of Onset    Asthma Mother     Diabetes Mother     Other Father         Endocarditis    Hypertension Father     Gout Father        Meds/Allergies   Hospital Medications:   No current facility-administered medications for this visit.     Home Medications: (Not in a hospital admission)      No Known Allergies      Portions of the record may have been created with voice recognition software.  Occasional wrong words or \"sound a like\" substitutions may have occurred due to the inherent limitations of voice recognition software.  Read the chart carefully and recognize, using context, where substitutions " have occurred.        I have spent a total of 65 min in reviewing and/or ordering tests, medications, or procedures, performing an examination or evaluation, reviewing pertinent history, counseling and educating the patient, referring and/or communicating with other health care professionals, documenting in the EMR and general coordination of care of the patient today.

## 2024-05-22 ENCOUNTER — ANESTHESIA EVENT (OUTPATIENT)
Dept: GASTROENTEROLOGY | Facility: AMBULARY SURGERY CENTER | Age: 49
End: 2024-05-22

## 2024-05-22 ENCOUNTER — HOSPITAL ENCOUNTER (OUTPATIENT)
Dept: GASTROENTEROLOGY | Facility: AMBULARY SURGERY CENTER | Age: 49
Setting detail: OUTPATIENT SURGERY
Discharge: HOME | End: 2024-05-22
Attending: INTERNAL MEDICINE | Admitting: INTERNAL MEDICINE
Payer: COMMERCIAL

## 2024-05-22 ENCOUNTER — ANESTHESIA (OUTPATIENT)
Dept: GASTROENTEROLOGY | Facility: AMBULARY SURGERY CENTER | Age: 49
End: 2024-05-22

## 2024-05-22 VITALS
WEIGHT: 204.15 LBS | HEART RATE: 69 BPM | HEIGHT: 69 IN | RESPIRATION RATE: 18 BRPM | TEMPERATURE: 99.5 F | OXYGEN SATURATION: 95 % | BODY MASS INDEX: 30.24 KG/M2 | SYSTOLIC BLOOD PRESSURE: 131 MMHG | DIASTOLIC BLOOD PRESSURE: 99 MMHG

## 2024-05-22 DIAGNOSIS — K22.70 BARRETT'S ESOPHAGUS DETERMINED BY ENDOSCOPY: ICD-10-CM

## 2024-05-22 DIAGNOSIS — K21.9 GASTROESOPHAGEAL REFLUX DISEASE WITHOUT ESOPHAGITIS: ICD-10-CM

## 2024-05-22 PROCEDURE — 43239 EGD BIOPSY SINGLE/MULTIPLE: CPT | Performed by: INTERNAL MEDICINE

## 2024-05-22 PROCEDURE — 88305 TISSUE EXAM BY PATHOLOGIST: CPT | Performed by: PATHOLOGY

## 2024-05-22 RX ORDER — PANTOPRAZOLE SODIUM 40 MG/1
40 TABLET, DELAYED RELEASE ORAL DAILY
Qty: 30 TABLET | Refills: 2 | Status: SHIPPED | OUTPATIENT
Start: 2024-05-22

## 2024-05-22 RX ORDER — PROPOFOL 10 MG/ML
INJECTION, EMULSION INTRAVENOUS AS NEEDED
Status: DISCONTINUED | OUTPATIENT
Start: 2024-05-22 | End: 2024-05-22

## 2024-05-22 RX ORDER — ONDANSETRON 2 MG/ML
4 INJECTION INTRAMUSCULAR; INTRAVENOUS ONCE AS NEEDED
Status: CANCELLED | OUTPATIENT
Start: 2024-05-22

## 2024-05-22 RX ORDER — SODIUM CHLORIDE, SODIUM LACTATE, POTASSIUM CHLORIDE, CALCIUM CHLORIDE 600; 310; 30; 20 MG/100ML; MG/100ML; MG/100ML; MG/100ML
125 INJECTION, SOLUTION INTRAVENOUS CONTINUOUS
Status: DISCONTINUED | OUTPATIENT
Start: 2024-05-22 | End: 2024-05-26 | Stop reason: HOSPADM

## 2024-05-22 RX ORDER — LIDOCAINE HYDROCHLORIDE 10 MG/ML
INJECTION, SOLUTION EPIDURAL; INFILTRATION; INTRACAUDAL; PERINEURAL AS NEEDED
Status: DISCONTINUED | OUTPATIENT
Start: 2024-05-22 | End: 2024-05-22

## 2024-05-22 RX ADMIN — PROPOFOL 50 MG: 10 INJECTION, EMULSION INTRAVENOUS at 10:53

## 2024-05-22 RX ADMIN — PROPOFOL 150 MG: 10 INJECTION, EMULSION INTRAVENOUS at 10:50

## 2024-05-22 RX ADMIN — LIDOCAINE HYDROCHLORIDE 50 MG: 10 INJECTION, SOLUTION EPIDURAL; INFILTRATION; INTRACAUDAL; PERINEURAL at 10:49

## 2024-05-22 RX ADMIN — SODIUM CHLORIDE, SODIUM LACTATE, POTASSIUM CHLORIDE, AND CALCIUM CHLORIDE: .6; .31; .03; .02 INJECTION, SOLUTION INTRAVENOUS at 10:50

## 2024-05-22 NOTE — ANESTHESIA POSTPROCEDURE EVALUATION
Post-Op Assessment Note    CV Status:  Stable  Pain Score: 0    Pain management: adequate       Mental Status:  Sleepy   Hydration Status:  Euvolemic   PONV Controlled:  Controlled   Airway Patency:  Patent     Post Op Vitals Reviewed: Yes    No anethesia notable event occurred.    Staff: CRNA               BP   123/80   Temp      Pulse  72   Resp   16   SpO2   95

## 2024-05-22 NOTE — H&P
"History and Physical - [facility]  Marcio Monge 49 y.o. male MRN: [id number]        HPI: 49-year-old male with history of GERD.  Regular bowel movements.    Historical Information   Past Medical History:   Diagnosis Date    Adrenal mass (HCC)     Chest pain     \"long ago stress related to family issue seen ER and cleared\"    Chronic kidney disease     Chronic pain disorder     right hip    Claustrophobia     Colon polyp     CPAP (continuous positive airway pressure) dependence     Dental crowns present     Disease of thyroid gland     nodule sees Dr Maldonado/endocrinologist and Dr TALA Rossi([facility])    Exercise involving weight training     1-2x/week    GERD (gastroesophageal reflux disease)     Hyperlipidemia     Hypertension     Kidney stone     Kidney stones     Low testosterone     Motion sickness     Right hip pain     Sleep apnea     not currently using his CPAP     Past Surgical History:   Procedure Laterality Date    COLONOSCOPY      CYSTOSCOPY      EXTRACORPOREAL SHOCK WAVE LITHOTRIPSY Left     FL INJECTION RIGHT HIP (ARTHROGRAM)  3/31/2021    pt cant recall this    FL RETROGRADE PYELOGRAM  8/21/2018    KIDNEY STONE SURGERY      ND ARTHROSCOPY HIP W/LABRAL REPAIR Right 7/19/2021    Procedure: ARTHROSCOPY HIP with labral debridement: femoroplasty;  Surgeon: Panfilo Pierce MD;  [facility];  Service: Orthopedics    ND ARTHRS KNE SURG W/MENISCECTOMY MED/LAT W/SHVG Right 12/13/2021    Procedure: ARTHROSCOPY KNEE, partial medial meniscectomy;  Surgeon: Panfilo Pierce MD;  [facility];  Service: Orthopedics    ND CYSTO/URETERO W/LITHOTRIPSY &INDWELL STENT INSRT Left 8/21/2018    Procedure: CYSTOSCOPY URETEROSCOPY, RETROGRADE PYELOGRAM AND INSERTION STENT URETERAL;  Surgeon: Panfilo Grant MD;  [facility];  Service: Urology    ND ESOPHAGOGASTRODUODENOSCOPY TRANSORAL DIAGNOSTIC N/A 6/21/2018    Procedure: ESOPHAGOGASTRODUODENOSCOPY (EGD);  Surgeon: " "Urszula Daniel MD;  [facility];  Service: Gastroenterology    WISDOM TOOTH EXTRACTION       Social History   Social History     Substance and Sexual Activity   Alcohol Use Not Currently    Alcohol/week: 2.0 standard drinks of alcohol    Types: 2 Glasses of wine per week    Comment: Rare      Social History     Substance and Sexual Activity   Drug Use No     Social History     Tobacco Use   Smoking Status Former    Current packs/day: 0.00    Average packs/day: 1 pack/day for 17.0 years (17.0 ttl pk-yrs)    Types: Cigarettes    Start date: 1988    Quit date: 2005    Years since quittin.4   Smokeless Tobacco Never     Family History   Problem Relation Age of Onset    Asthma Mother     Diabetes Mother     Other Father         Endocarditis    Hypertension Father     Gout Father        Meds/Allergies     Not in a hospital admission.    No Known Allergies    Objective     Blood pressure 165/99, pulse 66, temperature 99.5 °F (37.5 °C), temperature source Tympanic, resp. rate 18, height 5' 9\" (1.753 m), weight 92.6 kg (204 lb 2.3 oz), SpO2 96%.    Physical Exam:    Chest- CTA  Heart- RRR  Abdomen- NT/ND  Extremities- No edema    ASSESSMENT:     History of GERD    PLAN:    EGD              "

## 2024-05-22 NOTE — ANESTHESIA PREPROCEDURE EVALUATION
Procedure:  EGD    Relevant Problems   ANESTHESIA (within normal limits)      CARDIO   (+) Atypical chest pain   (+) Hyperlipidemia      ENDO (within normal limits)      GI/HEPATIC   (+) GERD (gastroesophageal reflux disease)   (+) Gastroesophageal reflux disease with esophagitis without hemorrhage      /RENAL   (+) Benign hypertension with CKD (chronic kidney disease) stage III (HCC)   (+) Nephrolithiasis   (+) Stage 3a chronic kidney disease (HCC)      PULMONARY   (+) Obstructive sleep apnea        Physical Exam    Airway    Mallampati score: II  TM Distance: >3 FB  Neck ROM: full     Dental       Cardiovascular  Cardiovascular exam normal    Pulmonary  Pulmonary exam normal     Other Findings        Anesthesia Plan  ASA Score- 2     Anesthesia Type- IV sedation with anesthesia with ASA Monitors.         Additional Monitors:     Airway Plan:            Plan Factors-Exercise tolerance (METS): >4 METS.    Chart reviewed.   Existing labs reviewed. Patient summary reviewed.    Patient is not a current smoker.              Induction-     Postoperative Plan-     Perioperative Resuscitation Plan - Level 1 - Full Code.       Informed Consent- Anesthetic plan and risks discussed with patient.  I personally reviewed this patient with the CRNA. Discussed and agreed on the Anesthesia Plan with the CRNA..

## 2024-05-23 ENCOUNTER — OFFICE VISIT (OUTPATIENT)
Dept: NEPHROLOGY | Facility: CLINIC | Age: 49
End: 2024-05-23
Payer: COMMERCIAL

## 2024-05-23 VITALS
HEIGHT: 69 IN | WEIGHT: 202.6 LBS | DIASTOLIC BLOOD PRESSURE: 68 MMHG | SYSTOLIC BLOOD PRESSURE: 118 MMHG | HEART RATE: 66 BPM | BODY MASS INDEX: 30.01 KG/M2

## 2024-05-23 DIAGNOSIS — R82.991 HYPOCITRATURIA: ICD-10-CM

## 2024-05-23 DIAGNOSIS — N18.31 STAGE 3A CHRONIC KIDNEY DISEASE (HCC): ICD-10-CM

## 2024-05-23 DIAGNOSIS — N20.0 NEPHROLITHIASIS: ICD-10-CM

## 2024-05-23 DIAGNOSIS — I12.9 BENIGN HYPERTENSION WITH CKD (CHRONIC KIDNEY DISEASE) STAGE III (HCC): Primary | ICD-10-CM

## 2024-05-23 DIAGNOSIS — E55.9 VITAMIN D DEFICIENCY: ICD-10-CM

## 2024-05-23 DIAGNOSIS — R60.0 BILATERAL LEG EDEMA: ICD-10-CM

## 2024-05-23 DIAGNOSIS — N18.30 BENIGN HYPERTENSION WITH CKD (CHRONIC KIDNEY DISEASE) STAGE III (HCC): Primary | ICD-10-CM

## 2024-05-23 PROCEDURE — 99214 OFFICE O/P EST MOD 30 MIN: CPT | Performed by: INTERNAL MEDICINE

## 2024-05-23 RX ORDER — FUROSEMIDE 20 MG/1
20 TABLET ORAL DAILY
Start: 2024-05-23

## 2024-05-23 NOTE — PROGRESS NOTES
NEPHROLOGY OUTPATIENT PROGRESS NOTE   Marcio Monge 49 y.o. male MRN: [id number]  DATE: 5/23/2024  Reason for visit:   Chief Complaint   Patient presents with    Follow-up    Chronic Kidney Disease    Hypertension     ASSESSMENT and PLAN:  Hypertension  -Likely thought to be essential in origin, another differential remains hypercortisolism vs ?primary hyperaldosteronism  -home BP readings are well-controlled 120s to 130s/low 80s.  BP well-controlled in the office today.  Prior 24 hour ABPM shows:  24 hour average BP reading 136/89  Daytime average BP reading 137/96  Nighttime average /80  Nighttime MAP dipping 13%  Overall 92% successful readings.  -current regimen includes Coreg 25 mg b.i.d., alfuzosin 10 mg daily, eplerenone 50 mg b.i.d.; was on chlorthalidone prior although this was changed to Lasix due to leg edema issues.  -Felodipine was reduced from 10 mg to 5 mg due to leg edema issues although he has stopped taking this.  Given well-controlled blood pressure, will continue to hold felodipine  -Reduce Lasix from 40 mg to 20 mg daily.  --his home wrist BP cuff was compared with our office readings in the past which were identical.  Recommend to get upper arm BP machine.  -plasma metanephrine and catecholamines are nonsignificant in December 2018.  Repeat plasma normetanephrine slightly elevated 156 in May 2021. Unable to check serum aldosterone/PRA while being on eplerenone.    -he is being followed by endocrine in [place] regarding adrenal nodule.   -Patient was found to have high aldosterone level along with high aldosterone/renin ratio prior although patient was also taking eplerenone at that time and makes results unreliable.  - Salt restricted diet     Recurrent nephrolithiasis  -patient had an episode of nephrolithiasis requiring ureteral stent placement with eventual removal in past.   -Denies any recent kidney stone flareup.  Denies any flank or back pain issues.  No gross hematuria.  Stone  analysis: Patient says his stone composition was calcium stone although I do not have documentation.  Imaging: CT scan in February 2023 shows no stone.  Recent MRI in April 2024 did not report kidney stones.  Stone risk profile in June 2023 undercollection, urine volume 1.6 L, urine calcium, sodium, oxalate acceptable.  Urine citrate significantly lower 106, pH 6.3.   - repeat 24-hour urine stone risk profile before next visit.  Work up/labs: No obvious evidence of hypokalemia or acidosis.  Serum calcium, phosphorus acceptable.  Vitamin D level 24.8, PTH 61.  Treatment :  -Now remains off chlorthalidone due to recent leg edema issues.  May consider restarting if hypercalciuria on stone risk profile.  -continue potassium citrate to 30 mEq p.o. b.i.d.  -advised to drink plenty of free water with goal urine output greater than 2 L per day.  -advised to follow low salt diet.     Hypocitraturia  -Since last 24-hour urine sample was undercollection, urine citrate not reliable.  Continue current potassium citrate for now.  -management as above  -Repeat stone risk profile before next visit     Right lower renal pole nodule versus artifact, follows with Urology  -Recent CT scan does not report any abnormalities     CKD stage IIIa, baseline Cr around 1.4 since early 2022, prior 1.1 to 1.4  -last creatinine 1.4 in May 2024 stable at baseline   -CKD suspect secondary to underlying hypertension.  Some progression noted.  -UA in April 2024 shows 1+ proteinuria, no hematuria, 4-10 WBCs.      Microalbuminuria, UACR 33 mg overall minimal and stable.  Could be related to underlying hypertension.  -If this is worsening, may consider ACE inhibitor      Vitamin-D insufficiency, vitamin D level slightly reduced and below goal 24.8 in May 2024.  He admits to be not taking vitamin D supplements lately.  Recommend to restart OTC vitamin D to 5000 units daily.       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    Leg edema issues  -This has now resolved.  His weight seems to be stable around 202 to 203 pounds.  -He may have gained some solid weight as well.  Denies any worsening dyspnea.  -Lower extremity Doppler negative for DVT.  He is scheduled for echocardiogram in the near future.  Also follows with cardiology.  -Due to leg edema issues, chlorthalidone was switched to Lasix and now remains on 40 mg daily.  Will decrease Lasix further to 20 mg daily.  Patient is euvolemic.  -Advised to monitor daily weight, if he has weight gain greater than 5 pounds in 2 to 3 days or has worsening leg swelling, may consider additional Lasix 20 mg as needed    Diagnoses and all orders for this visit:    Stage 3a chronic kidney disease (HCC)  -     Albumin / creatinine urine ratio; Future  -     Basic metabolic panel; Future  -     CBC; Future  -     Phosphorus; Future  -     PTH, intact; Future  -     Vitamin D 25 hydroxy; Future  -     Stone risk profile; Future    Bilateral leg edema  -     furosemide (LASIX) 20 mg tablet; Take 1 tablet (20 mg total) by mouth daily    Essential hypertension  -     furosemide (LASIX) 20 mg tablet; Take 1 tablet (20 mg total) by mouth daily    Benign hypertension with CKD (chronic kidney disease) stage III ([facility])  -     Albumin / creatinine urine ratio; Future  -     Basic metabolic panel; Future  -     CBC; Future  -     Phosphorus; Future  -     PTH, intact; Future  -     Vitamin D 25 hydroxy; Future  -     Stone risk profile; Future    Hypocitraturia    Nephrolithiasis  -     Phosphorus; Future  -     PTH, intact; Future  -     Vitamin D 25 hydroxy; Future  -     Stone risk profile; Future    Vitamin D deficiency  -     Vitamin D 25 hydroxy; Future        SUBJECTIVE / HPI:  Patient is 49-year-old male with significant medical issues of hypertension diagnosed early in the age, bilateral adrenal nodule, history of  "nephrolithiasis with history of requiring ureteral stent placement with eventual removal, history of lithotripsy, sleep apnea, comes for regular follow-up of hypertension and nephrolithiasis. Baseline serum creatinine around 1.4 since early 2022.   closely follows with Endocrine at [facility].  Continue to remain on osilodrostat which was recently increased.     Since last visit he was having significant leg edema issues with weight gain and chlorthalidone was changed to Lasix, felodipine was reduced.  He has stopped taking felodipine on his own since then.  His weight seems to be stable around 202 to 203 pounds as mentioned above.  Leg edema is much improved with Lasix.  Was also recently evaluated by cardiology.      His home BP readings are well controlled.denies any lightheadedness. BP controlled in the office today. He denies any hematuria, no dysuria. No flank or abdominal pain. No fever or chills.  No recent kidney stone flare-up issues.  No recent NSAID exposure.     REVIEW OF SYSTEMS:  More than 10 point review of systems were obtained and discussed in length with the patient. Complete review of systems were negative / unremarkable except mentioned above.     PHYSICAL EXAM:  Vitals:    05/23/24 1048 05/23/24 1114   BP: 130/84 118/68   BP Location: Left arm    Patient Position: Sitting    Cuff Size: Standard    Pulse: 66    Weight: 91.9 kg (202 lb 9.6 oz)    Height: 5' 9\" (1.753 m)      Body mass index is 29.92 kg/m².    Physical Exam  Vitals reviewed.   Constitutional:       Appearance: He is well-developed.   HENT:      Head: Normocephalic and atraumatic.      Right Ear: External ear normal.      Left Ear: External ear normal.   Eyes:      General: No scleral icterus.     Conjunctiva/sclera: Conjunctivae normal.   Cardiovascular:      Comments: No significant edema in legs  Pulmonary:      Effort: Pulmonary effort is normal. No respiratory distress.      Breath sounds: Normal breath sounds. No wheezing or " "rales.   Abdominal:      Palpations: Abdomen is soft.      Tenderness: There is no abdominal tenderness.   Musculoskeletal:         General: No deformity.   Lymphadenopathy:      Cervical: No cervical adenopathy.   Skin:     Findings: No rash.   Neurological:      Mental Status: He is alert and oriented to person, place, and time.   Psychiatric:         Behavior: Behavior normal.         PAST MEDICAL HISTORY:  Past Medical History:   Diagnosis Date    Adrenal mass (HCC)     Chest pain     \"long ago stress related to family issue seen ER and cleared\"    Chronic kidney disease     Chronic pain disorder     right hip    Claustrophobia     Colon polyp     CPAP (continuous positive airway pressure) dependence     Dental crowns present     Disease of thyroid gland     nodule sees Dr Maldonado/endocrinologist and Dr TALA Ross[facility])    Exercise involving weight training     1-2x/week    GERD (gastroesophageal reflux disease)     Hyperlipidemia     Hypertension     Kidney stone     Kidney stones     Low testosterone     Motion sickness     Right hip pain     Sleep apnea     not currently using his CPAP       PAST SURGICAL HISTORY:  Past Surgical History:   Procedure Laterality Date    COLONOSCOPY      CYSTOSCOPY      EXTRACORPOREAL SHOCK WAVE LITHOTRIPSY Left     FL INJECTION RIGHT HIP (ARTHROGRAM)  3/31/2021    pt cant recall this    FL RETROGRADE PYELOGRAM  8/21/2018    KIDNEY STONE SURGERY      GA ARTHROSCOPY HIP W/LABRAL REPAIR Right 7/19/2021    Procedure: ARTHROSCOPY HIP with labral debridement: femoroplasty;  Surgeon: Panfilo Pierce MD;  Location: [facility];  Service: Orthopedics    GA ARTHRS KNE SURG W/MENISCECTOMY MED/LAT W/SHVG Right 12/13/2021    Procedure: ARTHROSCOPY KNEE, partial medial meniscectomy;  Surgeon: Panfilo Pierce MD;  [facility];  Service: Orthopedics    GA CYSTO/URETERO W/LITHOTRIPSY &INDWELL STENT INSRT Left 8/21/2018    Procedure: CYSTOSCOPY URETEROSCOPY, RETROGRADE " PYELOGRAM AND INSERTION STENT URETERAL;  Surgeon: Panfilo Grant MD;  [facility];  Service: Urology    KS ESOPHAGOGASTRODUODENOSCOPY TRANSORAL DIAGNOSTIC N/A 2018    Procedure: ESOPHAGOGASTRODUODENOSCOPY (EGD);  Surgeon: Urszula Daniel MD;  Location: [facility];  Service: Gastroenterology    WISDOM TOOTH EXTRACTION         SOCIAL HISTORY:  Social History     Substance and Sexual Activity   Alcohol Use Not Currently    Alcohol/week: 2.0 standard drinks of alcohol    Types: 2 Glasses of wine per week    Comment: Rare      Social History     Substance and Sexual Activity   Drug Use No     Social History     Tobacco Use   Smoking Status Former    Current packs/day: 0.00    Average packs/day: 1 pack/day for 17.0 years (17.0 ttl pk-yrs)    Types: Cigarettes    Start date: 1988    Quit date: 2005    Years since quittin.4   Smokeless Tobacco Never       FAMILY HISTORY:  Family History   Problem Relation Age of Onset    Asthma Mother     Diabetes Mother     Other Father         Endocarditis    Hypertension Father     Gout Father        MEDICATIONS:    Current Outpatient Medications:     alfuzosin (UROXATRAL) 10 mg 24 hr tablet, Take 1 tablet (10 mg total) by mouth daily, Disp: 90 tablet, Rfl: 1    carvedilol (COREG) 25 mg tablet, Take 1 tablet (25 mg total) by mouth 2 (two) times a day with meals, Disp: 180 tablet, Rfl: 3    Epiduo Forte 0.3-2.5 % GEL, Apply topically daily, Disp: , Rfl:     eplerenone (INSPRA) 50 MG tablet, Take 1 tablet (50 mg total) by mouth 2 (two) times a day, Disp: 180 tablet, Rfl: 3    furosemide (LASIX) 20 mg tablet, Take 1 tablet (20 mg total) by mouth daily, Disp: , Rfl:     Osilodrostat Phosphate (Isturisa) 1 MG TABS, Take 1 capsule by mouth 3 (three) times a day, Disp: , Rfl:     pantoprazole (PROTONIX) 40 mg tablet, Take 1 tablet (40 mg total) by mouth daily, Disp: 30 tablet, Rfl: 2    Potassium Citrate ER 15 MEQ (1620 MG) TBCR, Take 2 tablets by mouth 2 (two)  "times a day, Disp: 360 tablet, Rfl: 3    rosuvastatin (CRESTOR) 10 MG tablet, Take 10 mg by mouth every evening , Disp: , Rfl:     SYRINGE-NEEDLE, DISP, 3 ML 21G X 1-1/2\" 3 ML MISC, For testerone injection, Disp: , Rfl:     testosterone cypionate (DEPO-TESTOSTERONE) 200 mg/mL SOLN, Inject 200 mg into a muscle every 14 (fourteen) days Due 12/5/21-Out needles - will take 12/8/21 , Disp: , Rfl:     felodipine (PLENDIL) 5 mg 24 hr tablet, Take 1 tablet (5 mg total) by mouth daily (Patient not taking: Reported on 5/9/2024), Disp: , Rfl:     polyethylene glycol (GOLYTELY) 4000 mL solution, Take 4,000 mL by mouth once for 1 dose, Disp: 4000 mL, Rfl: 0    Vuity 1.25 % SOLN, , Disp: , Rfl:   No current facility-administered medications for this visit.    Facility-Administered Medications Ordered in Other Visits:     lactated ringers infusion, 125 mL/hr, Intravenous, Continuous, Martine Paez MD, New Bag at 05/22/24 1050    Lab Results:   Results for orders placed or performed in visit on 05/08/24   CBC and Platelet   Result Value Ref Range    WBC 7.69 4.31 - 10.16 Thousand/uL    RBC 6.05 (H) 3.88 - 5.62 Million/uL    Hemoglobin 17.5 (H) 12.0 - 17.0 g/dL    Hematocrit 52.7 (H) 36.5 - 49.3 %    MCV 87 82 - 98 fL    MCH 28.9 26.8 - 34.3 pg    MCHC 33.2 31.4 - 37.4 g/dL    RDW 12.7 11.6 - 15.1 %    Platelets 253 149 - 390 Thousands/uL    MPV 9.0 8.9 - 12.7 fL   Basic metabolic panel   Result Value Ref Range    Sodium 139 135 - 147 mmol/L    Potassium 4.2 3.5 - 5.3 mmol/L    Chloride 100 96 - 108 mmol/L    CO2 27 21 - 32 mmol/L    ANION GAP 12 4 - 13 mmol/L    BUN 28 (H) 5 - 25 mg/dL    Creatinine 1.46 (H) 0.60 - 1.30 mg/dL    Glucose, Fasting 95 65 - 99 mg/dL    Calcium 10.0 8.4 - 10.2 mg/dL    eGFR 55 ml/min/1.73sq m     "

## 2024-05-28 PROCEDURE — 88305 TISSUE EXAM BY PATHOLOGIST: CPT | Performed by: PATHOLOGY

## 2024-06-14 ENCOUNTER — APPOINTMENT (OUTPATIENT)
Dept: LAB | Age: 49
End: 2024-06-14
Payer: COMMERCIAL

## 2024-06-14 ENCOUNTER — TELEPHONE (OUTPATIENT)
Age: 49
End: 2024-06-14

## 2024-06-14 DIAGNOSIS — I12.9 BENIGN HYPERTENSION WITH CKD (CHRONIC KIDNEY DISEASE) STAGE III (HCC): ICD-10-CM

## 2024-06-14 DIAGNOSIS — E04.9 ENLARGEMENT OF THYROID: ICD-10-CM

## 2024-06-14 DIAGNOSIS — N20.0 NEPHROLITHIASIS: ICD-10-CM

## 2024-06-14 DIAGNOSIS — E29.1 3-OXO-5 ALPHA-STEROID DELTA 4-DEHYDROGENASE DEFICIENCY: ICD-10-CM

## 2024-06-14 DIAGNOSIS — E34.9 ENDOCRINE DISORDER RELATED TO PUBERTY: ICD-10-CM

## 2024-06-14 DIAGNOSIS — N18.30 BENIGN HYPERTENSION WITH CKD (CHRONIC KIDNEY DISEASE) STAGE III (HCC): ICD-10-CM

## 2024-06-14 DIAGNOSIS — N18.31 STAGE 3A CHRONIC KIDNEY DISEASE (HCC): ICD-10-CM

## 2024-06-14 LAB
ALBUMIN SERPL BCP-MCNC: 4.2 G/DL (ref 3.5–5)
ALP SERPL-CCNC: 52 U/L (ref 34–104)
ALT SERPL W P-5'-P-CCNC: 32 U/L (ref 7–52)
ANION GAP SERPL CALCULATED.3IONS-SCNC: 7 MMOL/L (ref 4–13)
AST SERPL W P-5'-P-CCNC: 17 U/L (ref 13–39)
BACTERIA UR QL AUTO: ABNORMAL /HPF
BASOPHILS # BLD AUTO: 0.08 THOUSANDS/ÂΜL (ref 0–0.1)
BASOPHILS NFR BLD AUTO: 1 % (ref 0–1)
BILIRUB SERPL-MCNC: 0.45 MG/DL (ref 0.2–1)
BILIRUB UR QL STRIP: NEGATIVE
BUN SERPL-MCNC: 24 MG/DL (ref 5–25)
CALCIUM SERPL-MCNC: 9.5 MG/DL (ref 8.4–10.2)
CHLORIDE SERPL-SCNC: 101 MMOL/L (ref 96–108)
CLARITY UR: CLEAR
CO2 SERPL-SCNC: 29 MMOL/L (ref 21–32)
COLOR UR: YELLOW
CORTIS AM PEAK SERPL-MCNC: 8.8 UG/DL (ref 6.7–22.6)
CREAT SERPL-MCNC: 1.31 MG/DL (ref 0.6–1.3)
CREAT UR-MCNC: 209 MG/DL
EOSINOPHIL # BLD AUTO: 0.14 THOUSAND/ÂΜL (ref 0–0.61)
EOSINOPHIL NFR BLD AUTO: 2 % (ref 0–6)
ERYTHROCYTE [DISTWIDTH] IN BLOOD BY AUTOMATED COUNT: 12.9 % (ref 11.6–15.1)
EST. AVERAGE GLUCOSE BLD GHB EST-MCNC: 140 MG/DL
GFR SERPL CREATININE-BSD FRML MDRD: 63 ML/MIN/1.73SQ M
GLUCOSE P FAST SERPL-MCNC: 87 MG/DL (ref 65–99)
GLUCOSE UR STRIP-MCNC: NEGATIVE MG/DL
HBA1C MFR BLD: 6.5 %
HCT VFR BLD AUTO: 50.1 % (ref 36.5–49.3)
HGB BLD-MCNC: 16.4 G/DL (ref 12–17)
HGB UR QL STRIP.AUTO: NEGATIVE
IMM GRANULOCYTES # BLD AUTO: 0.12 THOUSAND/UL (ref 0–0.2)
IMM GRANULOCYTES NFR BLD AUTO: 2 % (ref 0–2)
KETONES UR STRIP-MCNC: NEGATIVE MG/DL
LEUKOCYTE ESTERASE UR QL STRIP: ABNORMAL
LIPASE SERPL-CCNC: 96 U/L (ref 11–82)
LYMPHOCYTES # BLD AUTO: 1.86 THOUSANDS/ÂΜL (ref 0.6–4.47)
LYMPHOCYTES NFR BLD AUTO: 23 % (ref 14–44)
MAGNESIUM SERPL-MCNC: 2 MG/DL (ref 1.9–2.7)
MCH RBC QN AUTO: 28.6 PG (ref 26.8–34.3)
MCHC RBC AUTO-ENTMCNC: 32.7 G/DL (ref 31.4–37.4)
MCV RBC AUTO: 87 FL (ref 82–98)
MICROALBUMIN UR-MCNC: 20.2 MG/L
MICROALBUMIN/CREAT 24H UR: 10 MG/G CREATININE (ref 0–30)
MONOCYTES # BLD AUTO: 0.86 THOUSAND/ÂΜL (ref 0.17–1.22)
MONOCYTES NFR BLD AUTO: 11 % (ref 4–12)
NEUTROPHILS # BLD AUTO: 5.11 THOUSANDS/ÂΜL (ref 1.85–7.62)
NEUTS SEG NFR BLD AUTO: 61 % (ref 43–75)
NITRITE UR QL STRIP: NEGATIVE
NON-SQ EPI CELLS URNS QL MICRO: ABNORMAL /HPF
NRBC BLD AUTO-RTO: 0 /100 WBCS
PH UR STRIP.AUTO: 7 [PH]
PHOSPHATE SERPL-MCNC: 3 MG/DL (ref 2.7–4.5)
PLATELET # BLD AUTO: 304 THOUSANDS/UL (ref 149–390)
PMV BLD AUTO: 9 FL (ref 8.9–12.7)
POTASSIUM SERPL-SCNC: 4.3 MMOL/L (ref 3.5–5.3)
PROLACTIN SERPL-MCNC: 44.69 NG/ML
PROT SERPL-MCNC: 6.7 G/DL (ref 6.4–8.4)
PROT UR STRIP-MCNC: ABNORMAL MG/DL
RBC # BLD AUTO: 5.73 MILLION/UL (ref 3.88–5.62)
RBC #/AREA URNS AUTO: ABNORMAL /HPF
SODIUM SERPL-SCNC: 137 MMOL/L (ref 135–147)
SP GR UR STRIP.AUTO: 1.02 (ref 1–1.03)
T4 FREE SERPL-MCNC: 0.55 NG/DL (ref 0.61–1.12)
TESTOST SERPL-MSCNC: 611 NG/DL
TSH SERPL DL<=0.05 MIU/L-ACNC: 0.74 UIU/ML (ref 0.45–4.5)
UROBILINOGEN UR STRIP-ACNC: <2 MG/DL
WBC # BLD AUTO: 8.17 THOUSAND/UL (ref 4.31–10.16)
WBC #/AREA URNS AUTO: ABNORMAL /HPF

## 2024-06-14 PROCEDURE — 83735 ASSAY OF MAGNESIUM: CPT

## 2024-06-14 PROCEDURE — 36415 COLL VENOUS BLD VENIPUNCTURE: CPT

## 2024-06-14 PROCEDURE — 82088 ASSAY OF ALDOSTERONE: CPT

## 2024-06-14 PROCEDURE — 83036 HEMOGLOBIN GLYCOSYLATED A1C: CPT

## 2024-06-14 PROCEDURE — 84439 ASSAY OF FREE THYROXINE: CPT

## 2024-06-14 PROCEDURE — 85025 COMPLETE CBC W/AUTO DIFF WBC: CPT

## 2024-06-14 PROCEDURE — 82570 ASSAY OF URINE CREATININE: CPT

## 2024-06-14 PROCEDURE — 84443 ASSAY THYROID STIM HORMONE: CPT

## 2024-06-14 PROCEDURE — 83835 ASSAY OF METANEPHRINES: CPT

## 2024-06-14 PROCEDURE — 82043 UR ALBUMIN QUANTITATIVE: CPT

## 2024-06-14 PROCEDURE — 83690 ASSAY OF LIPASE: CPT

## 2024-06-14 PROCEDURE — 84403 ASSAY OF TOTAL TESTOSTERONE: CPT

## 2024-06-14 PROCEDURE — 81001 URINALYSIS AUTO W/SCOPE: CPT

## 2024-06-14 PROCEDURE — 82533 TOTAL CORTISOL: CPT

## 2024-06-14 PROCEDURE — 80053 COMPREHEN METABOLIC PANEL: CPT

## 2024-06-14 PROCEDURE — 84146 ASSAY OF PROLACTIN: CPT

## 2024-06-14 PROCEDURE — 84244 ASSAY OF RENIN: CPT

## 2024-06-14 PROCEDURE — 84100 ASSAY OF PHOSPHORUS: CPT

## 2024-06-14 NOTE — TELEPHONE ENCOUNTER
Pt was called due to provider's schedule changed and needed to be r/s. Pt was very upset due to this was the 4th time and that is the reason for the appt changed location and dr due to appt was to be almost a yr ago.

## 2024-06-19 ENCOUNTER — HOSPITAL ENCOUNTER (OUTPATIENT)
Dept: NON INVASIVE DIAGNOSTICS | Facility: CLINIC | Age: 49
Discharge: HOME | End: 2024-06-19
Payer: COMMERCIAL

## 2024-06-19 VITALS
HEART RATE: 73 BPM | BODY MASS INDEX: 29.92 KG/M2 | SYSTOLIC BLOOD PRESSURE: 138 MMHG | WEIGHT: 202 LBS | OXYGEN SATURATION: 98 % | HEIGHT: 69 IN | DIASTOLIC BLOOD PRESSURE: 86 MMHG

## 2024-06-19 VITALS
DIASTOLIC BLOOD PRESSURE: 68 MMHG | SYSTOLIC BLOOD PRESSURE: 118 MMHG | HEART RATE: 66 BPM | HEIGHT: 69 IN | BODY MASS INDEX: 29.92 KG/M2 | WEIGHT: 202 LBS

## 2024-06-19 DIAGNOSIS — R06.02 SOB (SHORTNESS OF BREATH): ICD-10-CM

## 2024-06-19 LAB
AORTIC ROOT: 3.6 CM
APICAL FOUR CHAMBER EJECTION FRACTION: 55 %
ASCENDING AORTA: 3.2 CM
BSA FOR ECHO PROCEDURE: 2.07 M2
CHEST PAIN STATEMENT: NORMAL
E WAVE DECELERATION TIME: 237 MS
E/A RATIO: 0.74
FRACTIONAL SHORTENING: 44 (ref 28–44)
INTERVENTRICULAR SEPTUM IN DIASTOLE (PARASTERNAL SHORT AXIS VIEW): 1.6 CM
INTERVENTRICULAR SEPTUM: 1.6 CM (ref 0.6–1.1)
LAAS-AP2: 11 CM2
LAAS-AP4: 14.1 CM2
LEFT ATRIUM SIZE: 4 CM
LEFT ATRIUM VOLUME (MOD BIPLANE): 26 ML
LEFT ATRIUM VOLUME INDEX (MOD BIPLANE): 12.5 ML/M2
LEFT INTERNAL DIMENSION IN SYSTOLE: 2 CM (ref 2.1–4)
LEFT VENTRICULAR INTERNAL DIMENSION IN DIASTOLE: 3.6 CM (ref 3.5–6)
LEFT VENTRICULAR POSTERIOR WALL IN END DIASTOLE: 1.8 CM
LEFT VENTRICULAR STROKE VOLUME: 42 ML
LVSV (TEICH): 42 ML
MAX DIASTOLIC BP: 84 MMHG
MAX HR PERCENT: 87 %
MAX HR: 148 BPM
MAX PREDICTED HEART RATE: 170 BPM
MV E'TISSUE VEL-LAT: 10 CM/S
MV E'TISSUE VEL-SEP: 7 CM/S
MV PEAK A VEL: 0.87 M/S
MV PEAK E VEL: 64 CM/S
MV STENOSIS PRESSURE HALF TIME: 69 MS
MV VALVE AREA P 1/2 METHOD: 3.19
PROTOCOL NAME: NORMAL
RA PRESSURE ESTIMATED: 3 MMHG
RATE PRESSURE PRODUCT: NORMAL
REASON FOR TERMINATION: NORMAL
RIGHT ATRIUM AREA SYSTOLE A4C: 10.9 CM2
RIGHT VENTRICLE ID DIMENSION: 3.2 CM
RV PSP: 3 MMHG
SL CV LEFT ATRIUM LENGTH A2C: 4.5 CM
SL CV LV EF: 55
SL CV PED ECHO LEFT VENTRICLE DIASTOLIC VOLUME (MOD BIPLANE) 2D: 54 ML
SL CV PED ECHO LEFT VENTRICLE SYSTOLIC VOLUME (MOD BIPLANE) 2D: 12 ML
SL CV STRESS RECOVERY BP: NORMAL MMHG
SL CV STRESS RECOVERY HR: 98 BPM
SL CV STRESS RECOVERY O2 SAT: 97 %
SL CV STRESS STAGE REACHED: 3
STRESS ANGINA INDEX: 0
STRESS BASELINE BP: NORMAL MMHG
STRESS BASELINE HR: 73 BPM
STRESS O2 SAT REST: 98 %
STRESS PEAK HR: 148 BPM
STRESS POST ESTIMATED WORKLOAD: 10.1 METS
STRESS POST EXERCISE DUR MIN: 9 MIN
STRESS POST EXERCISE DUR MIN: 9 MIN
STRESS POST EXERCISE DUR SEC: 0 SEC
STRESS POST EXERCISE DUR SEC: 0 SEC
STRESS POST O2 SAT PEAK: 97 %
STRESS POST PEAK BP: 164 MMHG
STRESS POST PEAK HR: 148 BPM
STRESS POST PEAK SYSTOLIC BP: 164 MMHG
TARGET HR FORMULA: NORMAL
TEST INDICATION: NORMAL
TR MAX PG: 0 MMHG
TR PEAK VELOCITY: 0.2 M/S
TRICUSPID ANNULAR PLANE SYSTOLIC EXCURSION: 2.2 CM
TRICUSPID VALVE PEAK REGURGITATION VELOCITY: 0.15 M/S

## 2024-06-19 PROCEDURE — 93306 TTE W/DOPPLER COMPLETE: CPT | Performed by: INTERNAL MEDICINE

## 2024-06-19 PROCEDURE — 93018 CV STRESS TEST I&R ONLY: CPT | Performed by: INTERNAL MEDICINE

## 2024-06-19 PROCEDURE — 93016 CV STRESS TEST SUPVJ ONLY: CPT | Performed by: INTERNAL MEDICINE

## 2024-06-19 PROCEDURE — 93306 TTE W/DOPPLER COMPLETE: CPT

## 2024-06-19 PROCEDURE — 93017 CV STRESS TEST TRACING ONLY: CPT

## 2024-06-20 LAB
ALDOST SERPL-MCNC: <1 NG/DL (ref 0–30)
ALDOST/RENIN PLAS-RTO: <.7 {RATIO} (ref 0–30)
METANEPH FREE SERPL-MCNC: <25 PG/ML (ref 0–88)
NORMETANEPHRINE SERPL-MCNC: 34.5 PG/ML (ref 0–218.9)
RENIN PLAS-CCNC: 1.39 NG/ML/HR (ref 0.17–5.38)

## 2024-06-28 ENCOUNTER — TELEPHONE (OUTPATIENT)
Dept: CARDIOLOGY CLINIC | Facility: CLINIC | Age: 49
End: 2024-06-28

## 2024-06-28 NOTE — TELEPHONE ENCOUNTER
Called to r/s patient's appt from power outage on 6/27. Wife said she will go on mychart to schedule so she can coordinate with his work schedule.

## 2024-08-06 ENCOUNTER — PROCEDURE VISIT (OUTPATIENT)
Dept: UROLOGY | Facility: MEDICAL CENTER | Age: 49
End: 2024-08-06
Payer: COMMERCIAL

## 2024-08-06 VITALS
BODY MASS INDEX: 28.29 KG/M2 | WEIGHT: 191 LBS | HEIGHT: 69 IN | SYSTOLIC BLOOD PRESSURE: 130 MMHG | DIASTOLIC BLOOD PRESSURE: 80 MMHG | OXYGEN SATURATION: 78 % | HEART RATE: 78 BPM

## 2024-08-06 DIAGNOSIS — N13.8 BPH WITH OBSTRUCTION/LOWER URINARY TRACT SYMPTOMS: Primary | ICD-10-CM

## 2024-08-06 DIAGNOSIS — N40.1 BPH WITH OBSTRUCTION/LOWER URINARY TRACT SYMPTOMS: Primary | ICD-10-CM

## 2024-08-06 LAB
SL AMB  POCT GLUCOSE, UA: ABNORMAL
SL AMB LEUKOCYTE ESTERASE,UA: ABNORMAL
SL AMB POCT BILIRUBIN,UA: ABNORMAL
SL AMB POCT BLOOD,UA: ABNORMAL
SL AMB POCT CLARITY,UA: CLEAR
SL AMB POCT COLOR,UA: ABNORMAL
SL AMB POCT KETONES,UA: ABNORMAL
SL AMB POCT NITRITE,UA: ABNORMAL
SL AMB POCT PH,UA: 6
SL AMB POCT SPECIFIC GRAVITY,UA: 1.02
SL AMB POCT URINE PROTEIN: 30
SL AMB POCT UROBILINOGEN: 0.2

## 2024-08-06 PROCEDURE — 52000 CYSTOURETHROSCOPY: CPT | Performed by: UROLOGY

## 2024-08-06 PROCEDURE — 81003 URINALYSIS AUTO W/O SCOPE: CPT | Performed by: UROLOGY

## 2024-08-06 PROCEDURE — 76872 US TRANSRECTAL: CPT | Performed by: UROLOGY

## 2024-08-06 RX ORDER — FINASTERIDE 5 MG/1
5 TABLET, FILM COATED ORAL DAILY
Qty: 90 TABLET | Refills: 3 | Status: SHIPPED | OUTPATIENT
Start: 2024-08-06

## 2024-08-06 NOTE — PROGRESS NOTES
"   Cystoscopy     Date/Time  8/6/2024 8:30 AM     Performed by  Vic Powell MD   Authorized by  Vic Powell MD     Universal Protocol:  Consent: Verbal consent obtained. Written consent obtained.  Risks and benefits: risks, benefits and alternatives were discussed  Consent given by: patient  Time out: Immediately prior to procedure a \"time out\" was called to verify the correct patient, procedure, equipment, support staff and site/side marked as required.  Timeout called at: 8/6/2024 8:44 AM.  Patient understanding: patient states understanding of the procedure being performed  Patient consent: the patient's understanding of the procedure matches consent given  Procedure consent: procedure consent matches procedure scheduled  Relevant documents: relevant documents present and verified  Test results: test results available and properly labeled  Site marked: the operative site was not marked  Radiology Images displayed and confirmed. If images not available, report reviewed: imaging studies available  Required items: required blood products, implants, devices, and special equipment available  Patient identity confirmed: verbally with patient      Procedure Details:  Procedure type: cystoscopy    Patient tolerance: Patient tolerated the procedure well with no immediate complications    Additional Procedure Details: Office Cystoscopy Procedure Note    Indication:     medically refractory lower urinary tract symptoms     Informed consent   The risks, benefits, complications, treatment options, and expected outcomes were discussed with the patient. The patient concurred with the proposed plan and provided informed consent.    Anesthesia  Lidocaine jelly 2%    Antibiotic prophylaxis   None    Procedure  The patient was placed in the supineposition, was prepped and draped in the usual manner using sterile technique, and 2% lidocaine jelly instilled into the urethra.  A 17 F flexible cystoscope was then inserted " into the urethra and the urethra and bladder carefully examined.  The following findings were noted:    Findings:  Urethra:  Normal  Prostate:  mild lateral lobe hypertrophy, small median lobe, no lesions  Bladder:  Normal mucosa, no trabeculation, no diverticulum, no stones, no tumors  Ureteral orifices:  Orthotopic with clear efflux of urine  Other findings:  None, retroflexed view confirms    Transrectal ultrasound of the prostate  Transrectal ultrasound was performed with the transrectal probe at 8 megahertz.  The lubricated transrectal probe inserted into the rectum.  The seminal vessels are normal in appearance.  The prostate is enlarged measuring 41.2 gm. Prostatic height is 3.59 cm, length 4.06 cm and width 5.4 cm.  No hypoechoic lesion is noted in the peripheral zone.  The transition zone is heterogeneous in echotexture with scattered cysts and calcifications.  Visualized bladder is unremarkable.    Specimens: None                 Complications:    None; patient tolerated the procedure well           Disposition: To home            Condition: Stable    Plan:     BPH with 41 cc prostate and bothersome LUTS despite alfuzosin  PSA 1.97  - discussed option of adding finasteride versus continuing with alfuzosin alone versus surgical intervention with Rezum or TURP  - patient prefers to try combination medical therapy with alfuzosin and finasteride  - 5-Mita decrease serum PSA by about 50% after 3 months.  It can take up to 6 months to see the full benefit of the medication from a urinary symptom perspective.  Overall 5-Mita result in a 15-32% reduction in prostatic volume.  Of note, la lower dose of finasteride 1 mg for alopecia also results in a 40% reduction in serum PSA levels.    - The PLESS trial demonstrated the efficacy of finasteride with a 57% risk reduction in acute urinary retention and a 55% risk reduction in the need for BPH-related surgery in addition to significant improvements in urinary flow  rates and reduced prostatic volume after 4 years.  The MTOPS trial demonstrated a 68% risk reduction in acute urinary retention and a 64% risk reduction in the need for BPH-related surgery in men taking finasteride as compared to placebo.  The REDUCE trial and CombAT trial have shown similar reductions in urinary retention and need for BPH -related surgery for dutasteride.  5-Mita are also an effective treatment to reduce prostate-related bleeding by inhibiting prostatic VEGF and are considered an option to reduce blood loss during BPH surgery.    - Side-effects of 5-Mita include erectile dysfunction, loss of libido, decrease in semen volume and ejaculatory dysfunction.  Overall rates of sexual dysfunction with 5-Mita are low (10% ED, 1.9% loss of libido, 1.4% with decrease in semen volume in the REDUCE trial).  Additional side effects include gynecomastia in 1.9% of men, which is reversible in most cases with discontinuation of treatment.  .   - Post-finasteride syndrome (PFS) is a controversial and poorly defined constellation of physical and psychological symptoms that persist after discontinuation of 5-MERNA.  Symptoms potentially associated with PFS are ED, loss of libido, male infertility, depression, somnolence, headache, and gynecomastia among others.  Most of the data is anecdotal and from case-reports rather than prospective trials and is susceptible to many forms of bias (including a lack of baseline sexual function prior to starting or discontinuing medication).  PFS is also more closely linked to the 1 mg dose used for alopecia rather than the 5 mg dose used for BPH.  The results of well-designed and controlled studies do NOT support the existence of an association between finasteride and persistent sexual dysfunction following drug discontinuation.    - Current date have found that 5-MERNA are associated with an overall 25% decreased risk of prostate cancer, but may be associated with higher grade disease  in patients diagnosed with prostate cancer.

## 2024-08-14 DIAGNOSIS — I10 BENIGN ESSENTIAL HYPERTENSION: ICD-10-CM

## 2024-08-14 DIAGNOSIS — N18.2 CKD (CHRONIC KIDNEY DISEASE), STAGE II: ICD-10-CM

## 2024-08-14 DIAGNOSIS — I10 ESSENTIAL HYPERTENSION: ICD-10-CM

## 2024-08-14 RX ORDER — EPLERENONE 50 MG/1
50 TABLET, FILM COATED ORAL 2 TIMES DAILY
Qty: 180 TABLET | Refills: 1 | Status: SHIPPED | OUTPATIENT
Start: 2024-08-14

## 2024-08-16 ENCOUNTER — TELEPHONE (OUTPATIENT)
Age: 49
End: 2024-08-16

## 2024-08-16 DIAGNOSIS — K21.9 GASTROESOPHAGEAL REFLUX DISEASE WITHOUT ESOPHAGITIS: Primary | ICD-10-CM

## 2024-08-16 DIAGNOSIS — N13.8 BPH WITH OBSTRUCTION/LOWER URINARY TRACT SYMPTOMS: Primary | ICD-10-CM

## 2024-08-16 DIAGNOSIS — N40.1 BPH WITH OBSTRUCTION/LOWER URINARY TRACT SYMPTOMS: Primary | ICD-10-CM

## 2024-08-16 RX ORDER — PANTOPRAZOLE SODIUM 40 MG/1
40 TABLET, DELAYED RELEASE ORAL DAILY
Qty: 30 TABLET | Refills: 2 | Status: SHIPPED | OUTPATIENT
Start: 2024-08-16

## 2024-08-16 RX ORDER — ALFUZOSIN HYDROCHLORIDE 10 MG/1
10 TABLET, EXTENDED RELEASE ORAL DAILY
Qty: 90 TABLET | Refills: 3 | Status: SHIPPED | OUTPATIENT
Start: 2024-08-16

## 2024-08-16 NOTE — TELEPHONE ENCOUNTER
"Patient was seen in the office by MD on 8/6/2024 for an in office cystoscopy. He was historically prescribed Flomax on 8/18/2022 by an AP. He called today for a refill request on Flomax. We could not \"pend\" the orders as it is not on his current medication list. Please send orders for Flomax to [facility] in [place] as updated in [facility].   "

## 2024-08-16 NOTE — TELEPHONE ENCOUNTER
Needs Tamsulosin refill, 4 refills like last time.   States they are worried about continuity of care as the medications are being filled by other providers and not being refilled for correct amount. Explained the provider who refilled this 2 years ago is no longer in urology and therefore it will be refilled by another provider.   Can not pend medication as it is not on list for me to refill.     Verified pharmacy:  [id number] - [place], [place] [facility] [address] [facility]  [address] [facility] [place], [place] [address]  Phone: [phone]  Fax: [phone]

## 2024-08-16 NOTE — TELEPHONE ENCOUNTER
Spoke with pt's wife and relayed message below:     SANTOS Rodriguez Nurse Practitioner Signed 11:29 AM     Copy     According to last office note, patient takes alfuzosin not Flomax.  I sent a prescription for alfuzosin to his pharmacy.

## 2024-08-16 NOTE — TELEPHONE ENCOUNTER
Patients GI provider:  Dr. Hawk    Number to return call: ([phone]     Reason for call: Pt wife (Ying) calling to get script for Pantoprazole Sodium 40 mg Oral daily. Pt would like the script sent to [facility] [place].     Scheduled procedure/appointment date if applicable: N/A

## 2024-08-16 NOTE — TELEPHONE ENCOUNTER
According to last office note, patient takes alfuzosin not Flomax.  I sent a prescription for alfuzosin to his pharmacy.

## 2024-09-06 ENCOUNTER — APPOINTMENT (OUTPATIENT)
Dept: LAB | Age: 49
End: 2024-09-06
Payer: COMMERCIAL

## 2024-09-06 DIAGNOSIS — D35.01 BILATERAL ADRENAL ADENOMAS: ICD-10-CM

## 2024-09-06 DIAGNOSIS — D35.02 BILATERAL ADRENAL ADENOMAS: ICD-10-CM

## 2024-09-06 PROCEDURE — 82530 CORTISOL FREE: CPT

## 2024-09-09 LAB
CORTIS F 24H UR-MRATE: 32 UG/24 HR (ref 5–64)
CORTIS F UR-MCNC: 20 UG/L

## 2024-10-24 ENCOUNTER — TELEPHONE (OUTPATIENT)
Dept: NEPHROLOGY | Facility: CLINIC | Age: 49
End: 2024-10-24

## 2024-10-24 DIAGNOSIS — N20.0 NEPHROLITHIASIS: ICD-10-CM

## 2024-10-24 DIAGNOSIS — R82.991 HYPOCITRATURIA: Primary | ICD-10-CM

## 2024-10-24 RX ORDER — POTASSIUM CITRATE AND CITRIC ACID MONOHYDRATE 1100; 334 MG/5ML; MG/5ML
15 SOLUTION ORAL 2 TIMES DAILY
Qty: 473 ML | Refills: 3 | Status: SHIPPED | OUTPATIENT
Start: 2024-10-24

## 2024-10-24 NOTE — TELEPHONE ENCOUNTER
Called pt and scheduled follow up appt on 1/10 at 4pm with Dr Posey in the [facility] from the recall list.       Pt stated he has noticed that his potassium citrate pills have not been digesting for the last 2-3 weeks. He stated that he has noticed the pill in his stool. He would like advisory. Please contact the pt to discuss further. Thank you.

## 2024-10-24 NOTE — TELEPHONE ENCOUNTER
If patient feels that potassium citrate is not being absorbed and he is seeing them in the stool, okay to discontinue potassium citrate tablets and I have changed this to potassium citrate liquid formulation to [facility] pharmacy.

## 2024-10-25 NOTE — TELEPHONE ENCOUNTER
Spoke to pt's wife regarding the following message per Dr. Posey     If patient feels that potassium citrate is not being absorbed and he is seeing them in the stool, okay to discontinue potassium citrate tablets and I have changed this to potassium citrate liquid formulation to [facility] pharmacy.     Pt's wife verbalized understanding. Pt's wife would like to know if the appointment for January is okay or If pt should be seen sooner. Please advise.

## 2024-10-28 DIAGNOSIS — N18.2 CKD (CHRONIC KIDNEY DISEASE), STAGE II: ICD-10-CM

## 2024-10-28 DIAGNOSIS — K21.9 GASTROESOPHAGEAL REFLUX DISEASE WITHOUT ESOPHAGITIS: ICD-10-CM

## 2024-10-28 DIAGNOSIS — I10 BENIGN ESSENTIAL HYPERTENSION: ICD-10-CM

## 2024-10-28 DIAGNOSIS — I10 ESSENTIAL HYPERTENSION: ICD-10-CM

## 2024-10-28 RX ORDER — PANTOPRAZOLE SODIUM 40 MG/1
40 TABLET, DELAYED RELEASE ORAL DAILY
Qty: 30 TABLET | Refills: 2 | Status: SHIPPED | OUTPATIENT
Start: 2024-10-28 | End: 2024-11-06 | Stop reason: SDUPTHER

## 2024-10-28 NOTE — TELEPHONE ENCOUNTER
All the lab work that were already ordered to be done in November, they can use those same prescription to do in January before office appointment.  This also include stone risk profile.    All the orders are already in the system.

## 2024-10-28 NOTE — TELEPHONE ENCOUNTER
Spoke to pt's wife regarding the following message per Dr. Kalpesh Tate with appointment in January.     Pt's wife verbalized understanding. Pt's wife would like to know if repeat blood work is needed to make sure he is doing okay. Please advise.

## 2024-10-28 NOTE — TELEPHONE ENCOUNTER
Spoke to pt's wife regarding the following message per Dr. Posey    All the lab work that were already ordered to be done in November, they can use those same prescription to do in January before office appointment.  This also include stone risk profile.    All the orders are already in the system.     Pt's wife verbalized understanding.       Spoke with Dr. Posey, he is okay with pt getting labs done in November.

## 2024-10-29 RX ORDER — CARVEDILOL 25 MG/1
25 TABLET ORAL 2 TIMES DAILY WITH MEALS
Qty: 180 TABLET | Refills: 1 | Status: SHIPPED | OUTPATIENT
Start: 2024-10-29

## 2024-11-06 ENCOUNTER — OFFICE VISIT (OUTPATIENT)
Dept: GASTROENTEROLOGY | Facility: AMBULARY SURGERY CENTER | Age: 49
End: 2024-11-06
Payer: COMMERCIAL

## 2024-11-06 VITALS
HEART RATE: 68 BPM | DIASTOLIC BLOOD PRESSURE: 82 MMHG | WEIGHT: 197.6 LBS | BODY MASS INDEX: 29.27 KG/M2 | SYSTOLIC BLOOD PRESSURE: 138 MMHG | OXYGEN SATURATION: 97 % | HEIGHT: 69 IN

## 2024-11-06 DIAGNOSIS — K21.9 GASTROESOPHAGEAL REFLUX DISEASE WITHOUT ESOPHAGITIS: Primary | ICD-10-CM

## 2024-11-06 DIAGNOSIS — K22.70 BARRETT'S ESOPHAGUS DETERMINED BY ENDOSCOPY: ICD-10-CM

## 2024-11-06 DIAGNOSIS — Z86.0101 HISTORY OF ADENOMATOUS POLYP OF COLON: ICD-10-CM

## 2024-11-06 PROBLEM — Z86.0100 HISTORY OF COLON POLYPS: Status: RESOLVED | Noted: 2023-07-14 | Resolved: 2024-11-06

## 2024-11-06 PROCEDURE — 99213 OFFICE O/P EST LOW 20 MIN: CPT | Performed by: PHYSICIAN ASSISTANT

## 2024-11-06 RX ORDER — PANTOPRAZOLE SODIUM 40 MG/1
40 TABLET, DELAYED RELEASE ORAL DAILY
Qty: 90 TABLET | Refills: 3 | Status: SHIPPED | OUTPATIENT
Start: 2024-11-06

## 2024-11-06 NOTE — ASSESSMENT & PLAN NOTE
- Patient is currently stable on pantoprazole 40 mg daily.   No current daytime or night time break through symptoms.  No pain or difficulty swallowing.  No unintentional weight loss.  Also recommend:  - avoid NSAIDS  - avoid eating within 3 hours of sleeping  - elevated head of bed 4-6 inches while sleeping  - avoid trigger foods  - recommend daily supply of calcium and vitamin D    Orders:    pantoprazole (PROTONIX) 40 mg tablet; Take 1 tablet (40 mg total) by mouth daily

## 2024-11-06 NOTE — PROGRESS NOTES
Ambulatory Visit  Name: Marcio Monge      : 1975      MRN: [id number]  Encounter Provider: Anmol Nagy PA-C  Encounter Date: 2024   Encounter department: [facility] [place]    Assessment & Plan  Gastroesophageal reflux disease without esophagitis  - Patient is currently stable on pantoprazole 40 mg daily.   No current daytime or night time break through symptoms.  No pain or difficulty swallowing.  No unintentional weight loss.  Also recommend:  - avoid NSAIDS  - avoid eating within 3 hours of sleeping  - elevated head of bed 4-6 inches while sleeping  - avoid trigger foods  - recommend daily supply of calcium and vitamin D    Orders:    pantoprazole (PROTONIX) 40 mg tablet; Take 1 tablet (40 mg total) by mouth daily    Florian's esophagus determined by endoscopy  -Last EGD May 2024 done for GERD and history of Florian's with no Florian's esophagus on distal esophageal biopsy and no H. pylori on gastric biopsy.  2 cm hiatal hernia noted  -Next EGD recall May 2027       History of adenomatous polyp of colon  -Last colonoscopy May 2022 with adequate bowel prep and 2 polyps removed.  One polypoid and the other tubular adenoma.  -Next recall colonoscopy May 2027         History of Present Illness     Marcio Monge is a 49 y.o. male new to me with history of hypertension, chronic kidney disease stage III, obstructive sleep apnea, GERD, Florian's esophagus, kidney stones, adrenal mass, vitamin D deficiency and history of adenomatous colon polyp who presents for medication refill.  Patient GERD is currently stable on pantoprazole 40 mg daily.   No current daytime or night time break through symptoms.  No pain or difficulty swallowing.  No unintentional weight loss.    Patient denies any abdominal pain, nausea/vomiting, pain or difficulty swallowing, change in bowels, black or bloody stools        Endoscopic History  EGD -May 2024 done for GERD and history of  Florian's with no Florian's esophagus on distal esophageal biopsy and no H. pylori on gastric biopsy.  2 cm hiatal hernia noted  Colonoscopy -May 2022 with adequate bowel prep and 2 polyps removed.  One polypoid and the other tubular adenoma.        Review of Systems   Constitutional:  Negative for activity change, appetite change, chills, diaphoresis, fatigue and unexpected weight change.   HENT:  Negative for mouth sores, rhinorrhea, sore throat and trouble swallowing.    Eyes:  Negative for discharge, redness and visual disturbance.   Respiratory:  Negative for apnea, cough, choking, chest tightness and shortness of breath.    Cardiovascular:  Negative for chest pain, palpitations and leg swelling.   Gastrointestinal:  Negative for abdominal distention, abdominal pain, anal bleeding, blood in stool, constipation, diarrhea, nausea, rectal pain and vomiting.   Genitourinary:  Negative for difficulty urinating, dysuria, frequency and hematuria.   Musculoskeletal:  Negative for arthralgias, back pain, gait problem, joint swelling and myalgias.   Skin:  Negative for color change, pallor and rash.   Allergic/Immunologic: Negative for environmental allergies and food allergies.   Neurological:  Negative for dizziness, tremors, weakness, light-headedness, numbness and headaches.   Psychiatric/Behavioral:  Negative for agitation, behavioral problems, confusion and sleep disturbance. The patient is not nervous/anxious.            Objective     There were no vitals taken for this visit.    Physical Exam  Constitutional:       General: He is not in acute distress.     Appearance: Normal appearance. He is not ill-appearing.   HENT:      Head: Normocephalic and atraumatic.   Eyes:      General: No scleral icterus.     Conjunctiva/sclera: Conjunctivae normal.   Cardiovascular:      Rate and Rhythm: Normal rate and regular rhythm.   Pulmonary:      Effort: Pulmonary effort is normal. No respiratory distress.      Breath sounds:  Normal breath sounds.   Abdominal:      General: Bowel sounds are normal. There is no distension.      Palpations: Abdomen is soft.      Tenderness: There is no abdominal tenderness. There is no guarding.   Skin:     General: Skin is warm and dry.   Neurological:      Mental Status: He is alert and oriented to person, place, and time.   Psychiatric:         Mood and Affect: Mood normal.         Behavior: Behavior normal.           Anmol Nagy PA-C  [facility] - Gastroentrology

## 2024-11-06 NOTE — ASSESSMENT & PLAN NOTE
-Last EGD May 2024 done for GERD and history of Florian's with no Florian's esophagus on distal esophageal biopsy and no H. pylori on gastric biopsy.  2 cm hiatal hernia noted  -Next EGD recall May 2027

## 2024-11-06 NOTE — ASSESSMENT & PLAN NOTE
-Last colonoscopy May 2022 with adequate bowel prep and 2 polyps removed.  One polypoid and the other tubular adenoma.  -Next recall colonoscopy May 2027

## 2025-01-03 ENCOUNTER — TELEPHONE (OUTPATIENT)
Dept: NEPHROLOGY | Facility: CLINIC | Age: 50
End: 2025-01-03

## 2025-01-03 NOTE — TELEPHONE ENCOUNTER
Called pt's wife and asked to please complete lab work prior to the follow up appointment next week. Pt's wife verbalized understanding.

## 2025-01-06 ENCOUNTER — APPOINTMENT (OUTPATIENT)
Dept: LAB | Facility: CLINIC | Age: 50
End: 2025-01-06
Payer: COMMERCIAL

## 2025-01-06 DIAGNOSIS — N18.31 STAGE 3A CHRONIC KIDNEY DISEASE (HCC): ICD-10-CM

## 2025-01-06 DIAGNOSIS — I12.9 BENIGN HYPERTENSION WITH CKD (CHRONIC KIDNEY DISEASE) STAGE III (HCC): ICD-10-CM

## 2025-01-06 DIAGNOSIS — E55.9 VITAMIN D DEFICIENCY: ICD-10-CM

## 2025-01-06 DIAGNOSIS — N20.0 NEPHROLITHIASIS: ICD-10-CM

## 2025-01-06 DIAGNOSIS — N18.30 BENIGN HYPERTENSION WITH CKD (CHRONIC KIDNEY DISEASE) STAGE III (HCC): ICD-10-CM

## 2025-01-06 DIAGNOSIS — E78.5 HYPERLIPIDEMIA, UNSPECIFIED HYPERLIPIDEMIA TYPE: ICD-10-CM

## 2025-01-06 DIAGNOSIS — R06.02 SOB (SHORTNESS OF BREATH): ICD-10-CM

## 2025-01-06 LAB
25(OH)D3 SERPL-MCNC: 30.1 NG/ML (ref 30–100)
ANION GAP SERPL CALCULATED.3IONS-SCNC: 9 MMOL/L (ref 4–13)
BNP SERPL-MCNC: 9 PG/ML (ref 0–100)
BUN SERPL-MCNC: 26 MG/DL (ref 5–25)
CALCIUM SERPL-MCNC: 9.8 MG/DL (ref 8.4–10.2)
CHLORIDE SERPL-SCNC: 101 MMOL/L (ref 96–108)
CHOLEST SERPL-MCNC: 168 MG/DL (ref ?–200)
CO2 SERPL-SCNC: 28 MMOL/L (ref 21–32)
CREAT SERPL-MCNC: 1.25 MG/DL (ref 0.6–1.3)
ERYTHROCYTE [DISTWIDTH] IN BLOOD BY AUTOMATED COUNT: 13 % (ref 11.6–15.1)
GFR SERPL CREATININE-BSD FRML MDRD: 66 ML/MIN/1.73SQ M
GLUCOSE P FAST SERPL-MCNC: 95 MG/DL (ref 65–99)
HCT VFR BLD AUTO: 55.5 % (ref 36.5–49.3)
HDLC SERPL-MCNC: 50 MG/DL
HGB BLD-MCNC: 17.8 G/DL (ref 12–17)
LDLC SERPL CALC-MCNC: 83 MG/DL (ref 0–100)
LDLC SERPL DIRECT ASSAY-MCNC: 105 MG/DL (ref 0–100)
MCH RBC QN AUTO: 28.2 PG (ref 26.8–34.3)
MCHC RBC AUTO-ENTMCNC: 32.1 G/DL (ref 31.4–37.4)
MCV RBC AUTO: 88 FL (ref 82–98)
NONHDLC SERPL-MCNC: 118 MG/DL
PHOSPHATE SERPL-MCNC: 2.6 MG/DL (ref 2.7–4.5)
PLATELET # BLD AUTO: 269 THOUSANDS/UL (ref 149–390)
PMV BLD AUTO: 8.5 FL (ref 8.9–12.7)
POTASSIUM SERPL-SCNC: 4.7 MMOL/L (ref 3.5–5.3)
PTH-INTACT SERPL-MCNC: 51.7 PG/ML (ref 12–88)
RBC # BLD AUTO: 6.31 MILLION/UL (ref 3.88–5.62)
SODIUM SERPL-SCNC: 138 MMOL/L (ref 135–147)
TRIGL SERPL-MCNC: 176 MG/DL (ref ?–150)
WBC # BLD AUTO: 7.79 THOUSAND/UL (ref 4.31–10.16)

## 2025-01-06 PROCEDURE — 80048 BASIC METABOLIC PNL TOTAL CA: CPT

## 2025-01-06 PROCEDURE — 83970 ASSAY OF PARATHORMONE: CPT

## 2025-01-06 PROCEDURE — 84100 ASSAY OF PHOSPHORUS: CPT

## 2025-01-06 PROCEDURE — 83721 ASSAY OF BLOOD LIPOPROTEIN: CPT

## 2025-01-06 PROCEDURE — 36415 COLL VENOUS BLD VENIPUNCTURE: CPT

## 2025-01-06 PROCEDURE — 82306 VITAMIN D 25 HYDROXY: CPT

## 2025-01-06 PROCEDURE — 85027 COMPLETE CBC AUTOMATED: CPT

## 2025-01-06 PROCEDURE — 80061 LIPID PANEL: CPT

## 2025-01-06 PROCEDURE — 83880 ASSAY OF NATRIURETIC PEPTIDE: CPT

## 2025-01-07 ENCOUNTER — RESULTS FOLLOW-UP (OUTPATIENT)
Dept: NEPHROLOGY | Facility: CLINIC | Age: 50
End: 2025-01-07

## 2025-01-08 ENCOUNTER — APPOINTMENT (OUTPATIENT)
Dept: LAB | Facility: CLINIC | Age: 50
End: 2025-01-08
Payer: COMMERCIAL

## 2025-01-08 DIAGNOSIS — N18.31 STAGE 3A CHRONIC KIDNEY DISEASE (HCC): Primary | ICD-10-CM

## 2025-01-08 DIAGNOSIS — I12.9 BENIGN HYPERTENSION WITH CKD (CHRONIC KIDNEY DISEASE) STAGE III (HCC): ICD-10-CM

## 2025-01-08 DIAGNOSIS — N18.30 BENIGN HYPERTENSION WITH CKD (CHRONIC KIDNEY DISEASE) STAGE III (HCC): ICD-10-CM

## 2025-01-08 LAB
CREAT UR-MCNC: 141.6 MG/DL
MICROALBUMIN UR-MCNC: 28.6 MG/L
MICROALBUMIN/CREAT 24H UR: 20 MG/G CREATININE (ref 0–30)

## 2025-01-08 PROCEDURE — 82570 ASSAY OF URINE CREATININE: CPT

## 2025-01-08 PROCEDURE — 82043 UR ALBUMIN QUANTITATIVE: CPT

## 2025-01-10 ENCOUNTER — OFFICE VISIT (OUTPATIENT)
Dept: NEPHROLOGY | Facility: CLINIC | Age: 50
End: 2025-01-10
Payer: COMMERCIAL

## 2025-01-10 VITALS
BODY MASS INDEX: 29.33 KG/M2 | HEIGHT: 69 IN | DIASTOLIC BLOOD PRESSURE: 80 MMHG | SYSTOLIC BLOOD PRESSURE: 120 MMHG | WEIGHT: 198 LBS

## 2025-01-10 DIAGNOSIS — E27.8 ADRENAL MASS (HCC): ICD-10-CM

## 2025-01-10 DIAGNOSIS — E83.39 HYPOPHOSPHATEMIA: ICD-10-CM

## 2025-01-10 DIAGNOSIS — E55.9 VITAMIN D DEFICIENCY: ICD-10-CM

## 2025-01-10 DIAGNOSIS — I12.9 BENIGN HYPERTENSION WITH CKD (CHRONIC KIDNEY DISEASE) STAGE III (HCC): Primary | ICD-10-CM

## 2025-01-10 DIAGNOSIS — R60.0 LEG EDEMA: ICD-10-CM

## 2025-01-10 DIAGNOSIS — R80.9 MICROALBUMINURIA: ICD-10-CM

## 2025-01-10 DIAGNOSIS — N18.31 STAGE 3A CHRONIC KIDNEY DISEASE (HCC): ICD-10-CM

## 2025-01-10 DIAGNOSIS — N20.0 NEPHROLITHIASIS: ICD-10-CM

## 2025-01-10 DIAGNOSIS — N18.30 BENIGN HYPERTENSION WITH CKD (CHRONIC KIDNEY DISEASE) STAGE III (HCC): Primary | ICD-10-CM

## 2025-01-10 DIAGNOSIS — R82.991 HYPOCITRATURIA: ICD-10-CM

## 2025-01-10 PROCEDURE — 99214 OFFICE O/P EST MOD 30 MIN: CPT | Performed by: INTERNAL MEDICINE

## 2025-01-10 RX ORDER — POTASSIUM CITRATE 15 MEQ/1
2 TABLET, EXTENDED RELEASE ORAL 2 TIMES DAILY
Qty: 360 TABLET | Refills: 3 | Status: SHIPPED | OUTPATIENT
Start: 2025-01-10

## 2025-01-10 NOTE — ASSESSMENT & PLAN NOTE
Microalbuminuria, UACR unremarkable in January 2025.  Could be related to underlying hypertension.  -If this is worsening, may consider ACE inhibitor

## 2025-01-10 NOTE — ASSESSMENT & PLAN NOTE
Lab Results   Component Value Date    EGFR 66 01/06/2025    EGFR 63 06/14/2024    EGFR 55 05/08/2024    CREATININE 1.25 01/06/2025    CREATININE 1.31 (H) 06/14/2024    CREATININE 1.46 (H) 05/08/2024

## 2025-01-10 NOTE — ASSESSMENT & PLAN NOTE
Lab Results   Component Value Date    EGFR 66 01/06/2025    EGFR 63 06/14/2024    EGFR 55 05/08/2024    CREATININE 1.25 01/06/2025    CREATININE 1.31 (H) 06/14/2024    CREATININE 1.46 (H) 05/08/2024   Hypertension  -Likely thought to be essential in origin, another differential remains hypercortisolism vs ?primary hyperaldosteronism  -home BP readings are well-controlled 120s/low 80s.  BP well-controlled in the office today.  Prior 24 hour ABPM shows:  24 hour average BP reading 136/89  Daytime average BP reading 137/96  Nighttime average /80  Nighttime MAP dipping 13%  Overall 92% successful readings.  -current regimen includes Coreg 25 mg b.i.d., alfuzosin 10 mg daily, eplerenone 50 mg b.i.d, Lasix 20 mg daily  -Now remains off felodipine due to leg edema issues.  --his home wrist BP cuff was compared with our office readings in the past which were identical.  Recommend to get upper arm BP machine.  -plasma metanephrine and catecholamines are nonsignificant in December 2018.  Repeat plasma normetanephrine slightly elevated 156 in May 2021. Unable to check serum aldosterone/PRA while being on eplerenone.    -he is being followed by endocrine in [place] regarding adrenal nodule.   -Patient was found to have high aldosterone level along with high aldosterone/renin ratio prior although patient was also taking eplerenone at that time and makes results unreliable.  - Salt restricted diet    CKD stage IIIa, baseline Cr around 1.4 since early 2022, prior 1.1 to 1.4  -last creatinine 1.2 in January 2025.    -CKD suspect secondary to underlying hypertension.  Some progression noted.  -UA in August 2024 shows 1+ proteinuria, no hematuria.

## 2025-01-10 NOTE — ASSESSMENT & PLAN NOTE
-This has now resolved.  Has lost some weight and more recently weight seems to be around 195-196 pounds at home.   -Lower extremity Doppler negative for DVT.  Echo shows EF 55%, grade 1 diastolic dysfunction.  -Due to leg edema issues, chlorthalidone was changed to Lasix and now remains on 20 mg daily.  - Patient is euvolemic.

## 2025-01-10 NOTE — ASSESSMENT & PLAN NOTE
Hypocitraturia  -Since last 24-hour urine sample was undercollection, urine citrate not reliable.  Continue current potassium citrate for now.  -Repeat 24-hour stone risk profile results to follow.

## 2025-01-10 NOTE — ASSESSMENT & PLAN NOTE
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

## 2025-01-10 NOTE — PROGRESS NOTES
NEPHROLOGY OUTPATIENT PROGRESS NOTE   Marcio Monge 50 y.o. male MRN: [id number]  DATE: 1/10/2025  Reason for visit:   Chief Complaint   Patient presents with    Follow-up    Chronic Kidney Disease     ASSESSMENT and PLAN:  Assessment & Plan  Benign hypertension with CKD (chronic kidney disease) stage III ([facility])  Lab Results   Component Value Date    EGFR 66 01/06/2025    EGFR 63 06/14/2024    EGFR 55 05/08/2024    CREATININE 1.25 01/06/2025    CREATININE 1.31 (H) 06/14/2024    CREATININE 1.46 (H) 05/08/2024   Hypertension  -Likely thought to be essential in origin, another differential remains hypercortisolism vs ?primary hyperaldosteronism  -home BP readings are well-controlled 120s/low 80s.  BP well-controlled in the office today.  Prior 24 hour ABPM shows:  24 hour average BP reading 136/89  Daytime average BP reading 137/96  Nighttime average /80  Nighttime MAP dipping 13%  Overall 92% successful readings.  -current regimen includes Coreg 25 mg b.i.d., alfuzosin 10 mg daily, eplerenone 50 mg b.i.d, Lasix 20 mg daily  -Now remains off felodipine due to leg edema issues.  --his home wrist BP cuff was compared with our office readings in the past which were identical.  Recommend to get upper arm BP machine.  -plasma metanephrine and catecholamines are nonsignificant in December 2018.  Repeat plasma normetanephrine slightly elevated 156 in May 2021. Unable to check serum aldosterone/PRA while being on eplerenone.    -he is being followed by endocrine in [place] regarding adrenal nodule.   -Patient was found to have high aldosterone level along with high aldosterone/renin ratio prior although patient was also taking eplerenone at that time and makes results unreliable.  - Salt restricted diet    CKD stage IIIa, baseline Cr around 1.4 since early 2022, prior 1.1 to 1.4  -last creatinine 1.2 in January 2025.    -CKD suspect secondary to underlying hypertension.  Some progression noted.  -UA in August 2024  shows 1+ proteinuria, no hematuria.    Stage 3a chronic kidney disease (HCC)  Lab Results   Component Value Date    EGFR 66 01/06/2025    EGFR 63 06/14/2024    EGFR 55 05/08/2024    CREATININE 1.25 01/06/2025    CREATININE 1.31 (H) 06/14/2024    CREATININE 1.46 (H) 05/08/2024     Nephrolithiasis  Recurrent nephrolithiasis  -patient had an episode of nephrolithiasis requiring ureteral stent placement with eventual removal in past.   -Denies any recent kidney stone flareup.  Denies any flank or back pain issues.  No gross hematuria.  Stone analysis: Patient says his stone composition was calcium stone although I do not have documentation.  Imaging: CT scan in February 2023 shows no stone.  Recent MRI in April 2024 did not report kidney stones.  Stone risk profile in June 2023 undercollection, urine volume 1.6 L, urine calcium, sodium, oxalate acceptable.  Urine citrate significantly lower 106, pH 6.3.   - repeat 24-hour urine stone risk profile currently in process, results pending.  Work up/labs: No obvious evidence of hypokalemia or acidosis.  Serum calcium, phosphorus acceptable.  Vitamin D level improving 30, PTH 51.  Treatment :  -If hypercalciuria, consider restarting chlorthalidone.  This was changed to Lasix due to leg edema issues. -continue potassium citrate to 30 mEq p.o. b.i.d. (did not like taste of liquid)  -advised to drink plenty of free water with goal urine output greater than 2 L per day.  -advised to follow low salt diet.    Right lower renal pole nodule versus artifact, follows with Urology  CT scan does not report any abnormalities  Hypocitraturia  Hypocitraturia  -Since last 24-hour urine sample was undercollection, urine citrate not reliable.  Continue current potassium citrate for now.  -Repeat 24-hour stone risk profile results to follow.  Adrenal mass (HCC)  Bilateral adrenal nodules  -closely follows with Endocrine at [facility]  -currently remains on Osilodrostat since 11/2021 for  hypercortisolism..  Vitamin D deficiency  Improved, last vitamin D level 30 in January 2025.  PTH 51.  Continue vitamin D 5000 units daily  Leg edema  -This has now resolved.  Has lost some weight and more recently weight seems to be around 195-196 pounds at home.   -Lower extremity Doppler negative for DVT.  Echo shows EF 55%, grade 1 diastolic dysfunction.  -Due to leg edema issues, chlorthalidone was changed to Lasix and now remains on 20 mg daily.  - Patient is euvolemic.  Microalbuminuria  Microalbuminuria, UACR unremarkable in January 2025.  Could be related to underlying hypertension.  -If this is worsening, may consider ACE inhibitor   Hypophosphatemia  -Mild hypophosphatemia summa serum phosphorus borderline low at 2.6.  He would rather prefer to avoid phosphorus supplements and will try to increase phosphorus liberal diet.  Will repeat serum phosphorus in couple months.    Diagnoses and all orders for this visit:    Benign hypertension with CKD (chronic kidney disease) stage III (HCC)  -     Albumin / creatinine urine ratio; Future  -     Basic metabolic panel; Future  -     CBC; Future  -     Phosphorus; Future  -     PTH, intact; Future  -     Vitamin D 25 hydroxy; Future    Stage 3a chronic kidney disease (HCC)  -     Albumin / creatinine urine ratio; Future  -     Basic metabolic panel; Future  -     CBC; Future  -     Phosphorus; Future  -     PTH, intact; Future  -     Vitamin D 25 hydroxy; Future    Nephrolithiasis  -     Potassium Citrate ER 15 MEQ (1620 MG) TBCR; Take 2 tablets by mouth 2 (two) times a day  -     Albumin / creatinine urine ratio; Future  -     Basic metabolic panel; Future  -     CBC; Future  -     Phosphorus; Future  -     PTH, intact; Future  -     Vitamin D 25 hydroxy; Future    Hypocitraturia  -     Potassium Citrate ER 15 MEQ (1620 MG) TBCR; Take 2 tablets by mouth 2 (two) times a day    Adrenal mass (HCC)    Vitamin D deficiency  -     Vitamin D 25 hydroxy; Future    Leg  "edema    Microalbuminuria  -     Albumin / creatinine urine ratio; Future    Hypophosphatemia  -     Phosphorus; Future          SUBJECTIVE / HPI:  Patient is 50-year-old male with significant medical issues of hypertension diagnosed early in the age, bilateral adrenal nodule, history of nephrolithiasis with history of requiring ureteral stent placement with eventual removal, history of lithotripsy, sleep apnea, comes for regular follow-up of hypertension and nephrolithiasis. Baseline serum creatinine around 1.4 since early 2022.   closely follows with Endocrine at [facility].  Continue to remain on osilodrostat which was recently increased.      Since last visit overall feels well.  Denies any leg edema.  Claims to be more compliant drinking more water.  24 hours 20s profile currently pending.  Since felodipine was discontinued and Lasix was started, leg edema has improved.     His home BP readings are well controlled.denies any lightheadedness. BP controlled in the office today. He denies any hematuria, no dysuria. No flank or abdominal pain. No fever or chills.  No recent kidney stone flare-up issues.  No recent NSAID exposure.     REVIEW OF SYSTEMS:  More than 10 point review of systems were obtained and discussed in length with the patient. Complete review of systems were negative / unremarkable except mentioned above.     PHYSICAL EXAM:  Vitals:    01/10/25 1556 01/10/25 1622   BP: 126/84 120/80   BP Location: Left arm    Patient Position: Sitting    Cuff Size: Large    Weight: 89.8 kg (198 lb)    Height: 5' 9\" (1.753 m)      Body mass index is 29.24 kg/m².    Physical Exam  Vitals reviewed.   Constitutional:       Appearance: He is well-developed.   HENT:      Head: Normocephalic and atraumatic.      Right Ear: External ear normal.      Left Ear: External ear normal.   Eyes:      General: No scleral icterus.     Conjunctiva/sclera: Conjunctivae normal.   Cardiovascular:      Comments: No significant edema in " "legs  Pulmonary:      Effort: Pulmonary effort is normal. No respiratory distress.      Breath sounds: Normal breath sounds. No wheezing or rales.   Abdominal:      General: Bowel sounds are normal. There is no distension.      Palpations: Abdomen is soft.      Tenderness: There is no abdominal tenderness.   Musculoskeletal:         General: No deformity.   Lymphadenopathy:      Cervical: No cervical adenopathy.   Skin:     Findings: No rash.   Neurological:      Mental Status: He is alert and oriented to person, place, and time.   Psychiatric:         Behavior: Behavior normal.         PAST MEDICAL HISTORY:  Past Medical History:   Diagnosis Date    Adrenal mass (HCC)     Chest pain     \"long ago stress related to family issue seen ER and cleared\"    Chronic kidney disease     Chronic pain disorder     right hip    Claustrophobia     Colon polyp     CPAP (continuous positive airway pressure) dependence     Dental crowns present     Disease of thyroid gland     nodule sees Dr Maldonado/endocrinologist and Dr TALA Rossi([facility])    Exercise involving weight training     1-2x/week    GERD (gastroesophageal reflux disease)     Hyperlipidemia     Hypertension     Kidney stone     Kidney stones     Low testosterone     Motion sickness     Nasal congestion     Right hip pain     Sleep apnea     not currently using his CPAP       PAST SURGICAL HISTORY:  Past Surgical History:   Procedure Laterality Date    COLONOSCOPY      CYSTOSCOPY      EXTRACORPOREAL SHOCK WAVE LITHOTRIPSY Left     FL INJECTION RIGHT HIP (ARTHROGRAM)  3/31/2021    pt cant recall this    FL RETROGRADE PYELOGRAM  8/21/2018    KIDNEY STONE SURGERY      MD ARTHROSCOPY HIP W/LABRAL REPAIR Right 7/19/2021    Procedure: ARTHROSCOPY HIP with labral debridement: femoroplasty;  Surgeon: Panfilo Pierce MD;  Location: [facility];  Service: Orthopedics    MD ARTHRS KNE SURG W/MENISCECTOMY MED/LAT W/SHVG Right 12/13/2021    Procedure: ARTHROSCOPY KNEE, partial medial " meniscectomy;  Surgeon: Panfilo Pierce MD;  Location: [facility];  Service: Orthopedics    IL CYSTO/URETERO W/LITHOTRIPSY &INDWELL STENT INSRT Left 2018    Procedure: CYSTOSCOPY URETEROSCOPY, RETROGRADE PYELOGRAM AND INSERTION STENT URETERAL;  Surgeon: Panfilo Grant MD;  Location: [facility];  Service: Urology    IL ESOPHAGOGASTRODUODENOSCOPY TRANSORAL DIAGNOSTIC N/A 2018    Procedure: ESOPHAGOGASTRODUODENOSCOPY (EGD);  Surgeon: Urszula Daniel MD;  Location: [facility];  Service: Gastroenterology    WISDOM TOOTH EXTRACTION         SOCIAL HISTORY:  Social History     Substance and Sexual Activity   Alcohol Use Not Currently    Alcohol/week: 2.0 standard drinks of alcohol    Types: 2 Glasses of wine per week    Comment: Rare      Social History     Substance and Sexual Activity   Drug Use No     Social History     Tobacco Use   Smoking Status Former    Current packs/day: 0.00    Average packs/day: 1 pack/day for 17.0 years (17.0 ttl pk-yrs)    Types: Cigarettes    Start date: 1988    Quit date: 2005    Years since quittin.0   Smokeless Tobacco Never       FAMILY HISTORY:  Family History   Problem Relation Age of Onset    Asthma Mother         Mother    Diabetes Mother     Hypertension Father     Gout Father        MEDICATIONS:    Current Outpatient Medications:     alfuzosin (UROXATRAL) 10 mg 24 hr tablet, Take 1 tablet (10 mg total) by mouth daily, Disp: 90 tablet, Rfl: 3    carvedilol (COREG) 25 mg tablet, TAKE 1 TABLET BY MOUTH TWO TIMES DAILY WITH MEALS, Disp: 180 tablet, Rfl: 1    Epiduo Forte 0.3-2.5 % GEL, Apply topically daily, Disp: , Rfl:     eplerenone (INSPRA) 50 MG tablet, TAKE 1 TABLET BY MOUTH TWO TIMES DAILY, Disp: 180 tablet, Rfl: 1    finasteride (PROSCAR) 5 mg tablet, Take 1 tablet (5 mg total) by mouth daily, Disp: 90 tablet, Rfl: 3    furosemide (LASIX) 20 mg tablet, Take 1 tablet (20 mg total) by mouth daily, Disp: , Rfl:     Osilodrostat Phosphate (Isturisa) 1  "MG TABS, Take 1 capsule by mouth 3 (three) times a day, Disp: , Rfl:     pantoprazole (PROTONIX) 40 mg tablet, Take 1 tablet (40 mg total) by mouth daily, Disp: 90 tablet, Rfl: 3    Potassium Citrate ER 15 MEQ (1620 MG) TBCR, Take 2 tablets by mouth 2 (two) times a day, Disp: 360 tablet, Rfl: 3    rosuvastatin (CRESTOR) 10 MG tablet, Take 10 mg by mouth every evening , Disp: , Rfl:     SYRINGE-NEEDLE, DISP, 3 ML 21G X 1-1/2\" 3 ML MISC, For testerone injection, Disp: , Rfl:     testosterone cypionate (DEPO-TESTOSTERONE) 200 mg/mL SOLN, Inject 200 mg into a muscle every 14 (fourteen) days Due 12/5/21-Out needles - will take 12/8/21 , Disp: , Rfl:     polyethylene glycol (GOLYTELY) 4000 mL solution, Take 4,000 mL by mouth once for 1 dose, Disp: 4000 mL, Rfl: 0    Vuity 1.25 % SOLN, , Disp: , Rfl:     Lab Results:   Results for orders placed or performed in visit on 01/08/25   Albumin / creatinine urine ratio    Collection Time: 01/08/25  7:35 AM   Result Value Ref Range    Creatinine, Ur 141.6 Reference range not established. mg/dL    Albumin,U,Random 28.6 (H) <20.0 mg/L    Albumin Creat Ratio 20 0 - 30 mg/g creatinine     "

## 2025-01-10 NOTE — ASSESSMENT & PLAN NOTE
Recurrent nephrolithiasis  -patient had an episode of nephrolithiasis requiring ureteral stent placement with eventual removal in past.   -Denies any recent kidney stone flareup.  Denies any flank or back pain issues.  No gross hematuria.  Stone analysis: Patient says his stone composition was calcium stone although I do not have documentation.  Imaging: CT scan in February 2023 shows no stone.  Recent MRI in April 2024 did not report kidney stones.  Stone risk profile in June 2023 undercollection, urine volume 1.6 L, urine calcium, sodium, oxalate acceptable.  Urine citrate significantly lower 106, pH 6.3.   - repeat 24-hour urine stone risk profile currently in process, results pending.  Work up/labs: No obvious evidence of hypokalemia or acidosis.  Serum calcium, phosphorus acceptable.  Vitamin D level improving 30, PTH 51.  Treatment :  -If hypercalciuria, consider restarting chlorthalidone.  This was changed to Lasix due to leg edema issues. -continue potassium citrate to 30 mEq p.o. b.i.d. (did not like taste of liquid)  -advised to drink plenty of free water with goal urine output greater than 2 L per day.  -advised to follow low salt diet.    Right lower renal pole nodule versus artifact, follows with Urology  CT scan does not report any abnormalities

## 2025-01-16 LAB
AMMONIA UR-SCNC: 13 MMOL/24 HR (ref 15–60)
CA H2 PHOS DIHYD 24H SATFR UR: 1.13 (ref 0.5–2)
CALCIUM 24H UR-MRATE: 72 MG/24 HR
CALCIUM/CREAT UR: 35 MG/G CREAT (ref 34–196)
CALCIUM/KG BODY WEIGHT: ABNORMAL MG/24 HR/KG
CAOX INDEX 24H UR-RTO: 2.34 (ref 6–10)
CHLORIDE 24H UR-SRATE: 173 MMOL/24 HR (ref 70–250)
CITRATE 24H UR-MCNC: 395 MG/24 HR
COMMENT: ABNORMAL
CREAT UR-MCNC: 2068 MG/24 HR
CREAT/BW 24H UR-RELMRAT: ABNORMAL MG/24 HR/KG
CYSTINE 24H UR-MCNC: ABNORMAL MG/DL
MAGNESIUM 24H UR-MRATE: 80 MG/24 HR (ref 30–120)
OXALATE UR-MCNC: 41 MG/24 HR (ref 20–40)
PH 24H UR: 7.13 [PH] (ref 5.8–6.2)
PHOSPHATE 24H UR-MRATE: 1233 MG/24 HR (ref 600–1200)
POTASSIUM 24H UR-SCNC: 148 MMOL/24 HR (ref 20–100)
PROTEIN CATABOLIC RATE 24H UR-MRATE: ABNORMAL G/KG/24 HR
SODIUM 24H UR-SRATE: 187 MMOL/24 HR (ref 50–150)
SPECIMEN VOL 24H UR: 1820 ML/24 HR (ref 500–4000)
SULFATE 24H UR-SRATE: 49 MEQ/24 HR (ref 20–80)
URATE 24H SATFR UR: 0.08
URATE 24H UR-MRATE: 857 MG/24 HR
UUN 24H UR-MRATE: 12.62 G/24 HR (ref 6–14)

## 2025-01-17 NOTE — TELEPHONE ENCOUNTER
Please let him know stone risk profile shows urine volume 1.8 L which is slightly lower than goal.  He should still drink more water with goal urine output greater than 2 to 2.5 L/day.  Also urine citrate seems to be overall improving 395 comparing to prior value of 106.  pH 7.1.  Urine oxalate slightly higher.  Advise to follow low oxalate diet.  Please send educational material regarding low oxalate diet if he does not have one    No other changes in medication.

## 2025-01-18 DIAGNOSIS — R60.0 BILATERAL LEG EDEMA: ICD-10-CM

## 2025-01-20 RX ORDER — FUROSEMIDE 20 MG/1
40 TABLET ORAL DAILY
Qty: 60 TABLET | Refills: 5 | Status: SHIPPED | OUTPATIENT
Start: 2025-01-20

## 2025-01-20 NOTE — TELEPHONE ENCOUNTER
Spoke to pt's wife regarding the following message per Dr. Posey     Please let him know stone risk profile shows urine volume 1.8 L which is slightly lower than goal.  He should still drink more water with goal urine output greater than 2 to 2.5 L/day.  Also urine citrate seems to be overall improving 395 comparing to prior value of 106.  pH 7.1.  Urine oxalate slightly higher.  Advise to follow low oxalate diet.  Please send educational material regarding low oxalate diet if he does not have one    No other changes in medication.     Pt's wife verbalized understanding. Mailed low oxalate diet educational material to pt's address.

## 2025-01-24 DIAGNOSIS — I10 ESSENTIAL HYPERTENSION: ICD-10-CM

## 2025-01-24 DIAGNOSIS — N18.2 CKD (CHRONIC KIDNEY DISEASE), STAGE II: ICD-10-CM

## 2025-01-24 DIAGNOSIS — I10 BENIGN ESSENTIAL HYPERTENSION: ICD-10-CM

## 2025-01-24 RX ORDER — EPLERENONE 50 MG/1
50 TABLET, FILM COATED ORAL 2 TIMES DAILY
Qty: 180 TABLET | Refills: 1 | Status: SHIPPED | OUTPATIENT
Start: 2025-01-24

## 2025-02-14 ENCOUNTER — TELEPHONE (OUTPATIENT)
Dept: NEPHROLOGY | Facility: CLINIC | Age: 50
End: 2025-02-14

## 2025-02-28 ENCOUNTER — APPOINTMENT (OUTPATIENT)
Dept: LAB | Facility: CLINIC | Age: 50
End: 2025-02-28

## 2025-03-07 ENCOUNTER — APPOINTMENT (OUTPATIENT)
Dept: LAB | Facility: CLINIC | Age: 50
End: 2025-03-07
Payer: COMMERCIAL

## 2025-03-07 ENCOUNTER — RESULTS FOLLOW-UP (OUTPATIENT)
Dept: NEPHROLOGY | Facility: CLINIC | Age: 50
End: 2025-03-07

## 2025-03-07 DIAGNOSIS — E24.9: ICD-10-CM

## 2025-03-07 DIAGNOSIS — E29.1 SECONDARY MALE HYPOGONADISM: ICD-10-CM

## 2025-03-07 DIAGNOSIS — E83.39 HYPOPHOSPHATEMIA: ICD-10-CM

## 2025-03-07 LAB
ANION GAP SERPL CALCULATED.3IONS-SCNC: 10 MMOL/L (ref 4–13)
BASOPHILS # BLD AUTO: 0.08 THOUSANDS/ÂΜL (ref 0–0.1)
BASOPHILS NFR BLD AUTO: 1 % (ref 0–1)
BUN SERPL-MCNC: 23 MG/DL (ref 5–25)
CALCIUM SERPL-MCNC: 9.5 MG/DL (ref 8.4–10.2)
CHLORIDE SERPL-SCNC: 98 MMOL/L (ref 96–108)
CO2 SERPL-SCNC: 28 MMOL/L (ref 21–32)
CORTIS SERPL-MCNC: 13.3 UG/DL
CREAT SERPL-MCNC: 1.31 MG/DL (ref 0.6–1.3)
EOSINOPHIL # BLD AUTO: 0.08 THOUSAND/ÂΜL (ref 0–0.61)
EOSINOPHIL NFR BLD AUTO: 1 % (ref 0–6)
ERYTHROCYTE [DISTWIDTH] IN BLOOD BY AUTOMATED COUNT: 14 % (ref 11.6–15.1)
GFR SERPL CREATININE-BSD FRML MDRD: 62 ML/MIN/1.73SQ M
GLUCOSE SERPL-MCNC: 144 MG/DL (ref 65–140)
HCT VFR BLD AUTO: 55.9 % (ref 36.5–49.3)
HGB BLD-MCNC: 18 G/DL (ref 12–17)
IMM GRANULOCYTES # BLD AUTO: 0.18 THOUSAND/UL (ref 0–0.2)
IMM GRANULOCYTES NFR BLD AUTO: 2 % (ref 0–2)
LYMPHOCYTES # BLD AUTO: 1.49 THOUSANDS/ÂΜL (ref 0.6–4.47)
LYMPHOCYTES NFR BLD AUTO: 16 % (ref 14–44)
MCH RBC QN AUTO: 28.5 PG (ref 26.8–34.3)
MCHC RBC AUTO-ENTMCNC: 32.2 G/DL (ref 31.4–37.4)
MCV RBC AUTO: 88 FL (ref 82–98)
MONOCYTES # BLD AUTO: 0.73 THOUSAND/ÂΜL (ref 0.17–1.22)
MONOCYTES NFR BLD AUTO: 8 % (ref 4–12)
NEUTROPHILS # BLD AUTO: 6.91 THOUSANDS/ÂΜL (ref 1.85–7.62)
NEUTS SEG NFR BLD AUTO: 72 % (ref 43–75)
NRBC BLD AUTO-RTO: 0 /100 WBCS
PHOSPHATE SERPL-MCNC: 3 MG/DL (ref 2.7–4.5)
PLATELET # BLD AUTO: 246 THOUSANDS/UL (ref 149–390)
PMV BLD AUTO: 9.1 FL (ref 8.9–12.7)
POTASSIUM SERPL-SCNC: 4.4 MMOL/L (ref 3.5–5.3)
PSA SERPL-MCNC: 1.62 NG/ML (ref 0–4)
RBC # BLD AUTO: 6.32 MILLION/UL (ref 3.88–5.62)
SODIUM SERPL-SCNC: 136 MMOL/L (ref 135–147)
TESTOST SERPL-MSCNC: 662 NG/DL (ref 175–781)
WBC # BLD AUTO: 9.47 THOUSAND/UL (ref 4.31–10.16)

## 2025-03-07 PROCEDURE — 80048 BASIC METABOLIC PNL TOTAL CA: CPT

## 2025-03-07 PROCEDURE — 82530 CORTISOL FREE: CPT

## 2025-03-07 PROCEDURE — 84100 ASSAY OF PHOSPHORUS: CPT

## 2025-03-07 PROCEDURE — 85025 COMPLETE CBC W/AUTO DIFF WBC: CPT

## 2025-03-07 PROCEDURE — 84403 ASSAY OF TOTAL TESTOSTERONE: CPT

## 2025-03-07 PROCEDURE — G0103 PSA SCREENING: HCPCS

## 2025-03-07 PROCEDURE — 82533 TOTAL CORTISOL: CPT

## 2025-03-07 PROCEDURE — 36415 COLL VENOUS BLD VENIPUNCTURE: CPT

## 2025-03-07 NOTE — TELEPHONE ENCOUNTER
Phosphorus improving 3.0, creatinine stable at baseline.  Hemoglobin remains quite higher.  Would recommend for him to discuss this with PCP if further evaluation needed.

## 2025-03-10 NOTE — TELEPHONE ENCOUNTER
Spoke to pt's wife regarding the following message     Phosphorus improving 3.0, creatinine stable at baseline.  Hemoglobin remains quite higher.  Would recommend for him to discuss this with PCP if further evaluation needed.     Pt's wife verbalized understanding. Pt's wife asked if pt's hemoglobin levels being high would cause any issues to pt's kidney function. Please advise.

## 2025-03-10 NOTE — TELEPHONE ENCOUNTER
Spoke with pt's wife regarding the following message per Dr. Posey     Generally high hemoglobin should not be causing issues for kidneys but this may need to be further evaluated if any issues with the bone marrow/if any hematology evaluation needed     Pt's wife verbalized understanding.

## 2025-03-10 NOTE — TELEPHONE ENCOUNTER
Generally high hemoglobin should not be causing issues for kidneys but this may need to be further evaluated if any issues with the bone marrow/if any hematology evaluation needed

## 2025-03-14 LAB
CORTIS F 24H UR-MRATE: 58 UG/24 HR (ref 5–64)
CORTIS F UR-MCNC: 26 UG/L

## 2025-04-26 DIAGNOSIS — N18.2 CKD (CHRONIC KIDNEY DISEASE), STAGE II: ICD-10-CM

## 2025-04-26 DIAGNOSIS — I10 ESSENTIAL HYPERTENSION: ICD-10-CM

## 2025-04-26 DIAGNOSIS — I10 BENIGN ESSENTIAL HYPERTENSION: ICD-10-CM

## 2025-04-28 RX ORDER — CARVEDILOL 25 MG/1
25 TABLET ORAL 2 TIMES DAILY WITH MEALS
Qty: 180 TABLET | Refills: 1 | Status: SHIPPED | OUTPATIENT
Start: 2025-04-28

## 2025-07-24 DIAGNOSIS — N13.8 BPH WITH OBSTRUCTION/LOWER URINARY TRACT SYMPTOMS: ICD-10-CM

## 2025-07-24 DIAGNOSIS — N40.1 BPH WITH OBSTRUCTION/LOWER URINARY TRACT SYMPTOMS: ICD-10-CM

## 2025-07-24 RX ORDER — ALFUZOSIN HYDROCHLORIDE 10 MG/1
10 TABLET, EXTENDED RELEASE ORAL DAILY
Qty: 90 TABLET | Refills: 3 | Status: SHIPPED | OUTPATIENT
Start: 2025-07-24

## 2025-07-30 DIAGNOSIS — N13.8 BPH WITH OBSTRUCTION/LOWER URINARY TRACT SYMPTOMS: ICD-10-CM

## 2025-07-30 DIAGNOSIS — N18.2 CKD (CHRONIC KIDNEY DISEASE), STAGE II: ICD-10-CM

## 2025-07-30 DIAGNOSIS — N20.0 NEPHROLITHIASIS: ICD-10-CM

## 2025-07-30 DIAGNOSIS — N40.1 BPH WITH OBSTRUCTION/LOWER URINARY TRACT SYMPTOMS: ICD-10-CM

## 2025-07-30 DIAGNOSIS — K21.9 GASTROESOPHAGEAL REFLUX DISEASE WITHOUT ESOPHAGITIS: ICD-10-CM

## 2025-07-30 DIAGNOSIS — R60.0 BILATERAL LEG EDEMA: ICD-10-CM

## 2025-07-30 DIAGNOSIS — I10 BENIGN ESSENTIAL HYPERTENSION: ICD-10-CM

## 2025-07-30 DIAGNOSIS — R82.991 HYPOCITRATURIA: ICD-10-CM

## 2025-07-30 DIAGNOSIS — I10 ESSENTIAL HYPERTENSION: ICD-10-CM

## 2025-07-31 RX ORDER — FINASTERIDE 5 MG/1
5 TABLET, FILM COATED ORAL DAILY
Qty: 90 TABLET | Refills: 3 | Status: SHIPPED | OUTPATIENT
Start: 2025-07-31

## 2025-07-31 RX ORDER — PANTOPRAZOLE SODIUM 40 MG/1
40 TABLET, DELAYED RELEASE ORAL DAILY
Qty: 90 TABLET | Refills: 1 | Status: SHIPPED | OUTPATIENT
Start: 2025-07-31

## 2025-07-31 RX ORDER — EPLERENONE 50 MG/1
50 TABLET ORAL 2 TIMES DAILY
Qty: 180 TABLET | Refills: 0 | Status: SHIPPED | OUTPATIENT
Start: 2025-07-31

## 2025-07-31 RX ORDER — POTASSIUM CITRATE 15 MEQ/1
2 TABLET, EXTENDED RELEASE ORAL 2 TIMES DAILY
Qty: 360 TABLET | Refills: 0 | Status: SHIPPED | OUTPATIENT
Start: 2025-07-31

## 2025-07-31 RX ORDER — CARVEDILOL 25 MG/1
25 TABLET ORAL 2 TIMES DAILY WITH MEALS
Qty: 180 TABLET | Refills: 0 | Status: SHIPPED | OUTPATIENT
Start: 2025-07-31

## 2025-07-31 RX ORDER — FUROSEMIDE 20 MG/1
40 TABLET ORAL DAILY
Qty: 60 TABLET | Refills: 5 | Status: SHIPPED | OUTPATIENT
Start: 2025-07-31

## 2025-08-21 ENCOUNTER — TELEPHONE (OUTPATIENT)
Dept: NEPHROLOGY | Facility: CLINIC | Age: 50
End: 2025-08-21

## 2025-08-26 PROBLEM — E83.39 HYPOPHOSPHATEMIA: Status: ACTIVE | Noted: 2025-08-26

## (undated) DEVICE — UROCATCH BAG

## (undated) DEVICE — ABDOMINAL PAD: Brand: DERMACEA

## (undated) DEVICE — STERILE SURGICAL LUBRICANT,  TUBE: Brand: SURGILUBE

## (undated) DEVICE — PADDING CAST 6IN COTTON STRL

## (undated) DEVICE — KIT HIP ARTHROSCOPY DISP W/BANANA BLADE

## (undated) DEVICE — TUBING EXTENSION 6 FT CONTIN WAVE

## (undated) DEVICE — 3M™ STERI-STRIP™ REINFORCED ADHESIVE SKIN CLOSURES, R1547, 1/2 IN X 4 IN (12 MM X 100 MM), 6 STRIPS/ENVELOPE: Brand: 3M™ STERI-STRIP™

## (undated) DEVICE — NEEDLE 18 G X 1 1/2

## (undated) DEVICE — GLOVE SRG BIOGEL 8

## (undated) DEVICE — 4-PORT MANIFOLD: Brand: NEPTUNE 2

## (undated) DEVICE — GLOVE INDICATOR PI UNDERGLOVE SZ 8.5 BLUE

## (undated) DEVICE — WEBRIL 6 IN UNSTERILE

## (undated) DEVICE — SPECIMEN CONTAINER STERILE PEEL PACK

## (undated) DEVICE — GLOVE SRG BIOGEL 7.5

## (undated) DEVICE — COBAN 6 IN STERILE

## (undated) DEVICE — SUT MONOCRYL 2-0 SH 27 IN Y417H

## (undated) DEVICE — INVIEW CLEAR LEGGINGS: Brand: CONVERTORS

## (undated) DEVICE — BETHLEHEM UNIVERSAL  ARTHRO PK: Brand: CARDINAL HEALTH

## (undated) DEVICE — CATH URETERAL 5FR X 70 CM FLEX TIP POLYUR BARD

## (undated) DEVICE — SYRINGE 20ML LL

## (undated) DEVICE — VAPR COOLPULSE 90 ELECTRODE 90 DEGREES SUCTION WITH INTEGRATED HANDPIECE: Brand: VAPR COOLPULSE

## (undated) DEVICE — GLOVE SRG BIOGEL ECLIPSE 7.5

## (undated) DEVICE — GUIDEWIRE STRGHT TIP 0.035 IN  SOLO PLUS

## (undated) DEVICE — SCD SEQUENTIAL COMPRESSION COMFORT SLEEVE MEDIUM KNEE LENGTH: Brand: KENDALL SCD

## (undated) DEVICE — POSITIONER HANA TABLE PACK

## (undated) DEVICE — GAUZE SPONGES,16 PLY: Brand: CURITY

## (undated) DEVICE — INTENDED FOR TISSUE SEPARATION, AND OTHER PROCEDURES THAT REQUIRE A SHARP SURGICAL BLADE TO PUNCTURE OR CUT.: Brand: BARD-PARKER ® CARBON RIB-BACK BLADES

## (undated) DEVICE — CYSTO TUBING TUR Y IRRIGATION

## (undated) DEVICE — CHLORAPREP HI-LITE 26ML ORANGE

## (undated) DEVICE — 3M™ STERI-DRAPE™ U-DRAPE 1015: Brand: STERI-DRAPE™

## (undated) DEVICE — GLOVE INDICATOR PI UNDERGLOVE SZ 8 BLUE

## (undated) DEVICE — 6617 IOBAN II PATIENT ISOLATION DRAPE 5/BX,4BX/CS: Brand: STERI-DRAPE™ IOBAN™ 2

## (undated) DEVICE — BETHLEHEM TOTAL HIP, KIT: Brand: CARDINAL HEALTH

## (undated) DEVICE — PUDDLE VAC

## (undated) DEVICE — 3M™ DURAPORE™ SURGICAL TAPE 1538-3, 3 INCH X 10 YARD (7,5CM X 9,1M), 4 ROLLS/BOX: Brand: 3M™ DURAPORE™

## (undated) DEVICE — DRAPE C-ARM X-RAY

## (undated) DEVICE — BLADE SHAVER DISSECTOR 4MM 13CM COOLCUT

## (undated) DEVICE — BLADE SHAVER DISSECTOR 4.2MM 19CM HL CRV COOLCUT

## (undated) DEVICE — TUBING SUCTION 5MM X 12 FT

## (undated) DEVICE — GAUZE SPONGES,USP TYPE VII GAUZE, 12 PLY: Brand: CURITY

## (undated) DEVICE — TIBURON SPLIT SHEET: Brand: CONVERTORS

## (undated) DEVICE — ACE WRAP 6 IN UNSTERILE

## (undated) DEVICE — BURR RND 4MM 19CM 8 FLUTE HL COOLCUT

## (undated) DEVICE — 3M™ TEGADERM™ TRANSPARENT FILM DRESSING FRAME STYLE, 1624W, 2-3/8 IN X 2-3/4 IN (6 CM X 7 CM), 100/CT 4CT/CASE: Brand: 3M™ TEGADERM™

## (undated) DEVICE — 3M™ IOBAN™ 2 ANTIMICROBIAL INCISE DRAPE 6650EZ: Brand: IOBAN™ 2

## (undated) DEVICE — IMPERVIOUS STOCKINETTE: Brand: DEROYAL

## (undated) DEVICE — PACK TUR

## (undated) DEVICE — DRESSING MEPILEX AG BORDER 4 X 8 IN

## (undated) RX ORDER — PILOCARPINE HYDROCHLORIDE 12.5 MG/ML: 1 SOLUTION/ DROPS OPHTHALMIC ONCE A DAY